# Patient Record
Sex: FEMALE | Race: BLACK OR AFRICAN AMERICAN | NOT HISPANIC OR LATINO | Employment: OTHER | URBAN - METROPOLITAN AREA
[De-identification: names, ages, dates, MRNs, and addresses within clinical notes are randomized per-mention and may not be internally consistent; named-entity substitution may affect disease eponyms.]

---

## 2017-01-23 ENCOUNTER — OFFICE VISIT (OUTPATIENT)
Dept: FAMILY MEDICINE | Facility: CLINIC | Age: 62
End: 2017-01-23
Payer: COMMERCIAL

## 2017-01-23 VITALS
DIASTOLIC BLOOD PRESSURE: 80 MMHG | TEMPERATURE: 98 F | WEIGHT: 208.13 LBS | SYSTOLIC BLOOD PRESSURE: 160 MMHG | HEIGHT: 62 IN | HEART RATE: 82 BPM | BODY MASS INDEX: 38.3 KG/M2

## 2017-01-23 DIAGNOSIS — I10 ESSENTIAL HYPERTENSION: Primary | ICD-10-CM

## 2017-01-23 DIAGNOSIS — Z85.528 PERSONAL HISTORY OF RENAL CANCER: ICD-10-CM

## 2017-01-23 DIAGNOSIS — J30.9 ALLERGIC RHINITIS, UNSPECIFIED ALLERGIC RHINITIS TRIGGER, UNSPECIFIED RHINITIS SEASONALITY: ICD-10-CM

## 2017-01-23 DIAGNOSIS — F33.40 RECURRENT MAJOR DEPRESSIVE DISORDER, IN REMISSION: ICD-10-CM

## 2017-01-23 DIAGNOSIS — M70.71 HIP BURSITIS, RIGHT: ICD-10-CM

## 2017-01-23 DIAGNOSIS — J45.20 ASTHMA, INTERMITTENT, UNCOMPLICATED: ICD-10-CM

## 2017-01-23 DIAGNOSIS — I25.10 CORONARY ARTERY DISEASE INVOLVING NATIVE HEART WITHOUT ANGINA PECTORIS, UNSPECIFIED VESSEL OR LESION TYPE: ICD-10-CM

## 2017-01-23 PROCEDURE — 3077F SYST BP >= 140 MM HG: CPT | Mod: S$GLB,,, | Performed by: FAMILY MEDICINE

## 2017-01-23 PROCEDURE — 90732 PPSV23 VACC 2 YRS+ SUBQ/IM: CPT | Mod: S$GLB,,, | Performed by: FAMILY MEDICINE

## 2017-01-23 PROCEDURE — 90686 IIV4 VACC NO PRSV 0.5 ML IM: CPT | Mod: S$GLB,,, | Performed by: FAMILY MEDICINE

## 2017-01-23 PROCEDURE — 90471 IMMUNIZATION ADMIN: CPT | Mod: S$GLB,,, | Performed by: FAMILY MEDICINE

## 2017-01-23 PROCEDURE — 90472 IMMUNIZATION ADMIN EACH ADD: CPT | Mod: S$GLB,,, | Performed by: FAMILY MEDICINE

## 2017-01-23 PROCEDURE — 99214 OFFICE O/P EST MOD 30 MIN: CPT | Mod: 25,S$GLB,, | Performed by: FAMILY MEDICINE

## 2017-01-23 PROCEDURE — 1159F MED LIST DOCD IN RCRD: CPT | Mod: S$GLB,,, | Performed by: FAMILY MEDICINE

## 2017-01-23 PROCEDURE — 3079F DIAST BP 80-89 MM HG: CPT | Mod: S$GLB,,, | Performed by: FAMILY MEDICINE

## 2017-01-23 PROCEDURE — 99999 PR PBB SHADOW E&M-EST. PATIENT-LVL III: CPT | Mod: PBBFAC,,, | Performed by: FAMILY MEDICINE

## 2017-01-23 RX ORDER — ALBUTEROL SULFATE 90 UG/1
2 AEROSOL, METERED RESPIRATORY (INHALATION) EVERY 6 HOURS PRN
Qty: 18 G | Refills: 5 | Status: SHIPPED | OUTPATIENT
Start: 2017-01-23 | End: 2017-05-26 | Stop reason: SDUPTHER

## 2017-01-23 RX ORDER — VALSARTAN AND HYDROCHLOROTHIAZIDE 320; 25 MG/1; MG/1
1 TABLET, FILM COATED ORAL DAILY
Qty: 90 TABLET | Refills: 3 | Status: SHIPPED | OUTPATIENT
Start: 2017-01-23 | End: 2018-04-02 | Stop reason: SDUPTHER

## 2017-01-23 RX ORDER — CITALOPRAM 20 MG/1
20 TABLET, FILM COATED ORAL DAILY
Qty: 90 TABLET | Refills: 3 | Status: SHIPPED | OUTPATIENT
Start: 2017-01-23 | End: 2018-03-20 | Stop reason: SDUPTHER

## 2017-01-23 RX ORDER — FLUTICASONE PROPIONATE 50 MCG
SPRAY, SUSPENSION (ML) NASAL
Qty: 16 G | Refills: 12 | Status: SHIPPED | OUTPATIENT
Start: 2017-01-23 | End: 2017-10-11

## 2017-01-23 NOTE — MR AVS SNAPSHOT
[facility]  [address] [facility]  [place] [address]  Phone: [phone]  Fax: [phone]                  Erinn Silva   2017 3:40 PM   Office Visit    Description:  Female : 1955   Provider:  Layo Reed MD   Department:  [facility]           Reason for Visit     Medication Refill     Leg Pain           Diagnoses this Visit        Comments    Essential hypertension    -  Primary     Asthma, intermittent, uncomplicated         Coronary artery disease involving native heart without angina pectoris, unspecified vessel or lesion type         Allergic rhinitis, unspecified allergic rhinitis trigger, unspecified rhinitis seasonality         Personal history of renal cancer         Recurrent major depressive disorder, in remission         Hip bursitis, right                To Do List           Future Appointments        Provider Department Dept Phone    2017 11:15 AM LABORATORY, [place] [facility] [phone]    2017 4:00 PM Layo Reed MD [facility] [phone]      Goals (5 Years of Data)     None       These Medications        Disp Refills Start End    valsartan-hydrochlorothiazide (DIOVAN-HCT) 320-25 mg per tablet 90 tablet 3 2017     Take 1 tablet by mouth once daily. - Oral    Pharmacy: [id number] - [place] -  [facility] AT [facility] Ph #: [phone]       citalopram (CELEXA) 20 MG tablet 90 tablet 3 2017     Take 1 tablet (20 mg total) by mouth once daily. - Oral    Pharmacy: [facility] [id number] - [place] -  [facility] AT [facility] Ph #: [phone]       albuterol 90 mcg/actuation inhaler 18 g 5 2017    Inhale 2 puffs into the lungs every 6 (six) hours as needed for Wheezing. - Inhalation    Pharmacy: [facility] [id number] - [place] -  [facility] AT [facility] Ph  #: [phone]       fluticasone (FLONASE) 50 mcg/actuation nasal spray 16 g 12 1/23/2017     Use 2 sprays to each nostril daily    Pharmacy: [facility] [address] [place] [address] [place] [facility] [place] AT [facility] [place]  #: [phone]         [facility] On Call     [facility] On Call Nurse Care Line - 24/7 Assistance  Registered nurses in the [facility] On Call Center provide clinical advisement, health education, appointment booking, and other advisory services.  Call for this free service at [phone].             Medications           Message regarding Medications     Verify the changes and/or additions to your medication regime listed below are the same as discussed with your clinician today.  If any of these changes or additions are incorrect, please notify your healthcare provider.        START taking these NEW medications        Refills    valsartan-hydrochlorothiazide (DIOVAN-HCT) 320-25 mg per tablet 3    Sig: Take 1 tablet by mouth once daily.    Class: Normal    Route: Oral    fluticasone (FLONASE) 50 mcg/actuation nasal spray 12    Sig: Use 2 sprays to each nostril daily    Class: Normal      CHANGE how you are taking these medications     Start Taking Instead of    citalopram (CELEXA) 20 MG tablet citalopram (CELEXA) 20 MG tablet    Dosage:  Take 1 tablet (20 mg total) by mouth once daily. Dosage:  TAKE 1 TABLET BY MOUTH DAILY    Reason for Change:  Reorder       STOP taking these medications     valsartan-hydrochlorothiazide (DIOVAN-HCT) 160-25 mg per tablet TAKE 1 TABLET BY MOUTH ONCE DAILY           Verify that the below list of medications is an accurate representation of the medications you are currently taking.  If none reported, the list may be blank. If incorrect, please contact your healthcare provider. Carry this list with you in case of emergency.           Current Medications     acetaminophen (TYLENOL) 325 MG tablet Take 2 tablets (650 mg total) by mouth every 6 (six) hours as  "needed for Pain.    albuterol 90 mcg/actuation inhaler Inhale 2 puffs into the lungs every 6 (six) hours as needed for Wheezing.    aspirin (ECOTRIN) 81 MG EC tablet Take 81 mg by mouth once daily.     citalopram (CELEXA) 20 MG tablet Take 1 tablet (20 mg total) by mouth once daily.    metoprolol succinate (TOPROL-XL) 25 MG 24 hr tablet Take 1 tablet (25 mg total) by mouth once daily.    multivitamin (ONE DAILY MULTIVITAMIN) per tablet Take 1 tablet by mouth once daily.     cetirizine (ZYRTEC) 10 MG tablet Take 1 tablet (10 mg total) by mouth once daily.    fluticasone (FLONASE) 50 mcg/actuation nasal spray Use 2 sprays to each nostril daily    omeprazole (PRILOSEC OTC) 20 MG tablet Take 20 mg by mouth once daily.    triamcinolone acetonide 0.1% (KENALOG) 0.1 % cream Apply topically 2 (two) times daily.    valsartan-hydrochlorothiazide (DIOVAN-HCT) 320-25 mg per tablet Take 1 tablet by mouth once daily.           Clinical Reference Information           Vital Signs - Last Recorded  Most recent update: 1/23/2017  3:51 PM by Mary Lombardo MA    BP Pulse Temp Ht Wt BMI    (!) 160/80 82 97.8 °F (36.6 °C) 5' 2" (1.575 m) 94.4 kg (208 lb 1.8 oz) 38.06 kg/m2      Blood Pressure          Most Recent Value    BP  (!)  160/80      Allergies as of 1/23/2017     Oxycodone    Azithromycin    Codeine    Corticosteroids (Glucocorticoids)    Doxycycline    Mobic [Meloxicam]    Pcn [Penicillins]    Shellfish Containing Products    Sulfa (Sulfonamide Antibiotics)      Immunizations Administered on Date of Encounter - 1/23/2017     Name Date Dose VIS Date Route    Pneumococcal Polysaccharide - 23 Valent 1/23/2017 0.5 mL 4/24/2015 Intramuscular    influenza - Quadrivalent - PF (ADULT) 1/23/2017 0.5 mL 8/7/2015 Intramuscular      Orders Placed During Today's Visit      Normal Orders This Visit    Influenza - Quadrivalent (3 years & older) (PF)     Pneumococcal Polysaccharide Vaccine (23 Valent) (SQ/IM)     Future Labs/Procedures " Expected by Expires    Comprehensive metabolic panel  1/23/2017 1/23/2018    Lipid panel  1/23/2017 (Approximate) 1/23/2018

## 2017-01-24 NOTE — PROGRESS NOTES
Patient presents follow-up.  Hypertension blood pressure elevated.  States missed medication yesterday, but took it the day before.  Last blood pressure the clinic was elevated.  Mild intermittent asthma needs refill albuterol.  Rarely uses.  Coronary disease without angina.  Followed by cardiology.  Rhinitis with ALLERGIES would like to try something besides oral Claritin.  Depression well controlled with current Celexa.  She does complain of some right lateral hip pain during the past couple of months.  Personal history of treated renal cancer greater than 10 years ago.  Last visit urology recommended no further continued surveillance.    Past Medical History:  Past Medical History   Diagnosis Date    Anxiety     AR (allergic rhinitis)     Asthma, intermittent     Coronary artery disease     Depression     Endometrial polyp     GERD (gastroesophageal reflux disease)     Hiatal hernia     Hyperlipidemia LDL goal <100     Hypertension     Mild mitral regurgitation     Multiple thyroid nodules 3/27/2014    Obesity (BMI 30-39.9) 3/27/2014    Renal cell carcinoma 2002     removed - no recurrence    Sleep apnea     Solitary kidney, acquired      right     Past Surgical History   Procedure Laterality Date    Cholecystectomy      Ovarian cyst removal      Tubal ligation      Rotator cuff repair       L    Colonoscopy  2002     negative    Colonoscopy  3/25/2014          Coronary angioplasty  2008?     balloon angioplasty    Cardiac catheterization  6/27/14     has blockages, but per patient no stents    Nephrectomy Left 2002     [facility] - cancer    Polypectomy       endometrial polyp removal    Appendectomy      Thyroidectomy, partial Left      Social History     Social History    Marital status:      Spouse name: N/A    Number of children: N/A    Years of education: N/A     Occupational History    Not on file.     Social History Main Topics    Smoking  status: Never Smoker    Smokeless tobacco: Never Used    Alcohol use No    Drug use: No    Sexual activity: Not on file     Other Topics Concern    Not on file     Social History Narrative     Family History   Problem Relation Age of Onset    Heart attack Father 67    Diabetes Paternal Aunt     Diabetes Paternal Uncle     Colon cancer Neg Hx     Stomach cancer Neg Hx      Review of patient's allergies indicates:   Allergen Reactions    Oxycodone Shortness Of Breath, Nausea And Vomiting and Other (See Comments)     Anxiety.    Azithromycin Itching    Codeine Palpitations    Corticosteroids (glucocorticoids) Palpitations    Doxycycline Itching and Nausea Only    Mobic [meloxicam] Itching    Pcn [penicillins] Nausea And Vomiting    Shellfish containing products Itching     topical iodine OK    Sulfa (sulfonamide antibiotics) Nausea And Vomiting     Current Outpatient Prescriptions on File Prior to Visit   Medication Sig Dispense Refill    acetaminophen (TYLENOL) 325 MG tablet Take 2 tablets (650 mg total) by mouth every 6 (six) hours as needed for Pain.  0    aspirin (ECOTRIN) 81 MG EC tablet Take 81 mg by mouth once daily.       metoprolol succinate (TOPROL-XL) 25 MG 24 hr tablet Take 1 tablet (25 mg total) by mouth once daily. 30 tablet 11    multivitamin (ONE DAILY MULTIVITAMIN) per tablet Take 1 tablet by mouth once daily.       [DISCONTINUED] albuterol 90 mcg/actuation inhaler Inhale 2 puffs into the lungs every 6 (six) hours as needed for Wheezing. (Patient taking differently: Inhale 2 puffs into the lungs every 6 (six) hours as needed for Wheezing. ) 18 g 5    [DISCONTINUED] citalopram (CELEXA) 20 MG tablet TAKE 1 TABLET BY MOUTH DAILY 30 tablet 0    [DISCONTINUED] valsartan-hydrochlorothiazide (DIOVAN-HCT) 160-25 mg per tablet TAKE 1 TABLET BY MOUTH ONCE DAILY 30 tablet 0    cetirizine (ZYRTEC) 10 MG tablet Take 1 tablet (10 mg total) by mouth once daily. 10 tablet 0    omeprazole  "(PRILOSEC OTC) 20 MG tablet Take 20 mg by mouth once daily.      triamcinolone acetonide 0.1% (KENALOG) 0.1 % cream Apply topically 2 (two) times daily. 45 g 0     No current facility-administered medications on file prior to visit.            ROS:  GENERAL: No fever, chills,  or significant weight changes.   CARDIOVASCULAR: Denies chest pain, PND, orthopnea or reduced exercise tolerance.  ABDOMEN: Appetite fine. Denies diarrhea, abdominal pain, hematemesis or blood in stool.  URINARY: No flank pain, dysuria or hematuria.      OBJECTIVE:     Vitals:    01/23/17 1544   BP: (!) 160/80   Pulse: 82   Temp: 97.8 °F (36.6 °C)   Weight: 94.4 kg (208 lb 1.8 oz)   Height: 5' 2" (1.575 m)     Wt Readings from Last 3 Encounters:   01/23/17 94.4 kg (208 lb 1.8 oz)   12/05/16 96.6 kg (212 lb 15.4 oz)   04/22/16 97.7 kg (215 lb 6.2 oz)     APPEARANCE: Well nourished, well developed, in no acute distress.    HEAD: Normocephalic.  Atraumatic.  No sinus tenderness.  EYES:   Right eye: Pupil reactive.  Conjunctiva clear.    Left eye: Pupil reactive.  Conjunctiva clear.    Both fundi:  Grossly normal to nondilated exam. EOMI.    EARS: TM's intact. Light reflex normal. No retraction or perforation.    NOSE:  clear.  MOUTH & THROAT:  No pharyngeal erythema or exudate. No lesions.  NECK: Supple. No bruits.  No JVD.  No cervical lymphadenopathy.  No thyromegaly.    CHEST: Breath sounds clear bilaterally.  Normal respiratory effort  CARDIOVASCULAR: Normal rate.  Regular rhythm.  No murmurs.  No rub.  No gallops.  ABDOMEN: Bowel sounds normal.  Soft.  No tenderness.  No organomegaly.  PERIPHERAL VASCULAR: No cyanosis.  No clubbing.  No edema.  NEUROLOGIC: No focal findings.  MENTAL STATUS: Alert.  Oriented x 3.  Right hip full range of motion.  Mild tenderness palpation laterally greater trochanter      Erinn was seen today for medication refill and leg pain.    Diagnoses and all orders for this visit:    Essential hypertension  -     " Comprehensive metabolic panel; Future  -     Lipid panel; Future    Asthma, intermittent, uncomplicated  -     albuterol 90 mcg/actuation inhaler; Inhale 2 puffs into the lungs every 6 (six) hours as needed for Wheezing.    Coronary artery disease involving native heart without angina pectoris, unspecified vessel or lesion type    Allergic rhinitis, unspecified allergic rhinitis trigger, unspecified rhinitis seasonality    Personal history of renal cancer    Recurrent major depressive disorder, in remission    Hip bursitis, right    Other orders  -     valsartan-hydrochlorothiazide (DIOVAN-HCT) 320-25 mg per tablet; Take 1 tablet by mouth once daily.  -     citalopram (CELEXA) 20 MG tablet; Take 1 tablet (20 mg total) by mouth once daily.  -     Influenza - Quadrivalent (3 years & older) (PF)  -     Pneumococcal Polysaccharide Vaccine (23 Valent) (SQ/IM)  -     fluticasone (FLONASE) 50 mcg/actuation nasal spray; Use 2 sprays to each nostril daily      Increase Diovan HCT.  Continue other medications.follow-up 1 month

## 2017-01-26 ENCOUNTER — LAB VISIT (OUTPATIENT)
Dept: LAB | Facility: HOSPITAL | Age: 62
End: 2017-01-26
Attending: FAMILY MEDICINE
Payer: COMMERCIAL

## 2017-01-26 DIAGNOSIS — I10 ESSENTIAL HYPERTENSION: ICD-10-CM

## 2017-01-26 LAB
ALBUMIN SERPL BCP-MCNC: 3.8 G/DL
ALP SERPL-CCNC: 76 U/L
ALT SERPL W/O P-5'-P-CCNC: 21 U/L
ANION GAP SERPL CALC-SCNC: 6 MMOL/L
AST SERPL-CCNC: 22 U/L
BILIRUB SERPL-MCNC: 0.7 MG/DL
BUN SERPL-MCNC: 12 MG/DL
CALCIUM SERPL-MCNC: 9.5 MG/DL
CHLORIDE SERPL-SCNC: 101 MMOL/L
CHOLEST/HDLC SERPL: 4.8 {RATIO}
CO2 SERPL-SCNC: 31 MMOL/L
CREAT SERPL-MCNC: 0.8 MG/DL
EST. GFR  (AFRICAN AMERICAN): >60 ML/MIN/1.73 M^2
EST. GFR  (NON AFRICAN AMERICAN): >60 ML/MIN/1.73 M^2
GLUCOSE SERPL-MCNC: 100 MG/DL
HDL/CHOLESTEROL RATIO: 20.7 %
HDLC SERPL-MCNC: 246 MG/DL
HDLC SERPL-MCNC: 51 MG/DL
LDLC SERPL CALC-MCNC: 166.8 MG/DL
NONHDLC SERPL-MCNC: 195 MG/DL
POTASSIUM SERPL-SCNC: 4.4 MMOL/L
PROT SERPL-MCNC: 7.7 G/DL
SODIUM SERPL-SCNC: 138 MMOL/L
TRIGL SERPL-MCNC: 141 MG/DL

## 2017-01-26 PROCEDURE — 80061 LIPID PANEL: CPT

## 2017-01-26 PROCEDURE — 36415 COLL VENOUS BLD VENIPUNCTURE: CPT | Mod: PO

## 2017-01-26 PROCEDURE — 80053 COMPREHEN METABOLIC PANEL: CPT

## 2017-02-13 DIAGNOSIS — E78.5 HYPERLIPIDEMIA, UNSPECIFIED HYPERLIPIDEMIA TYPE: Primary | ICD-10-CM

## 2017-02-13 RX ORDER — PRAVASTATIN SODIUM 20 MG/1
20 TABLET ORAL DAILY
COMMUNITY
End: 2017-02-13 | Stop reason: SDUPTHER

## 2017-02-13 RX ORDER — PRAVASTATIN SODIUM 20 MG/1
20 TABLET ORAL DAILY
Qty: 30 TABLET | Refills: 11 | Status: SHIPPED | OUTPATIENT
Start: 2017-02-13 | End: 2017-10-31 | Stop reason: DRUGHIGH

## 2017-02-13 NOTE — TELEPHONE ENCOUNTER
Cholesterol is elevated.  Recommend start pravastatin 20 mg daily.  Repeat lipid panel, ALT in 3 months. My nurse will contact you to arrange.   Thanks,   Dr. Reed     Results released on [facility]

## 2017-02-14 ENCOUNTER — PATIENT OUTREACH (OUTPATIENT)
Dept: ADMINISTRATIVE | Facility: HOSPITAL | Age: 62
End: 2017-02-14
Payer: COMMERCIAL

## 2017-02-20 ENCOUNTER — TELEPHONE (OUTPATIENT)
Dept: FAMILY MEDICINE | Facility: CLINIC | Age: 62
End: 2017-02-20

## 2017-02-20 NOTE — TELEPHONE ENCOUNTER
----- Message from Julia Scales sent at 2/17/2017  3:20 PM CST -----  Contact: Patient  Patient is returning a call, please call her back at [phone]. Thank you

## 2017-02-22 ENCOUNTER — OFFICE VISIT (OUTPATIENT)
Dept: FAMILY MEDICINE | Facility: CLINIC | Age: 62
End: 2017-02-22
Payer: COMMERCIAL

## 2017-02-22 VITALS
SYSTOLIC BLOOD PRESSURE: 158 MMHG | HEART RATE: 80 BPM | HEIGHT: 62 IN | DIASTOLIC BLOOD PRESSURE: 80 MMHG | WEIGHT: 207.13 LBS | BODY MASS INDEX: 38.12 KG/M2

## 2017-02-22 DIAGNOSIS — E78.5 HYPERLIPIDEMIA LDL GOAL <100: ICD-10-CM

## 2017-02-22 DIAGNOSIS — I10 ESSENTIAL HYPERTENSION: Primary | ICD-10-CM

## 2017-02-22 DIAGNOSIS — I25.10 CORONARY ARTERY DISEASE INVOLVING NATIVE HEART WITHOUT ANGINA PECTORIS, UNSPECIFIED VESSEL OR LESION TYPE: ICD-10-CM

## 2017-02-22 DIAGNOSIS — E66.01 SEVERE OBESITY (BMI 35.0-35.9 WITH COMORBIDITY): ICD-10-CM

## 2017-02-22 PROCEDURE — 99213 OFFICE O/P EST LOW 20 MIN: CPT | Mod: S$GLB,,, | Performed by: FAMILY MEDICINE

## 2017-02-22 PROCEDURE — 3079F DIAST BP 80-89 MM HG: CPT | Mod: S$GLB,,, | Performed by: FAMILY MEDICINE

## 2017-02-22 PROCEDURE — 3077F SYST BP >= 140 MM HG: CPT | Mod: S$GLB,,, | Performed by: FAMILY MEDICINE

## 2017-02-22 PROCEDURE — 1160F RVW MEDS BY RX/DR IN RCRD: CPT | Mod: S$GLB,,, | Performed by: FAMILY MEDICINE

## 2017-02-22 PROCEDURE — 99999 PR PBB SHADOW E&M-EST. PATIENT-LVL III: CPT | Mod: PBBFAC,,, | Performed by: FAMILY MEDICINE

## 2017-02-22 RX ORDER — METOPROLOL SUCCINATE 50 MG/1
50 TABLET, EXTENDED RELEASE ORAL DAILY
Qty: 30 TABLET | Refills: 11 | Status: SHIPPED | OUTPATIENT
Start: 2017-02-22 | End: 2018-04-04 | Stop reason: SDUPTHER

## 2017-02-22 NOTE — MR AVS SNAPSHOT
[facility]  [address] [facility]  [place] [address]  Phone: [phone]  Fax: [phone]                  Erinn Silva   2017 4:00 PM   Office Visit    Description:  Female : 1955   Provider:  Layo Reed MD   Department:  [facility]           Reason for Visit     Follow-up           Diagnoses this Visit        Comments    Essential hypertension    -  Primary     Coronary artery disease involving native heart without angina pectoris, unspecified vessel or lesion type         Hyperlipidemia LDL goal <100         Severe obesity (BMI 35.0-35.9 with comorbidity)                To Do List           Future Appointments        Provider Department Dept Phone    3/23/2017 4:00 PM NURSE, [facility] [phone]    2017 9:25 AM LABORATORY, [place] [facility] [phone]      Goals (5 Years of Data)     None       These Medications        Disp Refills Start End    metoprolol succinate (TOPROL-XL) 50 MG 24 hr tablet 30 tablet 11 2017     Take 1 tablet (50 mg total) by mouth once daily. - Oral    Pharmacy: [facility] [id number] - [address] [place] AT [facility] [place] Ph #: [phone]         [facility]     [facility] Nurse Care Line -  Assistance  Registered nurses in the [facility] Center provide clinical advisement, health education, appointment booking, and other advisory services.  Call for this free service at [phone].             Medications           Message regarding Medications     Verify the changes and/or additions to your medication regime listed below are the same as discussed with your clinician today.  If any of these changes or additions are incorrect, please notify your healthcare provider.        CHANGE how you are taking these medications     Start Taking Instead of    metoprolol succinate (TOPROL-XL) 50 MG 24 hr tablet metoprolol  "succinate (TOPROL-XL) 25 MG 24 hr tablet    Dosage:  Take 1 tablet (50 mg total) by mouth once daily. Dosage:  Take 1 tablet (25 mg total) by mouth once daily.    Reason for Change:  Reorder            Verify that the below list of medications is an accurate representation of the medications you are currently taking.  If none reported, the list may be blank. If incorrect, please contact your healthcare provider. Carry this list with you in case of emergency.           Current Medications     acetaminophen (TYLENOL) 325 MG tablet Take 2 tablets (650 mg total) by mouth every 6 (six) hours as needed for Pain.    albuterol 90 mcg/actuation inhaler Inhale 2 puffs into the lungs every 6 (six) hours as needed for Wheezing.    aspirin (ECOTRIN) 81 MG EC tablet Take 81 mg by mouth once daily.     citalopram (CELEXA) 20 MG tablet Take 1 tablet (20 mg total) by mouth once daily.    fluticasone (FLONASE) 50 mcg/actuation nasal spray Use 2 sprays to each nostril daily    metoprolol succinate (TOPROL-XL) 50 MG 24 hr tablet Take 1 tablet (50 mg total) by mouth once daily.    multivitamin (ONE DAILY MULTIVITAMIN) per tablet Take 1 tablet by mouth once daily.     pravastatin (PRAVACHOL) 20 MG tablet Take 1 tablet (20 mg total) by mouth once daily.    valsartan-hydrochlorothiazide (DIOVAN-HCT) 320-25 mg per tablet Take 1 tablet by mouth once daily.    cetirizine (ZYRTEC) 10 MG tablet Take 1 tablet (10 mg total) by mouth once daily.    omeprazole (PRILOSEC OTC) 20 MG tablet Take 20 mg by mouth once daily.    triamcinolone acetonide 0.1% (KENALOG) 0.1 % cream Apply topically 2 (two) times daily.           Clinical Reference Information           Your Vitals Were     BP Pulse Height Weight BMI    158/80 80 5' 2" (1.575 m) 93.9 kg (207 lb 2 oz) 37.88 kg/m2      Blood Pressure          Most Recent Value    BP  (!)  158/80      Allergies as of 2/22/2017     Oxycodone    Azithromycin    Codeine    Corticosteroids (Glucocorticoids)    " Doxycycline    Mobic [Meloxicam]    Pcn [Penicillins]    Shellfish Containing Products    Sulfa (Sulfonamide Antibiotics)      Immunizations Administered on Date of Encounter - 2/22/2017     None      Language Assistance Services     ATTENTION: Language assistance services are available, free of charge. Please call [phone].      ATENCIÓN: Si habla selwyn, tiene a vasquez disposición servicios gratuitos de asistencia lingüística. Llame al [phone].     CHÚ Ý: N?u b?n nói Ti?ng Vi?t, có các d?ch v? h? tr? ngôn ng? mi?n phí dành cho b?n. G?i s? [phone].         [facility] complies with applicable Federal civil rights laws and does not discriminate on the basis of race, color, national origin, age, disability, or sex.

## 2017-03-15 ENCOUNTER — TELEPHONE (OUTPATIENT)
Dept: FAMILY MEDICINE | Facility: CLINIC | Age: 62
End: 2017-03-15

## 2017-03-15 ENCOUNTER — LAB VISIT (OUTPATIENT)
Dept: LAB | Facility: HOSPITAL | Age: 62
End: 2017-03-15
Attending: NURSE PRACTITIONER
Payer: COMMERCIAL

## 2017-03-15 ENCOUNTER — OFFICE VISIT (OUTPATIENT)
Dept: FAMILY MEDICINE | Facility: CLINIC | Age: 62
End: 2017-03-15
Payer: COMMERCIAL

## 2017-03-15 VITALS
DIASTOLIC BLOOD PRESSURE: 81 MMHG | SYSTOLIC BLOOD PRESSURE: 137 MMHG | WEIGHT: 213.75 LBS | TEMPERATURE: 98 F | HEIGHT: 62 IN | BODY MASS INDEX: 39.34 KG/M2 | HEART RATE: 68 BPM

## 2017-03-15 DIAGNOSIS — R10.11 RUQ DISCOMFORT: Primary | ICD-10-CM

## 2017-03-15 DIAGNOSIS — R11.0 NAUSEA: ICD-10-CM

## 2017-03-15 DIAGNOSIS — R10.11 RUQ DISCOMFORT: ICD-10-CM

## 2017-03-15 DIAGNOSIS — R19.7 DIARRHEA, UNSPECIFIED TYPE: ICD-10-CM

## 2017-03-15 LAB
ALBUMIN SERPL BCP-MCNC: 3.5 G/DL
ALP SERPL-CCNC: 68 U/L
ALT SERPL W/O P-5'-P-CCNC: 20 U/L
AST SERPL-CCNC: 16 U/L
BILIRUB DIRECT SERPL-MCNC: <0.1 MG/DL
BILIRUB SERPL-MCNC: 0.2 MG/DL
PROT SERPL-MCNC: 7.2 G/DL
WBC # BLD AUTO: 6.9 K/UL

## 2017-03-15 PROCEDURE — 99213 OFFICE O/P EST LOW 20 MIN: CPT | Mod: S$GLB,,, | Performed by: NURSE PRACTITIONER

## 2017-03-15 PROCEDURE — 1160F RVW MEDS BY RX/DR IN RCRD: CPT | Mod: S$GLB,,, | Performed by: NURSE PRACTITIONER

## 2017-03-15 PROCEDURE — 99999 PR PBB SHADOW E&M-EST. PATIENT-LVL IV: CPT | Mod: PBBFAC,,, | Performed by: NURSE PRACTITIONER

## 2017-03-15 PROCEDURE — 36415 COLL VENOUS BLD VENIPUNCTURE: CPT | Mod: PO

## 2017-03-15 PROCEDURE — 3079F DIAST BP 80-89 MM HG: CPT | Mod: S$GLB,,, | Performed by: NURSE PRACTITIONER

## 2017-03-15 PROCEDURE — 80076 HEPATIC FUNCTION PANEL: CPT

## 2017-03-15 PROCEDURE — 3075F SYST BP GE 130 - 139MM HG: CPT | Mod: S$GLB,,, | Performed by: NURSE PRACTITIONER

## 2017-03-15 PROCEDURE — 85048 AUTOMATED LEUKOCYTE COUNT: CPT

## 2017-03-15 RX ORDER — DICYCLOMINE HYDROCHLORIDE 20 MG/1
20 TABLET ORAL 2 TIMES DAILY
Qty: 20 TABLET | Refills: 0 | Status: SHIPPED | OUTPATIENT
Start: 2017-03-15 | End: 2017-03-25

## 2017-03-15 RX ORDER — ONDANSETRON HYDROCHLORIDE 8 MG/1
8 TABLET, FILM COATED ORAL EVERY 8 HOURS PRN
Qty: 15 TABLET | Refills: 0 | Status: SHIPPED | OUTPATIENT
Start: 2017-03-15 | End: 2017-03-20

## 2017-03-15 NOTE — MR AVS SNAPSHOT
[facility]  [address] [facility] [address]  Phone: [phone]  Fax: [phone]                  Erinn Silva   3/15/2017 1:40 PM   Office Visit    Description:  Female : 1955   Provider:  Tessa Cross NP   Department:  [facility]           Reason for Visit     Abdominal Pain           Diagnoses this Visit        Comments    RUQ discomfort    -  Primary     Nausea         Diarrhea, unspecified type                To Do List           Future Appointments        Provider Department Dept Phone    3/15/2017 2:35 PM LABORATORY, [place] [facility] [phone]    3/21/2017 8:00 AM HMDC US1 [facility] [phone]    3/23/2017 4:00 PM NURSE, [facility] [phone]    2017 9:25 AM LABORATORY, [place] [facility] [phone]      Goals (5 Years of Data)     None       These Medications        Disp Refills Start End    dicyclomine (BENTYL) 20 mg tablet 20 tablet 0 3/15/2017 3/25/2017    Take 1 tablet (20 mg total) by mouth 2 (two) times daily. - Oral    Pharmacy: [facility] [id number] [place] [address] [address] [facility] [address] [facility] Ph #: [phone]       ondansetron (ZOFRAN) 8 MG tablet 15 tablet 0 3/15/2017 3/20/2017    Take 1 tablet (8 mg total) by mouth every 8 (eight) hours as needed. - Oral    Pharmacy: [facility] [id number] [place] [address] [place] - [address] [facility] [address] [facility] Ph #: [phone]         [facility]     [facility] Care Line -  Assistance  Registered nurses in the [facility] Center provide clinical advisement, health education, appointment booking, and other advisory services.  Call for this free service at [phone].             Medications           Message regarding Medications     Verify the changes and/or additions to your medication regime listed below are the same  as discussed with your clinician today.  If any of these changes or additions are incorrect, please notify your healthcare provider.        START taking these NEW medications        Refills    dicyclomine (BENTYL) 20 mg tablet 0    Sig: Take 1 tablet (20 mg total) by mouth 2 (two) times daily.    Class: Normal    Route: Oral    ondansetron (ZOFRAN) 8 MG tablet 0    Sig: Take 1 tablet (8 mg total) by mouth every 8 (eight) hours as needed.    Class: Normal    Route: Oral           Verify that the below list of medications is an accurate representation of the medications you are currently taking.  If none reported, the list may be blank. If incorrect, please contact your healthcare provider. Carry this list with you in case of emergency.           Current Medications     acetaminophen (TYLENOL) 325 MG tablet Take 2 tablets (650 mg total) by mouth every 6 (six) hours as needed for Pain.    albuterol 90 mcg/actuation inhaler Inhale 2 puffs into the lungs every 6 (six) hours as needed for Wheezing.    aspirin (ECOTRIN) 81 MG EC tablet Take 81 mg by mouth once daily.     citalopram (CELEXA) 20 MG tablet Take 1 tablet (20 mg total) by mouth once daily.    fluticasone (FLONASE) 50 mcg/actuation nasal spray Use 2 sprays to each nostril daily    metoprolol succinate (TOPROL-XL) 50 MG 24 hr tablet Take 1 tablet (50 mg total) by mouth once daily.    multivitamin (ONE DAILY MULTIVITAMIN) per tablet Take 1 tablet by mouth once daily.     pravastatin (PRAVACHOL) 20 MG tablet Take 1 tablet (20 mg total) by mouth once daily.    valsartan-hydrochlorothiazide (DIOVAN-HCT) 320-25 mg per tablet Take 1 tablet by mouth once daily.    cetirizine (ZYRTEC) 10 MG tablet Take 1 tablet (10 mg total) by mouth once daily.    dicyclomine (BENTYL) 20 mg tablet Take 1 tablet (20 mg total) by mouth 2 (two) times daily.    omeprazole (PRILOSEC OTC) 20 MG tablet Take 20 mg by mouth once daily.    ondansetron (ZOFRAN) 8 MG tablet Take 1 tablet (8 mg  "total) by mouth every 8 (eight) hours as needed.    triamcinolone acetonide 0.1% (KENALOG) 0.1 % cream Apply topically 2 (two) times daily.           Clinical Reference Information           Your Vitals Were     BP Pulse Temp Height Weight BMI    137/81 68 98 °F (36.7 °C) 5' 2" (1.575 m) 97 kg (213 lb 11.8 oz) 39.09 kg/m2      Blood Pressure          Most Recent Value    BP  137/81      Allergies as of 3/15/2017     Oxycodone    Azithromycin    Codeine    Corticosteroids (Glucocorticoids)    Doxycycline    Mobic [Meloxicam]    Pcn [Penicillins]    Shellfish Containing Products    Sulfa (Sulfonamide Antibiotics)      Immunizations Administered on Date of Encounter - 3/15/2017     None      Orders Placed During Today's Visit     Future Labs/Procedures Expected by Expires    Giardia / Cryptosporidum, EIA  3/15/2017 5/14/2018    H. pylori antigen, stool  3/15/2017 3/15/2018    Hepatic function panel  3/15/2017 5/14/2018    Occult blood x 1, stool  3/15/2017 3/15/2018    Rotavirus antigen, stool  3/15/2017 3/15/2018    Stool culture  3/15/2017 3/15/2018    Stool Exam-Ova,Cysts,Parasites  3/15/2017 3/15/2018    US Abdomen Limited  3/15/2017 3/15/2018    WBC, Stool  3/15/2017 3/15/2018    WBC  3/15/2017 5/14/2018      Instructions    Hydrate well  Imodium OTC as directed (diarrhea)  Report to ER immediately if symptoms worsen       Language Assistance Services     ATTENTION: Language assistance services are available, free of charge. Please call [phone].      ATENCIÓN: Si habla español, tiene a vasquez disposición servicios gratuitos de asistencia lingüística. Llame al [phone].     CHÚ Ý: N?u b?n nói Ti?ng Vi?t, có các d?ch v? h? tr? ngôn ng? mi?n phí dành cho b?n. G?i s? [phone].         [facility] complies with applicable Federal civil rights laws and does not discriminate on the basis of race, color, national origin, age, disability, or sex.        "

## 2017-03-15 NOTE — PROGRESS NOTES
Subjective:       Patient ID: Erinn Silva is a 61 y.o. female.    Chief Complaint: Abdominal Pain    Abdominal Pain   This is a new problem. The current episode started in the past 7 days (x 4 d). The onset quality is gradual. The problem occurs intermittently. The problem has been gradually improving. The pain is located in the RUQ. The pain is mild. The quality of the pain is sharp and cramping. The abdominal pain does not radiate. Associated symptoms include diarrhea, flatus and nausea. Pertinent negatives include no anorexia, arthralgias, belching, constipation, dysuria, fever, frequency, headaches, hematochezia, hematuria, melena, myalgias, vomiting or weight loss. Nothing aggravates the pain. The pain is relieved by nothing. She has tried proton pump inhibitors for the symptoms. The treatment provided no relief. Her past medical history is significant for GERD and irritable bowel syndrome. There is no history of abdominal surgery, colon cancer, Crohn's disease, gallstones, pancreatitis, PUD or ulcerative colitis.     Past Medical History:   Diagnosis Date    Anxiety     AR (allergic rhinitis)     Asthma, intermittent     Coronary artery disease     Depression     Endometrial polyp     GERD (gastroesophageal reflux disease)     Hiatal hernia     Hyperlipidemia LDL goal <100     Hypertension     Mild mitral regurgitation     Multiple thyroid nodules 3/27/2014    Obesity (BMI 30-39.9) 3/27/2014    Renal cell carcinoma 2002    removed - no recurrence    Sleep apnea     Solitary kidney, acquired     right     Social History     Social History    Marital status:      Spouse name: N/A    Number of children: N/A    Years of education: N/A     Occupational History         Social History Main Topics    Smoking status: Never Smoker    Smokeless tobacco: Never Used    Alcohol use No    Drug use: No    Sexual activity: Not on file     Social History Narrative     Past Surgical History:    Procedure Laterality Date    APPENDECTOMY      CARDIAC CATHETERIZATION  6/27/14    has blockages, but per patient no stents    CHOLECYSTECTOMY      COLONOSCOPY  2002    negative    COLONOSCOPY  3/25/2014         CORONARY ANGIOPLASTY  2008?    balloon angioplasty    NEPHRECTOMY Left 2002    [facility] - [facility]    OVARIAN CYST REMOVAL      POLYPECTOMY      endometrial polyp removal    ROTATOR CUFF REPAIR      L    THYROIDECTOMY, PARTIAL Left     TUBAL LIGATION         Review of Systems   Constitutional: Negative.  Negative for fever and weight loss.   HENT: Negative.    Eyes: Negative.    Respiratory: Negative.    Cardiovascular: Negative.    Gastrointestinal: Positive for abdominal pain, diarrhea, flatus and nausea. Negative for anorexia, constipation, hematochezia, melena and vomiting.   Endocrine: Negative.    Genitourinary: Negative.  Negative for dysuria, frequency and hematuria.   Musculoskeletal: Negative.  Negative for arthralgias and myalgias.   Skin: Negative.    Allergic/Immunologic: Negative.    Neurological: Negative.  Negative for headaches.   Psychiatric/Behavioral: Negative.        Objective:      Physical Exam   Constitutional: She is oriented to person, place, and time. She appears well-developed and well-nourished.   HENT:   Head: Normocephalic.   Right Ear: External ear normal.   Left Ear: External ear normal.   Nose: Nose normal.   Mouth/Throat: Oropharynx is clear and moist.   Eyes: Conjunctivae are normal. Pupils are equal, round, and reactive to light.   Neck: Normal range of motion. Neck supple.   Cardiovascular: Normal rate, regular rhythm and normal heart sounds.    Pulmonary/Chest: Effort normal.   Abdominal: Soft. Bowel sounds are normal. There is tenderness in the right upper quadrant. There is no rigidity, no rebound, no guarding, no CVA tenderness, no tenderness at McBurney's point and negative Perez's sign.   Musculoskeletal: Normal range of motion.    Neurological: She is alert and oriented to person, place, and time.   Skin: Skin is warm and dry.   Psychiatric: She has a normal mood and affect. Her behavior is normal. Judgment and thought content normal.   Nursing note and vitals reviewed.      Assessment:       1. RUQ discomfort    2. Nausea    3. Diarrhea, unspecified type        Plan:           Erinn was seen today for abdominal pain.    Diagnoses and all orders for this visit:    RUQ discomfort  Nausea  Diarrhea, unspecified type  -     US Abdomen Limited; Future  -     Hepatic function panel; Future  -     WBC; Future  -     Stool culture; Future  -     Giardia / Cryptosporidum, EIA; Future  -     H. pylori antigen, stool; Future  -     Occult blood x 1, stool; Future  -     Rotavirus antigen, stool; Future  -     Stool Exam-Ova,Cysts,Parasites; Future  -     WBC, Stool; Future  -     dicyclomine (BENTYL) 20 mg tablet; Take 1 tablet (20 mg total) by mouth 2 (two) times daily.        -     ondansetron (ZOFRAN) 8 MG tablet; Take 1 tablet (8 mg total) by mouth every 8 (eight) hours as needed.  Dispense: 15 tablet; Refill: 0  Hydrate well  Imodium OTC as directed (diarrhea)  Report to ER immediately if symptoms worsen

## 2017-03-15 NOTE — TELEPHONE ENCOUNTER
----- Message from Angie Escoto sent at 3/15/2017  2:51 PM CDT -----  Pt at [phone]//states she saw Ms Gurrola a few minutes ago//she forgot to get a dr vijay//for yesterday and today//3-14/17 and 3/15/17//would like to have faxed if possible//fax is [phone]//please call//thanks//rebeka

## 2017-03-20 ENCOUNTER — TELEPHONE (OUTPATIENT)
Dept: RADIOLOGY | Facility: HOSPITAL | Age: 62
End: 2017-03-20

## 2017-03-21 ENCOUNTER — HOSPITAL ENCOUNTER (OUTPATIENT)
Dept: RADIOLOGY | Facility: HOSPITAL | Age: 62
Discharge: HOME | End: 2017-03-21
Attending: NURSE PRACTITIONER
Payer: COMMERCIAL

## 2017-03-21 ENCOUNTER — TELEPHONE (OUTPATIENT)
Dept: FAMILY MEDICINE | Facility: CLINIC | Age: 62
End: 2017-03-21

## 2017-03-21 DIAGNOSIS — R10.11 RUQ DISCOMFORT: ICD-10-CM

## 2017-03-21 PROCEDURE — 76705 ECHO EXAM OF ABDOMEN: CPT | Mod: TC,PO

## 2017-03-21 PROCEDURE — 76705 ECHO EXAM OF ABDOMEN: CPT | Mod: 26,,, | Performed by: RADIOLOGY

## 2017-03-21 NOTE — TELEPHONE ENCOUNTER
----- Message from Kathy Landers sent at 3/21/2017  9:03 AM CDT -----  Patient was in the office on today for an u/s and lab work. Requesting a doctor's note. Please send to [facility] portal. Please adv/call [phone].//thanks. cw

## 2017-04-19 LAB — HUMAN PAPILLOMAVIRUS (HPV): NORMAL

## 2017-04-27 ENCOUNTER — OFFICE VISIT (OUTPATIENT)
Dept: UROLOGY | Facility: CLINIC | Age: 62
End: 2017-04-27
Payer: COMMERCIAL

## 2017-04-27 VITALS
HEART RATE: 79 BPM | HEIGHT: 62 IN | BODY MASS INDEX: 38.75 KG/M2 | WEIGHT: 210.56 LBS | DIASTOLIC BLOOD PRESSURE: 75 MMHG | SYSTOLIC BLOOD PRESSURE: 145 MMHG

## 2017-04-27 DIAGNOSIS — R31.9 HEMATURIA: Primary | ICD-10-CM

## 2017-04-27 DIAGNOSIS — E66.9 OBESITY (BMI 30-39.9): ICD-10-CM

## 2017-04-27 DIAGNOSIS — N39.0 RECURRENT UTI: ICD-10-CM

## 2017-04-27 LAB
BILIRUB SERPL-MCNC: ABNORMAL MG/DL
BLOOD URINE, POC: ABNORMAL
COLOR, POC UA: YELLOW
GLUCOSE UR QL STRIP: ABNORMAL
KETONES UR QL STRIP: ABNORMAL
LEUKOCYTE ESTERASE URINE, POC: ABNORMAL
NITRITE, POC UA: ABNORMAL
PH, POC UA: 5
PROTEIN, POC: ABNORMAL
SPECIFIC GRAVITY, POC UA: 1.01
UROBILINOGEN, POC UA: 1

## 2017-04-27 PROCEDURE — 1160F RVW MEDS BY RX/DR IN RCRD: CPT | Mod: S$GLB,,, | Performed by: UROLOGY

## 2017-04-27 PROCEDURE — 99999 PR PBB SHADOW E&M-EST. PATIENT-LVL III: CPT | Mod: PBBFAC,,, | Performed by: UROLOGY

## 2017-04-27 PROCEDURE — 99214 OFFICE O/P EST MOD 30 MIN: CPT | Mod: 25,S$GLB,, | Performed by: UROLOGY

## 2017-04-27 PROCEDURE — 3078F DIAST BP <80 MM HG: CPT | Mod: S$GLB,,, | Performed by: UROLOGY

## 2017-04-27 PROCEDURE — 81002 URINALYSIS NONAUTO W/O SCOPE: CPT | Mod: S$GLB,,, | Performed by: UROLOGY

## 2017-04-27 PROCEDURE — 3077F SYST BP >= 140 MM HG: CPT | Mod: S$GLB,,, | Performed by: UROLOGY

## 2017-04-27 RX ORDER — NITROFURANTOIN 25; 75 MG/1; MG/1
CAPSULE ORAL
Refills: 0 | COMMUNITY
Start: 2017-04-18 | End: 2017-08-02

## 2017-04-27 NOTE — PROGRESS NOTES
UROLOGY [place]  4 27 17    Urinalysis: col yellow, sg 15, pH 5, leuco -, nitrites -, prot -, glucose -, bili -, blood -    c-c recurrent uti    Age 61, comes in because she is concerned that she has been having many uti's, approximately 4 in the last year.  The latest one is being treated with nitrofurantoin. Says she had them when she was young, and then for a while she was not having them.  Now she is having them again. The symptoms are dysuria and frequency. No pains at the renal level or any fever.    She is also known to us for hx of renal cancer. In 2014 we did a CT scan with and without contrast that did not show any sign of recurrence in her renal bed of the L side or any similar malignancy or suspicious deformity in her surviving kidney.   At that point it had been 12 from her surgery and I recommended stopping the oncologic followup.    At times pt has a pain that runs down the sacroiliac area to the thigh posteriorly and gets worse with some movements of the lower extremity    PMH    Surgical:  has a past surgical history that includes Cholecystectomy; Ovarian cyst removal; Tubal ligation; Rotator cuff repair; Colonoscopy (2002); Colonoscopy (3/25/2014); Coronary angioplasty (2008?); Cardiac catheterization (6/27/14); Nephrectomy (Left, 2002); Polypectomy; Appendectomy; and Thyroidectomy, partial (Left).    Medical:  has a past medical history of Anxiety; AR (allergic rhinitis); Asthma, intermittent; Coronary artery disease; Depression; Endometrial polyp; GERD (gastroesophageal reflux disease); Hiatal hernia; Hyperlipidemia LDL goal <100; Hypertension; Mild mitral regurgitation; Multiple thyroid nodules; Obesity (BMI 30-39.9); Renal cell carcinoma; Sleep apnea; and Solitary kidney, acquired.    Familial: no fh of renal disease. Father had a heart attack at age 67    Social: , lives in [place], [place]    Current Outpatient Prescriptions on File Prior to Visit   Medication Sig Dispense Refill     acetaminophen (TYLENOL) 325 MG tablet Take 2 tablets (650 mg total) by mouth every 6 (six) hours as needed for Pain.  0    albuterol 90 mcg/actuation inhaler Inhale 2 puffs into the lungs every 6 (six) hours as needed for Wheezing. 18 g 5    aspirin (ECOTRIN) 81 MG EC tablet Take 81 mg by mouth once daily.       citalopram (CELEXA) 20 MG tablet Take 1 tablet (20 mg total) by mouth once daily. 90 tablet 3    fluticasone (FLONASE) 50 mcg/actuation nasal spray Use 2 sprays to each nostril daily 16 g 12    metoprolol succinate (TOPROL-XL) 50 MG 24 hr tablet Take 1 tablet (50 mg total) by mouth once daily. 30 tablet 11    multivitamin (ONE DAILY MULTIVITAMIN) per tablet Take 1 tablet by mouth once daily.       pravastatin (PRAVACHOL) 20 MG tablet Take 1 tablet (20 mg total) by mouth once daily. 30 tablet 11    valsartan-hydrochlorothiazide (DIOVAN-HCT) 320-25 mg per tablet Take 1 tablet by mouth once daily. 90 tablet 3    cetirizine (ZYRTEC) 10 MG tablet Take 1 tablet (10 mg total) by mouth once daily. 10 tablet 0    omeprazole (PRILOSEC OTC) 20 MG tablet Take 20 mg by mouth once daily.      triamcinolone acetonide 0.1% (KENALOG) 0.1 % cream Apply topically 2 (two) times daily. 45 g 0     REVIEW OF SYSTEMS  GENERAL: No complaints of fatigue. No headaches or dizzy spells.   HEENT: vision preserved. Sinuses: No complaints.   CARDIOPULMONARY: No swelling of the legs; no chest pain. No shortness of breath, no wheezing.   GASTROINTESTINAL: has heartburn. Denies diarrhea; denies constipation, no blood or mucus in stools.   As nausea at times.   GENITOURINARY: having frequency uti's; At this time she denies dysuria, bleeding or incontinence.   MUSCULOSKELETAL: having some pain over the posterior L thigh, no other arthritic complaints such as pain or stiffness.   PSYCHIATRIC: No history of depression or anxiety.   ENDOCRINOLOGIC: No reports of sweating, cold or heat intolerance. No polyuria or polydipsia.    NEUROLOGICAL: No headache, dizziness, syncope, paralysis, ataxia, numbness or tingling in the extremities.  LYMPHATICS: No enlarged nodes. No history of splenectomy.    Pt alert, oriented, cooperative, no distress  HEENT: wnl   Neck: supple, no JVD, no lymphadenopathy  Chest: CV NSR, no murmurs  Lungs: normal auscultation  Abdomen prominent, obese, nontender, no organomegaly, no masses.  No hernias  External genitalia nl  Extremities: no edema, peripheral pulses nl  Neuro: preserved    IMP  Frequent uti's  Hx of renal ca, no recurrences  Obesity  Sciatica  I recommend cystoscopy, bladderscan  Pt already has imaging done of her R kidney

## 2017-05-26 ENCOUNTER — OFFICE VISIT (OUTPATIENT)
Dept: FAMILY MEDICINE | Facility: CLINIC | Age: 62
End: 2017-05-26
Payer: COMMERCIAL

## 2017-05-26 VITALS
BODY MASS INDEX: 38.44 KG/M2 | TEMPERATURE: 99 F | WEIGHT: 208.88 LBS | HEART RATE: 72 BPM | DIASTOLIC BLOOD PRESSURE: 78 MMHG | HEIGHT: 62 IN | SYSTOLIC BLOOD PRESSURE: 154 MMHG

## 2017-05-26 DIAGNOSIS — J30.9 ALLERGIC RHINITIS, UNSPECIFIED ALLERGIC RHINITIS TRIGGER, UNSPECIFIED RHINITIS SEASONALITY: Primary | ICD-10-CM

## 2017-05-26 DIAGNOSIS — Z76.0 MEDICATION REFILL: ICD-10-CM

## 2017-05-26 PROCEDURE — 99213 OFFICE O/P EST LOW 20 MIN: CPT | Mod: S$GLB,,, | Performed by: NURSE PRACTITIONER

## 2017-05-26 PROCEDURE — 99999 PR PBB SHADOW E&M-EST. PATIENT-LVL IV: CPT | Mod: PBBFAC,,, | Performed by: NURSE PRACTITIONER

## 2017-05-26 RX ORDER — AZELASTINE 1 MG/ML
1 SPRAY, METERED NASAL 2 TIMES DAILY
Qty: 30 ML | Refills: 0 | Status: SHIPPED | OUTPATIENT
Start: 2017-05-26 | End: 2017-10-11

## 2017-05-26 RX ORDER — BENZONATATE 200 MG/1
200 CAPSULE ORAL 3 TIMES DAILY PRN
Qty: 30 CAPSULE | Refills: 0 | Status: SHIPPED | OUTPATIENT
Start: 2017-05-26 | End: 2017-06-05

## 2017-05-26 RX ORDER — ALBUTEROL SULFATE 90 UG/1
2 AEROSOL, METERED RESPIRATORY (INHALATION) EVERY 6 HOURS PRN
Qty: 18 G | Refills: 5 | Status: SHIPPED | OUTPATIENT
Start: 2017-05-26 | End: 2020-03-24 | Stop reason: SDUPTHER

## 2017-05-26 RX ORDER — PROMETHAZINE HYDROCHLORIDE AND DEXTROMETHORPHAN HYDROBROMIDE 6.25; 15 MG/5ML; MG/5ML
5 SYRUP ORAL 2 TIMES DAILY PRN
Qty: 118 ML | Refills: 0 | Status: SHIPPED | OUTPATIENT
Start: 2017-05-26 | End: 2017-06-05

## 2017-05-26 NOTE — PROGRESS NOTES
Subjective:       Patient ID: Erinn Silva is a 61 y.o. female.    Chief Complaint: Cough    Sinus Problem   This is a new problem. The current episode started in the past 7 days. The problem is unchanged. There has been no fever. She is experiencing no pain. Associated symptoms include congestion, coughing and sinus pressure. Pertinent negatives include no chills, diaphoresis, ear pain, headaches, hoarse voice, neck pain, shortness of breath, sneezing, sore throat or swollen glands. Past treatments include nothing. The treatment provided no relief.     Review of Systems   Constitutional: Negative.  Negative for chills and diaphoresis.   HENT: Positive for congestion, postnasal drip and sinus pressure. Negative for ear pain, hoarse voice, sneezing and sore throat.    Eyes: Negative.    Respiratory: Positive for cough. Negative for shortness of breath.    Cardiovascular: Negative.    Gastrointestinal: Negative.    Endocrine: Negative.    Genitourinary: Negative.    Musculoskeletal: Negative.  Negative for neck pain.   Skin: Negative.    Allergic/Immunologic: Negative.    Neurological: Negative.  Negative for headaches.   Psychiatric/Behavioral: Negative.        Objective:      Physical Exam   Constitutional: She is oriented to person, place, and time. She appears well-developed and well-nourished.   HENT:   Head: Normocephalic.   Right Ear: Hearing, tympanic membrane, external ear and ear canal normal.   Left Ear: Hearing, tympanic membrane, external ear and ear canal normal.   Nose: Rhinorrhea present. Right sinus exhibits no maxillary sinus tenderness and no frontal sinus tenderness. Left sinus exhibits no maxillary sinus tenderness and no frontal sinus tenderness.   Mouth/Throat: Uvula is midline, oropharynx is clear and moist and mucous membranes are normal.   Eyes: Conjunctivae are normal. Pupils are equal, round, and reactive to light.   Neck: Normal range of motion. Neck supple.   Cardiovascular: Normal  rate, regular rhythm and normal heart sounds.    Pulmonary/Chest: Effort normal and breath sounds normal.   Abdominal: Soft. Bowel sounds are normal.   Musculoskeletal: Normal range of motion.   Neurological: She is alert and oriented to person, place, and time.   Skin: Skin is warm and dry. Capillary refill takes 2 to 3 seconds.   Psychiatric: She has a normal mood and affect. Her behavior is normal. Judgment and thought content normal.   Nursing note and vitals reviewed.      Assessment:       1. Allergic rhinitis, unspecified allergic rhinitis trigger, unspecified rhinitis seasonality    2. Medication refill        Plan:           Erinn was seen today for cough.    Diagnoses and all orders for this visit:    Allergic rhinitis, unspecified allergic rhinitis trigger, unspecified rhinitis seasonality  -     azelastine (ASTELIN) 137 mcg (0.1 %) nasal spray; 1 spray (137 mcg total) by Nasal route 2 (two) times daily.  -     benzonatate (TESSALON) 200 MG capsule; Take 1 capsule (200 mg total) by mouth 3 (three) times daily as needed.  -     promethazine-dextromethorphan (PROMETHAZINE-DM) 6.25-15 mg/5 mL Syrp; Take 5 mLs by mouth 2 (two) times daily as needed.      Medication refill  -     albuterol 90 mcg/actuation inhaler; Inhale 2 puffs into the lungs every 6 (six) hours as needed for Wheezing.

## 2017-06-07 ENCOUNTER — TELEPHONE (OUTPATIENT)
Dept: UROLOGY | Facility: CLINIC | Age: 62
End: 2017-06-07

## 2017-06-07 ENCOUNTER — PROCEDURE VISIT (OUTPATIENT)
Dept: UROLOGY | Facility: CLINIC | Age: 62
End: 2017-06-07
Payer: COMMERCIAL

## 2017-06-07 VITALS
BODY MASS INDEX: 38.7 KG/M2 | DIASTOLIC BLOOD PRESSURE: 82 MMHG | WEIGHT: 210.31 LBS | HEIGHT: 62 IN | HEART RATE: 67 BPM | SYSTOLIC BLOOD PRESSURE: 156 MMHG

## 2017-06-07 DIAGNOSIS — R33.9 INCOMPLETE BLADDER EMPTYING: ICD-10-CM

## 2017-06-07 DIAGNOSIS — N39.0 RECURRENT UTI: Primary | ICD-10-CM

## 2017-06-07 DIAGNOSIS — R31.9 HEMATURIA: ICD-10-CM

## 2017-06-07 DIAGNOSIS — N35.12 POSTINFECTIVE URETHRAL STRICTURE IN FEMALE: ICD-10-CM

## 2017-06-07 LAB
BILIRUB SERPL-MCNC: ABNORMAL MG/DL
BLOOD URINE, POC: ABNORMAL
COLOR, POC UA: YELLOW
GLUCOSE UR QL STRIP: ABNORMAL
KETONES UR QL STRIP: ABNORMAL
LEUKOCYTE ESTERASE URINE, POC: ABNORMAL
NITRITE, POC UA: ABNORMAL
PH, POC UA: 5
POC RESIDUAL URINE VOLUME: 63 ML (ref 0–100)
PROTEIN, POC: ABNORMAL
SPECIFIC GRAVITY, POC UA: 1.02
UROBILINOGEN, POC UA: ABNORMAL

## 2017-06-07 PROCEDURE — 52000 CYSTOURETHROSCOPY: CPT | Mod: S$GLB,,, | Performed by: UROLOGY

## 2017-06-07 PROCEDURE — 81002 URINALYSIS NONAUTO W/O SCOPE: CPT | Mod: S$GLB,,, | Performed by: UROLOGY

## 2017-06-07 PROCEDURE — 51798 US URINE CAPACITY MEASURE: CPT | Mod: 59,S$GLB,, | Performed by: UROLOGY

## 2017-06-07 RX ORDER — CIPROFLOXACIN 500 MG/1
500 TABLET ORAL 2 TIMES DAILY
Qty: 30 TABLET | Refills: 1 | Status: SHIPPED | OUTPATIENT
Start: 2017-06-07 | End: 2017-06-22

## 2017-06-07 NOTE — TELEPHONE ENCOUNTER
After Dr Barney further reviewed pt's chart history: he has noticed that pt had a Left Nephrectomy due to renal cell carcinoma quite a number of years ago by Dr Bosch & himself @ [facility] around 2002 & she has had a Cholecystectomy as well; so since she just had an Abdominal U/S in March 2017, so does not need another U/S; please advise pt of this when she returns call.

## 2017-06-07 NOTE — PROGRESS NOTES
UROLOGY [place]  6 7 17      Urinalysis: col yellow, sg 20, pH 5, leuco -, nitrites -, prot -, glucose -, bili -, blood -     c-c recurrent uti     Age 61, comes for urologic w/u for recurrent uti's    Genitalia show involutionary changes, with thinning and pale mucosa.   The urethral meatus is very small.  CYSTOSCOPY with urethral dilatation with sounds:  olympus flexible. Urethra cannot be threaded with the cystoscope, since it is too small. We then proceeded to perform dilatation of the urethra, with sounds. We used even number sounds starting at 14 F and proceeding to 26 F. This caused some bleeding and there was some resistance in the tissue, proving that the stricture was present. We then placed the cystoscope into the urethra. No diverticulum was present. Bladder cavity distends symmetrically and equally when distended with water. Mucosal layer with no lesions. No abnormal trabeculation. Location and shape of the ureteral orifices normal. Pt tolerated the procedure well.       BLADDERSCAN ULTRASOUND 63   ml residual      Pt has hx of L nephrectomy for renal cancer. In 2014 we did a CT scan with and without contrast and it did not show any sign of recurrence in her renal bed of the L side or any similar malignancy or suspicious deformity in her surviving kidney.   At that point it had been 12 yrs from her surgery and I recommended stopping the oncologic followup.        IMP  Frequent uti's  Hx of renal ca, no recurrences  Obesity  Sciatica  Urethral stricture, now s/p dilatation    Pt already has imaging done of her R kidney, and it was normal (L kidney is absent)    We recommend weight loss, kegel exercises  Can use cipro as needed (patient-initiated episodic treatment)

## 2017-06-07 NOTE — TELEPHONE ENCOUNTER
Left a message for the patient to call the office to schedule a renal ultrasound.  Dr Barney said to tell the patient that she had a gallbladder u/s but did not mention both of her kidneys.

## 2017-06-08 NOTE — TELEPHONE ENCOUNTER
S/W pt & advised her per Dr Barney: she does not need another Abd U/S & he cancelled the order b/c he & Dr Bosch performed Left Nephrectomy on pt back in 2002; pt states she agrees & verbalizes understanding.

## 2017-06-22 ENCOUNTER — PATIENT MESSAGE (OUTPATIENT)
Dept: FAMILY MEDICINE | Facility: CLINIC | Age: 62
End: 2017-06-22

## 2017-06-23 ENCOUNTER — TELEPHONE (OUTPATIENT)
Dept: FAMILY MEDICINE | Facility: CLINIC | Age: 62
End: 2017-06-23

## 2017-06-23 DIAGNOSIS — M79.672 PAIN OF LEFT HEEL: Primary | ICD-10-CM

## 2017-06-23 NOTE — TELEPHONE ENCOUNTER
Patient informed xray and OV with ortho on Tuesday scheduled, but if needs to be seen before then, can call Saturday, but no xray available, will keep OV on Tuesday

## 2017-06-23 NOTE — TELEPHONE ENCOUNTER
----- Message from Koki Eagle sent at 6/23/2017  3:08 PM CDT -----  Contact: pt   Pt states that she need to talk top the nurse regarding the appt that was schedule for her today.   ...[phone]

## 2017-06-27 ENCOUNTER — OFFICE VISIT (OUTPATIENT)
Dept: ORTHOPEDICS | Facility: CLINIC | Age: 62
End: 2017-06-27
Payer: COMMERCIAL

## 2017-06-27 ENCOUNTER — HOSPITAL ENCOUNTER (OUTPATIENT)
Dept: RADIOLOGY | Facility: HOSPITAL | Age: 62
Discharge: HOME | End: 2017-06-27
Attending: ORTHOPAEDIC SURGERY
Payer: COMMERCIAL

## 2017-06-27 VITALS
WEIGHT: 210 LBS | BODY MASS INDEX: 38.64 KG/M2 | HEIGHT: 62 IN | SYSTOLIC BLOOD PRESSURE: 165 MMHG | DIASTOLIC BLOOD PRESSURE: 83 MMHG | HEART RATE: 66 BPM

## 2017-06-27 VITALS
SYSTOLIC BLOOD PRESSURE: 165 MMHG | DIASTOLIC BLOOD PRESSURE: 83 MMHG | WEIGHT: 210 LBS | HEIGHT: 62 IN | BODY MASS INDEX: 38.64 KG/M2 | HEART RATE: 66 BPM

## 2017-06-27 DIAGNOSIS — M76.62 TENDONITIS, ACHILLES, LEFT: ICD-10-CM

## 2017-06-27 DIAGNOSIS — Z76.89 REFERRAL OF PATIENT: Primary | ICD-10-CM

## 2017-06-27 DIAGNOSIS — M92.62 HAGLUND'S DEFORMITY OF LEFT HEEL: Primary | ICD-10-CM

## 2017-06-27 DIAGNOSIS — M79.672 PAIN OF LEFT HEEL: ICD-10-CM

## 2017-06-27 DIAGNOSIS — M92.62 HAGLUND'S DEFORMITY OF LEFT HEEL: ICD-10-CM

## 2017-06-27 PROCEDURE — 99499 UNLISTED E&M SERVICE: CPT | Mod: S$GLB,,, | Performed by: ORTHOPAEDIC SURGERY

## 2017-06-27 PROCEDURE — 73650 X-RAY EXAM OF HEEL: CPT | Mod: TC,PO,LT

## 2017-06-27 PROCEDURE — 99999 PR PBB SHADOW E&M-EST. PATIENT-LVL III: CPT | Mod: PBBFAC,,, | Performed by: ORTHOPAEDIC SURGERY

## 2017-06-27 PROCEDURE — 73650 X-RAY EXAM OF HEEL: CPT | Mod: 26,LT,, | Performed by: RADIOLOGY

## 2017-06-27 PROCEDURE — 99244 OFF/OP CNSLTJ NEW/EST MOD 40: CPT | Mod: S$GLB,,, | Performed by: ORTHOPAEDIC SURGERY

## 2017-06-27 NOTE — PROGRESS NOTES
Subjective:          Chief Complaint: Erinn Silva is a 61 y.o. female who had concerns including Pain of the Left Foot.    Mrs. Silva has been having left heel problems for the past two years. She was told she needed surgery for her left      Pain           Past Medical History:   Diagnosis Date    Anxiety     AR (allergic rhinitis)     Asthma, intermittent     Coronary artery disease     Depression     Endometrial polyp     GERD (gastroesophageal reflux disease)     Hiatal hernia     Hyperlipidemia LDL goal <100     Hypertension     Mild mitral regurgitation     Multiple thyroid nodules 3/27/2014    Obesity (BMI 30-39.9) 3/27/2014    Renal cell carcinoma 2002    removed - no recurrence    Sleep apnea     Solitary kidney, acquired     right       Past Surgical History:   Procedure Laterality Date    APPENDECTOMY      CARDIAC CATHETERIZATION  6/27/14    has blockages, but per patient no stents    CHOLECYSTECTOMY      COLONOSCOPY  2002    negative    COLONOSCOPY  3/25/2014         CORONARY ANGIOPLASTY  2008?    balloon angioplasty    NEPHRECTOMY Left 2002    [facility] - cancer    OVARIAN CYST REMOVAL      POLYPECTOMY      endometrial polyp removal    ROTATOR CUFF REPAIR      L    THYROIDECTOMY, PARTIAL Left     TUBAL LIGATION         Family History   Problem Relation Age of Onset    Heart attack Father 67    Diabetes Paternal Aunt     Diabetes Paternal Uncle     Colon cancer Neg Hx     Stomach cancer Neg Hx          Current Outpatient Prescriptions:     acetaminophen (TYLENOL) 325 MG tablet, Take 2 tablets (650 mg total) by mouth every 6 (six) hours as needed for Pain., Disp: , Rfl: 0    albuterol 90 mcg/actuation inhaler, Inhale 2 puffs into the lungs every 6 (six) hours as needed for Wheezing., Disp: 18 g, Rfl: 5    aspirin (ECOTRIN) 81 MG EC tablet, Take 81 mg by mouth once daily. , Disp: , Rfl:     citalopram (CELEXA) 20 MG tablet, Take 1 tablet (20  mg total) by mouth once daily., Disp: 90 tablet, Rfl: 3    fluticasone (FLONASE) 50 mcg/actuation nasal spray, Use 2 sprays to each nostril daily, Disp: 16 g, Rfl: 12    metoprolol succinate (TOPROL-XL) 50 MG 24 hr tablet, Take 1 tablet (50 mg total) by mouth once daily., Disp: 30 tablet, Rfl: 11    multivitamin (ONE DAILY MULTIVITAMIN) per tablet, Take 1 tablet by mouth once daily. , Disp: , Rfl:     nitrofurantoin, macrocrystal-monohydrate, (MACROBID) 100 MG capsule, TK 1 C PO BID, Disp: , Rfl: 0    pravastatin (PRAVACHOL) 20 MG tablet, Take 1 tablet (20 mg total) by mouth once daily., Disp: 30 tablet, Rfl: 11    valsartan-hydrochlorothiazide (DIOVAN-HCT) 320-25 mg per tablet, Take 1 tablet by mouth once daily., Disp: 90 tablet, Rfl: 3    azelastine (ASTELIN) 137 mcg (0.1 %) nasal spray, 1 spray (137 mcg total) by Nasal route 2 (two) times daily., Disp: 30 mL, Rfl: 0    cetirizine (ZYRTEC) 10 MG tablet, Take 1 tablet (10 mg total) by mouth once daily., Disp: 10 tablet, Rfl: 0    omeprazole (PRILOSEC OTC) 20 MG tablet, Take 20 mg by mouth once daily., Disp: , Rfl:     triamcinolone acetonide 0.1% (KENALOG) 0.1 % cream, Apply topically 2 (two) times daily., Disp: 45 g, Rfl: 0    Review of patient's allergies indicates:   Allergen Reactions    Oxycodone Shortness Of Breath, Nausea And Vomiting and Other (See Comments)     Anxiety.    Azithromycin Itching    Codeine Palpitations    Corticosteroids (glucocorticoids) Palpitations    Doxycycline Itching and Nausea Only    Mobic [meloxicam] Itching    Pcn [penicillins] Nausea And Vomiting    Shellfish containing products Itching     topical iodine OK    Sulfa (sulfonamide antibiotics) Nausea And Vomiting       Vitals:    06/27/17 0846   BP: (!) 165/83   Pulse: 66       ROS                Objective:        General: Erinn is well-developed, well-nourished, appears stated age, in no acute distress, alert and oriented to time, place and person.      Ortho/SPM Exam      Current and previous radiographic studies and results were reviewed with the patient:         Assessment:       Encounter Diagnosis   Name Primary?    Pain of left heel Yes          Plan:

## 2017-06-27 NOTE — LETTER
June 29, 2017      Jarret Gong MD  [address] [facility] [address] [place] [address]           [facility]  [address] [facility] [address] [place] [address]  Phone: [phone]          Patient: Erinn Silva   MR Number: [id number]   YOB: 1955   Date of Visit: 6/27/2017       Dear Dr. Jarret Gong:    Thank you for referring Erinn Silva to me for evaluation. Attached you will find relevant portions of my assessment and plan of care.    If you have questions, please do not hesitate to call me. I look forward to following Erinn Silva along with you.    Sincerely,    Josef Jordan MD    Enclosure  CC:  No Recipients    If you would like to receive this communication electronically, please contact [email or web address] or [phone] to request more information on [employer] Link access.    For providers and/or their staff who would like to refer a patient to [facility], please contact us through our one-stop-shop provider referral line, [facility], at [phone].    If you feel you have received this communication in error or would no longer like to receive these types of communications, please e-mail [email or web address]

## 2017-06-29 ENCOUNTER — TELEPHONE (OUTPATIENT)
Dept: ORTHOPEDICS | Facility: CLINIC | Age: 62
End: 2017-06-29

## 2017-06-29 PROBLEM — M76.62 TENDONITIS, ACHILLES, LEFT: Status: ACTIVE | Noted: 2017-06-29

## 2017-06-29 PROBLEM — M92.62 HAGLUND'S DEFORMITY OF LEFT HEEL: Status: ACTIVE | Noted: 2017-06-29

## 2017-06-29 NOTE — PROGRESS NOTES
"HPI: Erinn Silva is a  61 y.o. female who was referred to me by Dr. Jarret Gong and was seen in consultation today for left heel pain.  No history of injury or trauma. The pain has been present and worsening for the past several years. She rates her pain as 8/10 today. The pain is worse with walking and standing. She has already done PT, inserts and worn a boot.     PAST MEDICAL/SURGICAL/FAMILY/SOCIAL/ HISTORY: REVIEWED    ALLERGIES/MEDICATIONS: REVIEWED       Review of Systems:     Constitution: Negative.   HEENT: Negative.   Eyes: Negative.   Cardiovascular: Negative.   Respiratory: Negative.   Endocrine: Negative.   Hematologic/Lymphatic: Negative.   Skin: Negative.   Musculoskeletal: Positive for left heel pain   Gastrointestinal: Negative.   Genitourinary: Negative.   Neurological: Negative.   Psychiatric/Behavioral: Negative.   Allergic/Immunologic: Negative.       PHYSICAL EXAM:  Vitals:    06/27/17 0922   BP: (!) 165/83   Pulse: 66     Ht Readings from Last 1 Encounters:   06/27/17 5' 2" (1.575 m)     Wt Readings from Last 1 Encounters:   06/27/17 95.3 kg (210 lb)       GENERAL: Well developed, well nourished, no acute distress.  SKIN: Skin is intact. No atrophy, abrasions or lesions are noted.   Neurological: Normal mental status. Appropriate and conversant. Alert and oriented x 3.  GAIT: Walks with non-antalgic gait.    Left  lower extremity:  2+ dorsalis pedis pulse.  Capillary refill < 3 seconds.  Normal range of motion tibiotalar and subtalar joints. Normal alignment of the forefoot and the hindfoot.  5/5 strength EHL, FHL, tibialis anterior, gastrocsoleus, tibialis posterior and peroneals. Sensation to light touch intact sural, saphenous, superficial peroneal and deep peroneal nerves. Mild swelling posterior heel. No lymphadenopathy, no masses or tumors palpated.   tenderness to palpation at insertion of achilles with large pump bump.     Right lower extremity:  2+ dorsalis pedis pulse.  " Capillary refill < 3 seconds.  Normal range of motion tibiotalar and subtalar joints. Normal alignment of the forefoot and the hindfoot.  5/5 strength EHL, FHL, tibialis anterior, gastrocsoleus, tibialis posterior and peroneals. Sensation to light touch intact sural, saphenous, superficial peroneal and deep peroneal nerves. No swelling, ecchymosis or deformity. No lymphadenopathy, no masses or tumors palpated.      XRAYS:   3 views of left foot obtained and reviewed today reveal lisandra's deformity with spurring within the achilles insertion.       ASSESSMENT:        Encounter Diagnoses   Name Primary?    Tendonitis, Achilles, left     Lisandra's deformity of left heel         PLAN:  I spent 30  minutes in consulation with the patient today. More than half the time was spent counseling the patient on her condition and the options for operative versus non-operative care.  She has now failed conservative treatment. I have explained the procedure, including indications, risks, and alternatives.  Erinn DICK Silva gave informed consent and is medically ready for surgery. To OR for left lisandra's excision, KALANI, possible FHL transfer and achilles debridement/repair.

## 2017-06-29 NOTE — TELEPHONE ENCOUNTER
----- Message from eCcy Deanaduc sent at 6/29/2017 12:19 PM CDT -----  Contact: Patient  Patient states that she went to see the cardiologist today and the doctor would like to do more testing before the surgery so he can give her clearance to do so.  Can you please call the patient back at [phone].  Thank you

## 2017-06-29 NOTE — TELEPHONE ENCOUNTER
Spoke to patient. Patient states she saw her cardiologist. He would like to do some tests that will not be able to be completed prior to scheduled surgery of 7/10/17. Will call back to reschedule her surgery.

## 2017-07-05 ENCOUNTER — LAB VISIT (OUTPATIENT)
Dept: LAB | Facility: HOSPITAL | Age: 62
End: 2017-07-05
Attending: INTERNAL MEDICINE
Payer: COMMERCIAL

## 2017-07-05 DIAGNOSIS — R00.2 PALPITATIONS: ICD-10-CM

## 2017-07-05 DIAGNOSIS — R73.9 BLOOD GLUCOSE ELEVATED: Primary | ICD-10-CM

## 2017-07-05 DIAGNOSIS — I25.10 CORONARY ATHEROSCLEROSIS OF UNSPECIFIED TYPE OF VESSEL, NATIVE OR GRAFT: ICD-10-CM

## 2017-07-05 LAB
ALBUMIN SERPL BCP-MCNC: 3.5 G/DL
ALP SERPL-CCNC: 68 U/L
ALT SERPL W/O P-5'-P-CCNC: 19 U/L
ANION GAP SERPL CALC-SCNC: 8 MMOL/L
AST SERPL-CCNC: 17 U/L
BASOPHILS # BLD AUTO: 0.02 K/UL
BASOPHILS NFR BLD: 0.3 %
BILIRUB SERPL-MCNC: 0.5 MG/DL
BUN SERPL-MCNC: 22 MG/DL
CALCIUM SERPL-MCNC: 9.4 MG/DL
CHLORIDE SERPL-SCNC: 101 MMOL/L
CHOLEST/HDLC SERPL: 5.4 {RATIO}
CO2 SERPL-SCNC: 29 MMOL/L
CREAT SERPL-MCNC: 0.8 MG/DL
DIFFERENTIAL METHOD: NORMAL
EOSINOPHIL # BLD AUTO: 0.1 K/UL
EOSINOPHIL NFR BLD: 2.3 %
ERYTHROCYTE [DISTWIDTH] IN BLOOD BY AUTOMATED COUNT: 13.9 %
EST. GFR  (AFRICAN AMERICAN): >60 ML/MIN/1.73 M^2
EST. GFR  (NON AFRICAN AMERICAN): >60 ML/MIN/1.73 M^2
ESTIMATED AVG GLUCOSE: 123 MG/DL
GLUCOSE SERPL-MCNC: 92 MG/DL
HBA1C MFR BLD HPLC: 5.9 %
HCT VFR BLD AUTO: 40.2 %
HDL/CHOLESTEROL RATIO: 18.5 %
HDLC SERPL-MCNC: 254 MG/DL
HDLC SERPL-MCNC: 47 MG/DL
HGB BLD-MCNC: 13.1 G/DL
LDLC SERPL CALC-MCNC: 163.6 MG/DL
LYMPHOCYTES # BLD AUTO: 2.4 K/UL
LYMPHOCYTES NFR BLD: 39.4 %
MCH RBC QN AUTO: 28.5 PG
MCHC RBC AUTO-ENTMCNC: 32.6 %
MCV RBC AUTO: 87 FL
MONOCYTES # BLD AUTO: 0.3 K/UL
MONOCYTES NFR BLD: 4.5 %
NEUTROPHILS # BLD AUTO: 3.2 K/UL
NEUTROPHILS NFR BLD: 53.3 %
NONHDLC SERPL-MCNC: 207 MG/DL
PLATELET # BLD AUTO: 295 K/UL
PMV BLD AUTO: 9.4 FL
POTASSIUM SERPL-SCNC: 4.6 MMOL/L
PROT SERPL-MCNC: 7.1 G/DL
RBC # BLD AUTO: 4.6 M/UL
SODIUM SERPL-SCNC: 138 MMOL/L
TRIGL SERPL-MCNC: 217 MG/DL
TSH SERPL DL<=0.005 MIU/L-ACNC: 3.19 UIU/ML
WBC # BLD AUTO: 5.97 K/UL

## 2017-07-05 PROCEDURE — 80053 COMPREHEN METABOLIC PANEL: CPT

## 2017-07-05 PROCEDURE — 36415 COLL VENOUS BLD VENIPUNCTURE: CPT | Mod: PO

## 2017-07-05 PROCEDURE — 80061 LIPID PANEL: CPT

## 2017-07-05 PROCEDURE — 83036 HEMOGLOBIN GLYCOSYLATED A1C: CPT

## 2017-07-05 PROCEDURE — 85025 COMPLETE CBC W/AUTO DIFF WBC: CPT

## 2017-07-05 PROCEDURE — 84443 ASSAY THYROID STIM HORMONE: CPT

## 2017-07-28 ENCOUNTER — NURSE TRIAGE (OUTPATIENT)
Dept: ADMINISTRATIVE | Facility: CLINIC | Age: 62
End: 2017-07-28

## 2017-07-28 NOTE — TELEPHONE ENCOUNTER
Reason for Disposition   [1] Caller requesting NON-URGENT health information AND [2] PCP's office is the best resource    Protocols used: ST INFORMATION ONLY CALL-A-    Pt states she recently had a stent placed and would like to know how long she has to take plavix for. Please call to advise.

## 2017-08-02 ENCOUNTER — LAB VISIT (OUTPATIENT)
Dept: LAB | Facility: HOSPITAL | Age: 62
End: 2017-08-02
Attending: UROLOGY
Payer: COMMERCIAL

## 2017-08-02 ENCOUNTER — OFFICE VISIT (OUTPATIENT)
Dept: UROLOGY | Facility: CLINIC | Age: 62
End: 2017-08-02
Payer: COMMERCIAL

## 2017-08-02 VITALS
HEIGHT: 62 IN | HEART RATE: 65 BPM | WEIGHT: 205 LBS | DIASTOLIC BLOOD PRESSURE: 68 MMHG | BODY MASS INDEX: 37.73 KG/M2 | SYSTOLIC BLOOD PRESSURE: 140 MMHG

## 2017-08-02 DIAGNOSIS — R31.9 HEMATURIA: Primary | ICD-10-CM

## 2017-08-02 DIAGNOSIS — Z90.5 SOLITARY KIDNEY, ACQUIRED: ICD-10-CM

## 2017-08-02 LAB
BILIRUB SERPL-MCNC: ABNORMAL MG/DL
BLOOD URINE, POC: ABNORMAL
BUN SERPL-MCNC: 19 MG/DL
COLOR, POC UA: YELLOW
CREAT SERPL-MCNC: 1 MG/DL
EST. GFR  (AFRICAN AMERICAN): >60 ML/MIN/1.73 M^2
EST. GFR  (NON AFRICAN AMERICAN): >60 ML/MIN/1.73 M^2
GLUCOSE UR QL STRIP: ABNORMAL
KETONES UR QL STRIP: ABNORMAL
LEUKOCYTE ESTERASE URINE, POC: ABNORMAL
NITRITE, POC UA: ABNORMAL
PH, POC UA: 5
PROTEIN, POC: ABNORMAL
SPECIFIC GRAVITY, POC UA: 1.02
UROBILINOGEN, POC UA: ABNORMAL

## 2017-08-02 PROCEDURE — 82565 ASSAY OF CREATININE: CPT

## 2017-08-02 PROCEDURE — 81002 URINALYSIS NONAUTO W/O SCOPE: CPT | Mod: S$GLB,,, | Performed by: UROLOGY

## 2017-08-02 PROCEDURE — 36415 COLL VENOUS BLD VENIPUNCTURE: CPT | Mod: PO

## 2017-08-02 PROCEDURE — 99999 PR PBB SHADOW E&M-EST. PATIENT-LVL III: CPT | Mod: PBBFAC,,, | Performed by: UROLOGY

## 2017-08-02 PROCEDURE — 84520 ASSAY OF UREA NITROGEN: CPT

## 2017-08-02 PROCEDURE — 99213 OFFICE O/P EST LOW 20 MIN: CPT | Mod: 25,S$GLB,, | Performed by: UROLOGY

## 2017-08-02 RX ORDER — SIMVASTATIN 10 MG/1
TABLET, FILM COATED ORAL
Refills: 4 | COMMUNITY
Start: 2017-07-26 | End: 2017-08-02

## 2017-08-02 RX ORDER — DICYCLOMINE HYDROCHLORIDE 20 MG/1
TABLET ORAL
Refills: 0 | COMMUNITY
Start: 2017-07-28 | End: 2017-10-11 | Stop reason: SDUPTHER

## 2017-08-02 RX ORDER — PRASUGREL HYDROCHLORIDE 10 MG/1
TABLET, COATED ORAL
Refills: 5 | COMMUNITY
Start: 2017-08-01 | End: 2018-08-20

## 2017-08-02 NOTE — PROGRESS NOTES
[facility] [place]  8 2 17    c-c renal function    Age 61, seen by us two months ago for frequent uti's and underwent a urologic w/u for that. She was found to have a strictured urethra, and we proceeded to dilate the urethra. The cystoscopy was  Normal. The bladderscan showed 63 ml residual. A renal ultrasound of the R side showed a normal kidney. Her L kidney was absent, secondary to a L nephrectomy in 2002 (carcinoma of the kidney).    More recently she was seen at [facility] by dr emilia salazar for a coronary problem and dr salazar performed coronary angiography and placed a stent. Dr salazar later worried that the contrast he used for his angiography could have been toxic to her kidney. He referred her here.    Pt has a creatinine level from three weeks ago which showed normal level of 0.8, it was the same value six months before, and it was 0.9 two years ago.     On exam, abd soft and nontender.  cva's neg    IMP  Recurrent uti  Hx of urethral stricture  Solitary kidney  Recent use of iv iodinated contrast, cardiologist expressed concern for her renal function    I am ordering creatinine and bun today. If these are abnormal I would order creatinine clearance

## 2017-08-03 ENCOUNTER — OFFICE VISIT (OUTPATIENT)
Dept: FAMILY MEDICINE | Facility: CLINIC | Age: 62
End: 2017-08-03
Payer: COMMERCIAL

## 2017-08-03 VITALS
BODY MASS INDEX: 37.45 KG/M2 | HEART RATE: 55 BPM | HEIGHT: 62 IN | SYSTOLIC BLOOD PRESSURE: 137 MMHG | DIASTOLIC BLOOD PRESSURE: 66 MMHG | WEIGHT: 203.5 LBS

## 2017-08-03 DIAGNOSIS — R09.89 DECREASED DIASTOLIC BLOOD PRESSURE: ICD-10-CM

## 2017-08-03 DIAGNOSIS — R42 LIGHTHEADEDNESS: ICD-10-CM

## 2017-08-03 DIAGNOSIS — Z95.5 S/P CORONARY ARTERY STENT PLACEMENT: Primary | ICD-10-CM

## 2017-08-03 DIAGNOSIS — R53.83 FATIGUE, UNSPECIFIED TYPE: ICD-10-CM

## 2017-08-03 PROCEDURE — 99213 OFFICE O/P EST LOW 20 MIN: CPT | Mod: S$GLB,,, | Performed by: NURSE PRACTITIONER

## 2017-08-03 PROCEDURE — 93010 ELECTROCARDIOGRAM REPORT: CPT | Mod: S$GLB,,, | Performed by: INTERNAL MEDICINE

## 2017-08-03 PROCEDURE — 99999 PR PBB SHADOW E&M-EST. PATIENT-LVL IV: CPT | Mod: PBBFAC,,, | Performed by: NURSE PRACTITIONER

## 2017-08-03 PROCEDURE — 3008F BODY MASS INDEX DOCD: CPT | Mod: S$GLB,,, | Performed by: NURSE PRACTITIONER

## 2017-08-03 PROCEDURE — 93005 ELECTROCARDIOGRAM TRACING: CPT | Mod: S$GLB,,, | Performed by: NURSE PRACTITIONER

## 2017-08-03 NOTE — PROGRESS NOTES
Subjective:       Patient ID: Erinn Silva is a 61 y.o. female.    Chief Complaint: Dizziness    Dizziness:   Chronicity:  New  Onset:  In the past 7 days (worse this AM; began after coronary stent placement last week; also states diastolic 55 on yesterday. BP WNL today)  Progression since onset:  Resolved  Frequency:  Every few days  Severity:  Moderate  Duration:  1 minute  Dizziness characteristics:  Off-balance and lightheaded/impending faint   Associated symptoms: light-headedness.no hearing loss, no ear congestion, no ear pain, no fever, no headaches, no tinnitus, no nausea, no vomiting, no diaphoresis, no aural fullness, no weakness, no visual disturbances, no syncope, no palpitations, no panic, no facial weakness, no slurred speech, no numbness in extremities and no chest pain.   Fatigue, SOB at times  Aggravated by:  Nothing  Treatments tried:  Nothing  Improvements on treatment:  No relief   PMH includes: cardiac surgery.no strokes, no neurologic disease, no head trauma, no balance testing, no ear trauma, no ear surgery, no head trauma, no ear infections, no anxiety, no ear tubes, no environmental allergies, no MRI head and no CT head.    Past Medical History:   Diagnosis Date    Anxiety     AR (allergic rhinitis)     Asthma, intermittent     Coronary artery disease     Depression     Endometrial polyp     GERD (gastroesophageal reflux disease)     Hiatal hernia     Hyperlipidemia LDL goal <100     Hypertension     Mild mitral regurgitation     Multiple thyroid nodules 3/27/2014    Obesity (BMI 30-39.9) 3/27/2014    Renal cell carcinoma 2002    removed - no recurrence    Sleep apnea     Solitary kidney, acquired     right     Social History     Social History    Marital status:      Spouse name: N/A    Number of children: N/A    Years of education: N/A     Occupational History         Social History Main Topics    Smoking status: Never Smoker    Smokeless tobacco: Never Used     Alcohol use No    Drug use: No    Sexual activity: Not on file     Past Surgical History:   Procedure Laterality Date    APPENDECTOMY      CARDIAC CATHETERIZATION  6/27/14    has blockages, but per patient no stents    CHOLECYSTECTOMY      COLONOSCOPY  2002    negative    COLONOSCOPY  3/25/2014         CORONARY ANGIOPLASTY  2008?    balloon angioplasty    CORONARY ANGIOPLASTY WITH STENT PLACEMENT Right 07/26/2017    NEPHRECTOMY Left 2002    [facility] cancer    OVARIAN CYST REMOVAL      POLYPECTOMY      endometrial polyp removal    ROTATOR CUFF REPAIR      L    THYROIDECTOMY, PARTIAL Left     TUBAL LIGATION         Review of Systems   Constitutional: Positive for fatigue. Negative for diaphoresis and fever.   HENT: Negative.  Negative for ear pain, hearing loss and tinnitus.    Eyes: Negative.    Respiratory: Positive for shortness of breath (at times).    Cardiovascular: Negative.  Negative for chest pain, palpitations and syncope.   Gastrointestinal: Negative.  Negative for nausea and vomiting.   Endocrine: Negative.    Genitourinary: Negative.    Musculoskeletal: Negative.    Skin: Negative.    Allergic/Immunologic: Negative.  Negative for environmental allergies.   Neurological: Positive for dizziness and light-headedness. Negative for weakness and headaches.   Psychiatric/Behavioral: Negative.        Objective:      Physical Exam   Constitutional: She is oriented to person, place, and time. She appears well-developed and well-nourished.   HENT:   Head: Normocephalic.   Right Ear: External ear normal.   Left Ear: External ear normal.   Nose: Nose normal.   Mouth/Throat: Oropharynx is clear and moist.   Eyes: Conjunctivae are normal. Pupils are equal, round, and reactive to light.   Neck: Normal range of motion. Neck supple.   Cardiovascular: Regular rhythm.  Bradycardia present.    Murmur heard.  Pulmonary/Chest: Effort normal and breath sounds normal.   Abdominal:  Soft.   Musculoskeletal: Normal range of motion.   Neurological: She is alert and oriented to person, place, and time.   Skin: Skin is warm and dry. Capillary refill takes 2 to 3 seconds.   Psychiatric: She has a normal mood and affect. Her behavior is normal. Judgment and thought content normal.   Nursing note and vitals reviewed.      Assessment:       1. S/P coronary artery stent placement    2. Decreased diastolic blood pressure    3. Lightheadedness    4. Fatigue, unspecified type        Plan:           Erinn was seen today for dizziness.    Diagnoses and all orders for this visit:    S/P coronary artery stent placement  Decreased diastolic blood pressure  Lightheadedness  Fatigue, unspecified type  -     IN OFFICE EKG 12-LEAD (to Muse)  Monitor BP daily   Follow up with cardiology ASAP  Report to ER immediately if symptoms worsen

## 2017-08-03 NOTE — LETTER
August 3, 2017      [facility]  [address] [address]  Phone: [phone]  Fax: [phone]       Patient: Erinn Silva   YOB: 1955  Date of Visit: 08/03/2017    To Whom It May Concern:    Erinn Xie was at [facility] on 08/03/2017. She may return to work/school on 8/4/17 with no restrictions. If you have any questions or concerns, or if I can be of further assistance, please do not hesitate to contact me.    Sincerely,    Mary Lombardo MA

## 2017-08-05 ENCOUNTER — PATIENT MESSAGE (OUTPATIENT)
Dept: UROLOGY | Facility: CLINIC | Age: 62
End: 2017-08-05

## 2017-08-05 ENCOUNTER — NURSE TRIAGE (OUTPATIENT)
Dept: ADMINISTRATIVE | Facility: CLINIC | Age: 62
End: 2017-08-05

## 2017-08-05 NOTE — TELEPHONE ENCOUNTER
Additional Information   Commented on: Answer Assessment - Initial Assessment Questions     Possibly from newly prescribed Effient following stent placement. Patient informed Lexicom medication review does not have that as possible side affect.    Protocols used: ST MEDICATION QUESTION CALL-A-AH

## 2017-08-05 NOTE — TELEPHONE ENCOUNTER
"  Reason for Disposition   Caller has medication question only, adult not sick, and triager answers question     Patient advised to call [facility] again if symptoms persist or worsen or if home measures are ineffective; patient had no further questions at end of call    Answer Assessment - Initial Assessment Questions  1. SYMPTOMS: "Do you have any symptoms?"      Slight hand trembling   2. SEVERITY: If symptoms are present, ask "Are they mild, moderate or severe?"      Mild happen last in June 26,17    Protocols used: ST MEDICATION QUESTION CALL-A-    "

## 2017-08-06 ENCOUNTER — NURSE TRIAGE (OUTPATIENT)
Dept: ADMINISTRATIVE | Facility: CLINIC | Age: 62
End: 2017-08-06

## 2017-08-06 NOTE — TELEPHONE ENCOUNTER
"  Reason for Disposition   Health Information question, no triage required and triager able to answer question    Protocols used: ST INFORMATION ONLY CALL-A-AH  pt was woken by 1 of her children and it startled her/ she had a short episode of palpitations/ states she had a cardiac stent placed last month and questioning of episode of palpitations could "move the stent"    Info advised-  F/u with cardiologist    Jazzy Townsend RN    "

## 2017-08-08 NOTE — TELEPHONE ENCOUNTER
I called pt and discussed the situation with her.  The change has been minimal.  I recommend consultation with nephrology, since she has a solitary kidney, has received intravenous contrast, and is over 60 years of age, which is when nephrons start to get sluggish.    Please get pt an elective consult with nephrology in [place]    Thank you

## 2017-08-08 NOTE — TELEPHONE ENCOUNTER
Spoke to pt and booked her an apt with dr rodriguez in [place] for the 18th. Pt verbalized understanding.

## 2017-08-18 ENCOUNTER — PATIENT MESSAGE (OUTPATIENT)
Dept: NEPHROLOGY | Facility: CLINIC | Age: 62
End: 2017-08-18

## 2017-08-18 ENCOUNTER — INITIAL CONSULT (OUTPATIENT)
Dept: NEPHROLOGY | Facility: CLINIC | Age: 62
End: 2017-08-18
Payer: COMMERCIAL

## 2017-08-18 ENCOUNTER — TELEPHONE (OUTPATIENT)
Dept: NEPHROLOGY | Facility: CLINIC | Age: 62
End: 2017-08-18

## 2017-08-18 VITALS
DIASTOLIC BLOOD PRESSURE: 68 MMHG | OXYGEN SATURATION: 98 % | HEART RATE: 72 BPM | SYSTOLIC BLOOD PRESSURE: 128 MMHG | BODY MASS INDEX: 37.64 KG/M2 | WEIGHT: 204.56 LBS | HEIGHT: 62 IN

## 2017-08-18 DIAGNOSIS — E55.9 VITAMIN D DEFICIENCY: ICD-10-CM

## 2017-08-18 DIAGNOSIS — Z85.528 PERSONAL HISTORY OF RENAL CANCER: ICD-10-CM

## 2017-08-18 PROCEDURE — 99243 OFF/OP CNSLTJ NEW/EST LOW 30: CPT | Mod: S$GLB,,, | Performed by: INTERNAL MEDICINE

## 2017-08-18 PROCEDURE — 99999 PR PBB SHADOW E&M-EST. PATIENT-LVL II: CPT | Mod: PBBFAC,,, | Performed by: INTERNAL MEDICINE

## 2017-08-18 RX ORDER — NITROFURANTOIN 25; 75 MG/1; MG/1
CAPSULE ORAL
Refills: 0 | COMMUNITY
Start: 2017-08-14 | End: 2017-08-20

## 2017-08-18 NOTE — TELEPHONE ENCOUNTER
----- Message from Andres Silva sent at 8/18/2017  2:55 PM CDT -----  Contact: pt  She's calling in regards to a return to work 's note, please fax this to: [phone], please advise, [phone] (cell)

## 2017-08-18 NOTE — LETTER
August 25, 2017      Kg Barney MD  [address] [facility] [address] [place] [address]           [facility]  [address] [address] [address]  Phone: [phone]  Fax: [phone]          Patient: Erinn Silva   MR Number: [id number]   YOB: 1955   Date of Visit: 8/18/2017       Dear Dr. Kg Barney:    Thank you for referring Erinn Silva to me for evaluation. Attached you will find relevant portions of my assessment and plan of care.    If you have questions, please do not hesitate to call me. I look forward to following Erinn Silva along with you.    Sincerely,    Chalino Moise  CC:  No Recipients    If you would like to receive this communication electronically, please contact [email or web address] or [phone] to request more information on [employer] Link access.    For providers and/or their staff who would like to refer a patient to [facility], please contact us through our one-stop-shop provider referral line, [facility], at [phone].    If you feel you have received this communication in error or would no longer like to receive these types of communications, please e-mail [email or web address]

## 2017-08-18 NOTE — LETTER
08/18/2017                 [facility]  [address] [facility] [address]  [place] [address]  Phone: [phone]   08/18/2017    Patient: Erinn Silva   YOB: 1955   Date of Visit: 8/18/2017       To Whom it May Concern:    Erinn Silva was seen in my clinic on 8/18/2017. She may return to work on Monday.    If you have any questions or concerns, please don't hesitate to call.    Sincerely,         Annabelle Moy LPN   for  Lucía Hooks MD

## 2017-08-22 ENCOUNTER — LAB VISIT (OUTPATIENT)
Dept: LAB | Facility: HOSPITAL | Age: 62
End: 2017-08-22
Attending: INTERNAL MEDICINE
Payer: COMMERCIAL

## 2017-08-22 LAB
BACTERIA #/AREA URNS HPF: NORMAL /HPF
BILIRUB UR QL STRIP: NEGATIVE
CLARITY UR: CLEAR
COLOR UR: YELLOW
CREAT UR-MCNC: 20 MG/DL
GLUCOSE UR QL STRIP: NEGATIVE
HGB UR QL STRIP: ABNORMAL
KETONES UR QL STRIP: NEGATIVE
LEUKOCYTE ESTERASE UR QL STRIP: NEGATIVE
MICROSCOPIC COMMENT: NORMAL
NITRITE UR QL STRIP: NEGATIVE
PH UR STRIP: 6 [PH] (ref 5–8)
PROT UR QL STRIP: NEGATIVE
PROT UR-MCNC: <7 MG/DL
PROT/CREAT RATIO, UR: NORMAL
RBC #/AREA URNS HPF: 1 /HPF (ref 0–4)
SP GR UR STRIP: 1 (ref 1–1.03)
SQUAMOUS #/AREA URNS HPF: 6 /HPF
URN SPEC COLLECT METH UR: ABNORMAL
WBC #/AREA URNS HPF: 1 /HPF (ref 0–5)

## 2017-08-22 PROCEDURE — 82570 ASSAY OF URINE CREATININE: CPT

## 2017-08-22 PROCEDURE — 81000 URINALYSIS NONAUTO W/SCOPE: CPT | Mod: PO

## 2017-08-23 ENCOUNTER — PATIENT MESSAGE (OUTPATIENT)
Dept: NEPHROLOGY | Facility: CLINIC | Age: 62
End: 2017-08-23

## 2017-08-24 ENCOUNTER — PATIENT MESSAGE (OUTPATIENT)
Dept: NEPHROLOGY | Facility: CLINIC | Age: 62
End: 2017-08-24

## 2017-08-24 NOTE — TELEPHONE ENCOUNTER
Urine is normal    She had no protein    There was a little contamination from skin cells so not a good clean catch but otherwise normal

## 2017-08-30 NOTE — PROGRESS NOTES
Subjective:       Patient ID: Erinn Silva is a 61 y.o. Black or  female who presents for new evaluation of Consult    HPI  Review of Systems    Objective:      Physical Exam    Assessment:       1. Solitary kidney    2. Vitamin D deficiency    3. Personal history of renal cancer        Plan:             Solitary kidney--check proteinuria--on a RAAS blocker

## 2017-10-11 ENCOUNTER — HOSPITAL ENCOUNTER (OUTPATIENT)
Dept: RADIOLOGY | Facility: HOSPITAL | Age: 62
Discharge: HOME | End: 2017-10-11
Attending: NURSE PRACTITIONER
Payer: COMMERCIAL

## 2017-10-11 ENCOUNTER — OFFICE VISIT (OUTPATIENT)
Dept: FAMILY MEDICINE | Facility: CLINIC | Age: 62
End: 2017-10-11
Payer: COMMERCIAL

## 2017-10-11 VITALS
WEIGHT: 198.63 LBS | HEIGHT: 62 IN | TEMPERATURE: 98 F | SYSTOLIC BLOOD PRESSURE: 124 MMHG | DIASTOLIC BLOOD PRESSURE: 68 MMHG | HEART RATE: 57 BPM | BODY MASS INDEX: 36.55 KG/M2

## 2017-10-11 DIAGNOSIS — R10.9 RIGHT FLANK PAIN: Primary | ICD-10-CM

## 2017-10-11 DIAGNOSIS — Z76.0 MEDICATION REFILL: ICD-10-CM

## 2017-10-11 DIAGNOSIS — R22.9 MASS OF SKIN: ICD-10-CM

## 2017-10-11 DIAGNOSIS — R10.11 RUQ PAIN: ICD-10-CM

## 2017-10-11 DIAGNOSIS — R14.3 FLATUS: ICD-10-CM

## 2017-10-11 DIAGNOSIS — K21.9 GASTROESOPHAGEAL REFLUX DISEASE, ESOPHAGITIS PRESENCE NOT SPECIFIED: ICD-10-CM

## 2017-10-11 LAB
BACTERIA #/AREA URNS HPF: NORMAL /HPF
BILIRUB UR QL STRIP: NEGATIVE
CLARITY UR: CLEAR
COLOR UR: YELLOW
GLUCOSE UR QL STRIP: NEGATIVE
HGB UR QL STRIP: ABNORMAL
KETONES UR QL STRIP: NEGATIVE
LEUKOCYTE ESTERASE UR QL STRIP: NEGATIVE
MICROSCOPIC COMMENT: NORMAL
NITRITE UR QL STRIP: NEGATIVE
PH UR STRIP: 6 [PH] (ref 5–8)
PROT UR QL STRIP: NEGATIVE
RBC #/AREA URNS HPF: 2 /HPF (ref 0–4)
SP GR UR STRIP: 1.02 (ref 1–1.03)
SQUAMOUS #/AREA URNS HPF: 6 /HPF
URN SPEC COLLECT METH UR: ABNORMAL
WBC #/AREA URNS HPF: 1 /HPF (ref 0–5)

## 2017-10-11 PROCEDURE — 99213 OFFICE O/P EST LOW 20 MIN: CPT | Mod: S$GLB,,, | Performed by: NURSE PRACTITIONER

## 2017-10-11 PROCEDURE — 76705 ECHO EXAM OF ABDOMEN: CPT | Mod: 26,,, | Performed by: RADIOLOGY

## 2017-10-11 PROCEDURE — 81000 URINALYSIS NONAUTO W/SCOPE: CPT | Mod: PO

## 2017-10-11 PROCEDURE — 99999 PR PBB SHADOW E&M-EST. PATIENT-LVL IV: CPT | Mod: PBBFAC,,, | Performed by: NURSE PRACTITIONER

## 2017-10-11 PROCEDURE — 76999 ECHO EXAMINATION PROCEDURE: CPT | Mod: TC,PO

## 2017-10-11 RX ORDER — DICYCLOMINE HYDROCHLORIDE 20 MG/1
TABLET ORAL
Qty: 30 TABLET | Refills: 0 | Status: SHIPPED | OUTPATIENT
Start: 2017-10-11 | End: 2018-09-11 | Stop reason: SDUPTHER

## 2017-10-11 NOTE — PROGRESS NOTES
Subjective:       Patient ID: Erinn Silva is a 61 y.o. female.    Chief Complaint: GI Problem and Back Pain    Abdominal Pain   This is a recurrent problem. The current episode started more than 1 month ago (Worse in past week). The onset quality is gradual. The problem occurs daily. The problem has been unchanged. The pain is located in the RUQ and right flank. The pain is mild. The quality of the pain is aching. The abdominal pain does not radiate. Associated symptoms include flatus. Pertinent negatives include no anorexia, arthralgias, belching, constipation, diarrhea, dysuria, fever, frequency, headaches, hematochezia, hematuria, melena, myalgias, nausea, vomiting or weight loss. Nothing aggravates the pain. The pain is relieved by nothing. She has tried nothing for the symptoms. The treatment provided no relief. Her past medical history is significant for GERD. There is no history of abdominal surgery, colon cancer, Crohn's disease, gallstones, irritable bowel syndrome, pancreatitis, PUD or ulcerative colitis. Patient's medical history does not include kidney stones and UTI.   Mass   This is a new (Right lower back) problem. The current episode started in the past 7 days. The problem occurs daily. The problem has been unchanged. Associated symptoms include abdominal pain. Pertinent negatives include no anorexia, arthralgias, change in bowel habit, chest pain, chills, congestion, coughing, diaphoresis, fatigue, fever, headaches, joint swelling, myalgias, nausea, neck pain, numbness, rash, sore throat, swollen glands, urinary symptoms, vertigo, visual change, vomiting or weakness. Exacerbated by: Palpation. She has tried nothing for the symptoms. The treatment provided no relief.   Gastroesophageal Reflux   She complains of abdominal pain and heartburn. She reports no belching, no chest pain, no choking, no coughing, no dysphagia, no early satiety, no globus sensation, no hoarse voice, no nausea, no sore  throat, no stridor, no tooth decay, no water brash or no wheezing. This is a chronic problem. The current episode started more than 1 year ago. The problem occurs frequently. The problem has been unchanged. The heartburn duration is several minutes. The heartburn is located in the substernum. The heartburn is of moderate intensity. The heartburn does not wake her from sleep. The heartburn does not limit her activity. The heartburn changes with position. The symptoms are aggravated by certain foods. Pertinent negatives include no anemia, fatigue, melena, muscle weakness, orthopnea or weight loss. Risk factors include obesity. She has tried a PPI (Worked well but urologist discontinued) for the symptoms. The treatment provided moderate relief. Past procedures do not include an abdominal ultrasound, an EGD, esophageal manometry, esophageal pH monitoring, H. pylori antibody titer or a UGI. Past invasive treatments do not include gastroplasty, gastroplication or reflux surgery.     Past Medical History:   Diagnosis Date    Anxiety     AR (allergic rhinitis)     Asthma, intermittent     Coronary artery disease     Depression     Endometrial polyp     GERD (gastroesophageal reflux disease)     Hiatal hernia     Hyperlipidemia LDL goal <100     Hypertension     Mild mitral regurgitation     Multiple thyroid nodules 3/27/2014    Obesity (BMI 30-39.9) 3/27/2014    Renal cell carcinoma 2002    removed - no recurrence    Sleep apnea     Solitary kidney, acquired     right     Social History     Social History    Marital status:      Spouse name: N/A    Number of children: N/A    Years of education: N/A     Occupational History         Social History Main Topics    Smoking status: Never Smoker    Smokeless tobacco: Never Used    Alcohol use No    Drug use: No    Sexual activity: Not on file     Past Surgical History:   Procedure Laterality Date    APPENDECTOMY      CARDIAC CATHETERIZATION   6/27/14    has blockages, but per patient no stents    CHOLECYSTECTOMY      COLONOSCOPY  2002    negative    COLONOSCOPY  3/25/2014         CORONARY ANGIOPLASTY  2008?    balloon angioplasty    CORONARY ANGIOPLASTY WITH STENT PLACEMENT Right 07/26/2017    NEPHRECTOMY Left 2002    [facility] - cancer    OVARIAN CYST REMOVAL      POLYPECTOMY      endometrial polyp removal    ROTATOR CUFF REPAIR      L    THYROIDECTOMY, PARTIAL Left     TUBAL LIGATION         Review of Systems   Constitutional: Negative.  Negative for chills, diaphoresis, fatigue, fever and weight loss.   HENT: Negative.  Negative for congestion, hoarse voice and sore throat.    Eyes: Negative.    Respiratory: Negative.  Negative for cough, choking and wheezing.    Cardiovascular: Negative.  Negative for chest pain.   Gastrointestinal: Positive for abdominal pain, flatus and heartburn. Negative for anorexia, change in bowel habit, constipation, diarrhea, dysphagia, hematochezia, melena, nausea and vomiting.   Endocrine: Negative.    Genitourinary: Negative.  Negative for dysuria, frequency and hematuria.   Musculoskeletal: Negative.  Negative for arthralgias, joint swelling, myalgias, muscle weakness and neck pain.   Skin: Negative.  Negative for rash.   Allergic/Immunologic: Negative.    Neurological: Negative.  Negative for vertigo, weakness, numbness and headaches.   Psychiatric/Behavioral: Negative.        Objective:      Physical Exam   Constitutional: She is oriented to person, place, and time. She appears well-developed and well-nourished.   HENT:   Head: Normocephalic.   Right Ear: External ear normal.   Left Ear: External ear normal.   Nose: Nose normal.   Mouth/Throat: Oropharynx is clear and moist.   Eyes: Conjunctivae are normal. Pupils are equal, round, and reactive to light.   Neck: Normal range of motion. Neck supple.   Cardiovascular: Normal rate, regular rhythm and normal heart sounds.     Pulmonary/Chest: Effort normal and breath sounds normal.   Abdominal: Soft. Bowel sounds are normal. There is tenderness (mild) in the right upper quadrant. There is no rigidity, no rebound, no guarding, no CVA tenderness, no tenderness at McBurney's point and negative Perez's sign.   Musculoskeletal: Normal range of motion.        Arms:  Neurological: She is alert and oriented to person, place, and time.   Skin: Skin is warm and dry. Capillary refill takes 2 to 3 seconds.   Psychiatric: She has a normal mood and affect. Her behavior is normal. Judgment and thought content normal.   Nursing note and vitals reviewed.      Assessment:       1. Right flank pain    2. Flatus    3. Mass of skin    4. RUQ pain    5. Gastroesophageal reflux disease, esophagitis presence not specified    6. Medication refill        Plan:           Erinn was seen today for gi problem and back pain.    Diagnoses and all orders for this visit:    Right flank pain  RUQ pain  -     CT Abdomen Without Contrast; Future  -     Amylase; Future  -     Lipase; Future  -     Hepatic function panel; Future  -     Urinalysis    Flatus       -     simethicone 80 mg Tab; Take 1 tablet by mouth 3 (three) times daily as needed.    Mass of skin  -     US Soft Tissue Misc; Future      Gastroesophageal reflux disease, esophagitis presence not specified       -     ranitidine (ZANTAC) 150 MG tablet; Take 1 tablet (150 mg total) by mouth 2 (two) times daily.    Medication refill  -     dicyclomine (BENTYL) 20 mg tablet; TK 1 T PO Q 8 H PRN CRAMPING    Report to ER immediately if symptoms worsen

## 2017-10-17 ENCOUNTER — HOSPITAL ENCOUNTER (OUTPATIENT)
Dept: RADIOLOGY | Facility: HOSPITAL | Age: 62
Discharge: HOME | End: 2017-10-17
Attending: NURSE PRACTITIONER
Payer: COMMERCIAL

## 2017-10-17 ENCOUNTER — OFFICE VISIT (OUTPATIENT)
Dept: FAMILY MEDICINE | Facility: CLINIC | Age: 62
End: 2017-10-17
Payer: COMMERCIAL

## 2017-10-17 ENCOUNTER — TELEPHONE (OUTPATIENT)
Dept: FAMILY MEDICINE | Facility: CLINIC | Age: 62
End: 2017-10-17

## 2017-10-17 ENCOUNTER — LAB VISIT (OUTPATIENT)
Dept: LAB | Facility: HOSPITAL | Age: 62
End: 2017-10-17
Attending: FAMILY MEDICINE
Payer: COMMERCIAL

## 2017-10-17 VITALS
SYSTOLIC BLOOD PRESSURE: 136 MMHG | WEIGHT: 202.19 LBS | HEART RATE: 53 BPM | DIASTOLIC BLOOD PRESSURE: 61 MMHG | BODY MASS INDEX: 37.21 KG/M2 | HEIGHT: 62 IN

## 2017-10-17 DIAGNOSIS — G47.30 SLEEP APNEA, UNSPECIFIED TYPE: ICD-10-CM

## 2017-10-17 DIAGNOSIS — E78.5 HYPERLIPIDEMIA, UNSPECIFIED HYPERLIPIDEMIA TYPE: ICD-10-CM

## 2017-10-17 DIAGNOSIS — I25.10 CORONARY ARTERY DISEASE INVOLVING NATIVE HEART WITHOUT ANGINA PECTORIS, UNSPECIFIED VESSEL OR LESION TYPE: ICD-10-CM

## 2017-10-17 DIAGNOSIS — J45.20 MILD INTERMITTENT ASTHMA WITHOUT COMPLICATION: ICD-10-CM

## 2017-10-17 DIAGNOSIS — R10.11 RIGHT UPPER QUADRANT ABDOMINAL PAIN: Primary | ICD-10-CM

## 2017-10-17 DIAGNOSIS — Z00.00 ROUTINE HISTORY AND PHYSICAL EXAMINATION OF ADULT: Primary | ICD-10-CM

## 2017-10-17 DIAGNOSIS — Z01.89 LABORATORY TEST: Primary | ICD-10-CM

## 2017-10-17 DIAGNOSIS — R10.9 RIGHT FLANK PAIN: ICD-10-CM

## 2017-10-17 DIAGNOSIS — Z23 NEED FOR PROPHYLACTIC VACCINATION AND INOCULATION AGAINST INFLUENZA: ICD-10-CM

## 2017-10-17 DIAGNOSIS — F33.40 RECURRENT MAJOR DEPRESSIVE DISORDER, IN REMISSION: ICD-10-CM

## 2017-10-17 DIAGNOSIS — R10.11 RUQ PAIN: ICD-10-CM

## 2017-10-17 DIAGNOSIS — I10 ESSENTIAL HYPERTENSION: ICD-10-CM

## 2017-10-17 LAB
CHOLEST SERPL-MCNC: 205 MG/DL
CHOLEST/HDLC SERPL: 4.3 {RATIO}
HDLC SERPL-MCNC: 48 MG/DL
HDLC SERPL: 23.4 %
LDLC SERPL CALC-MCNC: 122.8 MG/DL
NONHDLC SERPL-MCNC: 157 MG/DL
TRIGL SERPL-MCNC: 171 MG/DL

## 2017-10-17 PROCEDURE — 25500020 PHARM REV CODE 255: Mod: PO | Performed by: NURSE PRACTITIONER

## 2017-10-17 PROCEDURE — 80061 LIPID PANEL: CPT

## 2017-10-17 PROCEDURE — 90686 IIV4 VACC NO PRSV 0.5 ML IM: CPT | Mod: S$GLB,,, | Performed by: FAMILY MEDICINE

## 2017-10-17 PROCEDURE — 36415 COLL VENOUS BLD VENIPUNCTURE: CPT | Mod: PO

## 2017-10-17 PROCEDURE — 90471 IMMUNIZATION ADMIN: CPT | Mod: S$GLB,,, | Performed by: FAMILY MEDICINE

## 2017-10-17 PROCEDURE — 99999 PR PBB SHADOW E&M-EST. PATIENT-LVL III: CPT | Mod: PBBFAC,,, | Performed by: FAMILY MEDICINE

## 2017-10-17 PROCEDURE — 74178 CT ABD&PLV WO CNTR FLWD CNTR: CPT | Mod: TC,PO

## 2017-10-17 PROCEDURE — 99396 PREV VISIT EST AGE 40-64: CPT | Mod: 25,S,, | Performed by: FAMILY MEDICINE

## 2017-10-17 PROCEDURE — 74178 CT ABD&PLV WO CNTR FLWD CNTR: CPT | Mod: 26,,, | Performed by: RADIOLOGY

## 2017-10-17 RX ORDER — TRIAMCINOLONE ACETONIDE 1 MG/G
CREAM TOPICAL 2 TIMES DAILY
Qty: 45 G | Refills: 0 | Status: SHIPPED | OUTPATIENT
Start: 2017-10-17 | End: 2018-02-05

## 2017-10-17 RX ADMIN — IOHEXOL 100 ML: 350 INJECTION, SOLUTION INTRAVENOUS at 02:10

## 2017-10-17 RX ADMIN — IOHEXOL 30 ML: 350 INJECTION, SOLUTION INTRAVENOUS at 02:10

## 2017-10-17 NOTE — PROGRESS NOTES
Patient presents physical exam.  Coronary disease without angina followed by cardiology.  Hypertension controlled.  Mild intermittent asthma not requiring albuterol recently.  Depression without current symptoms doing well.  Like to continue current medication.  Sleep apnea not on CPAP.  Apparently had some problems getting her machine.  We did not have sleep study as this was done by outside provider.  Hyperlipidemia needs lipid panel.  Uses triamcinolone for different rashes and wanted to get refill.  Nothing currently.    Past Medical History:  Past Medical History:   Diagnosis Date    Anxiety     AR (allergic rhinitis)     Asthma, intermittent     Coronary artery disease     Depression     Endometrial polyp     GERD (gastroesophageal reflux disease)     Hiatal hernia     Hyperlipidemia LDL goal <100     Hypertension     Mild mitral regurgitation     Multiple thyroid nodules 3/27/2014    Obesity (BMI 30-39.9) 3/27/2014    Renal cell carcinoma 2002    removed - no recurrence    S/P primary angioplasty with coronary stent 07/26/2017    Sleep apnea     Solitary kidney, acquired     right     Past Surgical History:   Procedure Laterality Date    APPENDECTOMY      CARDIAC CATHETERIZATION  6/27/14    has blockages, but per patient no stents    CHOLECYSTECTOMY      COLONOSCOPY  2002    negative    COLONOSCOPY  3/25/2014         CORONARY ANGIOPLASTY  2008?    balloon angioplasty    CORONARY ANGIOPLASTY WITH STENT PLACEMENT Right 07/26/2017    NEPHRECTOMY Left 2002    [facility] cancer    OVARIAN CYST REMOVAL      POLYPECTOMY      endometrial polyp removal    ROTATOR CUFF REPAIR      L    THYROIDECTOMY, PARTIAL Left     TUBAL LIGATION       Social History     Social History    Marital status:      Spouse name: N/A    Number of children: N/A    Years of education: N/A     Occupational History    Not on file.     Social History Main Topics    Smoking status:  Never Smoker    Smokeless tobacco: Never Used    Alcohol use No    Drug use: No    Sexual activity: Not on file     Other Topics Concern    Not on file     Social History Narrative    No narrative on file     Family History   Problem Relation Age of Onset    Heart attack Father 67    Diabetes Paternal Aunt     Diabetes Paternal Uncle     Colon cancer Neg Hx     Stomach cancer Neg Hx      Review of patient's allergies indicates:   Allergen Reactions    Oxycodone Shortness Of Breath, Nausea And Vomiting and Other (See Comments)     Anxiety.    Plavix [clopidogrel] Diarrhea     Stomach cramps    Azithromycin Itching    Codeine Palpitations    Corticosteroids (glucocorticoids) Palpitations    Doxycycline Itching and Nausea Only    Mobic [meloxicam] Itching    Neuromuscular blockers, steroidal Palpitations    Pcn [penicillins] Nausea And Vomiting    Shellfish containing products Itching     topical iodine OK    Sulfa (sulfonamide antibiotics) Nausea And Vomiting     Current Outpatient Prescriptions on File Prior to Visit   Medication Sig Dispense Refill    acetaminophen (TYLENOL) 325 MG tablet Take 2 tablets (650 mg total) by mouth every 6 (six) hours as needed for Pain.  0    albuterol 90 mcg/actuation inhaler Inhale 2 puffs into the lungs every 6 (six) hours as needed for Wheezing. 18 g 5    aspirin (ECOTRIN) 81 MG EC tablet Take 81 mg by mouth once daily.       citalopram (CELEXA) 20 MG tablet Take 1 tablet (20 mg total) by mouth once daily. 90 tablet 3    dicyclomine (BENTYL) 20 mg tablet TK 1 T PO Q 8 H PRN CRAMPING 30 tablet 0    EFFIENT 10 mg Tab TK 1 T PO D UTD  5    metoprolol succinate (TOPROL-XL) 50 MG 24 hr tablet Take 1 tablet (50 mg total) by mouth once daily. (Patient taking differently: Take 25 mg by mouth once daily. Taking 0.5 tablets to equal 25mg) 30 tablet 11    potassium 99 mg Tab Take 1 tablet by mouth.      pravastatin (PRAVACHOL) 20 MG tablet Take 1 tablet (20 mg  "total) by mouth once daily. 30 tablet 11    ranitidine (ZANTAC) 150 MG tablet Take 1 tablet (150 mg total) by mouth 2 (two) times daily. 60 tablet 0    valsartan-hydrochlorothiazide (DIOVAN-HCT) 320-25 mg per tablet Take 1 tablet by mouth once daily. 90 tablet 3    cetirizine (ZYRTEC) 10 MG tablet Take 1 tablet (10 mg total) by mouth once daily. 10 tablet 0    [] simethicone 80 mg Tab Take 1 tablet by mouth 3 (three) times daily as needed. 15 each 0    [DISCONTINUED] triamcinolone acetonide 0.1% (KENALOG) 0.1 % cream Apply topically 2 (two) times daily. 45 g 0     No current facility-administered medications on file prior to visit.            ROS:  GENERAL: No fever, chills,  or significant weight changes.  HEENT: No headache or hearing complaints.  No dysphagia  Eyes: No vision complaints  CHEST: Denies WILLIAMSON, cyanosis, wheezing, cough and sputum production.  CARDIOVASCULAR: Denies chest pain, PND, orthopnea or reduced exercise tolerance.  ABDOMEN: Appetite fine. Denies diarrhea, abdominal pain, hematemesis or blood in stool.  URINARY: No flank pain, dysuria or hematuria.  MUSCULOSKELETAL: No warmth swelling or tenderness of the joints  NEUROLOGIC: No focal weakness numbness or paresthesia  PSYCHIATRIC: Denies depression.  No SI/HI      OBJECTIVE:     Vitals:    10/17/17 0752   BP: 136/61   Pulse: (!) 53   Weight: 91.7 kg (202 lb 2.6 oz)   Height: 5' 2" (1.575 m)     Wt Readings from Last 3 Encounters:   10/17/17 91.7 kg (202 lb 2.6 oz)   10/11/17 90.1 kg (198 lb 10.2 oz)   17 92.8 kg (204 lb 9.4 oz)     APPEARANCE: Well nourished, well developed, in no acute distress.    HEAD: Normocephalic.  Atraumatic.  No sinus tenderness.  EYES:   Right eye: Pupil reactive.  Conjunctiva clear.    Left eye: Pupil reactive.  Conjunctiva clear.    Both fundi:  Grossly normal to nondilated exam. EOMI.    EARS: TM's intact. Light reflex normal. No retraction or perforation.    NOSE:  clear.  MOUTH & THROAT:  No " pharyngeal erythema or exudate. No lesions.  NECK: Supple. No bruits.  No JVD.  No cervical lymphadenopathy.  No thyromegaly.    CHEST: Breath sounds clear bilaterally.  Normal respiratory effort  CARDIOVASCULAR: Normal rate.  Regular rhythm.  No murmurs.  No rub.  No gallops.  ABDOMEN: Bowel sounds normal.  Soft.  No tenderness.  No organomegaly.  PERIPHERAL VASCULAR: No cyanosis.  No clubbing.  No edema.  NEUROLOGIC: No focal findings.  MENTAL STATUS: Alert.  Oriented x 3.                Erinn was seen today for annual exam.    Diagnoses and all orders for this visit:    Routine history and physical examination of adult    Coronary artery disease involving native heart without angina pectoris, unspecified vessel or lesion type    Essential hypertension    Mild intermittent asthma without complication    Recurrent major depressive disorder, in remission    Sleep apnea, unspecified type    Hyperlipidemia, unspecified hyperlipidemia type  -     Lipid panel; Future    Need for prophylactic vaccination and inoculation against influenza  -     Influenza - Quadrivalent (3 years & older) (PF)    Other orders  -     Cancel: Influenza - Quadrivalent (3 years & older)  -     triamcinolone acetonide 0.1% (KENALOG) 0.1 % cream; Apply topically 2 (two) times daily.      Anticipatory guidance: Don't smoke.  Healthy diet and regular exercise recommended.  Request report from outside sleep study

## 2017-10-17 NOTE — TELEPHONE ENCOUNTER
----- Message from Tessa Cross NP sent at 10/17/2017  4:30 PM CDT -----  CT shows fatty liver, mild hepatomegaly; additional information below. Possible inflammation of intestine noted. Due to symptoms, recommend GI referral for further evaluation.     Nonalcoholic Fatty Liver Disease: What You Should Know  Am Fam Physician. 2006 Jun 1;73(11):1969.  What is nonalcoholic fatty liver disease (NAFLD)?  NAFLD is a buildup of fat in the liver. NAFLD can be harmless, but sometimes it may cause the liver to swell. It is a common condition that has many causes, including some drugs and genetic disorders. The most common causes of NAFLD are obesity, diabetes, and high cholesterol levels. It is not caused by drinking alcohol.  How can my doctor tell if I have NAFLD?  If you have NAFLD, you may feel fullness or pain in the right side of the abdomen. Most people do not have any symptoms. Your doctor may check your blood and order a scan of your liver.  What can I expect?  For most people, NAFLD is harmless and does not cause serious health problems. NAFLD usually does not affect how well the liver works. In a few people, NAFLD may stop the liver from working right. No one can tell who will have problems. It is more likely to happen in people with diabetes or who are very overweight. If your doctor thinks you may have a more severe liver disease, you may need a liver biopsy.  How is NAFLD treated?  People with NAFLD usually do not need treatment. The most important thing is to focus on what has led to NAFLD. Losing weight slowly (1 to 2 pounds per week) may reduce the amount of fat in your liver. Losing weight quickly may make NAFLD worse. If your cholesterol and sugar levels are high, your doctor may give you medicine to lower them.  This handout is provided to you by your family doctor and the American Academy of Family [employer]icians. Other health-related information is available from the AAFP online at  http://familydoctor.org.     This information provides a general overview and may not apply to everyone. Talk to your family doctor to find out if this information applies to you and to get more information on this subject.  Copyright © 2006 by the American Academy of Family Physicians.

## 2017-10-20 ENCOUNTER — TELEPHONE (OUTPATIENT)
Dept: GASTROENTEROLOGY | Facility: CLINIC | Age: 62
End: 2017-10-20

## 2017-10-23 ENCOUNTER — TELEPHONE (OUTPATIENT)
Dept: FAMILY MEDICINE | Facility: CLINIC | Age: 62
End: 2017-10-23

## 2017-10-23 NOTE — TELEPHONE ENCOUNTER
Patient reports GI discomfort has eased up some but is concerned about the CT results (danika ordered).  Next available is mid December for [place], , should she wait that long, as she was trying to avoid going out of town.

## 2017-10-23 NOTE — TELEPHONE ENCOUNTER
----- Message from aHlima Brooks sent at 10/23/2017  4:11 PM CDT -----  Contact: Patient  Patient called for test results; she forgot to ask about them earlier. She can be contacted at [phone].    Thanks,  Halima

## 2017-10-23 NOTE — TELEPHONE ENCOUNTER
----- Message from Delmy Eagle sent at 10/23/2017  9:48 AM CDT -----  Contact: Patient   Patient request a call back at [phone](b/t 1pm and 2pm or anytime after 3pm), Regards to her results and she also needs an Doctor.     Thanks  td

## 2017-10-30 NOTE — TELEPHONE ENCOUNTER
----- Message from Joseph Zazueta sent at 10/30/2017  1:17 PM CDT -----  Contact: self  [phone]  Would like to consult with nurse regarding Ct scan. Please call back between 3:15-4:15 at [phone].   Md Jaz

## 2017-10-30 NOTE — TELEPHONE ENCOUNTER
----- Message from Joseph Zazueta sent at 10/30/2017  4:36 PM CDT -----  Contact: self [phone]  Returning call, please call back at [phone].  Md Jaz

## 2017-10-31 ENCOUNTER — TELEPHONE (OUTPATIENT)
Dept: FAMILY MEDICINE | Facility: CLINIC | Age: 62
End: 2017-10-31

## 2017-10-31 RX ORDER — PRAVASTATIN SODIUM 40 MG/1
40 TABLET ORAL DAILY
Qty: 90 TABLET | Refills: 3 | Status: SHIPPED | OUTPATIENT
Start: 2017-10-31 | End: 2017-12-11 | Stop reason: DRUGHIGH

## 2017-10-31 NOTE — TELEPHONE ENCOUNTER
----- Message from Layo Reed MD sent at 10/23/2017  5:01 PM CDT -----  The cholesterol looks better, still above goal.  Recommend increase pravastatin 40 mg daily if tolerated.  Plan recheck lipid, ALT in 3 months.  My nurse will contact you to arrange.  Thanks,  Dr. Reed    Results released on [facility]

## 2017-10-31 NOTE — TELEPHONE ENCOUNTER
----- Message from Adela Lee sent at 10/31/2017  3:16 PM CDT -----  Contact: PT  Pt has questions about her test results and needs to know if she needs to see a doctor right away.  [phone]

## 2017-10-31 NOTE — TELEPHONE ENCOUNTER
Patient wants to know if it is ok to wait until December, to see GI MD of her choice, for follow up on CT results, or should she be seen sooner.  Reports feeling better, abdominal pain gone after using Prilosec.

## 2017-10-31 NOTE — TELEPHONE ENCOUNTER
----- Message from Raeann Balderas sent at 10/31/2017  4:16 PM CDT -----  Pt miss the call. Please call pt back at [phone].

## 2017-10-31 NOTE — TELEPHONE ENCOUNTER
Spoke with pt regard physician's recommendation. Pt verbalized understanding and will keep current appt for GI.

## 2017-11-13 ENCOUNTER — OFFICE VISIT (OUTPATIENT)
Dept: GASTROENTEROLOGY | Facility: CLINIC | Age: 62
End: 2017-11-13
Payer: COMMERCIAL

## 2017-11-13 ENCOUNTER — PATIENT MESSAGE (OUTPATIENT)
Dept: GASTROENTEROLOGY | Facility: CLINIC | Age: 62
End: 2017-11-13

## 2017-11-13 VITALS
WEIGHT: 199.31 LBS | HEIGHT: 62 IN | BODY MASS INDEX: 36.68 KG/M2 | HEART RATE: 78 BPM | DIASTOLIC BLOOD PRESSURE: 74 MMHG | SYSTOLIC BLOOD PRESSURE: 126 MMHG

## 2017-11-13 DIAGNOSIS — R10.11 RIGHT UPPER QUADRANT ABDOMINAL PAIN: Primary | ICD-10-CM

## 2017-11-13 DIAGNOSIS — K21.9 GASTROESOPHAGEAL REFLUX DISEASE WITHOUT ESOPHAGITIS: ICD-10-CM

## 2017-11-13 DIAGNOSIS — R93.3 ABNORMAL CT SCAN, STOMACH: ICD-10-CM

## 2017-11-13 DIAGNOSIS — K76.0 FATTY LIVER: ICD-10-CM

## 2017-11-13 PROCEDURE — 99999 PR PBB SHADOW E&M-EST. PATIENT-LVL IV: CPT | Mod: PBBFAC,,, | Performed by: INTERNAL MEDICINE

## 2017-11-13 PROCEDURE — 99244 OFF/OP CNSLTJ NEW/EST MOD 40: CPT | Mod: S$GLB,,, | Performed by: INTERNAL MEDICINE

## 2017-11-13 NOTE — PATIENT INSTRUCTIONS
Orders Placed This Encounter   Procedures    Case request GI: ESOPHAGOGASTRODUODENOSCOPY (EGD)     Order Specific Question:   CPT Code:     Answer:   [facility],DIAGNOSIS [92929]     Order Specific Question:   Case Referring Provider     Answer:   JACKIE DIAZ [[id number]]     Order Specific Question:   Positioning:     Answer:   Lateral Left [1000]     Order Specific Question:   Post-Procedure Disposition:     Answer:   Amb Surgery/DOSC [2]     Order Specific Question:   Medical Necessity:     Answer:   Medically Non-Urgent [100]     Nonalcoholic fatty liver, a disease tightly linked to the obesity crisis, is a strong risk factor for heart disease and Type 2 diabetes, and in severe cases it can lead to liver failure. There are no official dietary guidelines to treat it. But whether certain foods might fuel the disease is a subject that is drawing increasing attention from researchers.  Researchers have questioned the involvement of a number of dietary factors -- trans fats, omega-6 oils, fried foods and fructose, to name a few. But one that has attracted perhaps the most attention is sugar, in part because it is metabolized in the liver and it is known to increase blood levels of triglycerides, a type of fat. Studies suggest that sugar consumption contributes to liver fat accumulation. And there is some data indicating that people who carry genetic variants associated with fatty liver are particularly sensitive to increased fat accumulation in response to sugar and refined carbohydrates.  One of the first pieces of dietary advice that we, clinicians who treat fatty liver give to their patients, is to eliminate sugary drinks from their diets. But some studies suggest  that patients with the disease are typically consuming too many calories of all kinds, not just sugar.  We often recommend to avoid eating heavily processed foods, which are easy to consume in large quantities and usually stripped of their  fiber and other naturally occurring nutrients. Preliminary studies have found so far that fatty liver patients respond well to the Mediterranean diet, which includes plenty of fresh produce, nuts, olive oil, poultry and fish.  One small clinical trial published in The Journal of Hepatology last year found that a Mediterranean diet had a more favorable impact on liver fat and insulin resistance than a low fat, high carbohydrate diet. And another study in the journal Clinical Nutrition, which involved 90 overweight patients with fatty liver, found similar success with a Mediterranean approach. Right now, the only proven method of reducing fat in the liver is weight loss. We recommend fatty liver patients to aim for an initial weight loss of at least 10 percent of their body weight, which can be accomplished by limiting junk food and engaging in regular exercise. We advise our patients to exercise at least three times a week for 45 minutes.  Several studies have evaluated pioglitazone, alone, in combination with dietary therapy or with vitamin E in patients with JOSHUA. These have shown biochemical and histologic improvement with pioglitazone but the improvements appear to reverse upon discontinuation.  Pioglitazone was associated with significant declines in serum aminotransferase levels, increased hepatic insulin sensitivity, and improvement in histology. Patients receiving pioglitazone can develope fatigue and mild lower-extremity edema. Also, Pioglitazone was associated with an increase in weight (non visceral), a reduction in glucose, HbA1c levels and liver biochemical tests, and improvement in hepatocellular injury and fibrosis. There is a concern related to a possible increased risk of heart disease and bladder cancer associated with the thiazolidinediones. Careful use in patients suffering with CHF is advised. For patients with fatty liver without inflammation or fibrosis, we check liver biochemical tests, a  complete blood count, platelet count, and prothrombin time annually. For patients found to have fatty liver on an imaging test and who either declined to undergo a liver biopsy or in whom a liver biopsy was not immediately indicated, we check liver biochemical tests, a complete blood count, platelet count, and prothrombin time annually. The dose of Pioglitazone is 15 mg twice a day and for Vitamin E is 800 IU cap a day (natural form of Vitamin E).    Thanks for trusting us with your healthcare needs and using [facility]. If you want to ask us a question, you can do so by replying to this message or by calling [phone].    Sincerely,    Dimas Muller M.D.      To rate your experience with Dr. Muller please click on the link below:    [email or web address]          Abdominal Pain    Abdominal pain is pain in the stomach or belly area. Everyone has this pain from time to time. In many cases it goes away on its own. But abdominal pain can sometimes be due to a serious problem, such as appendicitis. So its important to know when to seek help.  Causes of abdominal pain  There are many possible causes of abdominal pain. Common causes in adults include:  · Constipation, diarrhea, or gas  · Stomach acid flowing back up into the esophagus (acid reflux or heartburn)  · Severe acid reflux, called GERD (gastroesophageal reflux disease)  · A sore in the lining of the stomach or small intestine (peptic ulcer)  · Inflammation of the gallbladder, liver, or pancreas  · Gallstones or kidney stones  · Appendicitis   · Intestinal blockage   · An internal organ pushing through a muscle or other tissue (hernia)  · Urinary tract infections  · In women, menstrual cramps, fibroids, or endometriosis  · Inflammation or infection of the intestines  Diagnosing the cause of abdominal pain  Your healthcare provider will do a physical exam help find the cause of your pain. If needed, tests will be  ordered. Belly pain has many possible causes. So it can be hard to find the reason for your pain. Giving details about your pain can help. Tell your provider where and when you feel the pain, and what makes it better or worse. Also let your provider know if you have other symptoms such as:  · Fever  · Tiredness  · Upset stomach (nausea)  · Vomiting  · Changes in bathroom habits  Treating abdominal pain  Some causes of pain need emergency medical treatment right away. These include appendicitis or a bowel blockage. Other problems can be treated with rest, fluids, or medicines. Your healthcare provider can give you specific instructions for treatment or self-care based on what is causing your pain.  If you have vomiting or diarrhea, sip water or other clear fluids. When you are ready to eat solid foods again, start with small amounts of easy-to-digest, low-fat foods. These include apple sauce, toast, or crackers.   When to seek medical care  Call 911 or go to the hospital right away if you:  · Cant pass stool and are vomiting  · Are vomiting blood or have bloody diarrhea or black, tarry diarrhea  · Have chest, neck, or shoulder pain  · Feel like you might pass out  · Have pain in your shoulder blades with nausea  · Have sudden, severe belly pain  · Have new, severe pain unlike any you have felt before  · Have a belly that is rigid, hard, and tender to touch  Call your healthcare provider if you have:  · Pain for more than 5 days  · Bloating for more than 2 days  · Diarrhea for more than 5 days  · A fever of 100.4°F (38.0°C) or higher, or as directed by your provider  · Pain that gets worse  · Weight loss for no reason  · Continued lack of appetite  · Blood in your stool  How to prevent abdominal pain  Here are some tips to help prevent abdominal pain:  · Eat smaller amounts of food at one time.  · Avoid greasy, fried, or other high-fat foods.  · Avoid foods that give you gas.  · Exercise regularly.  · Drink plenty of  fluids.  To help prevent GERD symptoms:  · Quit smoking.  · Reduce alcohol and certain foods that increase stomach acid.  · Avoid aspirin and over-the-counter pain and fever medicines (NSAIDS or nonsteroidal anti-inflammatory drugs), if possible  · Lose extra weight.  · Finish eating at least 2 hours before you go to bed or lie down.  · Raise the head of your bed.  Date Last Reviewed: 7/1/2016  © 4672-5173 [employer]. [address] [place] [address], [place] [address]. All rights reserved. This information is not intended as a substitute for professional medical care. Always follow your healthcare professional's instructions.        GERD (Adult)    The esophagus is a tube that carries food from the mouth to the stomach. A valve at the lower end of the esophagus prevents stomach acid from flowing upward. When this valve doesn't work properly, stomach contents may repeatedly flow back up (reflux) into the esophagus. This is called gastroesophageal reflux disease (GERD). GERD can irritate the esophagus. It can cause problems with swallowing or breathing. In severe cases, GERD can cause recurrent pneumonia or other serious problems.  Symptoms of reflux include burning, pressure or sharp pain in the upper abdomen or mid to lower chest. The pain can spread to the neck, back, or shoulder. There may be belching, an acid taste in the back of the throat, chronic cough, or sore throat or hoarseness. GERD symptoms often occur during the day after a big meal. They can also occur at night when lying down.   Home care  Lifestyle changes can help reduce symptoms. If needed, medicines may be prescribed. Symptoms often improve with treatment, but if treatment is stopped, the symptoms often return after a few months. So most persons with GERD will need to continue treatment.  Lifestyle changes  · Limit or avoid fatty, fried, and spicy foods, as well as coffee, chocolate, mint, and foods with high acid content such as tomatoes  "and citrus fruit and juices (orange, grapefruit, lemon).  · Dont eat large meals, especially at night. Frequent, smaller meals are best. Do not lie down right after eating. And dont eat anything 3 hours before going to bed.  · Avoid drinking alcohol and smoking. As much as possible, stay away from second hand smoke.  · If you are overweight, losing weight will reduce symptoms.   · Avoid wearing tight clothing around your stomach area.  · If your symptoms occur during sleep, use a foam wedge to elevate your upper body (not just your head.) Or, place 4" blocks under the head of your bed.  Medicines  If needed, medicines can help relieve the symptoms of GERD and prevent damage to the esophagus. Discuss a medicine plan with your healthcare provider. This may include one or more of the following medicines:  · Antacids to help neutralize the normal acids in your stomach.  · Acid blockers (H2 blockers) to decrease acid production.  · Acid inhibitors (PPIs) to decrease acid production in a different way than the blockers. They may work better, but can take a little longer to take effect.  Take an antacid 30-60 minutes after eating and at bedtime, but not at the same time as an acid blocker.  Try not to take medicines such as ibuprofen and aspirin. If you are taking aspirin for your heart or other medical reasons, talk to your healthcare provider about stopping it.  Follow-up care  Follow up with your healthcare provider or as advised by our staff.  When to seek medical advice  Call your healthcare provider if any of the following occur:  · Stomach pain gets worse or moves to the lower right abdomen (appendix area)  · Chest pain appears or gets worse, or spreads to the back, neck, shoulder, or arm  · Frequent vomiting (cant keep down liquids)  · Blood in the stool or vomit (red or black in color)  · Feeling weak or dizzy  · Fever of 100.4ºF (38ºC) or higher, or as directed by your healthcare provider  Date Last Reviewed: " 6/23/2015  © 0201-0838 [employer]. [address] [place] [address], [place] [address]. All rights reserved. This information is not intended as a substitute for professional medical care. Always follow your healthcare professional's instructions.        Nonalcoholic Fatty Liver Disease (NAFLD)  Nonalcoholic fatty liver disease (NAFLD) is a common disease of the liver. It occurs when you have too much fat in the liver. If NAFLD is severe, it can cause liver damage that seems like the damage caused by drinking too much alcohol. But NAFLD is not caused by drinking alcohol. This sheet tells you more about NAFLD and how it can be managed.    How the liver works   The liver is an organ in the upper right side of the belly (abdomen). It has many important jobs. These include:  · Breaking down (metabolizing) proteins, carbohydrates, and fats  · Making a substance called bile that helps break down fats  · Storing and releasing sugar (glucose) into the blood to give the body energy  · Removing toxins from the blood  · Helping with blood clotting  Understanding NAFLD  A healthy liver may contain some fat. But if too much fat builds up in the liver, this causes NAFLD. NAFLD can be mild, causing fatty liver. Or it can be more severe and show inflammation, as well as the fat. This can cause non-alcoholic steatohepatitis (JOSHUA).  · Fatty liver. With fatty liver, the liver simply has more fat than normal. This extra fat usually does not harm the liver.  · JOSHUA. With JOSHUA, the fatty liver becomes inflamed over time. JOSHUA is serious because it can lead to scarring of the liver (fibrosis). Over time, the scarring may lead to cirrhosis of the liver. This can eventually cause liver failure or liver cancer.  Causes and risk factors of NAFLD  Doctors don't know what causes NAFLD. But certain things make the problem more likely to happen. These include:  · Obesity  · Prediabetes or diabetes  · High levels of fat found in the blood  (cholesterol and triglycerides)  · Being exposed to certain medicines   Symptoms of NAFLD  Most people with NAFLD have no symptoms. If symptoms do occur, they can include:  · Tiredness  · Weakness  · Weight loss  · Loss of appetite  · Nausea and vomiting  · Belly pain and cramping  · Yellowing of the skin and eyes (jaundice), as well as dark urine, or light-colored stools  · Swelling in the belly or legs  Diagnosing NAFLD  Your healthcare provider may think you have NAFLD if routine blood tests show high levels of liver enzymes. This may mean you have a liver problem. You may need one or more imaging tests, such as an ultrasound, CT, or MRI. You may need more blood tests to look for other causes of liver disease. You may also need a liver biopsy. During this test, a hollow needle is used to remove a tiny tissue sample from your liver. This tissue is then checked in a lab. This test can find signs of damage to liver tissue. It can also help figure out the cause of the damage and tell the difference between fatty liver and JOSHUA.  Treating NAFLD  Treatment for NAFLD varies for each person. The best early treatment is to treat any underlying conditions causing metabolic syndrome. This is the name for a group of conditions that includes:  · High blood pressure  · High levels of cholesterol and triglycerides  · Being overweight or obese  · Diabetes  Your healthcare provider will monitor your health and treat any symptoms or underlying health problems you have. Your provider will also work with you to control your risk factors. This will make liver damage less likely. In fact, treating those underlying conditions can often improve liver disease. You may need to take certain medicines, but no medicine will cure NAFLD. This is why treating the underlying conditions is most important. Your plan may include:  · Losing extra weight  · Getting regular exercise  · Controlling diabetes and high cholesterol or triglyceride  levels  · Taking medicines and vitamins as prescribed by your provider  · Quitting smoking  · Not drinking alcohol  · Eating a healthy and balanced diet  Living with NAFLD  If NAFLD is caught early, it can be managed with treatment. Your healthcare provider will discuss further treatment choices with you as needed.  Be sure to ask your provider about recommended vaccines. These include vaccines for viruses that can cause liver disease.  Date Last Reviewed: 12/1/2016  © 4836-5966 [employer]. [address] [place] [address], [place] [address]. All rights reserved. This information is not intended as a substitute for professional medical care. Always follow your healthcare professional's instructions.

## 2017-11-13 NOTE — PROGRESS NOTES
[facility]  Gastroenterology Note    Patient referred at the [place]  [address] [facility] [place] [address]    Subjective:       Patient ID: Erinn Silva is a 61 y.o. female.    Chief Complaint: Abdominal Pain    A recent CT scan showed questionable gastric wall thickening and diffuse fatty liver.       Abdominal Pain   This is a recurrent problem. The current episode started more than 1 month ago. The onset quality is gradual. The problem occurs intermittently. The problem has been waxing and waning. The pain is located in the epigastric region and RUQ. The pain is at a severity of 7/10. The pain is moderate. The quality of the pain is dull and aching. The abdominal pain radiates to the back. Associated symptoms include belching and nausea. Pertinent negatives include no anorexia, arthralgias, constipation, diarrhea, dysuria, fever, flatus, frequency, headaches, hematochezia, hematuria, melena, myalgias, vomiting or weight loss. The pain is aggravated by certain positions and eating. The pain is relieved by nothing. Prior diagnostic workup includes CT scan, lower endoscopy and upper endoscopy. Her past medical history is significant for GERD. There is no history of abdominal surgery, colon cancer, Crohn's disease, gallstones, irritable bowel syndrome, pancreatitis, PUD or ulcerative colitis. Patient's medical history does not include kidney stones and UTI.        Past Medical History:   Diagnosis Date    Anxiety     AR (allergic rhinitis)     Asthma, intermittent     Coronary artery disease     Depression     Endometrial polyp     Fatty liver     GERD (gastroesophageal reflux disease)     Hiatal hernia     Hyperlipidemia LDL goal <100     Hypertension     Mild mitral regurgitation     Multiple thyroid nodules 3/27/2014    Obesity (BMI 30-39.9) 3/27/2014    Renal cell carcinoma 2002    removed - no recurrence    S/P primary angioplasty with coronary stent  07/26/2017    Sleep apnea     Solitary kidney, acquired     right     Past Surgical History:   Procedure Laterality Date    APPENDECTOMY      CARDIAC CATHETERIZATION  6/27/14    has blockages, but per patient no stents    CHOLECYSTECTOMY      COLONOSCOPY  2002    negative    COLONOSCOPY  3/25/2014         CORONARY ANGIOPLASTY  2008?    balloon angioplasty    CORONARY ANGIOPLASTY WITH STENT PLACEMENT Right 07/26/2017    [facility]    OVARIAN CYST REMOVAL      POLYPECTOMY      endometrial polyp removal    ROTATOR CUFF REPAIR      L    THYROIDECTOMY, PARTIAL Left     TUBAL LIGATION      UPPER GASTROINTESTINAL ENDOSCOPY  04/2014     Current Outpatient Prescriptions on File Prior to Visit   Medication Sig Dispense Refill    acetaminophen (TYLENOL) 325 MG tablet Take 2 tablets (650 mg total) by mouth every 6 (six) hours as needed for Pain.  0    albuterol 90 mcg/actuation inhaler Inhale 2 puffs into the lungs every 6 (six) hours as needed for Wheezing. 18 g 5    aspirin (ECOTRIN) 81 MG EC tablet Take 81 mg by mouth once daily.       citalopram (CELEXA) 20 MG tablet Take 1 tablet (20 mg total) by mouth once daily. 90 tablet 3    dicyclomine (BENTYL) 20 mg tablet TK 1 T PO Q 8 H PRN CRAMPING 30 tablet 0    EFFIENT 10 mg Tab TK 1 T PO D UTD  5    metoprolol succinate (TOPROL-XL) 50 MG 24 hr tablet Take 1 tablet (50 mg total) by mouth once daily. (Patient taking differently: Take 25 mg by mouth once daily. Taking 0.5 tablets to equal 25mg) 30 tablet 11    potassium 99 mg Tab Take 1 tablet by mouth.      pravastatin (PRAVACHOL) 40 MG tablet Take 1 tablet (40 mg total) by mouth once daily. 90 tablet 3    valsartan-hydrochlorothiazide (DIOVAN-HCT) 320-25 mg per tablet Take 1 tablet by mouth once daily. 90 tablet 3    cetirizine (ZYRTEC) 10 MG tablet Take 1 tablet (10 mg total) by mouth once daily. 10 tablet 0    ranitidine (ZANTAC) 150 MG tablet  TAKE 1 TABLET(150 MG) BY MOUTH TWICE DAILY 60 tablet 0    triamcinolone acetonide 0.1% (KENALOG) 0.1 % cream Apply topically 2 (two) times daily. 45 g 0     No current facility-administered medications on file prior to visit.      Review of patient's allergies indicates:   Allergen Reactions    Oxycodone Shortness Of Breath, Nausea And Vomiting and Other (See Comments)     Anxiety.    Plavix [clopidogrel] Diarrhea     Stomach cramps    Azithromycin Itching    Codeine Palpitations    Corticosteroids (glucocorticoids) Palpitations    Doxycycline Itching and Nausea Only    Mobic [meloxicam] Itching    Neuromuscular blockers, steroidal Palpitations    Pcn [penicillins] Nausea And Vomiting    Shellfish containing products Itching     topical iodine OK    Sulfa (sulfonamide antibiotics) Nausea And Vomiting     Social History     Social History    Marital status:      Spouse name: N/A    Number of children: N/A    Years of education: N/A     Occupational History    Not on file.     Social History Main Topics    Smoking status: Never Smoker    Smokeless tobacco: Never Used    Alcohol use No    Drug use: No    Sexual activity: Not on file     Other Topics Concern    Not on file     Social History Narrative    No narrative on file     Family History   Problem Relation Age of Onset    Heart attack Father 67    Diabetes Paternal Aunt     Diabetes Paternal Uncle     Colon cancer Neg Hx     Stomach cancer Neg Hx        Review of Systems   Constitutional: Negative for activity change, chills, diaphoresis, fatigue, fever and weight loss.   HENT: Negative for ear pain, facial swelling, nosebleeds, rhinorrhea, sneezing, sore throat and trouble swallowing.    Eyes: Negative for photophobia, pain and visual disturbance.   Respiratory: Negative for apnea, cough, chest tightness, shortness of breath and wheezing.    Gastrointestinal: Positive for abdominal pain and nausea. Negative for anorexia, blood  in stool, constipation, diarrhea, flatus, hematochezia, melena, rectal pain and vomiting.   Genitourinary: Negative for decreased urine volume, difficulty urinating, dysuria, flank pain, frequency and hematuria.   Musculoskeletal: Negative for arthralgias, back pain, gait problem, myalgias, neck pain and neck stiffness.   Skin: Negative for pallor and rash.   Neurological: Negative for seizures, syncope, speech difficulty, weakness and headaches.   Hematological: Negative for adenopathy. Does not bruise/bleed easily.   Psychiatric/Behavioral: Negative for behavioral problems, confusion and hallucinations.       Objective:      Physical Exam   Constitutional: She is oriented to person, place, and time. She appears well-developed and well-nourished.   HENT:   Head: Normocephalic and atraumatic.   Eyes: EOM are normal. Pupils are equal, round, and reactive to light.   Neck: Normal range of motion. Neck supple. No thyromegaly present.   Cardiovascular: Normal rate, regular rhythm, normal heart sounds and intact distal pulses.    No murmur heard.  Pulmonary/Chest: Effort normal and breath sounds normal. No respiratory distress. She has no wheezes. She has no rales.   Abdominal: Soft. Bowel sounds are normal. She exhibits no distension and no mass. There is no tenderness.   Musculoskeletal: Normal range of motion. She exhibits no edema or tenderness.   Lymphadenopathy:     She has no cervical adenopathy.   Neurological: She is alert and oriented to person, place, and time. She has normal reflexes. No cranial nerve deficit.   Skin: Skin is warm and dry. No erythema.   Psychiatric: She has a normal mood and affect. Her behavior is normal.       Lab Summary Latest Ref Rng & Units 7/5/2017 8/2/2017 8/22/2017 10/11/2017 10/17/2017   Hemoglobin 12.0 - 16.0 g/dL 13.1 (None) (None) (None) (None)   Hematocrit 37.0 - 48.5 % 40.2 (None) (None) (None) (None)   White count 3.90 - 12.70 K/uL 5.97 (None) (None) (None) (None)   Platelet  count 150 - 350 K/uL 295 (None) (None) (None) (None)   Sodium 136 - 145 mmol/L 138 (None) 138 (None) (None)   Potassium 3.5 - 5.1 mmol/L 4.6 (None) 3.9 (None) (None)   Calcium 8.7 - 10.5 mg/dL 9.4 (None) 9.0 (None) (None)   Phosphorus - (None) (None) (None) (None) (None)   Creatinine 0.5 - 1.4 mg/dL 0.8 1.0 0.9 (None) 0.8   AST 10 - 40 U/L 17 (None) (None) 20 (None)   Alk Phos 55 - 135 U/L 68 (None) (None) 79 (None)   Bilirubin 0.1 - 1.0 mg/dL 0.5 (None) (None) 0.4 (None)   Glucose 70 - 110 mg/dL 92 (None) 78 (None) (None)   Cholesterol 120 - 199 mg/dL 254(H) (None) (None) (None) 205(H)   HDL cholesterol 40 - 75 mg/dL 47 (None) (None) (None) 48   Triglycerides 30 - 150 mg/dL 217(H) (None) (None) (None) 171(H)   LDL calc - (None) (None) (None) (None) (None)   Total protein 6.0 - 8.4 g/dL 7.1 (None) (None) 7.3 (None)   Albumin 3.5 - 5.2 g/dL 3.5 (None) (None) 3.5 (None)   A1C - (None) (None) (None) (None) (None)   PSA Screen - (None) (None) (None) (None) (None)   Some recent data might be hidden     Lab Results   Component Value Date    ALT 21 10/11/2017         Assessment:       1. Right upper quadrant abdominal pain    2. Abnormal CT scan, stomach    3. Gastroesophageal reflux disease without esophagitis    4. Fatty liver        Plan:       Orders Placed This Encounter   Procedures    Case request GI: ESOPHAGOGASTRODUODENOSCOPY (EGD)     Order Specific Question:   CPT Code:     Answer:   MA UPPER GI ENDOSCOPY,DIAGNOSIS [99895]     Order Specific Question:   Case Referring Provider     Answer:   JACKIE DIAZ [[id number]]     Order Specific Question:   Positioning:     Answer:   Lateral Left [1000]     Order Specific Question:   Post-Procedure Disposition:     Answer:   Amb Surgery/DOSC [2]     Order Specific Question:   Medical Necessity:     Answer:   Medically Non-Urgent [100]     Nonalcoholic fatty liver, a disease tightly linked to the obesity crisis, is a strong risk factor for heart disease and Type 2 diabetes,  and in severe cases it can lead to liver failure. There are no official dietary guidelines to treat it. But whether certain foods might fuel the disease is a subject that is drawing increasing attention from researchers.  Researchers have questioned the involvement of a number of dietary factors -- trans fats, omega-6 oils, fried foods and fructose, to name a few. But one that has attracted perhaps the most attention is sugar, in part because it is metabolized in the liver and it is known to increase blood levels of triglycerides, a type of fat. Studies suggest that sugar consumption contributes to liver fat accumulation. And there is some data indicating that people who carry genetic variants associated with fatty liver are particularly sensitive to increased fat accumulation in response to sugar and refined carbohydrates.  One of the first pieces of dietary advice that we, clinicians who treat fatty liver give to their patients, is to eliminate sugary drinks from their diets. But some studies suggest  that patients with the disease are typically consuming too many calories of all kinds, not just sugar.  We often recommend to avoid eating heavily processed foods, which are easy to consume in large quantities and usually stripped of their fiber and other naturally occurring nutrients. Preliminary studies have found so far that fatty liver patients respond well to the Mediterranean diet, which includes plenty of fresh produce, nuts, olive oil, poultry and fish.  One small clinical trial published in The Journal of Hepatology last year found that a Mediterranean diet had a more favorable impact on liver fat and insulin resistance than a low fat, high carbohydrate diet. And another study in the journal Clinical Nutrition, which involved 90 overweight patients with fatty liver, found similar success with a Mediterranean approach. Right now, the only proven method of reducing fat in the liver is weight loss. We recommend  fatty liver patients to aim for an initial weight loss of at least 10 percent of their body weight, which can be accomplished by limiting junk food and engaging in regular exercise. We advise our patients to exercise at least three times a week for 45 minutes.  Several studies have evaluated pioglitazone, alone, in combination with dietary therapy or with vitamin E in patients with JOSHUA. These have shown biochemical and histologic improvement with pioglitazone but the improvements appear to reverse upon discontinuation.  Pioglitazone was associated with significant declines in serum aminotransferase levels, increased hepatic insulin sensitivity, and improvement in histology. Patients receiving pioglitazone can develope fatigue and mild lower-extremity edema. Also, Pioglitazone was associated with an increase in weight (non visceral), a reduction in glucose, HbA1c levels and liver biochemical tests, and improvement in hepatocellular injury and fibrosis. There is a concern related to a possible increased risk of heart disease and bladder cancer associated with the thiazolidinediones. Careful use in patients suffering with CHF is advised. For patients with fatty liver without inflammation or fibrosis, we check liver biochemical tests, a complete blood count, platelet count, and prothrombin time annually. For patients found to have fatty liver on an imaging test and who either declined to undergo a liver biopsy or in whom a liver biopsy was not immediately indicated, we check liver biochemical tests, a complete blood count, platelet count, and prothrombin time annually. The dose of Pioglitazone is 15 mg twice a day and for Vitamin E is 800 IU cap a day (natural form of Vitamin E).    Patients with NAFLD are at increased risk for cardiovascular disease and often have multiple cardiovascular disease risk factors. Management of patients with NAFLD includes optimization of blood glucose control in patients with diabetes and  treatment of hyperlipidemia. Statin therapy has been shown to be safe in patients with NAFLD.  In general, we do not suggest using pharmacologic agents, like Vitamin E or Pioglitazone, solely for the treatment of NAFLD. However, we do suggest vitamin E at a dose of 400 international units/day for the subset of patients with advanced fibrosis on biopsy who do not have diabetes or coronary artery disease.    The management of Fatty Liver consists of:  -- Gradual Weight loss. A 20% weight reduction translates to improved symptoms.  -- Exercise 4 times a week for 30 minutes will mobilize some of the fat away from your liver and make your insulin more effective.  -- A low carb high protein diet, rich in vegetables and increased water consumption, staying away from carbonated, sugary drinks.   -- Avoid processed foods.   These are the only effective measures to manage fatty liver. If after a year of adhering to these measures and effectively reducing your Body Mass Index plus decreasing the insulin resistance, you continue to have symptoms, we may need to start medications to treat Fatty liver. These medications are used to treat diabetes and work well for insulin resistance. Unfortunately, these medications can have side effects.          Risks, benefits and alternative options were discussed with the patient. Risks including but not limited to infection, bleeding, heart or respiratory problems and perforation that may require surgery were all explained to the patient. The patient had a chance for questions if there were doubts or concerns about the test. The referring provider will be notified that our consultation is complete and available through the patient's records.    Thanks for letting us participate in the care of this nice patient,      Dimas Muller

## 2017-11-13 NOTE — LETTER
November 16, 2017      [place]  [address] [facility] [address]           [facility]  [id number] [place] [id number]  Phone: [phone]  Fax: [phone]          Patient: Erinn Silva   MR Number: [id number]   YOB: 1955   Date of Visit: 11/13/2017       Dear Tessa Cross:    Thank you for referring Erinn Silva to me for evaluation. Attached you will find relevant portions of my assessment and plan of care.    If you have questions, please do not hesitate to call me. I look forward to following Erinn Silva along with you.    Sincerely,    Dimas Muller MD    Enclosure  CC:  No Recipients    If you would like to receive this communication electronically, please contact [email or web address] or [phone] to request more information on [employer] Link access.    For providers and/or their staff who would like to refer a patient to [facility], please contact us through our one-stop-shop provider referral line, [facility] , at [phone].    If you feel you have received this communication in error or would no longer like to receive these types of communications, please e-mail [email or web address]

## 2017-11-16 ENCOUNTER — OFFICE VISIT (OUTPATIENT)
Dept: FAMILY MEDICINE | Facility: CLINIC | Age: 62
End: 2017-11-16
Payer: COMMERCIAL

## 2017-11-16 VITALS
SYSTOLIC BLOOD PRESSURE: 127 MMHG | HEIGHT: 62 IN | BODY MASS INDEX: 36.68 KG/M2 | WEIGHT: 199.31 LBS | TEMPERATURE: 98 F | DIASTOLIC BLOOD PRESSURE: 71 MMHG | HEART RATE: 67 BPM

## 2017-11-16 DIAGNOSIS — J02.9 PHARYNGITIS, UNSPECIFIED ETIOLOGY: ICD-10-CM

## 2017-11-16 DIAGNOSIS — J01.00 ACUTE MAXILLARY SINUSITIS, RECURRENCE NOT SPECIFIED: Primary | ICD-10-CM

## 2017-11-16 PROCEDURE — 99213 OFFICE O/P EST LOW 20 MIN: CPT | Mod: S$GLB,,, | Performed by: NURSE PRACTITIONER

## 2017-11-16 PROCEDURE — 99999 PR PBB SHADOW E&M-EST. PATIENT-LVL III: CPT | Mod: PBBFAC,,, | Performed by: NURSE PRACTITIONER

## 2017-11-16 RX ORDER — BENZONATATE 200 MG/1
200 CAPSULE ORAL 3 TIMES DAILY PRN
Qty: 30 CAPSULE | Refills: 0 | Status: SHIPPED | OUTPATIENT
Start: 2017-11-16 | End: 2017-11-26

## 2017-11-16 RX ORDER — AMOXICILLIN 875 MG/1
875 TABLET, FILM COATED ORAL EVERY 12 HOURS
Qty: 20 TABLET | Refills: 0 | Status: SHIPPED | OUTPATIENT
Start: 2017-11-16 | End: 2017-11-26

## 2017-11-16 NOTE — PROGRESS NOTES
Subjective:       Patient ID: Erinn Silva is a 61 y.o. female.    Chief Complaint: Nasal Congestion; Cough; and Sore Throat    Sinus Problem   This is a new problem. The current episode started 1 to 4 weeks ago. The problem is unchanged. There has been no fever. Associated symptoms include congestion, coughing, ear pain, headaches and sinus pressure. Pertinent negatives include no chills, diaphoresis, hoarse voice, neck pain, shortness of breath, sneezing, sore throat or swollen glands. Treatments tried: mucinex. The treatment provided no relief.     Past Medical History:   Diagnosis Date    Anxiety     AR (allergic rhinitis)     Asthma, intermittent     Coronary artery disease     Depression     Endometrial polyp     Fatty liver     GERD (gastroesophageal reflux disease)     Hiatal hernia     Hyperlipidemia LDL goal <100     Hypertension     Mild mitral regurgitation     Multiple thyroid nodules 3/27/2014    Obesity (BMI 30-39.9) 3/27/2014    Renal cell carcinoma 2002    removed - no recurrence    S/P primary angioplasty with coronary stent 07/26/2017    Sleep apnea     Solitary kidney, acquired     right     Social History     Social History    Marital status:      Spouse name: N/A    Number of children: N/A    Years of education: N/A     Occupational History         Social History Main Topics    Smoking status: Never Smoker    Smokeless tobacco: Never Used    Alcohol use No    Drug use: No    Sexual activity: Not on file     Past Surgical History:   Procedure Laterality Date    APPENDECTOMY      CARDIAC CATHETERIZATION  6/27/14    has blockages, but per patient no stents    CHOLECYSTECTOMY      COLONOSCOPY  2002    negative    COLONOSCOPY  3/25/2014         CORONARY ANGIOPLASTY  2008?    balloon angioplasty    CORONARY ANGIOPLASTY WITH STENT PLACEMENT Right 07/26/2017    NEPHRECTOMY Left 2002    [facility]    OVARIAN CYST REMOVAL       POLYPECTOMY      endometrial polyp removal    ROTATOR CUFF REPAIR      L    THYROIDECTOMY, PARTIAL Left     TUBAL LIGATION      UPPER GASTROINTESTINAL ENDOSCOPY  04/2014       Review of Systems   Constitutional: Negative.  Negative for chills and diaphoresis.   HENT: Positive for congestion, ear pain, rhinorrhea, sinus pain and sinus pressure. Negative for hoarse voice, sneezing and sore throat.    Eyes: Negative.    Respiratory: Positive for cough. Negative for shortness of breath.    Cardiovascular: Negative.    Gastrointestinal: Negative.    Endocrine: Negative.    Genitourinary: Negative.    Musculoskeletal: Negative.  Negative for neck pain.   Skin: Negative.    Allergic/Immunologic: Negative.    Neurological: Positive for headaches.   Psychiatric/Behavioral: Negative.        Objective:      Physical Exam   Constitutional: She is oriented to person, place, and time. She appears well-developed and well-nourished.   HENT:   Head: Normocephalic.   Right Ear: External ear normal.   Left Ear: External ear normal.   Mouth/Throat: Oropharynx is clear and moist.   Eyes: Conjunctivae are normal. Pupils are equal, round, and reactive to light.   Neck: Normal range of motion. Neck supple.   Cardiovascular: Normal rate, regular rhythm and normal heart sounds.    Pulmonary/Chest: Effort normal and breath sounds normal.   Abdominal: Soft. Bowel sounds are normal.   Musculoskeletal: Normal range of motion.   Neurological: She is alert and oriented to person, place, and time.   Skin: Skin is warm and dry. Capillary refill takes 2 to 3 seconds.   Psychiatric: She has a normal mood and affect. Her behavior is normal. Judgment and thought content normal.   Nursing note and vitals reviewed.      Assessment:       1. Acute maxillary sinusitis, recurrence not specified    2. Pharyngitis, unspecified etiology        Plan:           Erinn was seen today for nasal congestion, cough and sore throat.    Diagnoses and all orders for  this visit:    Acute maxillary sinusitis, recurrence not specified  Pharyngitis, unspecified etiology  -     amoxicillin (AMOXIL) 875 MG tablet; Take 1 tablet (875 mg total) by mouth every 12 (twelve) hours.  -     benzonatate (TESSALON) 200 MG capsule; Take 1 capsule (200 mg total) by mouth 3 (three) times daily as needed.

## 2017-11-17 ENCOUNTER — TELEPHONE (OUTPATIENT)
Dept: FAMILY MEDICINE | Facility: CLINIC | Age: 62
End: 2017-11-17

## 2017-11-17 NOTE — TELEPHONE ENCOUNTER
Please call pt and inform her the MD was consulted and the the pravachol 20 mg BID is acceptable.

## 2017-11-17 NOTE — TELEPHONE ENCOUNTER
Dr. Reed,  This pt has been taking pravachol 20 mg BID, instead of the 40 mg daily. She states that taking the 40 mg at once causes her muscle pain. I hadn't ever seen it prescribed that way. Is this Ok?

## 2017-11-20 ENCOUNTER — TELEPHONE (OUTPATIENT)
Dept: FAMILY MEDICINE | Facility: CLINIC | Age: 62
End: 2017-11-20

## 2017-11-20 NOTE — TELEPHONE ENCOUNTER
----- Message from Ramon Bethea sent at 11/20/2017 12:17 PM CST -----  Contact: Pt   Pt called to cancel procedure scheduled on 11- requested a  Callback to discuss other dates and times...[phone]

## 2017-12-11 ENCOUNTER — SURGERY (OUTPATIENT)
Age: 62
End: 2017-12-11

## 2017-12-11 ENCOUNTER — ANESTHESIA (OUTPATIENT)
Dept: ENDOSCOPY | Facility: HOSPITAL | Age: 62
End: 2017-12-11
Payer: COMMERCIAL

## 2017-12-11 ENCOUNTER — PATIENT MESSAGE (OUTPATIENT)
Dept: GASTROENTEROLOGY | Facility: HOSPITAL | Age: 62
End: 2017-12-11

## 2017-12-11 ENCOUNTER — ANESTHESIA EVENT (OUTPATIENT)
Dept: ENDOSCOPY | Facility: HOSPITAL | Age: 62
End: 2017-12-11
Payer: COMMERCIAL

## 2017-12-11 ENCOUNTER — HOSPITAL ENCOUNTER (OUTPATIENT)
Facility: HOSPITAL | Age: 62
Discharge: HOME | End: 2017-12-11
Attending: INTERNAL MEDICINE | Admitting: INTERNAL MEDICINE
Payer: COMMERCIAL

## 2017-12-11 VITALS
BODY MASS INDEX: 36.62 KG/M2 | WEIGHT: 199 LBS | OXYGEN SATURATION: 99 % | DIASTOLIC BLOOD PRESSURE: 79 MMHG | TEMPERATURE: 98 F | HEART RATE: 56 BPM | RESPIRATION RATE: 16 BRPM | HEIGHT: 62 IN | SYSTOLIC BLOOD PRESSURE: 129 MMHG

## 2017-12-11 DIAGNOSIS — R93.3 ABNORMAL CT SCAN, STOMACH: ICD-10-CM

## 2017-12-11 DIAGNOSIS — R10.13 EPIGASTRIC PAIN: ICD-10-CM

## 2017-12-11 DIAGNOSIS — K21.9 GASTROESOPHAGEAL REFLUX DISEASE WITHOUT ESOPHAGITIS: Primary | ICD-10-CM

## 2017-12-11 PROCEDURE — 25000003 PHARM REV CODE 250: Performed by: NURSE ANESTHETIST, CERTIFIED REGISTERED

## 2017-12-11 PROCEDURE — 37000008 HC ANESTHESIA 1ST 15 MINUTES: Performed by: INTERNAL MEDICINE

## 2017-12-11 PROCEDURE — 63600175 PHARM REV CODE 636 W HCPCS: Performed by: NURSE ANESTHETIST, CERTIFIED REGISTERED

## 2017-12-11 PROCEDURE — 43235 EGD DIAGNOSTIC BRUSH WASH: CPT | Mod: ,,, | Performed by: INTERNAL MEDICINE

## 2017-12-11 PROCEDURE — 25000003 PHARM REV CODE 250: Performed by: INTERNAL MEDICINE

## 2017-12-11 PROCEDURE — 43235 EGD DIAGNOSTIC BRUSH WASH: CPT | Performed by: INTERNAL MEDICINE

## 2017-12-11 RX ORDER — SODIUM CHLORIDE, SODIUM LACTATE, POTASSIUM CHLORIDE, CALCIUM CHLORIDE 600; 310; 30; 20 MG/100ML; MG/100ML; MG/100ML; MG/100ML
INJECTION, SOLUTION INTRAVENOUS CONTINUOUS
Status: DISCONTINUED | OUTPATIENT
Start: 2017-12-11 | End: 2017-12-11 | Stop reason: HOSPADM

## 2017-12-11 RX ORDER — PRAVASTATIN SODIUM 20 MG/1
20 TABLET ORAL 2 TIMES DAILY
Qty: 60 TABLET | Refills: 5 | Status: SHIPPED | OUTPATIENT
Start: 2017-12-11 | End: 2018-01-17 | Stop reason: DRUGHIGH

## 2017-12-11 RX ORDER — PROPOFOL 10 MG/ML
VIAL (ML) INTRAVENOUS
Status: DISCONTINUED | OUTPATIENT
Start: 2017-12-11 | End: 2017-12-11

## 2017-12-11 RX ORDER — LIDOCAINE HYDROCHLORIDE 10 MG/ML
INJECTION INFILTRATION; PERINEURAL
Status: DISCONTINUED | OUTPATIENT
Start: 2017-12-11 | End: 2017-12-11

## 2017-12-11 RX ADMIN — LIDOCAINE HYDROCHLORIDE 50 MG: 10 INJECTION, SOLUTION INFILTRATION; PERINEURAL at 10:12

## 2017-12-11 RX ADMIN — PROPOFOL 50 MG: 10 INJECTION, EMULSION INTRAVENOUS at 10:12

## 2017-12-11 RX ADMIN — SODIUM CHLORIDE, SODIUM LACTATE, POTASSIUM CHLORIDE, AND CALCIUM CHLORIDE: 600; 310; 30; 20 INJECTION, SOLUTION INTRAVENOUS at 10:12

## 2017-12-11 NOTE — ANESTHESIA PREPROCEDURE EVALUATION
12/11/2017  Erinn Silva is a 62 y.o., female.    Anesthesia Evaluation    I have reviewed the Patient Summary Reports.    I have reviewed the Nursing Notes.   I have reviewed the Medications.     Review of Systems  Anesthesia Hx:  No problems with previous Anesthesia  History of prior surgery of interest to airway management or planning: Denies Family Hx of Anesthesia complications.   Denies Personal Hx of Anesthesia complications.   Cardiovascular:   Hypertension CAD  CABG/stent  hyperlipidemia ECG has been reviewed.    Pulmonary:   Asthma Sleep Apnea    Renal/:   Chronic Renal Disease Hx Renal Cancer   Hepatic/GI:   Hiatal Hernia, GERD Liver Disease,    Neurological:   Neuromuscular Disease,    Psych:   Psychiatric History depression          Physical Exam  General:  Obesity    Airway/Jaw/Neck:  Airway Findings: Mouth Opening: Normal Tongue: Normal  General Airway Assessment: Adult  Mallampati: II  TM Distance: Normal, at least 6 cm          Chest/Lungs:  Chest/Lungs Findings: Normal Respiratory Rate     Heart/Vascular:  Heart Findings: Rate: Normal             Anesthesia Plan  Type of Anesthesia, risks & benefits discussed:  Anesthesia Type:  MAC  Patient's Preference:   Intra-op Monitoring Plan: standard ASA monitors  Intra-op Monitoring Plan Comments:   Post Op Pain Control Plan:   Post Op Pain Control Plan Comments:   Induction:   IV  Beta Blocker:  Patient is on a Beta-Blocker and has received one dose within the past 24 hours (No further documentation required).       Informed Consent: Patient understands risks and agrees with Anesthesia plan.  Questions answered. Anesthesia consent signed with patient.  ASA Score: 3     Day of Surgery Review of History & Physical:    H&P update referred to the surgeon.         Ready For Surgery From Anesthesia Perspective.

## 2017-12-11 NOTE — INTERVAL H&P NOTE
The patient has been examined and the H&P has been reviewed:    I concur with the findings and no changes have occurred since H&P was written.    Anesthesia/Surgery risks, benefits and alternative options discussed and understood by patient/family.        Active Hospital Problems    Diagnosis  POA    *Epigastric pain [R10.13]  Yes    Abnormal CT scan, stomach [R93.3]  Yes    GERD (gastroesophageal reflux disease) [K21.9]  Yes      Resolved Hospital Problems    Diagnosis Date Resolved POA   No resolved problems to display.

## 2017-12-11 NOTE — TELEPHONE ENCOUNTER
----- Message from Yvonne Shaw sent at 12/11/2017  1:09 PM CST -----  Pt needs to speak to the nurse regarding a change in one of her meds/please call [phone]/ma

## 2017-12-11 NOTE — TRANSFER OF CARE
"Anesthesia Transfer of Care Note    Patient: Erinn Silva    Procedure(s) Performed: Procedure(s) (LRB):  ESOPHAGOGASTRODUODENOSCOPY (EGD) (Left)    Patient location: PACU    Anesthesia Type: MAC    Transport from OR: Transported from OR on room air with adequate spontaneous ventilation    Post pain: adequate analgesia    Post assessment: no apparent anesthetic complications    Post vital signs: stable    Level of consciousness: awake    Nausea/Vomiting: no nausea/vomiting    Complications: none    Transfer of care protocol was followed      Last vitals:   Visit Vitals  BP (!) 144/59 (BP Location: Right arm, Patient Position: Lying)   Pulse (!) 53   Temp 36.9 °C (98.4 °F) (Oral)   Resp 20   Ht 5' 2" (1.575 m)   Wt 90.3 kg (199 lb)   SpO2 97%   Breastfeeding? No   BMI 36.40 kg/m²     "

## 2017-12-11 NOTE — ANESTHESIA RELEASE NOTE
"Anesthesia Release from PACU Note    Patient: Erinn Silva    Procedure(s) Performed: Procedure(s) (LRB):  ESOPHAGOGASTRODUODENOSCOPY (EGD) (Left)    Anesthesia type: MAC    Post pain: Adequate analgesia    Post assessment: no apparent anesthetic complications    Last Vitals:   Visit Vitals  BP (!) 144/59 (BP Location: Right arm, Patient Position: Lying)   Pulse (!) 53   Temp 36.9 °C (98.4 °F) (Oral)   Resp 20   Ht 5' 2" (1.575 m)   Wt 90.3 kg (199 lb)   SpO2 97%   Breastfeeding? No   BMI 36.40 kg/m²       Post vital signs: stable    Level of consciousness: awake    Nausea/Vomiting: no nausea/no vomiting    Complications: none    Airway Patency: patent    Respiratory: unassisted    Cardiovascular: stable and blood pressure at baseline    Hydration: euvolemic  "

## 2017-12-11 NOTE — PLAN OF CARE
Renzo SINGLETARY at bedside. Discussed procedure results with patient and family. Vital signs stable.

## 2017-12-11 NOTE — DISCHARGE SUMMARY
Endoscopy Discharge Summary      Admit Date: 12/11/2017    Discharge Date and Time:  12/11/2017 11:02 AM    Attending Physician: Binh aCnnon MD     Discharge Physician: Binh Cannon MD     Principal Admitting Diagnoses: Epigastric pain         Discharge Diagnosis: The primary encounter diagnosis was Gastroesophageal reflux disease without esophagitis. Diagnoses of Abnormal CT scan, stomach and Epigastric pain were also pertinent to this visit.     Discharged Condition: Good    Indication for Admission: Epigastric pain     Hospital Course: Patient was admitted for an inpatient procedure and tolerated the procedure well with no complications.    Significant Diagnostic Studies: EGD    Pathology (if any):  Specimen (12h ago through future)    None          Disposition: Home.    Bleeding: None    No Implants         Follow Up/Patient Instructions:   Current Discharge Medication List      CONTINUE these medications which have NOT CHANGED    Details   albuterol 90 mcg/actuation inhaler Inhale 2 puffs into the lungs every 6 (six) hours as needed for Wheezing.  Qty: 18 g, Refills: 5    Associated Diagnoses: Medication refill      aspirin (ECOTRIN) 81 MG EC tablet Take 81 mg by mouth once daily.       citalopram (CELEXA) 20 MG tablet Take 1 tablet (20 mg total) by mouth once daily.  Qty: 90 tablet, Refills: 3      EFFIENT 10 mg Tab TK 1 T PO D UTD  Refills: 5      metoprolol succinate (TOPROL-XL) 50 MG 24 hr tablet Take 1 tablet (50 mg total) by mouth once daily.  Qty: 30 tablet, Refills: 11      potassium 99 mg Tab Take 1 tablet by mouth.      pravastatin (PRAVACHOL) 40 MG tablet Take 1 tablet (40 mg total) by mouth once daily.  Qty: 90 tablet, Refills: 3      ranitidine (ZANTAC) 150 MG tablet TAKE 1 TABLET(150 MG) BY MOUTH TWICE DAILY  Qty: 60 tablet, Refills: 0      valsartan-hydrochlorothiazide (DIOVAN-HCT) 320-25 mg per tablet Take 1 tablet by mouth once daily.  Qty: 90 tablet, Refills: 3       acetaminophen (TYLENOL) 325 MG tablet Take 2 tablets (650 mg total) by mouth every 6 (six) hours as needed for Pain.  Refills: 0      cetirizine (ZYRTEC) 10 MG tablet Take 1 tablet (10 mg total) by mouth once daily.  Qty: 10 tablet, Refills: 0      dicyclomine (BENTYL) 20 mg tablet TK 1 T PO Q 8 H PRN CRAMPING  Qty: 30 tablet, Refills: 0      triamcinolone acetonide 0.1% (KENALOG) 0.1 % cream Apply topically 2 (two) times daily.  Qty: 45 g, Refills: 0               Discharge Procedure Orders  Diet general     Activity as tolerated     Call MD for:  temperature >100.4     Call MD for:  persistent nausea and vomiting     Call MD for:  severe uncontrolled pain     Call MD for:  difficulty breathing, headache or visual disturbances     Call MD for:  redness, tenderness, or signs of infection (pain, swelling, redness, odor or green/yellow discharge around incision site)     Call MD for:  hives     Call MD for:  persistent dizziness or light-headedness     No dressing needed         Follow-up Information     Layo Reed MD.    Specialty:  Family Medicine  Why:  As needed  Contact information:  [address] [facility] [address] [phone]

## 2017-12-11 NOTE — DISCHARGE INSTRUCTIONS
If you develop fevers, severe abdominal pain, significant bleeding, nausea or vomiting, please contact us or come to our Emergency Department at [facility].  Our  contact number is [phone] available for emergencies or any question that you may have.      You may return to normal activities tomorrow.  Written discharge instructions were provided to you today and have them handy since you may not remember our conversation today.       I also recommend that you follow a high fiber diet and take calcium supplements daily as well as to continue your present medications.      If you take Aspirin, please resume taking it at your prior dose today. If we obtained biopsies or removed polyps during your procedure, we will contact you within 5 to 6 days with the results.      Thanks for trusting us with your healthcare needs.      Sincerely,     Dimas Muller M.D.        To rate your experience with Dr. Muller, please click on the link below     [email or web address]

## 2017-12-11 NOTE — ANESTHESIA POSTPROCEDURE EVALUATION
"Anesthesia Post Evaluation    Patient: Erinn Silva    Procedure(s) Performed: Procedure(s) (LRB):  ESOPHAGOGASTRODUODENOSCOPY (EGD) (Left)    Final Anesthesia Type: MAC  Patient location during evaluation: PACU  Patient participation: Yes- Able to Participate  Level of consciousness: awake and alert  Post-procedure vital signs: reviewed and stable  Pain management: adequate  Airway patency: patent  PONV status at discharge: No PONV  Anesthetic complications: no      Cardiovascular status: blood pressure returned to baseline  Respiratory status: unassisted  Hydration status: euvolemic  Follow-up not needed.        Visit Vitals  BP (!) 144/59 (BP Location: Right arm, Patient Position: Lying)   Pulse (!) 53   Temp 36.9 °C (98.4 °F) (Oral)   Resp 20   Ht 5' 2" (1.575 m)   Wt 90.3 kg (199 lb)   SpO2 97%   Breastfeeding? No   BMI 36.40 kg/m²       Pain/S[place] Score: Pain Assessment Performed: Yes (12/11/2017 10:36 AM)  Presence of Pain: denies (12/11/2017 10:36 AM)      "

## 2017-12-11 NOTE — H&P (VIEW-ONLY)
[facility]  Gastroenterology Note    Patient referred at the request [place]  [id number] [place] [address]    Subjective:       Patient ID: Erinn Silva is a 61 y.o. female.    Chief Complaint: Abdominal Pain    A recent CT scan showed questionable gastric wall thickening and diffuse fatty liver.       Abdominal Pain   This is a recurrent problem. The current episode started more than 1 month ago. The onset quality is gradual. The problem occurs intermittently. The problem has been waxing and waning. The pain is located in the epigastric region and RUQ. The pain is at a severity of 7/10. The pain is moderate. The quality of the pain is dull and aching. The abdominal pain radiates to the back. Associated symptoms include belching and nausea. Pertinent negatives include no anorexia, arthralgias, constipation, diarrhea, dysuria, fever, flatus, frequency, headaches, hematochezia, hematuria, melena, myalgias, vomiting or weight loss. The pain is aggravated by certain positions and eating. The pain is relieved by nothing. Prior diagnostic workup includes CT scan, lower endoscopy and upper endoscopy. Her past medical history is significant for GERD. There is no history of abdominal surgery, colon cancer, Crohn's disease, gallstones, irritable bowel syndrome, pancreatitis, PUD or ulcerative colitis. Patient's medical history does not include kidney stones and UTI.        Past Medical History:   Diagnosis Date    Anxiety     AR (allergic rhinitis)     Asthma, intermittent     Coronary artery disease     Depression     Endometrial polyp     Fatty liver     GERD (gastroesophageal reflux disease)     Hiatal hernia     Hyperlipidemia LDL goal <100     Hypertension     Mild mitral regurgitation     Multiple thyroid nodules 3/27/2014    Obesity (BMI 30-39.9) 3/27/2014    Renal cell carcinoma 2002    removed - no recurrence    S/P primary angioplasty with coronary stent  07/26/2017    Sleep apnea     Solitary kidney, acquired     right     Past Surgical History:   Procedure Laterality Date    APPENDECTOMY      CARDIAC CATHETERIZATION  6/27/14    has blockages, but per patient no stents    CHOLECYSTECTOMY      COLONOSCOPY  2002    negative    COLONOSCOPY  3/25/2014         CORONARY ANGIOPLASTY  2008?    balloon angioplasty    CORONARY ANGIOPLASTY WITH STENT PLACEMENT Right 07/26/2017    [facility] Left [facility]    OVARIAN CYST REMOVAL      POLYPECTOMY      endometrial polyp removal    ROTATOR CUFF REPAIR      L    THYROIDECTOMY, PARTIAL Left     TUBAL LIGATION      UPPER GASTROINTESTINAL ENDOSCOPY  04/2014     Current Outpatient Prescriptions on File Prior to Visit   Medication Sig Dispense Refill    acetaminophen (TYLENOL) 325 MG tablet Take 2 tablets (650 mg total) by mouth every 6 (six) hours as needed for Pain.  0    albuterol 90 mcg/actuation inhaler Inhale 2 puffs into the lungs every 6 (six) hours as needed for Wheezing. 18 g 5    aspirin (ECOTRIN) 81 MG EC tablet Take 81 mg by mouth once daily.       citalopram (CELEXA) 20 MG tablet Take 1 tablet (20 mg total) by mouth once daily. 90 tablet 3    dicyclomine (BENTYL) 20 mg tablet TK 1 T PO Q 8 H PRN CRAMPING 30 tablet 0    EFFIENT 10 mg Tab TK 1 T PO D UTD  5    metoprolol succinate (TOPROL-XL) 50 MG 24 hr tablet Take 1 tablet (50 mg total) by mouth once daily. (Patient taking differently: Take 25 mg by mouth once daily. Taking 0.5 tablets to equal 25mg) 30 tablet 11    potassium 99 mg Tab Take 1 tablet by mouth.      pravastatin (PRAVACHOL) 40 MG tablet Take 1 tablet (40 mg total) by mouth once daily. 90 tablet 3    valsartan-hydrochlorothiazide (DIOVAN-HCT) 320-25 mg per tablet Take 1 tablet by mouth once daily. 90 tablet 3    cetirizine (ZYRTEC) 10 MG tablet Take 1 tablet (10 mg total) by mouth once daily. 10 tablet 0    ranitidine (ZANTAC) 150 MG tablet  TAKE 1 TABLET(150 MG) BY MOUTH TWICE DAILY 60 tablet 0    triamcinolone acetonide 0.1% (KENALOG) 0.1 % cream Apply topically 2 (two) times daily. 45 g 0     No current facility-administered medications on file prior to visit.      Review of patient's allergies indicates:   Allergen Reactions    Oxycodone Shortness Of Breath, Nausea And Vomiting and Other (See Comments)     Anxiety.    Plavix [clopidogrel] Diarrhea     Stomach cramps    Azithromycin Itching    Codeine Palpitations    Corticosteroids (glucocorticoids) Palpitations    Doxycycline Itching and Nausea Only    Mobic [meloxicam] Itching    Neuromuscular blockers, steroidal Palpitations    Pcn [penicillins] Nausea And Vomiting    Shellfish containing products Itching     topical iodine OK    Sulfa (sulfonamide antibiotics) Nausea And Vomiting     Social History     Social History    Marital status:      Spouse name: N/A    Number of children: N/A    Years of education: N/A     Occupational History    Not on file.     Social History Main Topics    Smoking status: Never Smoker    Smokeless tobacco: Never Used    Alcohol use No    Drug use: No    Sexual activity: Not on file     Other Topics Concern    Not on file     Social History Narrative    No narrative on file     Family History   Problem Relation Age of Onset    Heart attack Father 67    Diabetes Paternal Aunt     Diabetes Paternal Uncle     Colon cancer Neg Hx     Stomach cancer Neg Hx        Review of Systems   Constitutional: Negative for activity change, chills, diaphoresis, fatigue, fever and weight loss.   HENT: Negative for ear pain, facial swelling, nosebleeds, rhinorrhea, sneezing, sore throat and trouble swallowing.    Eyes: Negative for photophobia, pain and visual disturbance.   Respiratory: Negative for apnea, cough, chest tightness, shortness of breath and wheezing.    Gastrointestinal: Positive for abdominal pain and nausea. Negative for anorexia, blood  in stool, constipation, diarrhea, flatus, hematochezia, melena, rectal pain and vomiting.   Genitourinary: Negative for decreased urine volume, difficulty urinating, dysuria, flank pain, frequency and hematuria.   Musculoskeletal: Negative for arthralgias, back pain, gait problem, myalgias, neck pain and neck stiffness.   Skin: Negative for pallor and rash.   Neurological: Negative for seizures, syncope, speech difficulty, weakness and headaches.   Hematological: Negative for adenopathy. Does not bruise/bleed easily.   Psychiatric/Behavioral: Negative for behavioral problems, confusion and hallucinations.       Objective:      Physical Exam   Constitutional: She is oriented to person, place, and time. She appears well-developed and well-nourished.   HENT:   Head: Normocephalic and atraumatic.   Eyes: EOM are normal. Pupils are equal, round, and reactive to light.   Neck: Normal range of motion. Neck supple. No thyromegaly present.   Cardiovascular: Normal rate, regular rhythm, normal heart sounds and intact distal pulses.    No murmur heard.  Pulmonary/Chest: Effort normal and breath sounds normal. No respiratory distress. She has no wheezes. She has no rales.   Abdominal: Soft. Bowel sounds are normal. She exhibits no distension and no mass. There is no tenderness.   Musculoskeletal: Normal range of motion. She exhibits no edema or tenderness.   Lymphadenopathy:     She has no cervical adenopathy.   Neurological: She is alert and oriented to person, place, and time. She has normal reflexes. No cranial nerve deficit.   Skin: Skin is warm and dry. No erythema.   Psychiatric: She has a normal mood and affect. Her behavior is normal.       Lab Summary Latest Ref Rng & Units 7/5/2017 8/2/2017 8/22/2017 10/11/2017 10/17/2017   Hemoglobin 12.0 - 16.0 g/dL 13.1 (None) (None) (None) (None)   Hematocrit 37.0 - 48.5 % 40.2 (None) (None) (None) (None)   White count 3.90 - 12.70 K/uL 5.97 (None) (None) (None) (None)   Platelet  count 150 - 350 K/uL 295 (None) (None) (None) (None)   Sodium 136 - 145 mmol/L 138 (None) 138 (None) (None)   Potassium 3.5 - 5.1 mmol/L 4.6 (None) 3.9 (None) (None)   Calcium 8.7 - 10.5 mg/dL 9.4 (None) 9.0 (None) (None)   Phosphorus - (None) (None) (None) (None) (None)   Creatinine 0.5 - 1.4 mg/dL 0.8 1.0 0.9 (None) 0.8   AST 10 - 40 U/L 17 (None) (None) 20 (None)   Alk Phos 55 - 135 U/L 68 (None) (None) 79 (None)   Bilirubin 0.1 - 1.0 mg/dL 0.5 (None) (None) 0.4 (None)   Glucose 70 - 110 mg/dL 92 (None) 78 (None) (None)   Cholesterol 120 - 199 mg/dL 254(H) (None) (None) (None) 205(H)   HDL cholesterol 40 - 75 mg/dL 47 (None) (None) (None) 48   Triglycerides 30 - 150 mg/dL 217(H) (None) (None) (None) 171(H)   LDL calc - (None) (None) (None) (None) (None)   Total protein 6.0 - 8.4 g/dL 7.1 (None) (None) 7.3 (None)   Albumin 3.5 - 5.2 g/dL 3.5 (None) (None) 3.5 (None)   A1C - (None) (None) (None) (None) (None)   PSA Screen - (None) (None) (None) (None) (None)   Some recent data might be hidden     Lab Results   Component Value Date    ALT 21 10/11/2017         Assessment:       1. Right upper quadrant abdominal pain    2. Abnormal CT scan, stomach    3. Gastroesophageal reflux disease without esophagitis    4. Fatty liver        Plan:       Orders Placed This Encounter   Procedures    Case request GI: ESOPHAGOGASTRODUODENOSCOPY (EGD)     Order Specific Question:   CPT Code:     Answer:   ME UPPER GI ENDOSCOPY,DIAGNOSIS [96066]     Order Specific Question:   Case Referring Provider     Answer:   JACKIE DIAZ [[id number]]     Order Specific Question:   Positioning:     Answer:   Lateral Left [1000]     Order Specific Question:   Post-Procedure Disposition:     Answer:   Amb Surgery/DOSC [2]     Order Specific Question:   Medical Necessity:     Answer:   Medically Non-Urgent [100]     Nonalcoholic fatty liver, a disease tightly linked to the obesity crisis, is a strong risk factor for heart disease and Type 2 diabetes,  and in severe cases it can lead to liver failure. There are no official dietary guidelines to treat it. But whether certain foods might fuel the disease is a subject that is drawing increasing attention from researchers.  Researchers have questioned the involvement of a number of dietary factors -- trans fats, omega-6 oils, fried foods and fructose, to name a few. But one that has attracted perhaps the most attention is sugar, in part because it is metabolized in the liver and it is known to increase blood levels of triglycerides, a type of fat. Studies suggest that sugar consumption contributes to liver fat accumulation. And there is some data indicating that people who carry genetic variants associated with fatty liver are particularly sensitive to increased fat accumulation in response to sugar and refined carbohydrates.  One of the first pieces of dietary advice that we, clinicians who treat fatty liver give to their patients, is to eliminate sugary drinks from their diets. But some studies suggest  that patients with the disease are typically consuming too many calories of all kinds, not just sugar.  We often recommend to avoid eating heavily processed foods, which are easy to consume in large quantities and usually stripped of their fiber and other naturally occurring nutrients. Preliminary studies have found so far that fatty liver patients respond well to the Mediterranean diet, which includes plenty of fresh produce, nuts, olive oil, poultry and fish.  One small clinical trial published in The Journal of Hepatology last year found that a Mediterranean diet had a more favorable impact on liver fat and insulin resistance than a low fat, high carbohydrate diet. And another study in the journal Clinical Nutrition, which involved 90 overweight patients with fatty liver, found similar success with a Mediterranean approach. Right now, the only proven method of reducing fat in the liver is weight loss. We recommend  fatty liver patients to aim for an initial weight loss of at least 10 percent of their body weight, which can be accomplished by limiting junk food and engaging in regular exercise. We advise our patients to exercise at least three times a week for 45 minutes.  Several studies have evaluated pioglitazone, alone, in combination with dietary therapy or with vitamin E in patients with JOSHUA. These have shown biochemical and histologic improvement with pioglitazone but the improvements appear to reverse upon discontinuation.  Pioglitazone was associated with significant declines in serum aminotransferase levels, increased hepatic insulin sensitivity, and improvement in histology. Patients receiving pioglitazone can develope fatigue and mild lower-extremity edema. Also, Pioglitazone was associated with an increase in weight (non visceral), a reduction in glucose, HbA1c levels and liver biochemical tests, and improvement in hepatocellular injury and fibrosis. There is a concern related to a possible increased risk of heart disease and bladder cancer associated with the thiazolidinediones. Careful use in patients suffering with CHF is advised. For patients with fatty liver without inflammation or fibrosis, we check liver biochemical tests, a complete blood count, platelet count, and prothrombin time annually. For patients found to have fatty liver on an imaging test and who either declined to undergo a liver biopsy or in whom a liver biopsy was not immediately indicated, we check liver biochemical tests, a complete blood count, platelet count, and prothrombin time annually. The dose of Pioglitazone is 15 mg twice a day and for Vitamin E is 800 IU cap a day (natural form of Vitamin E).    Patients with NAFLD are at increased risk for cardiovascular disease and often have multiple cardiovascular disease risk factors. Management of patients with NAFLD includes optimization of blood glucose control in patients with diabetes and  treatment of hyperlipidemia. Statin therapy has been shown to be safe in patients with NAFLD.  In general, we do not suggest using pharmacologic agents, like Vitamin E or Pioglitazone, solely for the treatment of NAFLD. However, we do suggest vitamin E at a dose of 400 international units/day for the subset of patients with advanced fibrosis on biopsy who do not have diabetes or coronary artery disease.    The management of Fatty Liver consists of:  -- Gradual Weight loss. A 20% weight reduction translates to improved symptoms.  -- Exercise 4 times a week for 30 minutes will mobilize some of the fat away from your liver and make your insulin more effective.  -- A low carb high protein diet, rich in vegetables and increased water consumption, staying away from carbonated, sugary drinks.   -- Avoid processed foods.   These are the only effective measures to manage fatty liver. If after a year of adhering to these measures and effectively reducing your Body Mass Index plus decreasing the insulin resistance, you continue to have symptoms, we may need to start medications to treat Fatty liver. These medications are used to treat diabetes and work well for insulin resistance. Unfortunately, these medications can have side effects.          Risks, benefits and alternative options were discussed with the patient. Risks including but not limited to infection, bleeding, heart or respiratory problems and perforation that may require surgery were all explained to the patient. The patient had a chance for questions if there were doubts or concerns about the test. The referring provider will be notified that our consultation is complete and available through the patient's records.    Thanks for letting us participate in the care of this nice patient,      Dimas Muller

## 2017-12-13 NOTE — TELEPHONE ENCOUNTER
----- Message from Adithya Gong sent at 12/13/2017  4:34 PM CST -----  Contact: [phone]  Pt would like to consult with nurse about medication dosage changes. Has questions; need a script for 20mg twice a day 30day supply please call bk at [phone] asked [facility] to call bk. alysha quintanilla        .  [facility] [employer] [address] [place] [address] FRANCISCO DANIEL [facility] Center[facility]ar GARVIN [facility] Rainer Balderas  [address] [address]  Phone: [phone] Fax: [phone]

## 2017-12-15 ENCOUNTER — TELEPHONE (OUTPATIENT)
Dept: FAMILY MEDICINE | Facility: CLINIC | Age: 62
End: 2017-12-15

## 2017-12-15 NOTE — TELEPHONE ENCOUNTER
----- Message from Leigh Ann Darden sent at 12/14/2017  4:26 PM CST -----  Contact: Pt  Pt called and stated she needed to speak to the nurse. She stated that she needs to discuss and get clarity on the milligrams of her prescription that was sent to her pharmacy. She can be reached at [phone].    Thanks,  TF

## 2017-12-28 ENCOUNTER — TELEPHONE (OUTPATIENT)
Dept: FAMILY MEDICINE | Facility: CLINIC | Age: 62
End: 2017-12-28

## 2017-12-28 NOTE — TELEPHONE ENCOUNTER
----- Message from Lisseth Baltazar sent at 12/28/2017  2:48 PM CST -----  Contact: patient  Calling concerning  a prior authorization for  medication Pravastatin 20 mg 2 x daily approval. Please call patient @ [phone] [facility] [id number]  [address]  [address] [facility] [address]  [place] [address]  Phone: [phone] Fax: [phone]

## 2017-12-29 NOTE — TELEPHONE ENCOUNTER
----- Message from Halima Brooks sent at 12/29/2017 10:08 AM CST -----  Contact: Patient  Patient returned call. She can be contacted at [phone].    Thanks,  Halima

## 2018-01-04 ENCOUNTER — OFFICE VISIT (OUTPATIENT)
Dept: FAMILY MEDICINE | Facility: CLINIC | Age: 63
End: 2018-01-04
Payer: COMMERCIAL

## 2018-01-04 VITALS
HEART RATE: 57 BPM | WEIGHT: 201.75 LBS | BODY MASS INDEX: 37.13 KG/M2 | DIASTOLIC BLOOD PRESSURE: 69 MMHG | SYSTOLIC BLOOD PRESSURE: 130 MMHG | HEIGHT: 62 IN

## 2018-01-04 DIAGNOSIS — E78.5 HYPERLIPIDEMIA LDL GOAL <100: Primary | ICD-10-CM

## 2018-01-04 DIAGNOSIS — I10 ESSENTIAL HYPERTENSION: ICD-10-CM

## 2018-01-04 DIAGNOSIS — G47.30 SLEEP APNEA, UNSPECIFIED TYPE: ICD-10-CM

## 2018-01-04 DIAGNOSIS — E66.01 SEVERE OBESITY (BMI 35.0-35.9 WITH COMORBIDITY): ICD-10-CM

## 2018-01-04 DIAGNOSIS — I25.10 CORONARY ARTERY DISEASE INVOLVING NATIVE HEART WITHOUT ANGINA PECTORIS, UNSPECIFIED VESSEL OR LESION TYPE: ICD-10-CM

## 2018-01-04 PROCEDURE — 99214 OFFICE O/P EST MOD 30 MIN: CPT | Mod: S$GLB,,, | Performed by: FAMILY MEDICINE

## 2018-01-04 PROCEDURE — 99999 PR PBB SHADOW E&M-EST. PATIENT-LVL III: CPT | Mod: PBBFAC,,, | Performed by: FAMILY MEDICINE

## 2018-01-04 NOTE — PROGRESS NOTES
Hyperlipidemia reviewed current medication.  She thought pravastatin might make her tired.  She has sleep apnea, but had problems with compliance with CPAP when she had respiratory illness and she states that she had to bring her machine back.  Coronary disease with previous stenting followed by cardiology.  No chest pain.  Hypertension controlled.  She is also on beta blocker.    Past Medical History:  Past Medical History:   Diagnosis Date    Anticoagulant long-term use     Anxiety     AR (allergic rhinitis)     Arthritis     Asthma, intermittent     Coronary artery disease     Depression     Endometrial polyp     Fatty liver     GERD (gastroesophageal reflux disease)     Hiatal hernia     Hyperlipidemia LDL goal <100     Hypertension     Mild mitral regurgitation     Multiple thyroid nodules 3/27/2014    Obesity (BMI 30-39.9) 3/27/2014    Renal cell carcinoma 2002    removed - no recurrence    S/P primary angioplasty with coronary stent 07/26/2017    Sleep apnea     Solitary kidney, acquired     right     Past Surgical History:   Procedure Laterality Date    APPENDECTOMY      CARDIAC CATHETERIZATION  6/27/14    has blockages, but per patient no stents    CHOLECYSTECTOMY      COLONOSCOPY  2002    negative    COLONOSCOPY  3/25/2014         CORONARY ANGIOPLASTY  2008?    balloon angioplasty    CORONARY ANGIOPLASTY WITH STENT PLACEMENT Right 07/26/2017    NEPHRECTOMY Left 2002    [facility] - cancer    OVARIAN CYST REMOVAL      POLYPECTOMY      endometrial polyp removal    ROTATOR CUFF REPAIR      L    THYROIDECTOMY, PARTIAL Left     TUBAL LIGATION      UPPER GASTROINTESTINAL ENDOSCOPY  04/2014     Social History     Social History    Marital status:      Spouse name: N/A    Number of children: N/A    Years of education: N/A     Occupational History    Not on file.     Social History Main Topics    Smoking status: Never Smoker    Smokeless tobacco:  Never Used    Alcohol use No    Drug use: No    Sexual activity: Not on file     Other Topics Concern    Not on file     Social History Narrative    No narrative on file     Family History   Problem Relation Age of Onset    Heart attack Father 67    Diabetes Paternal Aunt     Diabetes Paternal Uncle     Colon cancer Neg Hx     Stomach cancer Neg Hx      Review of patient's allergies indicates:   Allergen Reactions    Oxycodone Shortness Of Breath, Nausea And Vomiting and Other (See Comments)     Anxiety.    Plavix [clopidogrel] Diarrhea     Stomach cramps    Azithromycin Itching    Codeine Palpitations    Corticosteroids (glucocorticoids) Palpitations    Doxycycline Itching and Nausea Only    Mobic [meloxicam] Itching    Neuromuscular blockers, steroidal Palpitations    Nickel sutures [surgical stainless steel] Rash     Jewelery    Pcn [penicillins] Nausea And Vomiting    Shellfish containing products Itching     topical iodine OK    Sulfa (sulfonamide antibiotics) Nausea And Vomiting     Current Outpatient Prescriptions on File Prior to Visit   Medication Sig Dispense Refill    acetaminophen (TYLENOL) 325 MG tablet Take 2 tablets (650 mg total) by mouth every 6 (six) hours as needed for Pain.  0    albuterol 90 mcg/actuation inhaler Inhale 2 puffs into the lungs every 6 (six) hours as needed for Wheezing. 18 g 5    aspirin (ECOTRIN) 81 MG EC tablet Take 81 mg by mouth once daily.       citalopram (CELEXA) 20 MG tablet Take 1 tablet (20 mg total) by mouth once daily. 90 tablet 3    dicyclomine (BENTYL) 20 mg tablet TK 1 T PO Q 8 H PRN CRAMPING 30 tablet 0    EFFIENT 10 mg Tab TK 1 T PO D UTD  5    metoprolol succinate (TOPROL-XL) 50 MG 24 hr tablet Take 1 tablet (50 mg total) by mouth once daily. (Patient taking differently: Take 25 mg by mouth once daily. Taking 0.5 tablets to equal 25mg) 30 tablet 11    potassium 99 mg Tab Take 1 tablet by mouth.      pravastatin (PRAVACHOL) 20 MG  "tablet Take 1 tablet (20 mg total) by mouth 2 (two) times daily. 60 tablet 5    ranitidine (ZANTAC) 150 MG tablet TAKE 1 TABLET(150 MG) BY MOUTH TWICE DAILY 60 tablet 0    valsartan-hydrochlorothiazide (DIOVAN-HCT) 320-25 mg per tablet Take 1 tablet by mouth once daily. 90 tablet 3    cetirizine (ZYRTEC) 10 MG tablet Take 1 tablet (10 mg total) by mouth once daily. 10 tablet 0    triamcinolone acetonide 0.1% (KENALOG) 0.1 % cream Apply topically 2 (two) times daily. 45 g 0     No current facility-administered medications on file prior to visit.            ROS:  GENERAL: No fever, chills,  or significant weight changes.   CARDIOVASCULAR: Denies chest pain, PND, orthopnea or reduced exercise tolerance.  ABDOMEN: Appetite fine. Denies diarrhea, abdominal pain, hematemesis or blood in stool.  URINARY: No flank pain, dysuria or hematuria.      OBJECTIVE:     Vitals:    01/04/18 0926   BP: 130/69   Pulse: (!) 57   Weight: 91.5 kg (201 lb 11.5 oz)   Height: 5' 2" (1.575 m)     Wt Readings from Last 3 Encounters:   01/04/18 91.5 kg (201 lb 11.5 oz)   12/11/17 90.3 kg (199 lb)   11/16/17 90.4 kg (199 lb 4.7 oz)     APPEARANCE: Well nourished, well developed, in no acute distress.    HEAD: Normocephalic.  Atraumatic.  No sinus tenderness.  EYES:   Right eye: Pupil reactive.  Conjunctiva clear.    Left eye: Pupil reactive.  Conjunctiva clear.    Both fundi:  Grossly normal to nondilated exam. EOMI.    EARS: TM's intact. Light reflex normal. No retraction or perforation.    NOSE:  clear.  MOUTH & THROAT:  No pharyngeal erythema or exudate. No lesions.  NECK: Supple. No bruits.  No JVD.  No cervical lymphadenopathy.  No thyromegaly.    CHEST: Breath sounds clear bilaterally.  Normal respiratory effort  CARDIOVASCULAR: Normal rate.  Regular rhythm.  No murmurs.  No rub.  No gallops.  ABDOMEN: Bowel sounds normal.  Soft.  No tenderness.  No organomegaly.  PERIPHERAL VASCULAR: No cyanosis.  No clubbing.  No edema.  NEUROLOGIC: " No focal findings.  MENTAL STATUS: Alert.  Oriented x 3.          Erinn was seen today for fatigue.    Diagnoses and all orders for this visit:    Hyperlipidemia LDL goal <100    Coronary artery disease involving native heart without angina pectoris, unspecified vessel or lesion type    Essential hypertension    Sleep apnea, unspecified type  -     Ambulatory consult to Sleep Disorders    Severe obesity (BMI 35.0-35.9 with comorbidity)      Negative pravastatin causing her fatigue.  Need to get sleep evaluation reviewed.  Encourage weight loss.  Continue current medication.  Keep follow-up cardiology.  May consider different beta blocker-she'll discuss with her cardiologist.  Reviewed previous laboratory

## 2018-01-12 ENCOUNTER — TELEPHONE (OUTPATIENT)
Dept: FAMILY MEDICINE | Facility: CLINIC | Age: 63
End: 2018-01-12

## 2018-01-12 NOTE — TELEPHONE ENCOUNTER
Left message on voice mail that PA faxed on 12/29/17, no response received.  She can contact pharmacy and [employer].

## 2018-01-12 NOTE — TELEPHONE ENCOUNTER
----- Message from Lisseth Baltazar sent at 1/12/2018 12:24 PM CST -----  Contact: patient  Calling concerning the status of a prior authorization to be sent to her insurance provider for medication needed.  Please call patient @ [phone]. Thanks, brice

## 2018-01-17 RX ORDER — PRAVASTATIN SODIUM 20 MG/1
20 TABLET ORAL DAILY
Qty: 30 TABLET | Refills: 11 | Status: SHIPPED | OUTPATIENT
Start: 2018-01-17 | End: 2018-03-02

## 2018-01-17 NOTE — TELEPHONE ENCOUNTER
Left message on voice mail that RX changed to pravachol 20mg once daily, Should she still recheck her lab on 2/1/18, please advise.

## 2018-02-05 ENCOUNTER — OFFICE VISIT (OUTPATIENT)
Dept: FAMILY MEDICINE | Facility: CLINIC | Age: 63
End: 2018-02-05
Payer: COMMERCIAL

## 2018-02-05 VITALS
HEART RATE: 56 BPM | SYSTOLIC BLOOD PRESSURE: 135 MMHG | BODY MASS INDEX: 36.79 KG/M2 | WEIGHT: 199.94 LBS | TEMPERATURE: 98 F | HEIGHT: 62 IN | DIASTOLIC BLOOD PRESSURE: 74 MMHG

## 2018-02-05 DIAGNOSIS — J30.9 ALLERGIC RHINITIS, UNSPECIFIED CHRONICITY, UNSPECIFIED SEASONALITY, UNSPECIFIED TRIGGER: Primary | ICD-10-CM

## 2018-02-05 PROCEDURE — 3008F BODY MASS INDEX DOCD: CPT | Mod: S$GLB,,, | Performed by: NURSE PRACTITIONER

## 2018-02-05 PROCEDURE — 99999 PR PBB SHADOW E&M-EST. PATIENT-LVL IV: CPT | Mod: PBBFAC,,, | Performed by: NURSE PRACTITIONER

## 2018-02-05 PROCEDURE — 99213 OFFICE O/P EST LOW 20 MIN: CPT | Mod: S$GLB,,, | Performed by: NURSE PRACTITIONER

## 2018-02-05 RX ORDER — AZELASTINE 1 MG/ML
1 SPRAY, METERED NASAL 2 TIMES DAILY
Qty: 30 ML | Refills: 0 | Status: SHIPPED | OUTPATIENT
Start: 2018-02-05 | End: 2018-11-19

## 2018-02-05 RX ORDER — LEVOCETIRIZINE DIHYDROCHLORIDE 5 MG/1
5 TABLET, FILM COATED ORAL NIGHTLY
Qty: 10 TABLET | Refills: 0 | Status: SHIPPED | OUTPATIENT
Start: 2018-02-05 | End: 2018-03-02

## 2018-02-05 NOTE — PROGRESS NOTES
Subjective:       Patient ID: Erinn Silva is a 62 y.o. female.    Chief Complaint: Cough; Sinusitis; and Sore Throat    Sinus Problem   This is a new problem. The current episode started in the past 7 days. The problem is unchanged. There has been no fever. She is experiencing no pain. Associated symptoms include congestion, ear pain (right; ear fullness) and sinus pressure. Pertinent negatives include no chills, coughing, diaphoresis, headaches, hoarse voice, neck pain, shortness of breath, sneezing, sore throat or swollen glands. Past treatments include nothing. The treatment provided no relief.     Past Medical History:   Diagnosis Date    Anticoagulant long-term use     Anxiety     AR (allergic rhinitis)     Arthritis     Asthma, intermittent     Coronary artery disease     Depression     Endometrial polyp     Fatty liver     GERD (gastroesophageal reflux disease)     Hiatal hernia     Hyperlipidemia LDL goal <100     Hypertension     Mild mitral regurgitation     Multiple thyroid nodules 3/27/2014    Obesity (BMI 30-39.9) 3/27/2014    Renal cell carcinoma 2002    removed - no recurrence    S/P primary angioplasty with coronary stent 07/26/2017    Sleep apnea     Solitary kidney, acquired     right     Social History     Social History    Marital status:      Spouse name: N/A    Number of children: N/A    Years of education: N/A     Occupational History    Not on file.     Social History Main Topics    Smoking status: Never Smoker    Smokeless tobacco: Never Used    Alcohol use No    Drug use: No    Sexual activity: Not on file     Social History Narrative    No narrative on file     Past Surgical History:   Procedure Laterality Date    APPENDECTOMY      CARDIAC CATHETERIZATION  6/27/14    has blockages, but per patient no stents    CHOLECYSTECTOMY      COLONOSCOPY  2002    negative    COLONOSCOPY  3/25/2014         CORONARY ANGIOPLASTY  2008?    balloon angioplasty     CORONARY ANGIOPLASTY WITH STENT PLACEMENT Right 07/26/2017    NEPHRECTOMY Left 2002    [facility] - cancer    OVARIAN CYST REMOVAL      POLYPECTOMY      endometrial polyp removal    ROTATOR CUFF REPAIR      L    THYROIDECTOMY, PARTIAL Left     TUBAL LIGATION      UPPER GASTROINTESTINAL ENDOSCOPY  04/2014       Review of Systems   Constitutional: Negative.  Negative for chills and diaphoresis.   HENT: Positive for congestion, ear pain (right; ear fullness), postnasal drip and sinus pressure. Negative for hoarse voice, sneezing and sore throat.    Eyes: Negative.    Respiratory: Negative.  Negative for cough and shortness of breath.    Cardiovascular: Negative.    Gastrointestinal: Negative.    Endocrine: Negative.    Genitourinary: Negative.    Musculoskeletal: Negative.  Negative for neck pain.   Skin: Negative.    Allergic/Immunologic: Negative.    Neurological: Negative.  Negative for headaches.   Psychiatric/Behavioral: Negative.        Objective:      Physical Exam   Constitutional: She is oriented to person, place, and time. She appears well-developed and well-nourished.   HENT:   Head: Normocephalic.   Right Ear: Hearing, external ear and ear canal normal. A middle ear effusion is present.   Left Ear: Hearing, tympanic membrane, external ear and ear canal normal.   Nose: Rhinorrhea present. Right sinus exhibits no maxillary sinus tenderness and no frontal sinus tenderness. Left sinus exhibits no maxillary sinus tenderness and no frontal sinus tenderness.   Mouth/Throat: Uvula is midline, oropharynx is clear and moist and mucous membranes are normal.   Eyes: Conjunctivae are normal. Pupils are equal, round, and reactive to light.   Neck: Normal range of motion. Neck supple.   Cardiovascular: Normal rate, regular rhythm and normal heart sounds.    Pulmonary/Chest: Effort normal and breath sounds normal.   Abdominal: Soft. Bowel sounds are normal.   Musculoskeletal: Normal range  of motion.   Neurological: She is alert and oriented to person, place, and time.   Skin: Skin is warm and dry. Capillary refill takes 2 to 3 seconds.   Psychiatric: She has a normal mood and affect. Her behavior is normal. Judgment and thought content normal.   Nursing note and vitals reviewed.      Assessment:       1. Allergic rhinitis, unspecified chronicity, unspecified seasonality, unspecified trigger        Plan:           Erinn was seen today for cough, sinusitis and sore throat.    Diagnoses and all orders for this visit:    Allergic rhinitis, unspecified chronicity, unspecified seasonality, unspecified trigger  -     azelastine (ASTELIN) 137 mcg (0.1 %) nasal spray; 1 spray (137 mcg total) by Nasal route 2 (two) times daily.  -     levocetirizine (XYZAL) 5 MG tablet; Take 1 tablet (5 mg total) by mouth every evening.  Bollinger mist OTC as directed  RTC as needed

## 2018-02-05 NOTE — LETTER
February 5, 2018      [facility]  [address] [address] [place] [address]  Phone: [phone]  Fax: [phone]       Patient: Erinn Silva   YOB: 1955  Date of Visit: 02/05/2018    To Whom It May Concern:    Toan Silva  was at [facility] on 02/05/2018. She may return to work/school on 02/06/2018 with no restrictions. If you have any questions or concerns, or if I can be of further assistance, please do not hesitate to contact me.    Sincerely,    Claudia Portillo LPN

## 2018-03-02 ENCOUNTER — OFFICE VISIT (OUTPATIENT)
Dept: FAMILY MEDICINE | Facility: CLINIC | Age: 63
End: 2018-03-02
Payer: COMMERCIAL

## 2018-03-02 VITALS
HEART RATE: 62 BPM | SYSTOLIC BLOOD PRESSURE: 142 MMHG | TEMPERATURE: 98 F | DIASTOLIC BLOOD PRESSURE: 70 MMHG | BODY MASS INDEX: 36.99 KG/M2 | WEIGHT: 201 LBS | HEIGHT: 62 IN

## 2018-03-02 DIAGNOSIS — T78.40XD ALLERGIC REACTION, SUBSEQUENT ENCOUNTER: ICD-10-CM

## 2018-03-02 DIAGNOSIS — R21 RASH: Primary | ICD-10-CM

## 2018-03-02 DIAGNOSIS — Z88.9 MULTIPLE ALLERGIES: ICD-10-CM

## 2018-03-02 PROCEDURE — 99213 OFFICE O/P EST LOW 20 MIN: CPT | Mod: S$GLB,,, | Performed by: NURSE PRACTITIONER

## 2018-03-02 PROCEDURE — 99999 PR PBB SHADOW E&M-EST. PATIENT-LVL V: CPT | Mod: PBBFAC,,, | Performed by: NURSE PRACTITIONER

## 2018-03-02 PROCEDURE — 3077F SYST BP >= 140 MM HG: CPT | Mod: S$GLB,,, | Performed by: NURSE PRACTITIONER

## 2018-03-02 PROCEDURE — 3078F DIAST BP <80 MM HG: CPT | Mod: S$GLB,,, | Performed by: NURSE PRACTITIONER

## 2018-03-02 RX ORDER — PRAVASTATIN SODIUM 40 MG/1
40 TABLET ORAL NIGHTLY
Refills: 3 | COMMUNITY
Start: 2018-02-01 | End: 2018-12-27 | Stop reason: SDUPTHER

## 2018-03-02 RX ORDER — TRIAMCINOLONE ACETONIDE 5 MG/G
CREAM TOPICAL 2 TIMES DAILY
Qty: 30 G | Refills: 0 | Status: SHIPPED | OUTPATIENT
Start: 2018-03-02 | End: 2021-10-27

## 2018-03-02 NOTE — PROGRESS NOTES
Subjective:       Patient ID: Erinn Silva is a 62 y.o. female.    Chief Complaint: Rash (on the neck)    Rash   This is a new problem. The current episode started in the past 7 days. The problem is unchanged. The affected locations include the neck. The rash is characterized by itchiness and redness. It is unknown (States hugged lady with necklace of perfume that she is allergic to) if there was an exposure to a precipitant. Pertinent negatives include no anorexia, congestion, cough, diarrhea, eye pain, facial edema, fatigue, fever, joint pain, nail changes, rhinorrhea, shortness of breath, sore throat or vomiting. Past treatments include antihistamine (Benadryl). The treatment provided mild (States benadryl causes drowsiness; would like med that can take during day) relief. Her past medical history is significant for allergies (has multiple allergies; would like to see allergist). There is no history of asthma, eczema or varicella.     Past Medical History:   Diagnosis Date    Anticoagulant long-term use     Anxiety     AR (allergic rhinitis)     Arthritis     Asthma, intermittent     Coronary artery disease     Depression     Endometrial polyp     Fatty liver     GERD (gastroesophageal reflux disease)     Hiatal hernia     Hyperlipidemia LDL goal <100     Hypertension     Mild mitral regurgitation     Multiple thyroid nodules 3/27/2014    Obesity (BMI 30-39.9) 3/27/2014    Renal cell carcinoma 2002    removed - no recurrence    S/P primary angioplasty with coronary stent 07/26/2017    Sleep apnea     Solitary kidney, acquired     right     Social History     Social History    Marital status:      Spouse name: N/A    Number of children: N/A    Years of education: N/A     Occupational History    Not on file.     Social History Main Topics    Smoking status: Never Smoker    Smokeless tobacco: Never Used    Alcohol use No    Drug use: No    Sexual activity: Not on file      Social History Narrative    No narrative on file     Past Surgical History:   Procedure Laterality Date    APPENDECTOMY      CARDIAC CATHETERIZATION  6/27/14    has blockages, but per patient no stents    CHOLECYSTECTOMY      COLONOSCOPY  2002    negative    COLONOSCOPY  3/25/2014         CORONARY ANGIOPLASTY  2008?    balloon angioplasty    CORONARY ANGIOPLASTY WITH STENT PLACEMENT Right 07/26/2017    NEPHRECTOMY Left 2002    [facility] - cancer    OVARIAN CYST REMOVAL      POLYPECTOMY      endometrial polyp removal    ROTATOR CUFF REPAIR      L    THYROIDECTOMY, PARTIAL Left     TUBAL LIGATION      UPPER GASTROINTESTINAL ENDOSCOPY  04/2014       Review of Systems   Constitutional: Negative.  Negative for fatigue and fever.   HENT: Negative.  Negative for congestion, rhinorrhea and sore throat.    Eyes: Negative.  Negative for pain.   Respiratory: Negative.  Negative for cough and shortness of breath.    Cardiovascular: Negative.    Gastrointestinal: Negative.  Negative for anorexia, diarrhea and vomiting.   Endocrine: Negative.    Genitourinary: Negative.    Musculoskeletal: Negative.  Negative for joint pain.   Skin: Positive for rash. Negative for nail changes.   Allergic/Immunologic: Negative.    Neurological: Negative.    Psychiatric/Behavioral: Negative.        Objective:      Physical Exam   Constitutional: She is oriented to person, place, and time. She appears well-developed and well-nourished.   HENT:   Head: Normocephalic.   Right Ear: External ear normal.   Left Ear: External ear normal.   Nose: Nose normal.   Mouth/Throat: Oropharynx is clear and moist.   Eyes: Conjunctivae are normal. Pupils are equal, round, and reactive to light.   Neck: Normal range of motion. Neck supple.   Cardiovascular: Normal rate, regular rhythm and normal heart sounds.    Pulmonary/Chest: Effort normal and breath sounds normal.   Abdominal: Soft. Bowel sounds are normal.    Musculoskeletal: Normal range of motion.   Neurological: She is alert and oriented to person, place, and time.   Skin: Skin is warm and dry. Capillary refill takes 2 to 3 seconds. Rash noted. Rash is urticarial (neck).   Psychiatric: She has a normal mood and affect. Her behavior is normal. Judgment and thought content normal.   Nursing note and vitals reviewed.      Assessment:       1. Rash    2. Allergic reaction, subsequent encounter    3. Multiple allergies        Plan:           Erinn was seen today for rash.    Diagnoses and all orders for this visit:    Rash  Allergic reaction, subsequent encounter  -     triamcinolone acetonide 0.5% (KENALOG) 0.5 % Crea; Apply topically 2 (two) times daily.       Zyrtec OTC as directed    Multiple allergies  -     Ambulatory referral to Allergy

## 2018-03-06 ENCOUNTER — TELEPHONE (OUTPATIENT)
Dept: FAMILY MEDICINE | Facility: CLINIC | Age: 63
End: 2018-03-06

## 2018-03-06 ENCOUNTER — HOSPITAL ENCOUNTER (EMERGENCY)
Facility: HOSPITAL | Age: 63
Discharge: HOME | End: 2018-03-06
Payer: COMMERCIAL

## 2018-03-06 VITALS
BODY MASS INDEX: 36.99 KG/M2 | DIASTOLIC BLOOD PRESSURE: 72 MMHG | SYSTOLIC BLOOD PRESSURE: 170 MMHG | TEMPERATURE: 99 F | OXYGEN SATURATION: 98 % | WEIGHT: 201 LBS | HEART RATE: 59 BPM | HEIGHT: 62 IN | RESPIRATION RATE: 20 BRPM

## 2018-03-06 DIAGNOSIS — Z78.9 DIPHTHERIA-PERTUSSIS-TETANUS (DPT) VACCINATION ADMINISTERED AT CURRENT VISIT: ICD-10-CM

## 2018-03-06 DIAGNOSIS — S09.90XA INJURY OF HEAD, INITIAL ENCOUNTER: Primary | ICD-10-CM

## 2018-03-06 DIAGNOSIS — S00.01XA ABRASION OF SCALP, INITIAL ENCOUNTER: ICD-10-CM

## 2018-03-06 PROCEDURE — 90715 TDAP VACCINE 7 YRS/> IM: CPT | Performed by: PHYSICIAN ASSISTANT

## 2018-03-06 PROCEDURE — 90471 IMMUNIZATION ADMIN: CPT | Performed by: PHYSICIAN ASSISTANT

## 2018-03-06 PROCEDURE — 63600175 PHARM REV CODE 636 W HCPCS: Performed by: PHYSICIAN ASSISTANT

## 2018-03-06 PROCEDURE — 99284 EMERGENCY DEPT VISIT MOD MDM: CPT | Mod: 25

## 2018-03-06 RX ADMIN — CLOSTRIDIUM TETANI TOXOID ANTIGEN (FORMALDEHYDE INACTIVATED), CORYNEBACTERIUM DIPHTHERIAE TOXOID ANTIGEN (FORMALDEHYDE INACTIVATED), BORDETELLA PERTUSSIS TOXOID ANTIGEN (GLUTARALDEHYDE INACTIVATED), BORDETELLA PERTUSSIS FILAMENTOUS HEMAGGLUTININ ANTIGEN (FORMALDEHYDE INACTIVATED), BORDETELLA PERTUSSIS PERTACTIN ANTIGEN, AND BORDETELLA PERTUSSIS FIMBRIAE 2/3 ANTIGEN 0.5 ML: 5; 2; 2.5; 5; 3; 5 INJECTION, SUSPENSION INTRAMUSCULAR at 03:03

## 2018-03-06 NOTE — TELEPHONE ENCOUNTER
----- Message from Delmy Lagos sent at 3/6/2018  1:39 PM CST -----  Contact: Pt  Pt calling in regards to she fell and bumped her head and was bleeding a lot due to on blood thinners and would like to be seen to day.    Pt stated that the bleeding has stopped but she just wanted to be checked out.    Pt can be reached at [phone]

## 2018-03-06 NOTE — ED PROVIDER NOTES
"   History      Chief Complaint   Patient presents with    Head Laceration     Pt states, "I raised the trunk lid of my car up and when I raised up I hit my head on the latch and it started bleeding."       Review of patient's allergies indicates:   Allergen Reactions    Oxycodone Shortness Of Breath, Nausea And Vomiting and Other (See Comments)     Anxiety.    Plavix [clopidogrel] Diarrhea     Stomach cramps    Azithromycin Itching    Codeine Palpitations    Corticosteroids (glucocorticoids) Palpitations    Doxycycline Itching and Nausea Only    Mobic [meloxicam] Itching    Neuromuscular blockers, steroidal Palpitations    Nickel sutures [surgical stainless steel] Rash     Jewelery    Pcn [penicillins] Nausea And Vomiting    Shellfish containing products Itching     topical iodine OK    Sulfa (sulfonamide antibiotics) Nausea And Vomiting        HPI   HPI    3/6/2018, 3:03 PM   History obtained from the patient      History of Present Illness: Erinn Silva is a 62 y.o. female patient who presents to the Emergency Department for check up since head injury pta.  She struck her head on truck door of her car.  Her scalp was bleeding but that resolved.  She takes Effient and aspirin daily.  She denies LOC, focal weakness, n/v.  She does admit to headache.  Symptoms are moderate in severity.     No further complaints or concerns at this time.           PCP: Layo Reed MD       Past Medical History:  Past Medical History:   Diagnosis Date    Anticoagulant long-term use     Anxiety     AR (allergic rhinitis)     Arthritis     Asthma, intermittent     Coronary artery disease     Depression     Endometrial polyp     Fatty liver     GERD (gastroesophageal reflux disease)     Hiatal hernia     Hyperlipidemia LDL goal <100     Hypertension     Mild mitral regurgitation     Multiple thyroid nodules 3/27/2014    Obesity (BMI 30-39.9) 3/27/2014    Renal cell carcinoma 2002    removed - no " recurrence    S/P primary angioplasty with coronary stent 07/26/2017    Sleep apnea     Solitary kidney, acquired     right         Past Surgical History:  Past Surgical History:   Procedure Laterality Date    APPENDECTOMY      CARDIAC CATHETERIZATION  6/27/14    has blockages, but per patient no stents    CHOLECYSTECTOMY      COLONOSCOPY  2002    negative    COLONOSCOPY  3/25/2014         CORONARY ANGIOPLASTY  2008?    balloon angioplasty    CORONARY ANGIOPLASTY WITH STENT PLACEMENT Right 07/26/2017    NEPHRECTOMY Left 2002    [facility]    OVARIAN CYST REMOVAL      POLYPECTOMY      endometrial polyp removal    ROTATOR CUFF REPAIR      L    THYROIDECTOMY, PARTIAL Left     TUBAL LIGATION      UPPER GASTROINTESTINAL ENDOSCOPY  04/2014           Family History:  Family History   Problem Relation Age of Onset    Heart attack Father 67    Diabetes Paternal Aunt     Diabetes Paternal Uncle     Colon cancer Neg Hx     Stomach cancer Neg Hx            Social History:  Social History     Social History Main Topics    Smoking status: Never Smoker    Smokeless tobacco: Never Used    Alcohol use No    Drug use: No    Sexual activity: Not on file       ROS   Review of Systems   Constitutional: Negative for activity change, chills and fever.   HENT: Negative for rhinorrhea and trouble swallowing.    Eyes: Negative for pain and visual disturbance.   Respiratory: Negative for cough and shortness of breath.    Cardiovascular: Negative for chest pain.   Gastrointestinal: Negative for nausea and vomiting.   Genitourinary: Negative for dysuria.   Musculoskeletal: Negative for gait problem and neck stiffness.   Skin: Negative for pallor and rash.   Neurological: Negative for facial asymmetry, speech difficulty and weakness.   Hematological: Does not bruise/bleed easily.   All other systems reviewed and are negative.    Review of Systems    Physical Exam      Initial Vitals  "[03/06/18 1453]   BP Pulse Resp Temp SpO2   (!) 170/72 (!) 59 20 98.5 °F (36.9 °C) 98 %      MAP       104.67         Physical Exam  Vital signs and nursing notes reviewed.  Constitutional: Patient is in NAD. Awake and alert. Well-developed and well-nourished.  Head:  Normocephalic.  No agosto sign  Eyes: PERRL. EOM intact. Conjunctivae nl. No scleral icterus.  No hyphema  ENT: Mucous membranes are moist. Oropharynx is clear.  No hematotympanum  Neck: Supple. No JVD. No lymphadenopathy.  No meningismus.  No midline ttp, +FROM  Cardiovascular: Regular rate and rhythm. No murmurs, rubs, or gallops. Distal pulses are 2+ and symmetric.  Pulmonary/Chest: No respiratory distress. Clear to auscultation bilaterally. No wheezing, rales, or rhonchi.  Abdominal: Soft. Non-distended. No TTP. No rebound, guarding, or rigidity. Good bowel sounds.  Genitourinary: No CVA tenderness  Musculoskeletal: Moves all extremities. No edema.   Skin: Warm and dry.  Small abrasion or very superficial laceration to right parietal scalp, no repair indicated.   Neurological: Awake and alert. GCS 15.  No acute focal neurological deficits are appreciated. No facial droop.  Tongue is midline.  No pronator drift.  Finger to nose normal.  Hand  equal and strong, 5/5 motor strength x 4.   equal and strong bilaterally  Psychiatric: Normal affect. Good eye contact. Appropriate in content.      ED Course          Procedures  ED Vital Signs:  Vitals:    03/06/18 1453   BP: (!) 170/72   Pulse: (!) 59   Resp: 20   Temp: 98.5 °F (36.9 °C)   TempSrc: Oral   SpO2: 98%   Weight: 91.2 kg (201 lb)   Height: 5' 2" (1.575 m)                 Imaging Results:  Imaging Results          CT Head Without Contrast (Final result)  Result time 03/06/18 17:02:03    Final result by Ziggy Hugo MD (03/06/18 17:02:03)                 Impression:         Negative noncontrast CT scan of the brain.  No acute intracranial hemorrhage.        All CT scans at this " facility use automated dose modulation, iterative reconstruction, and/or weight based dosing techniques when appropriate to reduce radiation dose to as low as reasonably achievable.       Electronically signed by: YOSEF WOLF MD  Date:     03/06/18  Time:    17:02              Narrative:    CT HEAD WITHOUT CONTRAST    Date: 03/06/18 16:43:00    Clinical indication: Head trauma with scalp laceration and bleeding.     Technique:  Standard noncontrast CT scan of the brain.     Comparison: No previous for comparison.     Findings:  The ventricles are nondilated. Gray and white matter structures reveal normal attenuation. No hemorrhage, mass effect or edema is identified.     The skull and skull base are unremarkable.                                 The Emergency Provider reviewed the vital signs and test results, which are outlined above.    ED Discussion             Medication(s) given in the ER:  Medications   Tdap vaccine injection 0.5 mL (0.5 mLs Intramuscular Given 3/6/18 1534)           Follow-up Information     Layo Reed MD In 2 days.    Specialty:  Family Medicine  Contact information:  [address]  [place] [id number] [phone]                       Discharge Medication List as of 3/6/2018  5:05 PM             Medical Decision Making        All findings were reviewed with the patient/family in detail.  Findings seem to be most consistent with a diagnosis of head injury. Closed Head Injury precautions were discussed with patient and/or family/caretaker  Second impact syndrome was also discussed.    All remaining questions and concerns were addressed at that time.  Patient/family has been counseled regarding the need for follow-up as well as the indication to return to the emergency room should new or worrisome developments occur.        MDM               Clinical Impression:        ICD-10-CM ICD-9-CM   1. Injury of head, initial encounter S09.90XA 959.01   2. Abrasion of scalp, initial  encounter S00.01XA 910.0   3. Diphtheria-pertussis-tetanus (DPT) vaccination administered at current visit Z78.9 V49.89             Jerrica Woods PA-C  03/07/18 1832

## 2018-03-20 RX ORDER — CITALOPRAM 20 MG/1
TABLET, FILM COATED ORAL
Qty: 90 TABLET | Refills: 3 | Status: SHIPPED | OUTPATIENT
Start: 2018-03-20 | End: 2019-05-31

## 2018-03-31 ENCOUNTER — NURSE TRIAGE (OUTPATIENT)
Dept: ADMINISTRATIVE | Facility: CLINIC | Age: 63
End: 2018-03-31

## 2018-03-31 RX ORDER — VALSARTAN AND HYDROCHLOROTHIAZIDE 320; 25 MG/1; MG/1
TABLET, FILM COATED ORAL
Qty: 90 TABLET | Refills: 0 | Status: CANCELLED | OUTPATIENT
Start: 2018-03-31

## 2018-03-31 NOTE — TELEPHONE ENCOUNTER
Pt called re rx on valsartan. rec pt ask for 3 day emergency supply at pharmacy. Office message re refill to PCP. kasie back with questions     Reason for Disposition   Caller has medication question about med not prescribed by PCP and triager unable to answer question (e.g., compatibility with other med, storage)    Protocols used: ST MEDICATION QUESTION CALL-A-AH

## 2018-04-02 RX ORDER — VALSARTAN AND HYDROCHLOROTHIAZIDE 320; 25 MG/1; MG/1
1 TABLET, FILM COATED ORAL DAILY
Qty: 90 TABLET | Refills: 0 | Status: SHIPPED | OUTPATIENT
Start: 2018-04-02 | End: 2018-06-19 | Stop reason: SDUPTHER

## 2018-04-02 NOTE — TELEPHONE ENCOUNTER
----- Message from Jolly Palenciaedwinmarilia sent at 4/2/2018  7:37 AM CDT -----  Contact: self [phone]  States that she is calling to check status on refill request on refill request for valsartan/hctz 320/25mg. Pt uses     [facility] [id number]  [address] [facility] [place] [address]  Phone: [phone] Fax: [phone]    Please call back at [phone]//thank you acc

## 2018-04-03 ENCOUNTER — LAB VISIT (OUTPATIENT)
Dept: LAB | Facility: HOSPITAL | Age: 63
End: 2018-04-03
Attending: FAMILY MEDICINE
Payer: COMMERCIAL

## 2018-04-03 ENCOUNTER — PATIENT OUTREACH (OUTPATIENT)
Dept: ADMINISTRATIVE | Facility: HOSPITAL | Age: 63
End: 2018-04-03

## 2018-04-03 DIAGNOSIS — E78.5 HYPERLIPIDEMIA, UNSPECIFIED HYPERLIPIDEMIA TYPE: ICD-10-CM

## 2018-04-03 LAB
ALT SERPL W/O P-5'-P-CCNC: 23 U/L
CHOLEST SERPL-MCNC: 230 MG/DL
CHOLEST/HDLC SERPL: 5.2 {RATIO}
HDLC SERPL-MCNC: 44 MG/DL
HDLC SERPL: 19.1 %
LDLC SERPL CALC-MCNC: 116.6 MG/DL
NONHDLC SERPL-MCNC: 186 MG/DL
TRIGL SERPL-MCNC: 347 MG/DL

## 2018-04-03 PROCEDURE — 80061 LIPID PANEL: CPT

## 2018-04-03 PROCEDURE — 84460 ALANINE AMINO (ALT) (SGPT): CPT

## 2018-04-03 PROCEDURE — 36415 COLL VENOUS BLD VENIPUNCTURE: CPT | Mod: PO

## 2018-04-04 ENCOUNTER — PATIENT MESSAGE (OUTPATIENT)
Dept: FAMILY MEDICINE | Facility: CLINIC | Age: 63
End: 2018-04-04

## 2018-04-04 DIAGNOSIS — E78.5 HYPERLIPIDEMIA, UNSPECIFIED HYPERLIPIDEMIA TYPE: Primary | ICD-10-CM

## 2018-04-04 RX ORDER — METOPROLOL SUCCINATE 50 MG/1
50 TABLET, EXTENDED RELEASE ORAL DAILY
Qty: 30 TABLET | Refills: 11 | Status: SHIPPED | OUTPATIENT
Start: 2018-04-04 | End: 2019-11-13

## 2018-06-19 ENCOUNTER — OFFICE VISIT (OUTPATIENT)
Dept: FAMILY MEDICINE | Facility: CLINIC | Age: 63
End: 2018-06-19
Payer: COMMERCIAL

## 2018-06-19 VITALS
HEART RATE: 60 BPM | SYSTOLIC BLOOD PRESSURE: 170 MMHG | WEIGHT: 200 LBS | HEIGHT: 62 IN | BODY MASS INDEX: 36.8 KG/M2 | DIASTOLIC BLOOD PRESSURE: 80 MMHG | TEMPERATURE: 98 F

## 2018-06-19 DIAGNOSIS — G47.30 SLEEP APNEA, UNSPECIFIED TYPE: ICD-10-CM

## 2018-06-19 DIAGNOSIS — E66.01 SEVERE OBESITY (BMI 35.0-35.9 WITH COMORBIDITY): ICD-10-CM

## 2018-06-19 DIAGNOSIS — I10 ESSENTIAL HYPERTENSION: ICD-10-CM

## 2018-06-19 DIAGNOSIS — K21.9 GASTROESOPHAGEAL REFLUX DISEASE WITHOUT ESOPHAGITIS: Primary | ICD-10-CM

## 2018-06-19 DIAGNOSIS — I25.10 CORONARY ARTERY DISEASE INVOLVING NATIVE HEART WITHOUT ANGINA PECTORIS, UNSPECIFIED VESSEL OR LESION TYPE: ICD-10-CM

## 2018-06-19 DIAGNOSIS — K58.9 IRRITABLE BOWEL SYNDROME, UNSPECIFIED TYPE: ICD-10-CM

## 2018-06-19 PROBLEM — R93.3 ABNORMAL CT SCAN, STOMACH: Status: RESOLVED | Noted: 2017-12-11 | Resolved: 2018-06-19

## 2018-06-19 PROBLEM — R10.13 EPIGASTRIC PAIN: Status: RESOLVED | Noted: 2017-12-11 | Resolved: 2018-06-19

## 2018-06-19 PROCEDURE — 3008F BODY MASS INDEX DOCD: CPT | Mod: CPTII,S$GLB,, | Performed by: FAMILY MEDICINE

## 2018-06-19 PROCEDURE — 3079F DIAST BP 80-89 MM HG: CPT | Mod: CPTII,S$GLB,, | Performed by: FAMILY MEDICINE

## 2018-06-19 PROCEDURE — 3077F SYST BP >= 140 MM HG: CPT | Mod: CPTII,S$GLB,, | Performed by: FAMILY MEDICINE

## 2018-06-19 PROCEDURE — 99999 PR PBB SHADOW E&M-EST. PATIENT-LVL III: CPT | Mod: PBBFAC,,, | Performed by: FAMILY MEDICINE

## 2018-06-19 PROCEDURE — 99214 OFFICE O/P EST MOD 30 MIN: CPT | Mod: S$GLB,,, | Performed by: FAMILY MEDICINE

## 2018-06-19 RX ORDER — AMLODIPINE BESYLATE 5 MG/1
5 TABLET ORAL DAILY
Qty: 30 TABLET | Refills: 1 | Status: SHIPPED | OUTPATIENT
Start: 2018-06-19 | End: 2018-10-16

## 2018-06-19 RX ORDER — VALSARTAN AND HYDROCHLOROTHIAZIDE 320; 25 MG/1; MG/1
1 TABLET, FILM COATED ORAL DAILY
Qty: 90 TABLET | Refills: 3 | Status: SHIPPED | OUTPATIENT
Start: 2018-06-19 | End: 2019-05-05 | Stop reason: SDUPTHER

## 2018-06-19 NOTE — PROGRESS NOTES
Complains of some fatigue.  She gets some gas and intermittent looser stool.  She gets belching and occasional fecal urgency.  Previously diagnosed irritable bowel syndrome.  She has had previous imaging.  She is on ranitidine, but only takes it occasionally.  She does get some reflux at least 3 times a week.  Symptoms do not change with eating.  Sleep apnea not currently treated pending follow-up with Dr. Eagle.  Severe obesity reviewed.  Coronary disease followed by Cardiology.  Hyperlipidemia on medication.  She was concerned that the pravastatin was causing her symptoms.  Hypertension uncontrolled    Past Medical History:  Past Medical History:   Diagnosis Date    Anticoagulant long-term use     Anxiety     AR (allergic rhinitis)     Arthritis     Asthma, intermittent     Coronary artery disease     Depression     Endometrial polyp     Fatty liver     GERD (gastroesophageal reflux disease)     Hiatal hernia     Hyperlipidemia LDL goal <100     Hypertension     Mild mitral regurgitation     Multiple thyroid nodules 3/27/2014    Obesity (BMI 30-39.9) 3/27/2014    Renal cell carcinoma 2002    removed - no recurrence    S/P primary angioplasty with coronary stent 07/26/2017    Sleep apnea     Solitary kidney, acquired     right     Past Surgical History:   Procedure Laterality Date    APPENDECTOMY      CARDIAC CATHETERIZATION  6/27/14    has blockages, but per patient no stents    CHOLECYSTECTOMY      COLONOSCOPY  2002    negative    COLONOSCOPY  3/25/2014         CORONARY ANGIOPLASTY  2008?    balloon angioplasty    CORONARY ANGIOPLASTY WITH STENT PLACEMENT Right 07/26/2017    NEPHRECTOMY Left 2002    [facility] - cancer    OVARIAN CYST REMOVAL      POLYPECTOMY      endometrial polyp removal    ROTATOR CUFF REPAIR      L    THYROIDECTOMY, PARTIAL Left     TUBAL LIGATION      UPPER GASTROINTESTINAL ENDOSCOPY  04/2014     Social History     Social History     Marital status:      Spouse name: N/A    Number of children: N/A    Years of education: N/A     Occupational History    Not on file.     Social History Main Topics    Smoking status: Never Smoker    Smokeless tobacco: Never Used    Alcohol use No    Drug use: No    Sexual activity: Not on file     Other Topics Concern    Not on file     Social History Narrative    No narrative on file     Family History   Problem Relation Age of Onset    Heart attack Father 67    Diabetes Paternal Aunt     Diabetes Paternal Uncle     Colon cancer Neg Hx     Stomach cancer Neg Hx      Review of patient's allergies indicates:   Allergen Reactions    Oxycodone Shortness Of Breath, Nausea And Vomiting and Other (See Comments)     Anxiety.    Plavix [clopidogrel] Diarrhea     Stomach cramps    Azithromycin Itching    Codeine Palpitations    Corticosteroids (glucocorticoids) Palpitations    Doxycycline Itching and Nausea Only    Mobic [meloxicam] Itching    Neuromuscular blockers, steroidal Palpitations    Nickel sutures [surgical stainless steel] Rash     Jewelery    Pcn [penicillins] Nausea And Vomiting    Shellfish containing products Itching     topical iodine OK    Sulfa (sulfonamide antibiotics) Nausea And Vomiting     Current Outpatient Prescriptions on File Prior to Visit   Medication Sig Dispense Refill    acetaminophen (TYLENOL) 325 MG tablet Take 2 tablets (650 mg total) by mouth every 6 (six) hours as needed for Pain.  0    albuterol 90 mcg/actuation inhaler Inhale 2 puffs into the lungs every 6 (six) hours as needed for Wheezing. 18 g 5    aspirin (ECOTRIN) 81 MG EC tablet Take 81 mg by mouth once daily.       citalopram (CELEXA) 20 MG tablet TAKE 1 TABLET BY MOUTH DAILY 90 tablet 3    EFFIENT 10 mg Tab TK 1 T PO D UTD  5    metoprolol succinate (TOPROL-XL) 50 MG 24 hr tablet Take 1 tablet (50 mg total) by mouth once daily. 30 tablet 11    potassium 99 mg Tab Take 1 tablet by mouth.    "   pravastatin (PRAVACHOL) 40 MG tablet Take 40 mg by mouth every evening.  3    ranitidine (ZANTAC) 150 MG tablet TAKE 1 TABLET(150 MG) BY MOUTH TWICE DAILY 60 tablet 3    [DISCONTINUED] valsartan-hydrochlorothiazide (DIOVAN-HCT) 320-25 mg per tablet Take 1 tablet by mouth once daily. 90 tablet 0    azelastine (ASTELIN) 137 mcg (0.1 %) nasal spray 1 spray (137 mcg total) by Nasal route 2 (two) times daily. 30 mL 0    dicyclomine (BENTYL) 20 mg tablet TK 1 T PO Q 8 H PRN CRAMPING 30 tablet 0    triamcinolone acetonide 0.5% (KENALOG) 0.5 % Crea Apply topically 2 (two) times daily. 30 g 0     No current facility-administered medications on file prior to visit.            ROS:  GENERAL: No fever, chills,  or significant weight changes.   CARDIOVASCULAR: Denies chest pain, PND, orthopnea or reduced exercise tolerance.  ABDOMEN: Appetite fine. Denies hematemesis or blood in stool.  URINARY: No flank pain, dysuria or hematuria.      OBJECTIVE:     Vitals:    06/19/18 1134   BP: (!) 170/80   Pulse: 60   Temp: 98.4 °F (36.9 °C)   Weight: 90.7 kg (200 lb)   Height: 5' 2" (1.575 m)     Wt Readings from Last 3 Encounters:   06/19/18 90.7 kg (200 lb)   03/06/18 91.2 kg (201 lb)   03/02/18 91.2 kg (201 lb)     APPEARANCE: Well nourished, well developed, in no acute distress.    HEAD: Normocephalic.  Atraumatic.  No sinus tenderness.  EYES:   Right eye: Pupil reactive.  Conjunctiva clear.    Left eye: Pupil reactive.  Conjunctiva clear.    Both fundi:  Grossly normal to nondilated exam. EOMI.    EARS: TM's intact. Light reflex normal. No retraction or perforation.    NOSE:  clear.  MOUTH & THROAT:  No pharyngeal erythema or exudate. No lesions.  NECK: Supple. No bruits.  No JVD.  No cervical lymphadenopathy.  No thyromegaly.    CHEST: Breath sounds clear bilaterally.  Normal respiratory effort  CARDIOVASCULAR: Normal rate.  Regular rhythm.  No murmurs.  No rub.  No gallops.  ABDOMEN: Bowel sounds normal.  Soft.  No " tenderness.  No organomegaly.  PERIPHERAL VASCULAR: No cyanosis.  No clubbing.  No edema.  NEUROLOGIC: No focal findings.  MENTAL STATUS: Alert.  Oriented x 3.    Reviewed previous EGD December 2017, previous CT abdomen pelvis October 2017    Erinn was seen today for fatigue and abdominal pain.    Diagnoses and all orders for this visit:    Gastroesophageal reflux disease without esophagitis    Irritable bowel syndrome, unspecified type    Sleep apnea, unspecified type    Severe obesity (BMI 35.0-35.9 with comorbidity)    Coronary artery disease involving native heart without angina pectoris, unspecified vessel or lesion type    Essential hypertension    Other orders  -     amLODIPine (NORVASC) 5 MG tablet; Take 1 tablet (5 mg total) by mouth once daily.  -     valsartan-hydrochlorothiazide (DIOVAN-HCT) 320-25 mg per tablet; Take 1 tablet by mouth once daily.      Reviewed previous imaging.  Doubt symptoms related to her pravastatin.  Would like her to continue if possible due to coronary disease.  She will discuss this with her cardiologist when she sees him next.  At amlodipine as above.  Recheck blood pressure with nurse in 1 month.  Make sure that she takes her Zantac twice a day every day.  Keep follow-up scheduled regarding sleep apnea.  Follow-up symptoms not improved.

## 2018-06-28 ENCOUNTER — OFFICE VISIT (OUTPATIENT)
Dept: FAMILY MEDICINE | Facility: CLINIC | Age: 63
End: 2018-06-28
Payer: COMMERCIAL

## 2018-06-28 VITALS
BODY MASS INDEX: 36.8 KG/M2 | HEIGHT: 62 IN | SYSTOLIC BLOOD PRESSURE: 139 MMHG | HEART RATE: 61 BPM | DIASTOLIC BLOOD PRESSURE: 67 MMHG | WEIGHT: 200 LBS

## 2018-06-28 DIAGNOSIS — Z88.9 MULTIPLE ALLERGIES: ICD-10-CM

## 2018-06-28 DIAGNOSIS — J32.9 SINUSITIS, UNSPECIFIED CHRONICITY, UNSPECIFIED LOCATION: Primary | ICD-10-CM

## 2018-06-28 PROCEDURE — 3075F SYST BP GE 130 - 139MM HG: CPT | Mod: CPTII,S$GLB,, | Performed by: NURSE PRACTITIONER

## 2018-06-28 PROCEDURE — 99999 PR PBB SHADOW E&M-EST. PATIENT-LVL IV: CPT | Mod: PBBFAC,,, | Performed by: NURSE PRACTITIONER

## 2018-06-28 PROCEDURE — 99213 OFFICE O/P EST LOW 20 MIN: CPT | Mod: S$GLB,,, | Performed by: NURSE PRACTITIONER

## 2018-06-28 PROCEDURE — 3078F DIAST BP <80 MM HG: CPT | Mod: CPTII,S$GLB,, | Performed by: NURSE PRACTITIONER

## 2018-06-28 PROCEDURE — 3008F BODY MASS INDEX DOCD: CPT | Mod: CPTII,S$GLB,, | Performed by: NURSE PRACTITIONER

## 2018-06-28 RX ORDER — AMOXICILLIN 875 MG/1
875 TABLET, FILM COATED ORAL EVERY 12 HOURS
Qty: 20 TABLET | Refills: 0 | Status: SHIPPED | OUTPATIENT
Start: 2018-06-28 | End: 2018-07-08

## 2018-06-28 NOTE — PROGRESS NOTES
Subjective:       Patient ID: Erinn Silva is a 62 y.o. female.    Chief Complaint: Sinus Problem    Sinus Problem   This is a new problem. The current episode started 1 to 4 weeks ago. The problem has been waxing and waning since onset. There has been no fever. The pain is moderate. Associated symptoms include congestion, coughing, headaches and sinus pressure. Pertinent negatives include no chills, diaphoresis, ear pain, hoarse voice, neck pain, shortness of breath, sneezing, sore throat or swollen glands. Treatments tried: States started amoxil she had left over; symptoms improving. Also using nasal spray. The treatment provided mild relief.     Past Medical History:   Diagnosis Date    Anticoagulant long-term use     Anxiety     AR (allergic rhinitis)     Arthritis     Asthma, intermittent     Coronary artery disease     Depression     Endometrial polyp     Fatty liver     GERD (gastroesophageal reflux disease)     Hiatal hernia     Hyperlipidemia LDL goal <100     Hypertension     Mild mitral regurgitation     Multiple thyroid nodules 3/27/2014    Obesity (BMI 30-39.9) 3/27/2014    Renal cell carcinoma 2002    removed - no recurrence    S/P primary angioplasty with coronary stent 07/26/2017    Sleep apnea     Solitary kidney, acquired     right     Social History     Social History    Marital status:      Spouse name: N/A    Number of children: N/A    Years of education: N/A     Occupational History    Not on file.     Social History Main Topics    Smoking status: Never Smoker    Smokeless tobacco: Never Used    Alcohol use No    Drug use: No    Sexual activity: Not on file     Social History Narrative    No narrative on file     Past Surgical History:   Procedure Laterality Date    APPENDECTOMY      CARDIAC CATHETERIZATION  6/27/14    has blockages, but per patient no stents    CHOLECYSTECTOMY      COLONOSCOPY  2002    negative    COLONOSCOPY  3/25/2014          CORONARY ANGIOPLASTY  2008?    balloon angioplasty    CORONARY ANGIOPLASTY WITH STENT PLACEMENT Right 07/26/2017    NEPHRECTOMY Left 2002    [facility]    OVARIAN CYST REMOVAL      POLYPECTOMY      endometrial polyp removal    ROTATOR CUFF REPAIR      L    THYROIDECTOMY, PARTIAL Left     TUBAL LIGATION      UPPER GASTROINTESTINAL ENDOSCOPY  04/2014       Review of Systems   Constitutional: Negative.  Negative for chills and diaphoresis.   HENT: Positive for congestion, postnasal drip, rhinorrhea, sinus pain and sinus pressure. Negative for ear pain, hoarse voice, sneezing and sore throat.    Eyes: Negative.    Respiratory: Positive for cough. Negative for shortness of breath.    Cardiovascular: Negative.    Gastrointestinal: Negative.    Endocrine: Negative.    Genitourinary: Negative.    Musculoskeletal: Negative.  Negative for neck pain.   Skin: Negative.    Allergic/Immunologic: Negative.    Neurological: Positive for headaches.   Psychiatric/Behavioral: Negative.        Objective:      Physical Exam   Constitutional: She is oriented to person, place, and time. She appears well-developed and well-nourished.   HENT:   Head: Normocephalic.   Right Ear: Hearing, tympanic membrane, external ear and ear canal normal.   Left Ear: Hearing, tympanic membrane, external ear and ear canal normal.   Nose: Mucosal edema and rhinorrhea present. Right sinus exhibits maxillary sinus tenderness and frontal sinus tenderness. Left sinus exhibits maxillary sinus tenderness and frontal sinus tenderness.   Mouth/Throat: Uvula is midline, oropharynx is clear and moist and mucous membranes are normal.   Eyes: Conjunctivae are normal. Pupils are equal, round, and reactive to light.   Neck: Normal range of motion. Neck supple.   Cardiovascular: Normal rate, regular rhythm and normal heart sounds.    Pulmonary/Chest: Effort normal and breath sounds normal.   Abdominal: Soft. Bowel sounds are normal.    Musculoskeletal: Normal range of motion.   Neurological: She is alert and oriented to person, place, and time.   Skin: Skin is warm and dry. Capillary refill takes 2 to 3 seconds.   Psychiatric: She has a normal mood and affect. Her behavior is normal. Judgment and thought content normal.   Nursing note and vitals reviewed.      Assessment:       1. Sinusitis, unspecified chronicity, unspecified location    2. Multiple allergies        Plan:           Erinn was seen today for sinus problem.    Diagnoses and all orders for this visit:    Sinusitis, unspecified chronicity, unspecified location  -     amoxicillin (AMOXIL) 875 MG tablet; Take 1 tablet (875 mg total) by mouth every 12 (twelve) hours. for 10 days    Multiple allergies  -     Ambulatory referral to Allergy

## 2018-07-18 ENCOUNTER — LAB VISIT (OUTPATIENT)
Dept: LAB | Facility: HOSPITAL | Age: 63
End: 2018-07-18
Attending: ALLERGY & IMMUNOLOGY
Payer: COMMERCIAL

## 2018-07-18 ENCOUNTER — OFFICE VISIT (OUTPATIENT)
Dept: ALLERGY | Facility: CLINIC | Age: 63
End: 2018-07-18
Payer: COMMERCIAL

## 2018-07-18 VITALS — OXYGEN SATURATION: 97 % | WEIGHT: 198.88 LBS | BODY MASS INDEX: 36.6 KG/M2 | HEIGHT: 62 IN | HEART RATE: 78 BPM

## 2018-07-18 DIAGNOSIS — J31.0 CHRONIC RHINITIS: Primary | ICD-10-CM

## 2018-07-18 DIAGNOSIS — L50.8 ACUTE URTICARIA: ICD-10-CM

## 2018-07-18 DIAGNOSIS — L29.9 PRURITUS: ICD-10-CM

## 2018-07-18 DIAGNOSIS — J31.0 CHRONIC RHINITIS: ICD-10-CM

## 2018-07-18 DIAGNOSIS — Z91.018 FOOD ALLERGY: ICD-10-CM

## 2018-07-18 PROCEDURE — 99999 PR PBB SHADOW E&M-EST. PATIENT-LVL III: CPT | Mod: PBBFAC,,, | Performed by: ALLERGY & IMMUNOLOGY

## 2018-07-18 PROCEDURE — 86003 ALLG SPEC IGE CRUDE XTRC EA: CPT

## 2018-07-18 PROCEDURE — 36415 COLL VENOUS BLD VENIPUNCTURE: CPT | Mod: PO

## 2018-07-18 PROCEDURE — 99244 OFF/OP CNSLTJ NEW/EST MOD 40: CPT | Mod: S$GLB,,, | Performed by: ALLERGY & IMMUNOLOGY

## 2018-07-18 PROCEDURE — 86003 ALLG SPEC IGE CRUDE XTRC EA: CPT | Mod: 59

## 2018-07-18 RX ORDER — FLUTICASONE PROPIONATE 50 MCG
2 SPRAY, SUSPENSION (ML) NASAL DAILY
Qty: 16 G | Refills: 12 | Status: SHIPPED | OUTPATIENT
Start: 2018-07-18 | End: 2018-10-19

## 2018-07-18 NOTE — LETTER
July 18, 2018      [place]  [address] [facility] [facility] [facility] [address]           [facility]  [address] [facility] [address] [place] [address]  Phone: [phone]          Patient: Erinn Silva   MR Number: [id number]   YOB: 1955   Date of Visit: 7/18/2018       Dear Tessa Cross:    Thank you for referring Erinn Silva to me for evaluation. Attached you will find relevant portions of my assessment and plan of care.    If you have questions, please do not hesitate to call me. I look forward to following Erinn Silva along with you.    Sincerely,    Vandana Ch MD    Enclosure  CC:  No Recipients    If you would like to receive this communication electronically, please contact [email or web address] or [phone] to request more information on [employer] Link access.    For providers and/or their staff who would like to refer a patient to [facility], please contact us through our one-stop-shop provider referral line, Lencho Rick, at [phone].    If you feel you have received this communication in error or would no longer like to receive these types of communications, please e-mail [email or web address]

## 2018-07-18 NOTE — PROGRESS NOTES
Subjective:       Patient ID: Erinn Silva is a 62 y.o. female.    Chief Complaint:  Allergies (food allergies, evaluation and discuss what is appropriate treatments)      63 yo woman presents for consult from [facility] for allergies. She states she ha had allergy issues lifelong. She gets rash from most jewelry,. wears hypoallergenic and is fine. She has very dry skin an gets worse in winter and summer but not really eczema patches. She also gets itch and rash with citrus foods like oranges, grapefruit, lemon and tomatoes but nothing worse. With seafood she gets itch and hives as well as wheeze and SOB so avoids. She is fine with fish. No other food triggers. Does not have EpiPen. She finds perfumes and chemical cause itch as well.  She does have rhinitis, always stuffy nose, occ sneeze, lots of PND and occ runny nose, ha itchy yes and nose, eyes kvng red and water at times. She is worse with pollen and dyson so worse in spring and fall. Not on any allergy medications. Take benadryl prn. She has asthma but very milk, rarely ever uses albuterol maybe once er year. Never had an allergy test. No H/o sinus surgery or T&A. No insect or latex allergy.        Environmental History: see history section for home environment  Review of Systems   Constitutional: Negative for appetite change, chills, fatigue and fever.   HENT: Positive for congestion, postnasal drip, rhinorrhea and sneezing. Negative for ear discharge, ear pain, facial swelling, nosebleeds, sinus pressure, sore throat, trouble swallowing and voice change.    Eyes: Positive for discharge, redness and itching. Negative for visual disturbance.   Respiratory: Positive for cough, shortness of breath and wheezing. Negative for choking and chest tightness.    Cardiovascular: Negative for chest pain, palpitations and leg swelling.   Gastrointestinal: Negative for abdominal distention, abdominal pain, constipation, diarrhea, nausea and vomiting.    Genitourinary: Negative for difficulty urinating.   Musculoskeletal: Negative for arthralgias, gait problem, joint swelling and myalgias.   Skin: Positive for rash. Negative for color change.   Neurological: Negative for dizziness, syncope, weakness, light-headedness and headaches.   Hematological: Negative for adenopathy. Does not bruise/bleed easily.   Psychiatric/Behavioral: Negative for agitation, behavioral problems, confusion and sleep disturbance. The patient is not nervous/anxious.         Objective:    Physical Exam   Constitutional: She is oriented to person, place, and time. She appears well-developed and well-nourished. No distress.   HENT:   Head: Normocephalic and atraumatic.   Right Ear: Hearing, tympanic membrane, external ear and ear canal normal.   Left Ear: Hearing, tympanic membrane, external ear and ear canal normal.   Nose: No mucosal edema, rhinorrhea, sinus tenderness or septal deviation. No epistaxis. Right sinus exhibits no maxillary sinus tenderness and no frontal sinus tenderness. Left sinus exhibits no maxillary sinus tenderness and no frontal sinus tenderness.   Mouth/Throat: Uvula is midline, oropharynx is clear and moist and mucous membranes are normal. No uvula swelling.   Eyes: Conjunctivae are normal. Right eye exhibits no discharge. Left eye exhibits no discharge.   Neck: Normal range of motion. No thyromegaly present.   Cardiovascular: Normal rate, regular rhythm and normal heart sounds.    No murmur heard.  Pulmonary/Chest: Effort normal and breath sounds normal. No respiratory distress. She has no wheezes.   Abdominal: Soft. She exhibits no distension. There is no tenderness.   Musculoskeletal: Normal range of motion. She exhibits no edema or tenderness.   Lymphadenopathy:     She has no cervical adenopathy.   Neurological: She is alert and oriented to person, place, and time.   Skin: Skin is warm and dry. No rash noted. No erythema.   Psychiatric: She has a normal mood and  affect. Her behavior is normal. Judgment and thought content normal.   Nursing note and vitals reviewed.      Laboratory:   none performed   Assessment:       1. Chronic rhinitis    2. Pruritus    3. Acute urticaria    4. Food allergy         Plan:       1. Advised for foods can be IgE mediated allergy vs intolerance, will send immunocaps to further evaluate  2. Immunocaps for inhalants to determine if any allergic triggers and start fluticasone 2 SEN daily, prescription sent in  3. Good skin care for eczema - bathe daily in lukewarm water, let soak, pat dry and moisturize after bath as well as second time per day.  Wash with mild soap like Aveeno or Dove.  Moisturize with Lubriderm, Eucerin, CeraVe or Aquaphor.  4. Phone review  5. [facility] notified of completed consult via [employer]

## 2018-07-19 ENCOUNTER — TELEPHONE (OUTPATIENT)
Dept: FAMILY MEDICINE | Facility: CLINIC | Age: 63
End: 2018-07-19

## 2018-07-19 NOTE — TELEPHONE ENCOUNTER
----- Message from Halima Brooks sent at 7/19/2018  6:42 AM CDT -----  Contact: Patient  Patient called to reschedule her Nurse Visit. She can be contacted at [phone].    Thanks,  Halima

## 2018-07-20 LAB
A ALTERNATA IGE QN: <0.35 KU/L
A FUMIGATUS IGE QN: <0.35 KU/L
ALLERGEN MAPLE/SYCAMORE IGE: <0.35 KU/L
ALLERGEN OREGANO, CLASS: NORMAL
ALLERGEN PENICILLIUM IGE: <0.35 KU/L
ALLERGEN WALNUT TREE IGE: <0.35 KU/L
ALLERGEN WHITE PINE TREE IGE: <0.35 KU/L
ALLERGEN WILLOW IGE: <0.35 KU/L
BAHIA GRASS IGE QN: <0.35 KU/L
BALD CYPRESS IGE QN: <0.35 KU/L
BERMUDA GRASS IGE QN: <0.35 KU/L
C GLOBOSUM IGE QN: <0.35 KU/L
C HERBARUM IGE QN: <0.35 KU/L
C LUNATA IGE QN: <0.35 KU/L
CAT DANDER IGE QN: <0.35 KU/L
CATFISH IGE QN: <0.35 KU/L
CLAM IGE QN: <0.35 KU/L
CODFISH IGE QN: <0.35 KU/L
COMMON RAGWEED IGE QN: <0.35 KU/L
COTTONWOOD IGE QN: <0.35 KU/L
CRAB IGE QN: <0.35 KU/L
CRAWFISH IGE QN: 0.69 KU/L
D FARINAE IGE QN: <0.35 KU/L
D PTERONYSS IGE QN: 0.52 KU/L
DEPRECATED A ALTERNATA IGE RAST QL: NORMAL
DEPRECATED A FUMIGATUS IGE RAST QL: NORMAL
DEPRECATED BAHIA GRASS IGE RAST QL: NORMAL
DEPRECATED BALD CYPRESS IGE RAST QL: NORMAL
DEPRECATED BERMUDA GRASS IGE RAST QL: NORMAL
DEPRECATED C GLOBOSUM IGE RAST QL: NORMAL
DEPRECATED C HERBARUM IGE RAST QL: NORMAL
DEPRECATED C LUNATA IGE RAST QL: NORMAL
DEPRECATED CAT DANDER IGE RAST QL: NORMAL
DEPRECATED CATFISH IGE RAST QL: NORMAL
DEPRECATED CLAM IGE RAST QL: NORMAL
DEPRECATED CODFISH IGE RAST QL: NORMAL
DEPRECATED COMMON RAGWEED IGE RAST QL: NORMAL
DEPRECATED COTTONWOOD IGE RAST QL: NORMAL
DEPRECATED CRAB IGE RAST QL: NORMAL
DEPRECATED CRAWFISH IGE RAST QL: ABNORMAL
DEPRECATED D FARINAE IGE RAST QL: NORMAL
DEPRECATED D PTERONYSS IGE RAST QL: ABNORMAL
DEPRECATED DOG DANDER IGE RAST QL: ABNORMAL
DEPRECATED ENGL PLANTAIN IGE RAST QL: NORMAL
DEPRECATED FLOUNDER IGE RAST QL: NORMAL
DEPRECATED GARLIC IGE RAST QL: NORMAL
DEPRECATED GRAPEFRUIT IGE RAST QL: NORMAL
DEPRECATED HORSE DANDER IGE RAST QL: NORMAL
DEPRECATED JOHNSON GRASS IGE RAST QL: NORMAL
DEPRECATED LEMON IGE RAST QL: NORMAL
DEPRECATED LIME IGE RAST QL: NORMAL
DEPRECATED LOBSTER IGE RAST QL: NORMAL
DEPRECATED MARSH ELDER IGE RAST QL: NORMAL
DEPRECATED MUGWORT IGE RAST QL: NORMAL
DEPRECATED ONION IGE RAST QL: NORMAL
DEPRECATED ORANGE IGE RAST QL: NORMAL
DEPRECATED OYSTER IGE RAST QL: NORMAL
DEPRECATED PEACH IGE RAST QL: NORMAL
DEPRECATED PECAN/HICK TREE IGE RAST QL: NORMAL
DEPRECATED PINEAPPLE IGE RAST QL: NORMAL
DEPRECATED ROACH IGE RAST QL: NORMAL
DEPRECATED S ROSTRATA IGE RAST QL: NORMAL
DEPRECATED SALMON IGE RAST QL: NORMAL
DEPRECATED SALTWORT IGE RAST QL: NORMAL
DEPRECATED SCALLOP IGE RAST QL: NORMAL
DEPRECATED SHRIMP IGE RAST QL: ABNORMAL
DEPRECATED SILVER BIRCH IGE RAST QL: NORMAL
DEPRECATED TIMOTHY IGE RAST QL: NORMAL
DEPRECATED TOMATO IGE RAST QL: NORMAL
DEPRECATED TROUT IGE RAST QL: NORMAL
DEPRECATED TUNA IGE RAST QL: NORMAL
DEPRECATED WHITE OAK IGE RAST QL: NORMAL
DOG DANDER IGE QN: 0.9 KU/L
ENGL PLANTAIN IGE QN: <0.35 KU/L
FLOUNDER IGE QN: <0.35 KU/L
GARLIC IGE QN: <0.35 KU/L
GRAPEFRUIT IGE QN: <0.35 KU/L
HORSE DANDER IGE QN: <0.35 KU/L
JOHNSON GRASS IGE QN: <0.35 KU/L
LEMON IGE QN: <0.35 KU/L
LIME IGE QN: <0.35 KU/L
LOBSTER IGE QN: <0.35 KU/L
MAPLE/SYCAMORE CLASS: NORMAL
MARSH ELDER IGE QN: <0.35 KU/L
MUGWORT IGE QN: <0.35 KU/L
ONION IGE QN: <0.35 KU/L
ORANGE IGE QN: <0.35 KU/L
OREGANO IGE QN: <0.35 KU/L
OYSTER IGE QN: <0.35 KU/L
PEACH IGE QN: <0.35 KU/L
PECAN/HICK TREE IGE QN: <0.35 KU/L
PENICILLIUM CLASS: NORMAL
PINEAPPLE IGE QN: <0.35 KU/L
RAGWEED, WESTERN IGE: <0.35 KU/L
RAGWEED, WESTERN, CLASS: NORMAL
ROACH IGE QN: <0.35 KU/L
S ROSTRATA IGE QN: <0.35 KU/L
SALMON IGE QN: <0.35 KU/L
SALTWORT IGE QN: <0.35 KU/L
SCALLOP IGE QN: <0.35 KU/L
SHRIMP IGE QN: 0.72 KU/L
SILVER BIRCH IGE QN: <0.35 KU/L
TIMOTHY IGE QN: <0.35 KU/L
TOMATO IGE QN: <0.35 KU/L
TROUT IGE QN: <0.35 KU/L
TUNA IGE QN: <0.35 KU/L
WALNUT TREE CLASS: NORMAL
WHITE OAK IGE QN: <0.35 KU/L
WHITE PINE CLASS: NORMAL
WILLOW CLASS: NORMAL

## 2018-07-24 ENCOUNTER — PATIENT MESSAGE (OUTPATIENT)
Dept: ENDOCRINOLOGY | Facility: CLINIC | Age: 63
End: 2018-07-24

## 2018-08-01 ENCOUNTER — PATIENT MESSAGE (OUTPATIENT)
Dept: ALLERGY | Facility: CLINIC | Age: 63
End: 2018-08-01

## 2018-08-20 ENCOUNTER — OFFICE VISIT (OUTPATIENT)
Dept: FAMILY MEDICINE | Facility: CLINIC | Age: 63
End: 2018-08-20
Payer: COMMERCIAL

## 2018-08-20 VITALS
WEIGHT: 199 LBS | SYSTOLIC BLOOD PRESSURE: 119 MMHG | TEMPERATURE: 99 F | DIASTOLIC BLOOD PRESSURE: 65 MMHG | BODY MASS INDEX: 36.62 KG/M2 | HEIGHT: 62 IN | HEART RATE: 71 BPM

## 2018-08-20 DIAGNOSIS — J32.9 SINUSITIS, UNSPECIFIED CHRONICITY, UNSPECIFIED LOCATION: Primary | ICD-10-CM

## 2018-08-20 PROCEDURE — 99999 PR PBB SHADOW E&M-EST. PATIENT-LVL IV: CPT | Mod: PBBFAC,,, | Performed by: NURSE PRACTITIONER

## 2018-08-20 PROCEDURE — 3074F SYST BP LT 130 MM HG: CPT | Mod: CPTII,S$GLB,, | Performed by: NURSE PRACTITIONER

## 2018-08-20 PROCEDURE — 3078F DIAST BP <80 MM HG: CPT | Mod: CPTII,S$GLB,, | Performed by: NURSE PRACTITIONER

## 2018-08-20 PROCEDURE — 3008F BODY MASS INDEX DOCD: CPT | Mod: CPTII,S$GLB,, | Performed by: NURSE PRACTITIONER

## 2018-08-20 PROCEDURE — 99213 OFFICE O/P EST LOW 20 MIN: CPT | Mod: S$GLB,,, | Performed by: NURSE PRACTITIONER

## 2018-08-20 RX ORDER — MONTELUKAST SODIUM 10 MG/1
10 TABLET ORAL NIGHTLY
Qty: 30 TABLET | Refills: 0 | Status: SHIPPED | OUTPATIENT
Start: 2018-08-20 | End: 2018-09-16 | Stop reason: SDUPTHER

## 2018-08-20 RX ORDER — AMOXICILLIN 875 MG/1
875 TABLET, FILM COATED ORAL EVERY 12 HOURS
Qty: 20 TABLET | Refills: 0 | Status: SHIPPED | OUTPATIENT
Start: 2018-08-20 | End: 2018-08-30

## 2018-08-20 RX ORDER — PROMETHAZINE HYDROCHLORIDE AND DEXTROMETHORPHAN HYDROBROMIDE 6.25; 15 MG/5ML; MG/5ML
5 SYRUP ORAL 2 TIMES DAILY PRN
Qty: 118 ML | Refills: 0 | Status: SHIPPED | OUTPATIENT
Start: 2018-08-20 | End: 2018-08-30

## 2018-08-20 NOTE — PROGRESS NOTES
Subjective:       Patient ID: Erinn Silva is a 62 y.o. female.    Chief Complaint: Sore Throat; Cough; Fever; and Nasal Congestion    Sinus Problem   This is a recurrent problem. The current episode started more than 1 month ago (Current episode > 7 d). The problem is unchanged. There has been no fever. The pain is moderate. Associated symptoms include congestion, coughing, headaches, sinus pressure and a sore throat. Pertinent negatives include no chills, diaphoresis, ear pain, hoarse voice, neck pain, shortness of breath, sneezing or swollen glands. Past treatments include nothing. The treatment provided no relief.     Past Medical History:   Diagnosis Date    Anticoagulant long-term use     Anxiety     AR (allergic rhinitis)     Arthritis     Asthma, intermittent     Coronary artery disease     Depression     Endometrial polyp     Fatty liver     GERD (gastroesophageal reflux disease)     Hiatal hernia     Hyperlipidemia LDL goal <100     Hypertension     Mild mitral regurgitation     Multiple thyroid nodules 3/27/2014    Obesity (BMI 30-39.9) 3/27/2014    Renal cell carcinoma 2002    removed - no recurrence    S/P primary angioplasty with coronary stent 07/26/2017    Sleep apnea     Solitary kidney, acquired     right     Social History     Socioeconomic History    Marital status:      Spouse name: Not on file    Number of children: Not on file    Years of education: Not on file    Highest education level: Not on file   Social Needs    Financial resource strain: Not on file    Food insecurity - worry: Not on file    Food insecurity - inability: Not on file    Transportation needs - medical: Not on file    Transportation needs - non-medical: Not on file   Occupational History    Not on file   Tobacco Use    Smoking status: Never Smoker    Smokeless tobacco: Never Used   Substance and Sexual Activity    Alcohol use: No    Drug use: No    Sexual activity: Not on file    Other Topics Concern    Not on file   Social History Narrative    Not on file     Past Surgical History:   Procedure Laterality Date    APPENDECTOMY      CARDIAC CATHETERIZATION  6/27/14    has blockages, but per patient no stents    CHOLECYSTECTOMY      COLONOSCOPY  2002    negative    COLONOSCOPY  3/25/2014         CORONARY ANGIOPLASTY  2008?    balloon angioplasty    CORONARY ANGIOPLASTY WITH STENT PLACEMENT Right 07/26/2017    NEPHRECTOMY Left 2002    [facility]    OVARIAN CYST REMOVAL      POLYPECTOMY      endometrial polyp removal    ROTATOR CUFF REPAIR      L    THYROIDECTOMY, PARTIAL Left     TUBAL LIGATION      UPPER GASTROINTESTINAL ENDOSCOPY  04/2014       Review of Systems   Constitutional: Negative.  Negative for chills and diaphoresis.   HENT: Positive for congestion, postnasal drip, sinus pressure, sinus pain and sore throat. Negative for ear pain, hoarse voice and sneezing.    Eyes: Negative.    Respiratory: Positive for cough. Negative for shortness of breath.    Cardiovascular: Negative.    Gastrointestinal: Negative.    Endocrine: Negative.    Genitourinary: Negative.    Musculoskeletal: Negative.  Negative for neck pain.   Skin: Negative.    Allergic/Immunologic: Negative.    Neurological: Positive for headaches.   Psychiatric/Behavioral: Negative.        Objective:      Physical Exam   Constitutional: She is oriented to person, place, and time. She appears well-developed and well-nourished.   HENT:   Head: Normocephalic.   Right Ear: Hearing, tympanic membrane, external ear and ear canal normal.   Left Ear: Hearing, tympanic membrane, external ear and ear canal normal.   Nose: Mucosal edema and rhinorrhea present. Right sinus exhibits maxillary sinus tenderness. Right sinus exhibits no frontal sinus tenderness. Left sinus exhibits maxillary sinus tenderness. Left sinus exhibits no frontal sinus tenderness.   Mouth/Throat: Uvula is midline,  oropharynx is clear and moist and mucous membranes are normal.   Eyes: Conjunctivae are normal. Pupils are equal, round, and reactive to light.   Neck: Normal range of motion. Neck supple.   Cardiovascular: Normal rate, regular rhythm and normal heart sounds.   Pulmonary/Chest: Effort normal and breath sounds normal.   Abdominal: Soft. Bowel sounds are normal.   Musculoskeletal: Normal range of motion.   Neurological: She is alert and oriented to person, place, and time.   Skin: Skin is warm and dry. Capillary refill takes 2 to 3 seconds.   Psychiatric: She has a normal mood and affect. Her behavior is normal. Judgment and thought content normal.   Nursing note and vitals reviewed.      Assessment:       1. Sinusitis, unspecified chronicity, unspecified location        Plan:           Erinn was seen today for sore throat, cough, fever and nasal congestion.    Diagnoses and all orders for this visit:    Sinusitis, unspecified chronicity, unspecified location  -     montelukast (SINGULAIR) 10 mg tablet; Take 1 tablet (10 mg total) by mouth every evening.  -     amoxicillin (AMOXIL) 875 MG tablet; Take 1 tablet (875 mg total) by mouth every 12 (twelve) hours. for 10 days  -     promethazine-dextromethorphan (PROMETHAZINE-DM) 6.25-15 mg/5 mL Syrp; Take 5 mLs by mouth 2 (two) times daily as needed.  Zyrtec OTC as directed

## 2018-08-22 ENCOUNTER — TELEPHONE (OUTPATIENT)
Dept: FAMILY MEDICINE | Facility: CLINIC | Age: 63
End: 2018-08-22

## 2018-08-22 RX ORDER — ONDANSETRON HYDROCHLORIDE 8 MG/1
8 TABLET, FILM COATED ORAL EVERY 8 HOURS PRN
Qty: 15 TABLET | Refills: 0 | Status: SHIPPED | OUTPATIENT
Start: 2018-08-22 | End: 2018-08-27

## 2018-08-22 NOTE — TELEPHONE ENCOUNTER
----- Message from Annabelle Herrera sent at 8/22/2018  9:55 AM CDT -----  Contact: pt  Pt stated she's calling about medication prescription she didn't get at the time of visit, she can be reached at [phone]

## 2018-08-22 NOTE — TELEPHONE ENCOUNTER
----- Message from Angie Escoto sent at 8/22/2018 12:25 PM CDT -----  Pt at [phone]//states she is returning your call//please call again//valarie/lh

## 2018-08-22 NOTE — TELEPHONE ENCOUNTER
Pt is requesting something for nausea. 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 Pt want to return to work on tomorrow.

## 2018-09-11 ENCOUNTER — OFFICE VISIT (OUTPATIENT)
Dept: FAMILY MEDICINE | Facility: CLINIC | Age: 63
End: 2018-09-11
Payer: COMMERCIAL

## 2018-09-11 VITALS
WEIGHT: 199 LBS | SYSTOLIC BLOOD PRESSURE: 130 MMHG | BODY MASS INDEX: 36.62 KG/M2 | HEART RATE: 49 BPM | HEIGHT: 62 IN | DIASTOLIC BLOOD PRESSURE: 60 MMHG | TEMPERATURE: 98 F

## 2018-09-11 DIAGNOSIS — K52.9 GASTROENTERITIS: Primary | ICD-10-CM

## 2018-09-11 DIAGNOSIS — R11.0 NAUSEA: ICD-10-CM

## 2018-09-11 PROCEDURE — 3008F BODY MASS INDEX DOCD: CPT | Mod: CPTII,S$GLB,, | Performed by: NURSE PRACTITIONER

## 2018-09-11 PROCEDURE — 99213 OFFICE O/P EST LOW 20 MIN: CPT | Mod: S$GLB,,, | Performed by: NURSE PRACTITIONER

## 2018-09-11 PROCEDURE — 3075F SYST BP GE 130 - 139MM HG: CPT | Mod: CPTII,S$GLB,, | Performed by: NURSE PRACTITIONER

## 2018-09-11 PROCEDURE — 99999 PR PBB SHADOW E&M-EST. PATIENT-LVL IV: CPT | Mod: PBBFAC,,, | Performed by: NURSE PRACTITIONER

## 2018-09-11 PROCEDURE — 3078F DIAST BP <80 MM HG: CPT | Mod: CPTII,S$GLB,, | Performed by: NURSE PRACTITIONER

## 2018-09-11 RX ORDER — ONDANSETRON HYDROCHLORIDE 8 MG/1
8 TABLET, FILM COATED ORAL EVERY 8 HOURS PRN
Qty: 15 TABLET | Refills: 0 | Status: SHIPPED | OUTPATIENT
Start: 2018-09-11 | End: 2018-11-19

## 2018-09-11 RX ORDER — DICYCLOMINE HYDROCHLORIDE 20 MG/1
TABLET ORAL
Qty: 30 TABLET | Refills: 0 | Status: SHIPPED | OUTPATIENT
Start: 2018-09-11 | End: 2019-03-26

## 2018-09-11 NOTE — PROGRESS NOTES
Subjective:       Patient ID: Erinn Silva is a 62 y.o. female.    Chief Complaint: Abdominal Cramping; Nausea; and Constipation    Abdominal Cramping   This is a new problem. The current episode started in the past 7 days. The onset quality is gradual. The problem occurs daily. The problem has been rapidly improving. The pain is located in the generalized abdominal region. The pain is mild. The quality of the pain is cramping. The abdominal pain does not radiate. Associated symptoms include constipation (Resolved; states stools soft over past 2 d), flatus and nausea. Pertinent negatives include no anorexia, arthralgias, belching, diarrhea, dysuria, fever, frequency, headaches, hematochezia, hematuria, melena, myalgias, vomiting or weight loss. Nothing aggravates the pain. The pain is relieved by nothing. The treatment provided moderate relief. Her past medical history is significant for GERD and irritable bowel syndrome. There is no history of abdominal surgery, colon cancer, Crohn's disease, gallstones, pancreatitis, PUD or ulcerative colitis. Patient's medical history does not include kidney stones and UTI.     Past Medical History:   Diagnosis Date    Anticoagulant long-term use     Anxiety     AR (allergic rhinitis)     Arthritis     Asthma, intermittent     Coronary artery disease     Depression     Endometrial polyp     Fatty liver     GERD (gastroesophageal reflux disease)     Hiatal hernia     Hyperlipidemia LDL goal <100     Hypertension     Mild mitral regurgitation     Multiple thyroid nodules 3/27/2014    Obesity (BMI 30-39.9) 3/27/2014    Renal cell carcinoma 2002    removed - no recurrence    S/P primary angioplasty with coronary stent 07/26/2017    Sleep apnea     Solitary kidney, acquired     right     Social History     Socioeconomic History    Marital status:      Spouse name: Not on file    Number of children: Not on file    Years of education: Not on file     Highest education level: Not on file   Social Needs    Financial resource strain: Not on file    Food insecurity - worry: Not on file    Food insecurity - inability: Not on file    Transportation needs - medical: Not on file    Transportation needs - non-medical: Not on file   Occupational History    Not on file   Tobacco Use    Smoking status: Never Smoker    Smokeless tobacco: Never Used   Substance and Sexual Activity    Alcohol use: No    Drug use: No    Sexual activity: Not on file   Other Topics Concern    Not on file   Social History Narrative    Not on file     Past Surgical History:   Procedure Laterality Date    APPENDECTOMY      CARDIAC CATHETERIZATION  6/27/14    has blockages, but per patient no stents    CHOLECYSTECTOMY      COLONOSCOPY  2002    negative    COLONOSCOPY  3/25/2014         CORONARY ANGIOPLASTY  2008?    balloon angioplasty    CORONARY ANGIOPLASTY WITH STENT PLACEMENT Right 07/26/2017    EGD (ESOPHAGOGASTRODUODENOSCOPY) N/A 4/17/2014    [facility]    ESOPHAGOGASTRODUODENOSCOPY (EGD) Left 12/11/2017    [facility]    MOTILITY STUDY, ESOPHAGEAL, USING HIGH RESOLUTION MANOMETRY N/A 4/17/2014    [facility]    [facility] Left 2002    [facility]    OVARIAN CYST REMOVAL      POLYPECTOMY      endometrial polyp removal    ROTATOR CUFF REPAIR      L    THYROIDECTOMY Left 7/8/2014    [facility]    THYROIDECTOMY, PARTIAL Left     TUBAL LIGATION      UPPER GASTROINTESTINAL ENDOSCOPY  04/2014       Review of Systems   Constitutional: Negative.  Negative for fever and weight loss.   HENT: Negative.    Eyes: Negative.    Respiratory: Negative.    Cardiovascular: Negative.    Gastrointestinal: Positive for constipation (Resolved; states stools soft over past 2 d), flatus and nausea. Negative for anorexia, diarrhea,  hematochezia, melena and vomiting.   Endocrine: Negative.    Genitourinary: Negative.  Negative for dysuria, frequency and hematuria.   Musculoskeletal: Negative.  Negative for arthralgias and myalgias.   Skin: Negative.    Allergic/Immunologic: Negative.    Neurological: Negative.  Negative for headaches.   Psychiatric/Behavioral: Negative.        Objective:      Physical Exam   Constitutional: She is oriented to person, place, and time. She appears well-developed and well-nourished.   HENT:   Head: Normocephalic.   Right Ear: External ear normal.   Left Ear: External ear normal.   Nose: Nose normal.   Mouth/Throat: Oropharynx is clear and moist.   Eyes: Conjunctivae are normal. Pupils are equal, round, and reactive to light.   Neck: Normal range of motion. Neck supple.   Cardiovascular: Normal rate, regular rhythm and normal heart sounds.   Pulmonary/Chest: Effort normal and breath sounds normal.   Abdominal: Soft. Bowel sounds are normal. There is no tenderness.   Musculoskeletal: Normal range of motion.   Neurological: She is alert and oriented to person, place, and time.   Skin: Skin is warm and dry. Capillary refill takes 2 to 3 seconds.   Psychiatric: She has a normal mood and affect. Her behavior is normal. Judgment and thought content normal.   Nursing note and vitals reviewed.      Assessment:       1. Gastroenteritis    2. Nausea        Plan:           Erinn was seen today for abdominal cramping, nausea and constipation.    Diagnoses and all orders for this visit:    Gastroenteritis  Nausea  -     dicyclomine (BENTYL) 20 mg tablet; TK 1 T PO Q 8 H PRN CRAMPING  -     ondansetron (ZOFRAN) 8 MG tablet; Take 1 tablet (8 mg total) by mouth every 8 (eight) hours as needed for Nausea.  Gas X OTC        Hydrate well        Report to ER immediately if symptoms worsen

## 2018-09-11 NOTE — LETTER
September 11, 2018      [facility]  [address] [address]  Phone: [phone]  Fax: [phone]       Patient: Erinn Silva   YOB: 1955  Date of Visit: 09/11/2018    To Whom It May Concern:    Toan Silva  was at [facility] on 09/11/2018. She may return to work/school on 09/12/2018 with no restrictions. If you have any questions or concerns, or if I can be of further assistance, please do not hesitate to contact me.    Sincerely,    Claudia Portillo LPN

## 2018-09-17 RX ORDER — MONTELUKAST SODIUM 10 MG/1
TABLET ORAL
Qty: 30 TABLET | Refills: 0 | Status: SHIPPED | OUTPATIENT
Start: 2018-09-17 | End: 2018-10-20 | Stop reason: SDUPTHER

## 2018-10-09 ENCOUNTER — OFFICE VISIT (OUTPATIENT)
Dept: FAMILY MEDICINE | Facility: CLINIC | Age: 63
End: 2018-10-09
Payer: COMMERCIAL

## 2018-10-09 VITALS
SYSTOLIC BLOOD PRESSURE: 125 MMHG | HEART RATE: 59 BPM | TEMPERATURE: 99 F | HEIGHT: 62 IN | WEIGHT: 196 LBS | BODY MASS INDEX: 36.07 KG/M2 | DIASTOLIC BLOOD PRESSURE: 68 MMHG

## 2018-10-09 DIAGNOSIS — R35.0 URINE FREQUENCY: Primary | ICD-10-CM

## 2018-10-09 DIAGNOSIS — R53.83 FATIGUE, UNSPECIFIED TYPE: ICD-10-CM

## 2018-10-09 DIAGNOSIS — R42 DIZZINESS: ICD-10-CM

## 2018-10-09 LAB
BILIRUB UR QL STRIP: NEGATIVE
CLARITY UR: CLEAR
COLOR UR: YELLOW
GLUCOSE UR QL STRIP: NEGATIVE
HGB UR QL STRIP: ABNORMAL
KETONES UR QL STRIP: NEGATIVE
LEUKOCYTE ESTERASE UR QL STRIP: NEGATIVE
NITRITE UR QL STRIP: NEGATIVE
PH UR STRIP: 6.5 [PH] (ref 5–8)
PROT UR QL STRIP: NEGATIVE
SP GR UR STRIP: 1 (ref 1–1.03)
URN SPEC COLLECT METH UR: ABNORMAL

## 2018-10-09 PROCEDURE — 93010 ELECTROCARDIOGRAM REPORT: CPT | Mod: S$GLB,,, | Performed by: INTERNAL MEDICINE

## 2018-10-09 PROCEDURE — 93005 ELECTROCARDIOGRAM TRACING: CPT | Mod: S$GLB,,, | Performed by: NURSE PRACTITIONER

## 2018-10-09 PROCEDURE — 87086 URINE CULTURE/COLONY COUNT: CPT

## 2018-10-09 PROCEDURE — 3008F BODY MASS INDEX DOCD: CPT | Mod: CPTII,S$GLB,, | Performed by: NURSE PRACTITIONER

## 2018-10-09 PROCEDURE — 99213 OFFICE O/P EST LOW 20 MIN: CPT | Mod: S$GLB,,, | Performed by: NURSE PRACTITIONER

## 2018-10-09 PROCEDURE — 99999 PR PBB SHADOW E&M-EST. PATIENT-LVL IV: CPT | Mod: PBBFAC,,, | Performed by: NURSE PRACTITIONER

## 2018-10-09 PROCEDURE — 3074F SYST BP LT 130 MM HG: CPT | Mod: CPTII,S$GLB,, | Performed by: NURSE PRACTITIONER

## 2018-10-09 PROCEDURE — 81002 URINALYSIS NONAUTO W/O SCOPE: CPT | Mod: PO

## 2018-10-09 PROCEDURE — 3078F DIAST BP <80 MM HG: CPT | Mod: CPTII,S$GLB,, | Performed by: NURSE PRACTITIONER

## 2018-10-09 RX ORDER — MECLIZINE HYDROCHLORIDE 25 MG/1
25 TABLET ORAL 2 TIMES DAILY PRN
Qty: 10 TABLET | Refills: 0 | Status: SHIPPED | OUTPATIENT
Start: 2018-10-09 | End: 2018-11-19

## 2018-10-11 ENCOUNTER — LAB VISIT (OUTPATIENT)
Dept: LAB | Facility: HOSPITAL | Age: 63
End: 2018-10-11
Attending: NURSE PRACTITIONER
Payer: COMMERCIAL

## 2018-10-11 DIAGNOSIS — R53.83 FATIGUE, UNSPECIFIED TYPE: ICD-10-CM

## 2018-10-11 LAB
ANION GAP SERPL CALC-SCNC: 10 MMOL/L
BACTERIA UR CULT: NORMAL
BASOPHILS # BLD AUTO: 0.05 K/UL
BASOPHILS NFR BLD: 0.8 %
BUN SERPL-MCNC: 13 MG/DL
CALCIUM SERPL-MCNC: 9.2 MG/DL
CHLORIDE SERPL-SCNC: 104 MMOL/L
CO2 SERPL-SCNC: 26 MMOL/L
CREAT SERPL-MCNC: 0.8 MG/DL
DIFFERENTIAL METHOD: ABNORMAL
EOSINOPHIL # BLD AUTO: 0.3 K/UL
EOSINOPHIL NFR BLD: 4.6 %
ERYTHROCYTE [DISTWIDTH] IN BLOOD BY AUTOMATED COUNT: 13.9 %
EST. GFR  (AFRICAN AMERICAN): >60 ML/MIN/1.73 M^2
EST. GFR  (NON AFRICAN AMERICAN): >60 ML/MIN/1.73 M^2
GLUCOSE SERPL-MCNC: 96 MG/DL
HCT VFR BLD AUTO: 40.8 %
HGB BLD-MCNC: 12.6 G/DL
IMM GRANULOCYTES # BLD AUTO: 0.01 K/UL
IMM GRANULOCYTES NFR BLD AUTO: 0.2 %
LYMPHOCYTES # BLD AUTO: 2.4 K/UL
LYMPHOCYTES NFR BLD: 40 %
MCH RBC QN AUTO: 28.6 PG
MCHC RBC AUTO-ENTMCNC: 30.9 G/DL
MCV RBC AUTO: 93 FL
MONOCYTES # BLD AUTO: 0.4 K/UL
MONOCYTES NFR BLD: 7.1 %
NEUTROPHILS # BLD AUTO: 2.9 K/UL
NEUTROPHILS NFR BLD: 47.3 %
NRBC BLD-RTO: 0 /100 WBC
PLATELET # BLD AUTO: 285 K/UL
PMV BLD AUTO: 10 FL
POTASSIUM SERPL-SCNC: 4.5 MMOL/L
RBC # BLD AUTO: 4.4 M/UL
SODIUM SERPL-SCNC: 140 MMOL/L
TSH SERPL DL<=0.005 MIU/L-ACNC: 1.78 UIU/ML
WBC # BLD AUTO: 6.05 K/UL

## 2018-10-11 PROCEDURE — 85025 COMPLETE CBC W/AUTO DIFF WBC: CPT

## 2018-10-11 PROCEDURE — 36415 COLL VENOUS BLD VENIPUNCTURE: CPT | Mod: PO

## 2018-10-11 PROCEDURE — 80048 BASIC METABOLIC PNL TOTAL CA: CPT

## 2018-10-11 PROCEDURE — 84443 ASSAY THYROID STIM HORMONE: CPT

## 2018-10-11 NOTE — PROGRESS NOTES
Subjective:       Patient ID: Erinn Silva is a 62 y.o. female.    Chief Complaint: Urinary Frequency and Back Pain    Urinary Frequency    This is a new problem. The current episode started in the past 7 days. The problem occurs intermittently. The problem has been unchanged. The patient is experiencing no pain. There has been no fever. There is no history of pyelonephritis. Associated symptoms include frequency. Pertinent negatives include no behavior changes, chills, discharge, flank pain, hematuria, hesitancy, nausea, possible pregnancy, sweats, urgency, vomiting, weight loss, bubble bath use, constipation, rash or withholding. She has tried nothing for the symptoms. The treatment provided no relief. Her past medical history is significant for hypertension and a single kidney. There is no history of catheterization, diabetes insipidus, diabetes mellitus, genitourinary reflux, kidney stones, recurrent UTIs, STD, urinary stasis or a urological procedure.   Fatigue   This is a new problem. The current episode started in the past 7 days. The problem occurs intermittently. The problem has been unchanged. Associated symptoms include fatigue and vertigo. Pertinent negatives include no abdominal pain, anorexia, arthralgias, change in bowel habit, chest pain, chills, congestion, coughing, diaphoresis, fever, headaches, joint swelling, myalgias, nausea, neck pain, numbness, rash, sore throat, swollen glands, urinary symptoms, visual change, vomiting or weakness. Nothing aggravates the symptoms. She has tried nothing for the symptoms. The treatment provided no relief.   Dizziness:   Chronicity:  New  Onset:  In the past 7 days  Progression since onset:  Resolved  Frequency:  Every few days  Severity:  Mild  Duration:  Very brief  Dizziness characteristics:  Height vertigo   Associated symptoms: light-headedness.no hearing loss, no ear congestion, no ear pain, no fever, no headaches, no tinnitus, no nausea, no vomiting,  no diaphoresis, no aural fullness, no weakness, no visual disturbances, no syncope, no palpitations, no panic, no facial weakness, no slurred speech, no numbness in extremities and no chest pain.  Aggravated by:  Nothing  Treatments tried:  Nothing  Improvements on treatment:  Significant   PMH includes: cardiac surgery and anxiety.no strokes, no neurologic disease, no head trauma, no balance testing, no ear trauma, no ear surgery, no head trauma, no ear infections, no ear tubes, no environmental allergies, no MRI head and no CT head.    Past Medical History:   Diagnosis Date    Anticoagulant long-term use     Anxiety     AR (allergic rhinitis)     Arthritis     Asthma, intermittent     Coronary artery disease     Depression     Endometrial polyp     Fatty liver     GERD (gastroesophageal reflux disease)     Hiatal hernia     Hyperlipidemia LDL goal <100     Hypertension     Mild mitral regurgitation     Multiple thyroid nodules 3/27/2014    Obesity (BMI 30-39.9) 3/27/2014    Renal cell carcinoma 2002    removed - no recurrence    S/P primary angioplasty with coronary stent 07/26/2017    Sleep apnea     Using CPAP    Solitary kidney, acquired     right     Social History     Socioeconomic History    Marital status:      Spouse name: Not on file    Number of children: Not on file    Years of education: Not on file    Highest education level: Not on file   Social Needs    Financial resource strain: Not on file    Food insecurity - worry: Not on file    Food insecurity - inability: Not on file    Transportation needs - medical: Not on file    Transportation needs - non-medical: Not on file   Occupational History    Not on file   Tobacco Use    Smoking status: Never Smoker    Smokeless tobacco: Never Used   Substance and Sexual Activity    Alcohol use: No    Drug use: No    Sexual activity: Not on file   Other Topics Concern    Not on file   Social History Narrative    Not on  file     Past Surgical History:   Procedure Laterality Date    APPENDECTOMY      CARDIAC CATHETERIZATION  6/27/14    has blockages, but per patient no stents    CHOLECYSTECTOMY      COLONOSCOPY  2002    negative    COLONOSCOPY  3/25/2014         CORONARY ANGIOPLASTY  2008?    balloon angioplasty    CORONARY ANGIOPLASTY WITH STENT PLACEMENT Right 07/26/2017    EGD (ESOPHAGOGASTRODUODENOSCOPY) N/A 4/17/2014    [facility]    ESOPHAGOGASTRODUODENOSCOPY (EGD) Left 12/11/2017    [facility]    MOTILITY STUDY, ESOPHAGUS, USING HIGH RESOLUTION MANOMETRY N/A 4/17/2014    [facility]    NEPHRECTOMY Left 2002    [facility] - [facility] - cancer    OVARIAN CYST REMOVAL      POLYPECTOMY      endometrial polyp removal    ROTATOR CUFF REPAIR      L    THYROIDECTOMY Left 7/8/2014    [facility]    THYROIDECTOMY, PARTIAL Left     TUBAL LIGATION      UPPER GASTROINTESTINAL ENDOSCOPY  04/2014       Review of Systems   Constitutional: Positive for fatigue. Negative for chills, diaphoresis, fever and weight loss.   HENT: Negative.  Negative for congestion, ear pain, hearing loss, sore throat and tinnitus.    Eyes: Negative.    Respiratory: Negative.  Negative for cough.    Cardiovascular: Negative.  Negative for chest pain, palpitations and syncope.   Gastrointestinal: Negative.  Negative for abdominal pain, anorexia, change in bowel habit, constipation, nausea and vomiting.   Endocrine: Negative.    Genitourinary: Positive for frequency. Negative for flank pain, hematuria, hesitancy and urgency.   Musculoskeletal: Negative.  Negative for arthralgias, joint swelling, myalgias and neck pain.   Skin: Negative.  Negative for rash.   Allergic/Immunologic: Negative.  Negative for environmental allergies.   Neurological: Positive for dizziness, vertigo and light-headedness. Negative for  weakness, numbness and headaches.   Psychiatric/Behavioral: Negative.        Objective:      Physical Exam   Constitutional: She is oriented to person, place, and time. She appears well-developed and well-nourished.   HENT:   Head: Normocephalic.   Right Ear: External ear normal.   Left Ear: External ear normal.   Nose: Nose normal.   Mouth/Throat: Oropharynx is clear and moist.   Eyes: Conjunctivae are normal. Pupils are equal, round, and reactive to light.   Neck: Normal range of motion. Neck supple.   Cardiovascular: Normal rate, regular rhythm and normal heart sounds.   Pulmonary/Chest: Effort normal and breath sounds normal.   Abdominal: Soft. Bowel sounds are normal.   Musculoskeletal: Normal range of motion.   Neurological: She is alert and oriented to person, place, and time.   Skin: Skin is warm and dry. Capillary refill takes 2 to 3 seconds.   Psychiatric: She has a normal mood and affect. Her behavior is normal. Judgment and thought content normal.   Nursing note and vitals reviewed.      Assessment:       1. Urine frequency    2. Fatigue, unspecified type    3. Dizziness        Plan:           Erinn was seen today for urinary frequency and back pain.    Diagnoses and all orders for this visit:    Urine frequency  -     Urinalysis  -     Urine culture    Fatigue, unspecified type  -     CBC auto differential; Future  -     Basic metabolic panel; Future  -     TSH; Future    Dizziness  -     IN OFFICE EKG 12-LEAD (to Muse)  -     meclizine (ANTIVERT) 25 mg tablet; Take 1 tablet (25 mg total) by mouth 2 (two) times daily as needed for Dizziness.    Report to ER immediately if symptoms worsen

## 2018-10-16 ENCOUNTER — OFFICE VISIT (OUTPATIENT)
Dept: FAMILY MEDICINE | Facility: CLINIC | Age: 63
End: 2018-10-16
Payer: COMMERCIAL

## 2018-10-16 ENCOUNTER — HOSPITAL ENCOUNTER (OUTPATIENT)
Dept: RADIOLOGY | Facility: HOSPITAL | Age: 63
Discharge: HOME | End: 2018-10-16
Attending: NURSE PRACTITIONER
Payer: COMMERCIAL

## 2018-10-16 VITALS
SYSTOLIC BLOOD PRESSURE: 130 MMHG | HEART RATE: 77 BPM | OXYGEN SATURATION: 99 % | HEIGHT: 62 IN | WEIGHT: 194 LBS | BODY MASS INDEX: 35.7 KG/M2 | DIASTOLIC BLOOD PRESSURE: 70 MMHG

## 2018-10-16 DIAGNOSIS — R05.9 COUGH: Primary | ICD-10-CM

## 2018-10-16 DIAGNOSIS — R10.9 FLANK PAIN: ICD-10-CM

## 2018-10-16 DIAGNOSIS — Z85.528 PERSONAL HISTORY OF RENAL CANCER: ICD-10-CM

## 2018-10-16 DIAGNOSIS — R05.9 COUGH: ICD-10-CM

## 2018-10-16 DIAGNOSIS — J30.9 ALLERGIC RHINITIS, UNSPECIFIED SEASONALITY, UNSPECIFIED TRIGGER: ICD-10-CM

## 2018-10-16 PROCEDURE — 71046 X-RAY EXAM CHEST 2 VIEWS: CPT | Mod: 26,,, | Performed by: RADIOLOGY

## 2018-10-16 PROCEDURE — 3008F BODY MASS INDEX DOCD: CPT | Mod: CPTII,S$GLB,, | Performed by: NURSE PRACTITIONER

## 2018-10-16 PROCEDURE — 71046 X-RAY EXAM CHEST 2 VIEWS: CPT | Mod: TC,PO

## 2018-10-16 PROCEDURE — 99213 OFFICE O/P EST LOW 20 MIN: CPT | Mod: S$GLB,,, | Performed by: NURSE PRACTITIONER

## 2018-10-16 PROCEDURE — 3075F SYST BP GE 130 - 139MM HG: CPT | Mod: CPTII,S$GLB,, | Performed by: NURSE PRACTITIONER

## 2018-10-16 PROCEDURE — 3078F DIAST BP <80 MM HG: CPT | Mod: CPTII,S$GLB,, | Performed by: NURSE PRACTITIONER

## 2018-10-16 PROCEDURE — 99999 PR PBB SHADOW E&M-EST. PATIENT-LVL V: CPT | Mod: PBBFAC,,, | Performed by: NURSE PRACTITIONER

## 2018-10-16 RX ORDER — METOLAZONE 5 MG/1
TABLET ORAL
Refills: 1 | COMMUNITY
Start: 2018-10-15 | End: 2018-12-07

## 2018-10-16 NOTE — PROGRESS NOTES
"Subjective:       Patient ID: Erinn Silva is a 62 y.o. female.    Chief Complaint: Cough  Pt in today for cough. She states that she saw her cardiologist on yesterday and states he informed her that she had "fluid on her lungs." Pt states that she did not have a CXR done and no cough on yesterday. Denies CP, SOB, HA, dizziness, wheezing. Pt states prescribed zaroxolyn by cardiologist on yesterday. Records requested from cardiology. Pt states cough mild, intermittent and non-productive.   Flank Pain   This is a new problem. The current episode started yesterday. The problem occurs intermittently. The problem is unchanged. Pain location: right flank. The quality of the pain is described as aching. The pain is mild. Pertinent negatives include no abdominal pain, bladder incontinence, bowel incontinence, chest pain, dysuria, fever, headaches, leg pain, numbness, paresis, paresthesias, pelvic pain, perianal numbness, tingling, weakness or weight loss. (Coughing) Risk factors include history of cancer. She has tried nothing for the symptoms. The treatment provided no relief.     Past Medical History:   Diagnosis Date    Anticoagulant long-term use     Anxiety     AR (allergic rhinitis)     Arthritis     Asthma, intermittent     Coronary artery disease     Depression     Endometrial polyp     Fatty liver     GERD (gastroesophageal reflux disease)     Hiatal hernia     Hyperlipidemia LDL goal <100     Hypertension     Mild mitral regurgitation     Multiple thyroid nodules 3/27/2014    Obesity (BMI 30-39.9) 3/27/2014    Renal cell carcinoma 2002    removed - no recurrence    S/P primary angioplasty with coronary stent 07/26/2017    Sleep apnea     Using CPAP    Solitary kidney, acquired     right     Social History     Socioeconomic History    Marital status:      Spouse name: Not on file    Number of children: Not on file    Years of education: Not on file    Highest education level: Not " on file   Social Needs    Financial resource strain: Not on file    Food insecurity - worry: Not on file    Food insecurity - inability: Not on file    Transportation needs - medical: Not on file    Transportation needs - non-medical: Not on file   Occupational History    Not on file   Tobacco Use    Smoking status: Never Smoker    Smokeless tobacco: Never Used   Substance and Sexual Activity    Alcohol use: No    Drug use: No    Sexual activity: Not on file   Other Topics Concern    Not on file   Social History Narrative    Not on file     Past Surgical History:   Procedure Laterality Date    APPENDECTOMY      CARDIAC CATHETERIZATION  6/27/14    has blockages, but per patient no stents    CHOLECYSTECTOMY      COLONOSCOPY  2002    negative    COLONOSCOPY  3/25/2014         CORONARY ANGIOPLASTY  2008?    balloon angioplasty    CORONARY ANGIOPLASTY WITH STENT PLACEMENT Right 07/26/2017    EGD (ESOPHAGOGASTRODUODENOSCOPY) N/A 4/17/2014    [facility]    ESOPHAGOGASTRODUODENOSCOPY (EGD) Left 12/11/2017    [facility]    MOTILITY STUDY, ESOPHAGUS, USING HIGH RESOLUTION MANOMETRY N/A 4/17/2014    [facility]    NE[facility]Y Left 2002    [place] [facility]    OVARIAN CYST REMOVAL      POLYPECTOMY      endometrial polyp removal    ROTATOR CUFF REPAIR      L    THYROIDECTOMY Left 7/8/2014    [facility]    THYROIDECTOMY, PARTIAL Left     TUBAL LIGATION      UPPER GASTROINTESTINAL ENDOSCOPY  04/2014       Review of Systems   Constitutional: Negative.  Negative for fever and weight loss.   HENT: Negative.    Eyes: Negative.    Respiratory: Positive for cough.    Cardiovascular: Negative.  Negative for chest pain.   Gastrointestinal: Negative.  Negative for abdominal pain and bowel incontinence.   Endocrine: Negative.    Genitourinary: Positive for  flank pain. Negative for bladder incontinence, dysuria and pelvic pain.   Skin: Negative.    Allergic/Immunologic: Negative.    Neurological: Negative.  Negative for tingling, weakness, numbness, headaches and paresthesias.   Psychiatric/Behavioral: Negative.        Objective:      Physical Exam   Constitutional: She is oriented to person, place, and time. She appears well-developed and well-nourished.   HENT:   Head: Normocephalic.   Right Ear: External ear normal.   Left Ear: External ear normal.   Nose: Nose normal.   Mouth/Throat: Oropharynx is clear and moist.   Eyes: Conjunctivae are normal. Pupils are equal, round, and reactive to light.   Neck: Normal range of motion. Neck supple.   Cardiovascular: Normal rate, regular rhythm and normal heart sounds.   Pulmonary/Chest: Effort normal and breath sounds normal.   Abdominal: Soft. Bowel sounds are normal.   Musculoskeletal: Normal range of motion.   Neurological: She is alert and oriented to person, place, and time.   Skin: Skin is warm and dry. Capillary refill takes 2 to 3 seconds.   Psychiatric: She has a normal mood and affect. Her behavior is normal. Judgment and thought content normal.   Nursing note and vitals reviewed.      Assessment:       1. Cough    2. Allergic rhinitis, unspecified seasonality, unspecified trigger    3. Flank pain    4. Personal history of renal cancer        Plan:           Erinn was seen today for cough.    Diagnoses and all orders for this visit:    Cough  -     X-Ray Chest PA And Lateral; Future  -     D dimer, quantitative; Future    Allergic rhinitis, unspecified seasonality, unspecified trigger  Continue meclizine, singulair, flonase as prescribed    Flank pain  Personal history of renal cancer  -     US Retroperitoneal Complete; Future    Report to ER immediately if symptoms worsen

## 2018-10-17 ENCOUNTER — TELEPHONE (OUTPATIENT)
Dept: RADIOLOGY | Facility: HOSPITAL | Age: 63
End: 2018-10-17

## 2018-10-18 ENCOUNTER — HOSPITAL ENCOUNTER (OUTPATIENT)
Dept: RADIOLOGY | Facility: HOSPITAL | Age: 63
Discharge: HOME | End: 2018-10-18
Attending: NURSE PRACTITIONER
Payer: COMMERCIAL

## 2018-10-18 ENCOUNTER — TELEPHONE (OUTPATIENT)
Dept: FAMILY MEDICINE | Facility: CLINIC | Age: 63
End: 2018-10-18

## 2018-10-18 DIAGNOSIS — R10.9 FLANK PAIN: ICD-10-CM

## 2018-10-18 DIAGNOSIS — R89.9 ABNORMAL LABORATORY TEST: Primary | ICD-10-CM

## 2018-10-18 PROCEDURE — 76770 US EXAM ABDO BACK WALL COMP: CPT | Mod: TC,PO

## 2018-10-18 PROCEDURE — 76770 US EXAM ABDO BACK WALL COMP: CPT | Mod: 26,,, | Performed by: RADIOLOGY

## 2018-10-18 NOTE — TELEPHONE ENCOUNTER
MD TUCKER Juarez. Staff             D-dimer mildly elevated.  Please get CT PE protocol, venous ultrasound both legs ASAP.  My nurse will contact you to arrange.   Thanks,   Dr. Reed

## 2018-10-18 NOTE — TELEPHONE ENCOUNTER
----- Message from Era Wong sent at 10/18/2018  3:03 PM CDT -----  Contact: pt  She is calling in regards to her test results and received a letter stating that she needed to speak with the Dr, please advise [phone]

## 2018-10-19 ENCOUNTER — HOSPITAL ENCOUNTER (OUTPATIENT)
Dept: RADIOLOGY | Facility: HOSPITAL | Age: 63
Discharge: HOME | End: 2018-10-19
Attending: FAMILY MEDICINE
Payer: COMMERCIAL

## 2018-10-19 ENCOUNTER — OFFICE VISIT (OUTPATIENT)
Dept: FAMILY MEDICINE | Facility: CLINIC | Age: 63
End: 2018-10-19
Payer: COMMERCIAL

## 2018-10-19 VITALS
BODY MASS INDEX: 33.86 KG/M2 | DIASTOLIC BLOOD PRESSURE: 56 MMHG | HEART RATE: 81 BPM | HEIGHT: 62 IN | WEIGHT: 184 LBS | TEMPERATURE: 98 F | SYSTOLIC BLOOD PRESSURE: 112 MMHG

## 2018-10-19 DIAGNOSIS — R89.9 ABNORMAL LABORATORY TEST: ICD-10-CM

## 2018-10-19 DIAGNOSIS — Z12.31 ENCOUNTER FOR SCREENING MAMMOGRAM FOR MALIGNANT NEOPLASM OF BREAST: ICD-10-CM

## 2018-10-19 DIAGNOSIS — R91.8 PULMONARY NODULES: Primary | ICD-10-CM

## 2018-10-19 DIAGNOSIS — Z85.528 PERSONAL HISTORY OF RENAL CANCER: ICD-10-CM

## 2018-10-19 PROCEDURE — 90686 IIV4 VACC NO PRSV 0.5 ML IM: CPT | Mod: S$GLB,,, | Performed by: FAMILY MEDICINE

## 2018-10-19 PROCEDURE — 3078F DIAST BP <80 MM HG: CPT | Mod: CPTII,S$GLB,, | Performed by: FAMILY MEDICINE

## 2018-10-19 PROCEDURE — 93970 EXTREMITY STUDY: CPT | Mod: 26,,, | Performed by: RADIOLOGY

## 2018-10-19 PROCEDURE — 99214 OFFICE O/P EST MOD 30 MIN: CPT | Mod: 25,S$GLB,, | Performed by: FAMILY MEDICINE

## 2018-10-19 PROCEDURE — 93970 EXTREMITY STUDY: CPT | Mod: TC,PO

## 2018-10-19 PROCEDURE — 71275 CT ANGIOGRAPHY CHEST: CPT | Mod: 26,,, | Performed by: RADIOLOGY

## 2018-10-19 PROCEDURE — 3008F BODY MASS INDEX DOCD: CPT | Mod: CPTII,S$GLB,, | Performed by: FAMILY MEDICINE

## 2018-10-19 PROCEDURE — 99999 PR PBB SHADOW E&M-EST. PATIENT-LVL IV: CPT | Mod: PBBFAC,,, | Performed by: FAMILY MEDICINE

## 2018-10-19 PROCEDURE — 3074F SYST BP LT 130 MM HG: CPT | Mod: CPTII,S$GLB,, | Performed by: FAMILY MEDICINE

## 2018-10-19 PROCEDURE — 25500020 PHARM REV CODE 255: Mod: PO | Performed by: FAMILY MEDICINE

## 2018-10-19 PROCEDURE — 71275 CT ANGIOGRAPHY CHEST: CPT | Mod: TC,PO

## 2018-10-19 PROCEDURE — 90471 IMMUNIZATION ADMIN: CPT | Mod: S$GLB,,, | Performed by: FAMILY MEDICINE

## 2018-10-19 RX ADMIN — IOHEXOL 100 ML: 350 INJECTION, SOLUTION INTRAVENOUS at 10:10

## 2018-10-19 NOTE — TELEPHONE ENCOUNTER
----- Message from Rosalinda España sent at 10/19/2018  8:42 AM CDT -----  Contact: pt  Please call pt @ [phone], pt have some questions.

## 2018-10-19 NOTE — PROGRESS NOTES
Patient states that she saw her cardiologist recently and they told her she might have some fluid in her chest on auscultation.  She saw the nurse practitioner after this and had a chest x-ray performed as well as some laboratory with a positive D-dimer.  Subsequently performed bilateral venous ultrasound lower extremities which is negative.  CT scan negative for pulmonary embolism though she does have a couple of small nodules for which follow-up is recommended in 6 months.  She is asymptomatic with regards to all of this.  Remote history of kidney cancer.  She did have recent retroperitoneal ultrasound which was unremarkable.    Past Medical History:  Past Medical History:   Diagnosis Date    Anticoagulant long-term use     Anxiety     AR (allergic rhinitis)     Arthritis     Asthma, intermittent     Coronary artery disease     Depression     Endometrial polyp     Fatty liver     GERD (gastroesophageal reflux disease)     Hiatal hernia     Hyperlipidemia LDL goal <100     Hypertension     Mild mitral regurgitation     Multiple thyroid nodules 3/27/2014    Obesity (BMI 30-39.9) 3/27/2014    Renal cell carcinoma 2002    removed - no recurrence    S/P primary angioplasty with coronary stent 07/26/2017    Sleep apnea     Using CPAP    Solitary kidney, acquired     right     Past Surgical History:   Procedure Laterality Date    APPENDECTOMY      CARDIAC CATHETERIZATION  6/27/14    has blockages, but per patient no stents    CHOLECYSTECTOMY      COLONOSCOPY  2002    negative    COLONOSCOPY  3/25/2014         CORONARY ANGIOPLASTY  2008?    balloon angioplasty    CORONARY ANGIOPLASTY WITH STENT PLACEMENT Right 07/26/2017    EGD (ESOPHAGOGASTRODUODENOSCOPY) N/A 4/17/2014    [facility]    ESOPHAGOGASTRODUODENOSCOPY (EGD) Left 12/11/2017    [facility]    MOTILITY STUDY, ESOPHAGUS, USING HIGH RESOLUTION MANOMETRY N/A 4/17/2014     [facility]    NEPHRECTOMY Left 2002    [place] [facility] - [facility] - cancer    OVARIAN CYST REMOVAL      POLYPECTOMY      endometrial polyp removal    ROTATOR CUFF REPAIR      L    THYROIDECTOMY Left 7/8/2014    [facility]    THYROIDECTOMY, PARTIAL Left     TUBAL LIGATION      UPPER GASTROINTESTINAL ENDOSCOPY  04/2014     Social History     Socioeconomic History    Marital status:      Spouse name: Not on file    Number of children: Not on file    Years of education: Not on file    Highest education level: Not on file   Social Needs    Financial resource strain: Not on file    Food insecurity - worry: Not on file    Food insecurity - inability: Not on file    Transportation needs - medical: Not on file    Transportation needs - non-medical: Not on file   Occupational History    Not on file   Tobacco Use    Smoking status: Never Smoker    Smokeless tobacco: Never Used   Substance and Sexual Activity    Alcohol use: No    Drug use: No    Sexual activity: Not on file   Other Topics Concern    Not on file   Social History Narrative    Not on file     Family History   Problem Relation Age of Onset    Heart attack Father 67    Diabetes Paternal Aunt     Allergies Paternal Aunt     Diabetes Paternal Uncle     Allergies Maternal Grandmother     Colon cancer Neg Hx     Stomach cancer Neg Hx     Angioedema Neg Hx     Atopy Neg Hx     Immunodeficiency Neg Hx     Urticaria Neg Hx     Rhinitis Neg Hx     Eczema Neg Hx     Asthma Neg Hx      Review of patient's allergies indicates:   Allergen Reactions    Oxycodone Shortness Of Breath, Nausea And Vomiting and Other (See Comments)     Anxiety.    Plavix [clopidogrel] Diarrhea     Stomach cramps    Azithromycin Itching    Codeine Palpitations    Corticosteroids (glucocorticoids) Palpitations    Doxycycline Itching and Nausea Only    Mobic [meloxicam] Itching     Neuromuscular blockers, steroidal Palpitations    Nickel sutures [surgical stainless steel] Rash     Jewelery    Pcn [penicillins] Nausea And Vomiting    Shellfish containing products Itching     topical iodine OK    Sulfa (sulfonamide antibiotics) Nausea And Vomiting     Current Outpatient Medications on File Prior to Visit   Medication Sig Dispense Refill    acetaminophen (TYLENOL) 325 MG tablet Take 2 tablets (650 mg total) by mouth every 6 (six) hours as needed for Pain.  0    albuterol 90 mcg/actuation inhaler Inhale 2 puffs into the lungs every 6 (six) hours as needed for Wheezing. 18 g 5    aspirin (ECOTRIN) 81 MG EC tablet Take 325 mg by mouth once daily.       citalopram (CELEXA) 20 MG tablet TAKE 1 TABLET BY MOUTH DAILY 90 tablet 3    dicyclomine (BENTYL) 20 mg tablet TK 1 T PO Q 8 H PRN CRAMPING 30 tablet 0    metOLazone (ZAROXOLYN) 5 MG tablet TK 1 T PO 3 TIMES A WEEK  1    metoprolol succinate (TOPROL-XL) 50 MG 24 hr tablet Take 1 tablet (50 mg total) by mouth once daily. (Patient taking differently: Take 25 mg by mouth once daily. ) 30 tablet 11    montelukast (SINGULAIR) 10 mg tablet TAKE 1 TABLET(10 MG) BY MOUTH EVERY EVENING 30 tablet 0    ondansetron (ZOFRAN) 8 MG tablet Take 1 tablet (8 mg total) by mouth every 8 (eight) hours as needed for Nausea. 15 tablet 0    potassium 99 mg Tab Take 1 tablet by mouth.      pravastatin (PRAVACHOL) 40 MG tablet Take 40 mg by mouth every evening.  3    ranitidine (ZANTAC) 150 MG tablet TAKE 1 TABLET(150 MG) BY MOUTH TWICE DAILY 60 tablet 3    valsartan-hydrochlorothiazide (DIOVAN-HCT) 320-25 mg per tablet Take 1 tablet by mouth once daily. 90 tablet 3    azelastine (ASTELIN) 137 mcg (0.1 %) nasal spray 1 spray (137 mcg total) by Nasal route 2 (two) times daily. 30 mL 0    meclizine (ANTIVERT) 25 mg tablet Take 1 tablet (25 mg total) by mouth 2 (two) times daily as needed for Dizziness. 10 tablet 0    triamcinolone acetonide 0.5% (KENALOG)  "0.5 % Crea Apply topically 2 (two) times daily. 30 g 0    [DISCONTINUED] fluticasone (FLONASE) 50 mcg/actuation nasal spray 2 sprays (100 mcg total) by Each Nare route once daily. 16 g 12     Current Facility-Administered Medications on File Prior to Visit   Medication Dose Route Frequency Provider Last Rate Last Dose    [COMPLETED] omnipaque 350 iohexol 100 mL  100 mL Intravenous ONCE PRN Layo Reed MD   100 mL at 10/19/18 1039           ROS:  GENERAL: No fever, chills,  or significant weight changes.   CARDIOVASCULAR: Denies chest pain, PND, orthopnea or reduced exercise tolerance.  ABDOMEN: Appetite fine. Denies diarrhea, abdominal pain, hematemesis or blood in stool.  URINARY: No flank pain, dysuria or hematuria.      OBJECTIVE:     Vitals:    10/19/18 1525   BP: (!) 112/56   Pulse: 81   Temp: 98.4 °F (36.9 °C)   Weight: 83.5 kg (184 lb)   Height: 5' 2" (1.575 m)     Wt Readings from Last 3 Encounters:   10/19/18 83.5 kg (184 lb)   10/16/18 88 kg (194 lb)   10/09/18 88.9 kg (196 lb)     APPEARANCE: Well nourished, well developed, in no acute distress.    HEAD: Normocephalic.  Atraumatic.  No sinus tenderness.  EYES:   Right eye: Pupil reactive.  Conjunctiva clear.    Left eye: Pupil reactive.  Conjunctiva clear.    Both fundi:  Grossly normal to nondilated exam. EOMI.    EARS: TM's intact. Light reflex normal. No retraction or perforation.    NOSE:  clear.  MOUTH & THROAT:  No pharyngeal erythema or exudate. No lesions.  NECK: Supple. No bruits.  No JVD.  No cervical lymphadenopathy.  No thyromegaly.    CHEST: Breath sounds clear bilaterally.  Normal respiratory effort  CARDIOVASCULAR: Normal rate.  Regular rhythm.  No murmurs.  No rub.  No gallops.  ABDOMEN: Bowel sounds normal.  Soft.  No tenderness.  No organomegaly.  PERIPHERAL VASCULAR: No cyanosis.  No clubbing.  No edema.  NEUROLOGIC: No focal findings.  MENTAL STATUS: Alert.  Oriented x 3.        Diagnoses and all orders for this " visit:    Pulmonary nodules    Encounter for screening mammogram for malignant neoplasm of breast  -     Mammo Digital Screening Bilat; Future    Personal history of renal cancer    Solitary kidney    Other orders  -     Influenza - Quadrivalent (3 years & older) (PF)      Plan repeat CT chest noncontrast 6 months.  Reassured no worrisome findings at present.  Follow-up as needed if symptoms

## 2018-10-23 RX ORDER — MONTELUKAST SODIUM 10 MG/1
TABLET ORAL
Qty: 30 TABLET | Refills: 11 | Status: SHIPPED | OUTPATIENT
Start: 2018-10-23 | End: 2018-11-19

## 2018-11-20 ENCOUNTER — HOSPITAL ENCOUNTER (OUTPATIENT)
Dept: RADIOLOGY | Facility: HOSPITAL | Age: 63
Discharge: HOME | End: 2018-11-20
Attending: FAMILY MEDICINE
Payer: COMMERCIAL

## 2018-11-20 VITALS — WEIGHT: 184.06 LBS | BODY MASS INDEX: 33.87 KG/M2 | HEIGHT: 62 IN

## 2018-11-20 DIAGNOSIS — Z12.31 ENCOUNTER FOR SCREENING MAMMOGRAM FOR MALIGNANT NEOPLASM OF BREAST: ICD-10-CM

## 2018-11-20 PROCEDURE — 77067 SCR MAMMO BI INCL CAD: CPT | Mod: TC,PO

## 2018-11-20 PROCEDURE — 77067 SCR MAMMO BI INCL CAD: CPT | Mod: 26,,, | Performed by: RADIOLOGY

## 2018-11-20 PROCEDURE — 77063 BREAST TOMOSYNTHESIS BI: CPT | Mod: TC,PO

## 2018-11-20 PROCEDURE — 77063 BREAST TOMOSYNTHESIS BI: CPT | Mod: 26,,, | Performed by: RADIOLOGY

## 2018-12-07 ENCOUNTER — OFFICE VISIT (OUTPATIENT)
Dept: FAMILY MEDICINE | Facility: CLINIC | Age: 63
End: 2018-12-07
Payer: COMMERCIAL

## 2018-12-07 VITALS
TEMPERATURE: 99 F | DIASTOLIC BLOOD PRESSURE: 57 MMHG | WEIGHT: 195 LBS | HEART RATE: 56 BPM | BODY MASS INDEX: 35.88 KG/M2 | SYSTOLIC BLOOD PRESSURE: 138 MMHG | HEIGHT: 62 IN

## 2018-12-07 DIAGNOSIS — J32.9 SINUSITIS, UNSPECIFIED CHRONICITY, UNSPECIFIED LOCATION: Primary | ICD-10-CM

## 2018-12-07 PROCEDURE — 99213 OFFICE O/P EST LOW 20 MIN: CPT | Mod: S$GLB,,, | Performed by: NURSE PRACTITIONER

## 2018-12-07 PROCEDURE — 3075F SYST BP GE 130 - 139MM HG: CPT | Mod: CPTII,S$GLB,, | Performed by: NURSE PRACTITIONER

## 2018-12-07 PROCEDURE — 3008F BODY MASS INDEX DOCD: CPT | Mod: CPTII,S$GLB,, | Performed by: NURSE PRACTITIONER

## 2018-12-07 PROCEDURE — 3078F DIAST BP <80 MM HG: CPT | Mod: CPTII,S$GLB,, | Performed by: NURSE PRACTITIONER

## 2018-12-07 PROCEDURE — 99999 PR PBB SHADOW E&M-EST. PATIENT-LVL IV: CPT | Mod: PBBFAC,,, | Performed by: NURSE PRACTITIONER

## 2018-12-07 RX ORDER — AMOXICILLIN 875 MG/1
875 TABLET, FILM COATED ORAL EVERY 12 HOURS
Qty: 20 TABLET | Refills: 0 | Status: SHIPPED | OUTPATIENT
Start: 2018-12-07 | End: 2018-12-17

## 2018-12-07 NOTE — PROGRESS NOTES
Subjective:       Patient ID: Erinn Silva is a 62 y.o. female.    Chief Complaint: Sinus Problem; Nasal Congestion; Cough; Ear Fullness; and Sore Throat    Sinus Problem   This is a new problem. The current episode started 1 to 4 weeks ago. The problem has been waxing and waning since onset. There has been no fever. The pain is moderate. Associated symptoms include congestion, headaches, a hoarse voice, sinus pressure and a sore throat. Pertinent negatives include no chills, coughing, diaphoresis, ear pain, neck pain, shortness of breath, sneezing or swollen glands. Past treatments include nothing. The treatment provided no relief.     Past Medical History:   Diagnosis Date    Anticoagulant long-term use     Anxiety     AR (allergic rhinitis)     Arthritis     Asthma, intermittent     Coronary artery disease     Depression     Endometrial polyp     Fatty liver     GERD (gastroesophageal reflux disease)     Hiatal hernia     Hyperlipidemia LDL goal <100     Hypertension     Mild mitral regurgitation     Multiple thyroid nodules 3/27/2014    Obesity (BMI 30-39.9) 3/27/2014    Renal cell carcinoma 2002    removed - no recurrence    S/P primary angioplasty with coronary stent 07/26/2017    Sleep apnea     Using CPAP    Solitary kidney, acquired     right     Social History     Socioeconomic History    Marital status:      Spouse name: Not on file    Number of children: Not on file    Years of education: Not on file    Highest education level: Not on file   Social Needs    Financial resource strain: Not on file    Food insecurity - worry: Not on file    Food insecurity - inability: Not on file    Transportation needs - medical: Not on file    Transportation needs - non-medical: Not on file   Occupational History    Not on file   Tobacco Use    Smoking status: Never Smoker    Smokeless tobacco: Never Used   Substance and Sexual Activity    Alcohol use: No    Drug use: No     Sexual activity: Not on file   Other Topics Concern    Not on file   Social History Narrative    Not on file     Past Surgical History:   Procedure Laterality Date    APPENDECTOMY      CARDIAC CATHETERIZATION  6/27/14    has blockages, but per patient no stents    CHOLECYSTECTOMY      COLONOSCOPY  2002    negative    COLONOSCOPY  3/25/2014         CORONARY ANGIOPLASTY  2008?    balloon angioplasty    CORONARY ANGIOPLASTY WITH STENT PLACEMENT Right 07/26/2017    EGD (ESOPHAGOGASTRODUODENOSCOPY) N/A 4/17/2014    [facility]    ESOPHAGOGASTRODUODENOSCOPY (EGD) Left 12/11/2017    [facility]    MOTILITY STUDY, ESOPHAGUS, USING HIGH RESOLUTION MANOMETRY N/A 4/17/2014    [facility]    NEPHRECTOMY Left 2002    [facility]aela/randrup - cancer    OVARIAN CYST REMOVAL      POLYPECTOMY      endometrial polyp removal    ROTATOR CUFF REPAIR      L    THYROIDECTOMY Left 7/8/2014    [facility]    THYROIDECTOMY, PARTIAL Left     TUBAL LIGATION      UPPER GASTROINTESTINAL ENDOSCOPY  04/2014       Review of Systems   Constitutional: Negative.  Negative for chills and diaphoresis.   HENT: Positive for congestion, hoarse voice, postnasal drip, rhinorrhea, sinus pressure, sinus pain and sore throat. Negative for ear pain and sneezing.    Eyes: Negative.    Respiratory: Negative.  Negative for cough and shortness of breath.    Cardiovascular: Negative.    Gastrointestinal: Negative.    Endocrine: Negative.    Genitourinary: Negative.    Musculoskeletal: Negative.  Negative for neck pain.   Skin: Negative.    Allergic/Immunologic: Negative.    Neurological: Positive for headaches.   Psychiatric/Behavioral: Negative.        Objective:      Physical Exam   Constitutional: She is oriented to person, place, and time. She appears well-developed and well-nourished.   HENT:   Head:  Normocephalic.   Right Ear: Hearing, tympanic membrane, external ear and ear canal normal.   Left Ear: Hearing, tympanic membrane, external ear and ear canal normal.   Nose: Mucosal edema and rhinorrhea present. Right sinus exhibits maxillary sinus tenderness. Right sinus exhibits no frontal sinus tenderness. Left sinus exhibits maxillary sinus tenderness. Left sinus exhibits no frontal sinus tenderness.   Mouth/Throat: Uvula is midline, oropharynx is clear and moist and mucous membranes are normal.   Eyes: Conjunctivae are normal. Pupils are equal, round, and reactive to light.   Neck: Normal range of motion. Neck supple.   Cardiovascular: Normal rate, regular rhythm and normal heart sounds.   Pulmonary/Chest: Effort normal and breath sounds normal.   Abdominal: Soft. Bowel sounds are normal.   Musculoskeletal: Normal range of motion.   Neurological: She is alert and oriented to person, place, and time.   Skin: Skin is warm and dry. Capillary refill takes 2 to 3 seconds.   Psychiatric: She has a normal mood and affect. Her behavior is normal. Judgment and thought content normal.   Nursing note and vitals reviewed.      Assessment:       1. Sinusitis, unspecified chronicity, unspecified location        Plan:           Erinn was seen today for sinus problem, nasal congestion, cough, ear fullness and sore throat.    Diagnoses and all orders for this visit:    Sinusitis, unspecified chronicity, unspecified location  -     amoxicillin (AMOXIL) 875 MG tablet; Take 1 tablet (875 mg total) by mouth every 12 (twelve) hours. for 10 days  Zyrtec, flonase as directed  Report to ER immediately if symptoms worsen

## 2018-12-27 ENCOUNTER — PATIENT MESSAGE (OUTPATIENT)
Dept: FAMILY MEDICINE | Facility: CLINIC | Age: 63
End: 2018-12-27

## 2018-12-27 DIAGNOSIS — E78.5 HYPERLIPIDEMIA LDL GOAL <100: Primary | ICD-10-CM

## 2018-12-27 RX ORDER — PRAVASTATIN SODIUM 40 MG/1
40 TABLET ORAL NIGHTLY
Qty: 90 TABLET | Refills: 1 | Status: SHIPPED | OUTPATIENT
Start: 2018-12-27 | End: 2019-03-26

## 2018-12-27 RX ORDER — PRAVASTATIN SODIUM 40 MG/1
TABLET ORAL
Qty: 90 TABLET | Refills: 1 | Status: SHIPPED | OUTPATIENT
Start: 2018-12-27 | End: 2019-07-10 | Stop reason: SDUPTHER

## 2019-01-11 ENCOUNTER — OFFICE VISIT (OUTPATIENT)
Dept: FAMILY MEDICINE | Facility: CLINIC | Age: 64
End: 2019-01-11
Payer: COMMERCIAL

## 2019-01-11 VITALS
TEMPERATURE: 99 F | HEART RATE: 66 BPM | SYSTOLIC BLOOD PRESSURE: 125 MMHG | WEIGHT: 196 LBS | HEIGHT: 62 IN | DIASTOLIC BLOOD PRESSURE: 59 MMHG | BODY MASS INDEX: 36.07 KG/M2

## 2019-01-11 DIAGNOSIS — H60.502 ACUTE OTITIS EXTERNA OF LEFT EAR, UNSPECIFIED TYPE: Primary | ICD-10-CM

## 2019-01-11 PROCEDURE — 99999 PR PBB SHADOW E&M-EST. PATIENT-LVL IV: CPT | Mod: PBBFAC,,, | Performed by: NURSE PRACTITIONER

## 2019-01-11 PROCEDURE — 3008F PR BODY MASS INDEX (BMI) DOCUMENTED: ICD-10-PCS | Mod: CPTII,S$GLB,, | Performed by: NURSE PRACTITIONER

## 2019-01-11 PROCEDURE — 3078F PR MOST RECENT DIASTOLIC BLOOD PRESSURE < 80 MM HG: ICD-10-PCS | Mod: CPTII,S$GLB,, | Performed by: NURSE PRACTITIONER

## 2019-01-11 PROCEDURE — 3008F BODY MASS INDEX DOCD: CPT | Mod: CPTII,S$GLB,, | Performed by: NURSE PRACTITIONER

## 2019-01-11 PROCEDURE — 99213 OFFICE O/P EST LOW 20 MIN: CPT | Mod: S$GLB,,, | Performed by: NURSE PRACTITIONER

## 2019-01-11 PROCEDURE — 99213 PR OFFICE/OUTPT VISIT, EST, LEVL III, 20-29 MIN: ICD-10-PCS | Mod: S$GLB,,, | Performed by: NURSE PRACTITIONER

## 2019-01-11 PROCEDURE — 3078F DIAST BP <80 MM HG: CPT | Mod: CPTII,S$GLB,, | Performed by: NURSE PRACTITIONER

## 2019-01-11 PROCEDURE — 99999 PR PBB SHADOW E&M-EST. PATIENT-LVL IV: ICD-10-PCS | Mod: PBBFAC,,, | Performed by: NURSE PRACTITIONER

## 2019-01-11 PROCEDURE — 3074F PR MOST RECENT SYSTOLIC BLOOD PRESSURE < 130 MM HG: ICD-10-PCS | Mod: CPTII,S$GLB,, | Performed by: NURSE PRACTITIONER

## 2019-01-11 PROCEDURE — 3074F SYST BP LT 130 MM HG: CPT | Mod: CPTII,S$GLB,, | Performed by: NURSE PRACTITIONER

## 2019-01-11 RX ORDER — OFLOXACIN 3 MG/ML
10 SOLUTION AURICULAR (OTIC) DAILY
Qty: 5 ML | Refills: 0 | Status: SHIPPED | OUTPATIENT
Start: 2019-01-11 | End: 2019-01-18

## 2019-01-11 NOTE — PROGRESS NOTES
Subjective:       Patient ID: Erinn Silva is a 63 y.o. female.    Chief Complaint: Otalgia (left ear )    Otalgia    There is pain in the left ear. This is a new problem. The current episode started yesterday. The problem occurs constantly. The problem has been unchanged. There has been no fever. The pain is moderate. Pertinent negatives include no abdominal pain, coughing, diarrhea, ear discharge, headaches, hearing loss, neck pain, rash, rhinorrhea, sore throat or vomiting. She has tried nothing for the symptoms. The treatment provided no relief. There is no history of a chronic ear infection, hearing loss or a tympanostomy tube.     Past Medical History:   Diagnosis Date    Anticoagulant long-term use     Anxiety     AR (allergic rhinitis)     Arthritis     Asthma, intermittent     Coronary artery disease     Depression     Endometrial polyp     Fatty liver     GERD (gastroesophageal reflux disease)     Hiatal hernia     Hyperlipidemia LDL goal <100     Hypertension     Mild mitral regurgitation     Multiple thyroid nodules 3/27/2014    Obesity (BMI 30-39.9) 3/27/2014    Renal cell carcinoma 2002    removed - no recurrence    S/P primary angioplasty with coronary stent 07/26/2017    Sleep apnea     Using CPAP    Solitary kidney, acquired     right     Social History     Socioeconomic History    Marital status:      Spouse name: Not on file    Number of children: Not on file    Years of education: Not on file    Highest education level: Not on file   Social Needs    Financial resource strain: Not on file    Food insecurity - worry: Not on file    Food insecurity - inability: Not on file    Transportation needs - medical: Not on file    Transportation needs - non-medical: Not on file   Occupational History    Not on file   Tobacco Use    Smoking status: Never Smoker    Smokeless tobacco: Never Used   Substance and Sexual Activity    Alcohol use: No    Drug use: No     Sexual activity: Not on file   Other Topics Concern    Not on file   Social History Narrative    Not on file     Past Surgical History:   Procedure Laterality Date    APPENDECTOMY      CARDIAC CATHETERIZATION  6/27/14    has blockages, but per patient no stents    CHOLECYSTECTOMY      COLONOSCOPY  2002    negative    COLONOSCOPY  3/25/2014         CORONARY ANGIOPLASTY  2008?    balloon angioplasty    CORONARY ANGIOPLASTY WITH STENT PLACEMENT Right 07/26/2017    EGD (ESOPHAGOGASTRODUODENOSCOPY) N/A 4/17/2014    [facility]    ESOPHAGOGASTRODUODENOSCOPY (EGD) Left 12/11/2017    [facility]    MOTILITY STUDY, ESOPHAGUS, USING HIGH RESOLUTION MANOMETRY N/A 4/17/2014    [facility]    NEPHRECTOMY Left 2002    [facility] mikaela/randrup - cancer    OVARIAN CYST REMOVAL      POLYPECTOMY      endometrial polyp removal    ROTATOR CUFF REPAIR      L    THYROIDECTOMY Left 7/8/2014    [facility]    THYROIDECTOMY, PARTIAL Left     TUBAL LIGATION      UPPER GASTROINTESTINAL ENDOSCOPY  04/2014       Review of Systems   Constitutional: Negative.    HENT: Positive for ear pain. Negative for ear discharge, hearing loss, rhinorrhea and sore throat.    Eyes: Negative.    Respiratory: Negative.  Negative for cough.    Cardiovascular: Negative.    Gastrointestinal: Negative.  Negative for abdominal pain, diarrhea and vomiting.   Endocrine: Negative.    Genitourinary: Negative.    Musculoskeletal: Negative.  Negative for neck pain.   Skin: Negative.  Negative for rash.   Allergic/Immunologic: Negative.    Neurological: Negative.  Negative for headaches.   Psychiatric/Behavioral: Negative.        Objective:      Physical Exam   Constitutional: She is oriented to person, place, and time. She appears well-developed and well-nourished.   HENT:   Head: Normocephalic.   Right Ear: Hearing,  tympanic membrane, external ear and ear canal normal.   Left Ear: Hearing, tympanic membrane and external ear normal.   Nose: Nose normal.   Mouth/Throat: Uvula is midline, oropharynx is clear and moist and mucous membranes are normal.   Moderate swelling to left ear canal   Eyes: Conjunctivae are normal. Pupils are equal, round, and reactive to light.   Neck: Normal range of motion. Neck supple.   Cardiovascular: Normal rate, regular rhythm and normal heart sounds.   Pulmonary/Chest: Effort normal and breath sounds normal.   Abdominal: Soft. Bowel sounds are normal.   Musculoskeletal: Normal range of motion.   Neurological: She is alert and oriented to person, place, and time.   Skin: Skin is warm and dry. Capillary refill takes 2 to 3 seconds.   Psychiatric: She has a normal mood and affect. Her behavior is normal. Judgment and thought content normal.   Nursing note and vitals reviewed.      Assessment:       1. Acute otitis externa of left ear, unspecified type        Plan:           Erinn was seen today for otalgia.    Diagnoses and all orders for this visit:    Acute otitis externa of left ear, unspecified type  -     ofloxacin (FLOXIN) 0.3 % otic solution; Place 10 drops into the left ear once daily. for 7 days  Report to ER immediately if symptoms worsen

## 2019-03-26 ENCOUNTER — OFFICE VISIT (OUTPATIENT)
Dept: FAMILY MEDICINE | Facility: CLINIC | Age: 64
End: 2019-03-26
Payer: COMMERCIAL

## 2019-03-26 VITALS
SYSTOLIC BLOOD PRESSURE: 127 MMHG | BODY MASS INDEX: 36.15 KG/M2 | WEIGHT: 196.44 LBS | TEMPERATURE: 98 F | HEIGHT: 62 IN | HEART RATE: 60 BPM | DIASTOLIC BLOOD PRESSURE: 65 MMHG

## 2019-03-26 DIAGNOSIS — J32.9 SINUSITIS, UNSPECIFIED CHRONICITY, UNSPECIFIED LOCATION: Primary | ICD-10-CM

## 2019-03-26 PROCEDURE — 3074F PR MOST RECENT SYSTOLIC BLOOD PRESSURE < 130 MM HG: ICD-10-PCS | Mod: CPTII,S$GLB,, | Performed by: NURSE PRACTITIONER

## 2019-03-26 PROCEDURE — 99999 PR PBB SHADOW E&M-EST. PATIENT-LVL III: ICD-10-PCS | Mod: PBBFAC,,, | Performed by: NURSE PRACTITIONER

## 2019-03-26 PROCEDURE — 99213 PR OFFICE/OUTPT VISIT, EST, LEVL III, 20-29 MIN: ICD-10-PCS | Mod: S$GLB,,, | Performed by: NURSE PRACTITIONER

## 2019-03-26 PROCEDURE — 3074F SYST BP LT 130 MM HG: CPT | Mod: CPTII,S$GLB,, | Performed by: NURSE PRACTITIONER

## 2019-03-26 PROCEDURE — 3078F DIAST BP <80 MM HG: CPT | Mod: CPTII,S$GLB,, | Performed by: NURSE PRACTITIONER

## 2019-03-26 PROCEDURE — 99999 PR PBB SHADOW E&M-EST. PATIENT-LVL III: CPT | Mod: PBBFAC,,, | Performed by: NURSE PRACTITIONER

## 2019-03-26 PROCEDURE — 3078F PR MOST RECENT DIASTOLIC BLOOD PRESSURE < 80 MM HG: ICD-10-PCS | Mod: CPTII,S$GLB,, | Performed by: NURSE PRACTITIONER

## 2019-03-26 PROCEDURE — 99213 OFFICE O/P EST LOW 20 MIN: CPT | Mod: S$GLB,,, | Performed by: NURSE PRACTITIONER

## 2019-03-26 PROCEDURE — 3008F BODY MASS INDEX DOCD: CPT | Mod: CPTII,S$GLB,, | Performed by: NURSE PRACTITIONER

## 2019-03-26 PROCEDURE — 3008F PR BODY MASS INDEX (BMI) DOCUMENTED: ICD-10-PCS | Mod: CPTII,S$GLB,, | Performed by: NURSE PRACTITIONER

## 2019-03-26 RX ORDER — ONDANSETRON 4 MG/1
4 TABLET, ORALLY DISINTEGRATING ORAL EVERY 6 HOURS PRN
Qty: 20 TABLET | Refills: 0 | Status: SHIPPED | OUTPATIENT
Start: 2019-03-26 | End: 2020-03-03

## 2019-03-26 RX ORDER — FLUTICASONE PROPIONATE 50 MCG
2 SPRAY, SUSPENSION (ML) NASAL DAILY
Qty: 16 G | Refills: 0 | Status: SHIPPED | OUTPATIENT
Start: 2019-03-26 | End: 2019-04-23 | Stop reason: SDUPTHER

## 2019-03-26 RX ORDER — BENZONATATE 200 MG/1
200 CAPSULE ORAL 3 TIMES DAILY PRN
Qty: 30 CAPSULE | Refills: 0 | Status: SHIPPED | OUTPATIENT
Start: 2019-03-26 | End: 2019-04-05

## 2019-03-26 NOTE — PROGRESS NOTES
Subjective:       Patient ID: Erinn Silva is a 63 y.o. female.    Chief Complaint: Sinus Problem    Sinus Problem   This is a new problem. The current episode started in the past 7 days. The problem has been gradually worsening since onset. There has been no fever. The pain is mild. Associated symptoms include congestion, coughing and sinus pressure. Pertinent negatives include no ear pain, headaches, shortness of breath or sore throat. (Nausea) Past treatments include nothing.       Review of Systems   Constitutional: Negative for fatigue, fever and unexpected weight change.   HENT: Positive for congestion and sinus pressure. Negative for ear pain and sore throat.    Eyes: Negative for pain and visual disturbance.   Respiratory: Positive for cough. Negative for shortness of breath.    Cardiovascular: Negative for chest pain and palpitations.   Gastrointestinal: Positive for nausea. Negative for abdominal pain, diarrhea and vomiting.   Musculoskeletal: Negative for arthralgias and myalgias.   Skin: Negative for color change and rash.   Neurological: Negative for dizziness and headaches.   Psychiatric/Behavioral: Negative for dysphoric mood and sleep disturbance. The patient is not nervous/anxious.        Vitals:    03/26/19 1350   BP: 127/65   Pulse: 60   Temp: 98.1 °F (36.7 °C)       Objective:     Current Outpatient Medications   Medication Sig Dispense Refill    acetaminophen (TYLENOL) 325 MG tablet Take 2 tablets (650 mg total) by mouth every 6 (six) hours as needed for Pain.  0    albuterol 90 mcg/actuation inhaler Inhale 2 puffs into the lungs every 6 (six) hours as needed for Wheezing. 18 g 5    aspirin (ECOTRIN) 81 MG EC tablet Take 325 mg by mouth once daily.       citalopram (CELEXA) 20 MG tablet TAKE 1 TABLET BY MOUTH DAILY 90 tablet 3    metoprolol succinate (TOPROL-XL) 50 MG 24 hr tablet Take 1 tablet (50 mg total) by mouth once daily. (Patient taking differently: Take 25 mg by mouth once  daily. ) 30 tablet 11    pravastatin (PRAVACHOL) 40 MG tablet TAKE 1 TABLET(40 MG) BY MOUTH EVERY DAY 90 tablet 1    ranitidine (ZANTAC) 150 MG tablet TAKE 1 TABLET(150 MG) BY MOUTH TWICE DAILY 60 tablet 3    triamcinolone acetonide 0.5% (KENALOG) 0.5 % Crea Apply topically 2 (two) times daily. 30 g 0    valsartan-hydrochlorothiazide (DIOVAN-HCT) 320-25 mg per tablet Take 1 tablet by mouth once daily. 90 tablet 3    benzonatate (TESSALON) 200 MG capsule Take 1 capsule (200 mg total) by mouth 3 (three) times daily as needed for Cough. 30 capsule 0    fluticasone (FLONASE) 50 mcg/actuation nasal spray 2 sprays (100 mcg total) by Each Nare route once daily. 16 g 0    ondansetron (ZOFRAN-ODT) 4 MG TbDL Take 1 tablet (4 mg total) by mouth every 6 (six) hours as needed. 20 tablet 0     No current facility-administered medications for this visit.        Physical Exam   Constitutional: She is oriented to person, place, and time. She appears well-developed and well-nourished. No distress.   HENT:   Head: Normocephalic and atraumatic.   Right Ear: Tympanic membrane normal.   Left Ear: Tympanic membrane normal.   Nose: Mucosal edema and rhinorrhea present.   Mouth/Throat: Posterior oropharyngeal edema present.   Eyes: Pupils are equal, round, and reactive to light. EOM are normal.   Neck: Normal range of motion. Neck supple.   Cardiovascular: Normal rate and regular rhythm.   Pulmonary/Chest: Effort normal and breath sounds normal.   Dry cough   Musculoskeletal: Normal range of motion.   Neurological: She is alert and oriented to person, place, and time.   Skin: Skin is warm and dry. No rash noted.   Psychiatric: She has a normal mood and affect. Judgment normal.   Nursing note and vitals reviewed.      Assessment:       1. Sinusitis, unspecified chronicity, unspecified location        Plan:   Sinusitis, unspecified chronicity, unspecified location    Other orders  -     fluticasone (FLONASE) 50 mcg/actuation nasal  spray; 2 sprays (100 mcg total) by Each Nare route once daily.  Dispense: 16 g; Refill: 0  -     benzonatate (TESSALON) 200 MG capsule; Take 1 capsule (200 mg total) by mouth 3 (three) times daily as needed for Cough.  Dispense: 30 capsule; Refill: 0  -     ondansetron (ZOFRAN-ODT) 4 MG TbDL; Take 1 tablet (4 mg total) by mouth every 6 (six) hours as needed.  Dispense: 20 tablet; Refill: 0        No follow-ups on file.    There are no Patient Instructions on file for this visit.

## 2019-04-23 RX ORDER — FLUTICASONE PROPIONATE 50 MCG
SPRAY, SUSPENSION (ML) NASAL
Qty: 16 ML | Refills: 2 | Status: SHIPPED | OUTPATIENT
Start: 2019-04-23 | End: 2019-11-13

## 2019-05-05 RX ORDER — VALSARTAN AND HYDROCHLOROTHIAZIDE 320; 25 MG/1; MG/1
TABLET, FILM COATED ORAL
Qty: 90 TABLET | Refills: 0 | Status: SHIPPED | OUTPATIENT
Start: 2019-05-05 | End: 2019-05-06 | Stop reason: SDUPTHER

## 2019-05-06 RX ORDER — VALSARTAN AND HYDROCHLOROTHIAZIDE 320; 25 MG/1; MG/1
1 TABLET, FILM COATED ORAL DAILY
Qty: 90 TABLET | Refills: 1 | Status: SHIPPED | OUTPATIENT
Start: 2019-05-06 | End: 2019-11-13 | Stop reason: SDUPTHER

## 2019-05-06 NOTE — TELEPHONE ENCOUNTER
----- Message from Toñito Maya sent at 5/6/2019 12:55 PM CDT -----  Contact: [phone]  ..Type:  RX Refill Request    Who Called: Erinn  Refill or New Rx:refill  RX Name and Strength: Valsartan 320 mg   How is the patient currently taking it? (ex. 1XDay) daily  Is this a 30 day or 90 day RX:90 day   Preferred Pharmacy with phone number:..  [facility] [address] [place] [address] [facility]  [address] [facility] MERARY [address]LAHERTY  [facility]  [place] [address]  Phone: [phone] Fax: [phone]      Local or Mail Order:local  Ordering Provider:Dr Reed   Would the patient rather a call back or a response via [facility]? Call back  Best Call Back Number:[phone]  Additional Information:

## 2019-05-27 ENCOUNTER — LAB VISIT (OUTPATIENT)
Dept: LAB | Facility: HOSPITAL | Age: 64
End: 2019-05-27
Attending: FAMILY MEDICINE
Payer: COMMERCIAL

## 2019-05-27 ENCOUNTER — OFFICE VISIT (OUTPATIENT)
Dept: FAMILY MEDICINE | Facility: CLINIC | Age: 64
End: 2019-05-27
Payer: COMMERCIAL

## 2019-05-27 VITALS
WEIGHT: 194.19 LBS | DIASTOLIC BLOOD PRESSURE: 70 MMHG | BODY MASS INDEX: 35.73 KG/M2 | TEMPERATURE: 98 F | HEIGHT: 62 IN | HEART RATE: 54 BPM | SYSTOLIC BLOOD PRESSURE: 154 MMHG

## 2019-05-27 DIAGNOSIS — J32.9 SINUSITIS, UNSPECIFIED CHRONICITY, UNSPECIFIED LOCATION: Primary | ICD-10-CM

## 2019-05-27 DIAGNOSIS — H92.01 OTALGIA, RIGHT: ICD-10-CM

## 2019-05-27 DIAGNOSIS — E78.5 HYPERLIPIDEMIA, UNSPECIFIED HYPERLIPIDEMIA TYPE: ICD-10-CM

## 2019-05-27 DIAGNOSIS — Z76.0 MEDICATION REFILL: ICD-10-CM

## 2019-05-27 LAB
ALT SERPL W/O P-5'-P-CCNC: 22 U/L (ref 10–44)
CHOLEST SERPL-MCNC: 192 MG/DL (ref 120–199)
CHOLEST/HDLC SERPL: 3.9 {RATIO} (ref 2–5)
HDLC SERPL-MCNC: 49 MG/DL (ref 40–75)
HDLC SERPL: 25.5 % (ref 20–50)
LDLC SERPL CALC-MCNC: 111.8 MG/DL (ref 63–159)
NONHDLC SERPL-MCNC: 143 MG/DL
TRIGL SERPL-MCNC: 156 MG/DL (ref 30–150)

## 2019-05-27 PROCEDURE — 3077F PR MOST RECENT SYSTOLIC BLOOD PRESSURE >= 140 MM HG: ICD-10-PCS | Mod: CPTII,S$GLB,, | Performed by: NURSE PRACTITIONER

## 2019-05-27 PROCEDURE — 99213 PR OFFICE/OUTPT VISIT, EST, LEVL III, 20-29 MIN: ICD-10-PCS | Mod: S$GLB,,, | Performed by: NURSE PRACTITIONER

## 2019-05-27 PROCEDURE — 99213 OFFICE O/P EST LOW 20 MIN: CPT | Mod: S$GLB,,, | Performed by: NURSE PRACTITIONER

## 2019-05-27 PROCEDURE — 99999 PR PBB SHADOW E&M-EST. PATIENT-LVL IV: CPT | Mod: PBBFAC,,, | Performed by: NURSE PRACTITIONER

## 2019-05-27 PROCEDURE — 84460 ALANINE AMINO (ALT) (SGPT): CPT

## 2019-05-27 PROCEDURE — 3078F PR MOST RECENT DIASTOLIC BLOOD PRESSURE < 80 MM HG: ICD-10-PCS | Mod: CPTII,S$GLB,, | Performed by: NURSE PRACTITIONER

## 2019-05-27 PROCEDURE — 80061 LIPID PANEL: CPT

## 2019-05-27 PROCEDURE — 3008F PR BODY MASS INDEX (BMI) DOCUMENTED: ICD-10-PCS | Mod: CPTII,S$GLB,, | Performed by: NURSE PRACTITIONER

## 2019-05-27 PROCEDURE — 3077F SYST BP >= 140 MM HG: CPT | Mod: CPTII,S$GLB,, | Performed by: NURSE PRACTITIONER

## 2019-05-27 PROCEDURE — 3078F DIAST BP <80 MM HG: CPT | Mod: CPTII,S$GLB,, | Performed by: NURSE PRACTITIONER

## 2019-05-27 PROCEDURE — 99999 PR PBB SHADOW E&M-EST. PATIENT-LVL IV: ICD-10-PCS | Mod: PBBFAC,,, | Performed by: NURSE PRACTITIONER

## 2019-05-27 PROCEDURE — 3008F BODY MASS INDEX DOCD: CPT | Mod: CPTII,S$GLB,, | Performed by: NURSE PRACTITIONER

## 2019-05-27 PROCEDURE — 36415 COLL VENOUS BLD VENIPUNCTURE: CPT | Mod: PO

## 2019-05-27 RX ORDER — AMOXICILLIN 875 MG/1
875 TABLET, FILM COATED ORAL EVERY 12 HOURS
Qty: 20 TABLET | Refills: 0 | Status: SHIPPED | OUTPATIENT
Start: 2019-05-27 | End: 2019-06-06

## 2019-05-27 NOTE — PROGRESS NOTES
Subjective:       Patient ID: Erinn Silva is a 63 y.o. female.    Chief Complaint: Sinus Problem (off and on for about 1 month); Sinusitis; Sore Throat; and Otalgia (right ear)    Sinus Problem   This is a new problem. The current episode started 1 to 4 weeks ago. The problem is unchanged. There has been no fever. The pain is moderate. Associated symptoms include congestion, ear pain, headaches, sinus pressure and a sore throat. Pertinent negatives include no chills, coughing, diaphoresis, hoarse voice, neck pain, shortness of breath, sneezing or swollen glands. Past treatments include nothing. The treatment provided no relief.     Past Medical History:   Diagnosis Date    Anticoagulant long-term use     Anxiety     AR (allergic rhinitis)     Arthritis     Asthma, intermittent     Coronary artery disease     Depression     Endometrial polyp     Fatty liver     GERD (gastroesophageal reflux disease)     Hiatal hernia     Hyperlipidemia LDL goal <100     Hypertension     Mild mitral regurgitation     Multiple thyroid nodules 3/27/2014    Obesity (BMI 30-39.9) 3/27/2014    Renal cell carcinoma 2002    removed - no recurrence    S/P primary angioplasty with coronary stent 07/26/2017    Sleep apnea     Using CPAP    Solitary kidney, acquired     right     Social History     Socioeconomic History    Marital status:      Spouse name: Not on file    Number of children: Not on file    Years of education: Not on file    Highest education level: Not on file   Occupational History    Not on file   Social Needs    Financial resource strain: Not on file    Food insecurity:     Worry: Not on file     Inability: Not on file    Transportation needs:     Medical: Not on file     Non-medical: Not on file   Tobacco Use    Smoking status: Never Smoker    Smokeless tobacco: Never Used   Substance and Sexual Activity    Alcohol use: No    Drug use: No    Sexual activity: Not on file   Lifestyle     Physical activity:     Days per week: Not on file     Minutes per session: Not on file    Stress: Not on file   Relationships    Social connections:     Talks on phone: Not on file     Gets together: Not on file     Attends [employer] service: Not on file     Active member of club or organization: Not on file     Attends meetings of clubs or organizations: Not on file     Relationship status: Not on file   Other Topics Concern    Not on file   Social History Narrative    Not on file     Past Surgical History:   Procedure Laterality Date    APPENDECTOMY      CARDIAC CATHETERIZATION  6/27/14    has blockages, but per patient no stents    CHOLECYSTECTOMY      COLONOSCOPY  2002    negative    COLONOSCOPY  3/25/2014         CORONARY ANGIOPLASTY  2008?    balloon angioplasty    CORONARY ANGIOPLASTY WITH STENT PLACEMENT Right 07/26/2017    EGD (ESOPHAGOGASTRODUODENOSCOPY) N/A 4/17/2014    [facility]    ESOPHAGOGASTRODUODENOSCOPY (EGD) Left 12/11/2017    [facility] END[facility]    MOTILITY STUDY, ESOPHAGUS, USING HIGH RESOLUTION MANOMETRY N/A 4/17/2014    [facility] END[facility]    NEPHRECTOMY Left 2002    [facility] - mikaela/randrup - cancer    OVARIAN CYST REMOVAL      POLYPECTOMY      endometrial polyp removal    ROTATOR CUFF REPAIR      L    THYROIDECTOMY Left 7/8/2014    [facility]    THYROIDECTOMY, PARTIAL Left     TUBAL LIGATION      UPPER GASTROINTESTINAL ENDOSCOPY  04/2014       Review of Systems   Constitutional: Negative.  Negative for chills and diaphoresis.   HENT: Positive for congestion, ear pain, postnasal drip, rhinorrhea, sinus pressure, sinus pain and sore throat. Negative for hoarse voice and sneezing.    Eyes: Negative.    Respiratory: Negative.  Negative for cough and shortness of breath.    Cardiovascular: Negative.    Gastrointestinal: Negative.    Endocrine:  Negative.    Genitourinary: Negative.    Musculoskeletal: Negative.  Negative for neck pain.   Skin: Negative.    Allergic/Immunologic: Negative.    Neurological: Positive for headaches.   Psychiatric/Behavioral: Negative.        Objective:      Physical Exam   Constitutional: She is oriented to person, place, and time. She appears well-developed and well-nourished.   HENT:   Head: Normocephalic.   Right Ear: Hearing, tympanic membrane, external ear and ear canal normal.   Left Ear: Hearing, tympanic membrane, external ear and ear canal normal.   Nose: Mucosal edema and rhinorrhea present. Right sinus exhibits maxillary sinus tenderness. Right sinus exhibits no frontal sinus tenderness. Left sinus exhibits maxillary sinus tenderness. Left sinus exhibits no frontal sinus tenderness.   Mouth/Throat: Uvula is midline, oropharynx is clear and moist and mucous membranes are normal.   Eyes: Pupils are equal, round, and reactive to light. Conjunctivae are normal.   Neck: Normal range of motion. Neck supple.   Cardiovascular: Normal rate, regular rhythm and normal heart sounds.   Pulmonary/Chest: Effort normal and breath sounds normal.   Abdominal: Soft. Bowel sounds are normal.   Musculoskeletal: Normal range of motion.   Neurological: She is alert and oriented to person, place, and time.   Skin: Skin is warm and dry. Capillary refill takes 2 to 3 seconds.   Psychiatric: She has a normal mood and affect. Her behavior is normal. Judgment and thought content normal.   Nursing note and vitals reviewed.      Assessment:       1. Sinusitis, unspecified chronicity, unspecified location    2. Otalgia, right    3. Medication refill        Plan:         Erinn was seen today for sinus problem, sinusitis, sore throat and otalgia.    Diagnoses and all orders for this visit:    Sinusitis, unspecified chronicity, unspecified location  Otalgia, right        -     amoxicillin (AMOXIL) 875 MG tablet; Take 1 tablet (875 mg total) by mouth  every 12 (twelve) hours. for 10 days  Saline nasal spray OTC as directed  Report to ER immediately if symptoms worsen    Medication refill  -     ranitidine (ZANTAC) 150 MG tablet; TAKE 1 TABLET(150 MG) BY MOUTH TWICE DAILY

## 2019-05-31 ENCOUNTER — PATIENT OUTREACH (OUTPATIENT)
Dept: ADMINISTRATIVE | Facility: HOSPITAL | Age: 64
End: 2019-05-31

## 2019-05-31 ENCOUNTER — OFFICE VISIT (OUTPATIENT)
Dept: FAMILY MEDICINE | Facility: CLINIC | Age: 64
End: 2019-05-31
Payer: COMMERCIAL

## 2019-05-31 VITALS
SYSTOLIC BLOOD PRESSURE: 152 MMHG | DIASTOLIC BLOOD PRESSURE: 73 MMHG | BODY MASS INDEX: 35.7 KG/M2 | HEIGHT: 62 IN | HEART RATE: 69 BPM | TEMPERATURE: 98 F | WEIGHT: 194 LBS

## 2019-05-31 DIAGNOSIS — F33.41 RECURRENT MAJOR DEPRESSIVE DISORDER, IN PARTIAL REMISSION: ICD-10-CM

## 2019-05-31 DIAGNOSIS — R00.2 PALPITATIONS: ICD-10-CM

## 2019-05-31 DIAGNOSIS — I25.10 CORONARY ARTERY DISEASE INVOLVING NATIVE HEART WITHOUT ANGINA PECTORIS, UNSPECIFIED VESSEL OR LESION TYPE: ICD-10-CM

## 2019-05-31 DIAGNOSIS — R91.8 PULMONARY NODULES: ICD-10-CM

## 2019-05-31 DIAGNOSIS — I10 ESSENTIAL HYPERTENSION: Primary | ICD-10-CM

## 2019-05-31 DIAGNOSIS — E66.01 SEVERE OBESITY (BMI 35.0-35.9 WITH COMORBIDITY): ICD-10-CM

## 2019-05-31 PROCEDURE — 99214 OFFICE O/P EST MOD 30 MIN: CPT | Mod: S$GLB,,, | Performed by: FAMILY MEDICINE

## 2019-05-31 PROCEDURE — 99999 PR PBB SHADOW E&M-EST. PATIENT-LVL III: CPT | Mod: PBBFAC,,, | Performed by: FAMILY MEDICINE

## 2019-05-31 PROCEDURE — 3008F PR BODY MASS INDEX (BMI) DOCUMENTED: ICD-10-PCS | Mod: CPTII,S$GLB,, | Performed by: FAMILY MEDICINE

## 2019-05-31 PROCEDURE — 3078F PR MOST RECENT DIASTOLIC BLOOD PRESSURE < 80 MM HG: ICD-10-PCS | Mod: CPTII,S$GLB,, | Performed by: FAMILY MEDICINE

## 2019-05-31 PROCEDURE — 3078F DIAST BP <80 MM HG: CPT | Mod: CPTII,S$GLB,, | Performed by: FAMILY MEDICINE

## 2019-05-31 PROCEDURE — 3008F BODY MASS INDEX DOCD: CPT | Mod: CPTII,S$GLB,, | Performed by: FAMILY MEDICINE

## 2019-05-31 PROCEDURE — 3077F SYST BP >= 140 MM HG: CPT | Mod: CPTII,S$GLB,, | Performed by: FAMILY MEDICINE

## 2019-05-31 PROCEDURE — 99999 PR PBB SHADOW E&M-EST. PATIENT-LVL III: ICD-10-PCS | Mod: PBBFAC,,, | Performed by: FAMILY MEDICINE

## 2019-05-31 PROCEDURE — 99214 PR OFFICE/OUTPT VISIT, EST, LEVL IV, 30-39 MIN: ICD-10-PCS | Mod: S$GLB,,, | Performed by: FAMILY MEDICINE

## 2019-05-31 PROCEDURE — 3077F PR MOST RECENT SYSTOLIC BLOOD PRESSURE >= 140 MM HG: ICD-10-PCS | Mod: CPTII,S$GLB,, | Performed by: FAMILY MEDICINE

## 2019-05-31 RX ORDER — CITALOPRAM 20 MG/1
20 TABLET, FILM COATED ORAL DAILY
Refills: 3 | COMMUNITY
Start: 2019-05-31 | End: 2019-11-13 | Stop reason: SDUPTHER

## 2019-06-01 NOTE — PROGRESS NOTES
Hypertension blood pressure elevated.  Coronary disease no chest pain or shortness of breath.  Obesity reviewed.  Due for follow-up CT regarding pulmonary nodules.  She is seeing Cardiology regarding previous ER visit with tachycardia.  States having some pauses on monitoring.  They are considering pacemaker.  No syncope.  Depression with some recurrent symptoms.  No SI/HI.    Diagnoses and all orders for this visit:    Essential hypertension    Coronary artery disease involving native heart without angina pectoris, unspecified vessel or lesion type    Severe obesity (BMI 35.0-35.9 with comorbidity)    Pulmonary nodules  -     CT Chest Without Contrast; Future    Recurrent major depressive disorder, in partial remission    Palpitations      Continue current medications.  Request last note from her cardiologist.  Increased Celexa 20 mg daily.  Follow-up 1 month.  Recheck blood pressure with nurse in 1 week.            Past Medical History:  Past Medical History:   Diagnosis Date    Anticoagulant long-term use     Anxiety     AR (allergic rhinitis)     Arthritis     Asthma, intermittent     Coronary artery disease     Depression     Endometrial polyp     Fatty liver     GERD (gastroesophageal reflux disease)     Hiatal hernia     Hyperlipidemia LDL goal <100     Hypertension     Mild mitral regurgitation     Multiple thyroid nodules 3/27/2014    Obesity (BMI 30-39.9) 3/27/2014    Renal cell carcinoma 2002    removed - no recurrence    S/P primary angioplasty with coronary stent 07/26/2017    Sleep apnea     Using CPAP    Solitary kidney, acquired     right     Past Surgical History:   Procedure Laterality Date    APPENDECTOMY      CARDIAC CATHETERIZATION  6/27/14    has blockages, but per patient no stents    CHOLECYSTECTOMY      COLONOSCOPY  2002    negative    COLONOSCOPY  3/25/2014         CORONARY ANGIOPLASTY  2008?    balloon angioplasty    CORONARY ANGIOPLASTY WITH STENT PLACEMENT  Right 07/26/2017    EGD (ESOPHAGOGASTRODUODENOSCOPY) N/A 4/17/2014    [facility]    ESOPHAGOGASTRODUODENOSCOPY (EGD) Left 12/11/2017    [facility]    MOTILITY STUDY, ESOPHAGUS, USING HIGH RESOLUTION MANOMETRY N/A 4/17/2014    [facility]    NEPHRECTOMY Left 2002    [facility] - mikaela/[facility] - cancer    OVARIAN CYST REMOVAL      POLYPECTOMY      endometrial polyp removal    ROTATOR CUFF REPAIR      L    THYROIDECTOMY Left 7/8/2014    [facility]    THYROIDECTOMY, PARTIAL Left     TUBAL LIGATION      UPPER GASTROINTESTINAL ENDOSCOPY  04/2014     Review of patient's allergies indicates:   Allergen Reactions    Oxycodone Shortness Of Breath, Nausea And Vomiting and Other (See Comments)     Anxiety.    Plavix [clopidogrel] Diarrhea     Stomach cramps    Azithromycin Itching    Codeine Palpitations    Corticosteroids (glucocorticoids) Palpitations    Doxycycline Itching and Nausea Only    Mobic [meloxicam] Itching    Neuromuscular blockers, steroidal Palpitations    Nickel sutures [surgical stainless steel] Rash     Jewelery    Pcn [penicillins] Nausea And Vomiting    Shellfish containing products Itching     topical iodine OK    Sulfa (sulfonamide antibiotics) Nausea And Vomiting     Current Outpatient Medications on File Prior to Visit   Medication Sig Dispense Refill    acetaminophen (TYLENOL) 325 MG tablet Take 2 tablets (650 mg total) by mouth every 6 (six) hours as needed for Pain.  0    albuterol 90 mcg/actuation inhaler Inhale 2 puffs into the lungs every 6 (six) hours as needed for Wheezing. 18 g 5    amoxicillin (AMOXIL) 875 MG tablet Take 1 tablet (875 mg total) by mouth every 12 (twelve) hours. for 10 days 20 tablet 0    aspirin (ECOTRIN) 81 MG EC tablet Take 325 mg by mouth once daily.       citalopram (CELEXA) 20 MG tablet Take 1 tablet (20 mg  total) by mouth once daily.  3    fluticasone (FLONASE) 50 mcg/actuation nasal spray USE 2 SPRAYS IN EACH NOSTRIL ONCE DAILY 16 mL 2    metoprolol succinate (TOPROL-XL) 50 MG 24 hr tablet Take 1 tablet (50 mg total) by mouth once daily. (Patient taking differently: Take 25 mg by mouth once daily. ) 30 tablet 11    ondansetron (ZOFRAN-ODT) 4 MG TbDL Take 1 tablet (4 mg total) by mouth every 6 (six) hours as needed. 20 tablet 0    pravastatin (PRAVACHOL) 40 MG tablet TAKE 1 TABLET(40 MG) BY MOUTH EVERY DAY 90 tablet 1    ranitidine (ZANTAC) 150 MG tablet TAKE 1 TABLET(150 MG) BY MOUTH TWICE DAILY 60 tablet 3    valsartan-hydrochlorothiazide (DIOVAN-HCT) 320-25 mg per tablet Take 1 tablet by mouth once daily. 90 tablet 1    [DISCONTINUED] citalopram (CELEXA) 20 MG tablet TAKE 1 TABLET BY MOUTH DAILY 90 tablet 3    triamcinolone acetonide 0.5% (KENALOG) 0.5 % Crea Apply topically 2 (two) times daily. 30 g 0     No current facility-administered medications on file prior to visit.      Social History     Socioeconomic History    Marital status:      Spouse name: Not on file    Number of children: Not on file    Years of education: Not on file    Highest education level: Not on file   Occupational History    Not on file   Social Needs    Financial resource strain: Not on file    Food insecurity:     Worry: Not on file     Inability: Not on file    Transportation needs:     Medical: Not on file     Non-medical: Not on file   Tobacco Use    Smoking status: Never Smoker    Smokeless tobacco: Never Used   Substance and Sexual Activity    Alcohol use: No    Drug use: No    Sexual activity: Not on file   Lifestyle    Physical activity:     Days per week: Not on file     Minutes per session: Not on file    Stress: Not on file   Relationships    Social connections:     Talks on phone: Not on file     Gets together: Not on file     Attends Bahai service: Not on file     Active member of club or  "organization: Not on file     Attends meetings of clubs or organizations: Not on file     Relationship status: Not on file   Other Topics Concern    Not on file   Social History Narrative    Not on file     Family History   Problem Relation Age of Onset    Heart attack Father 67    Diabetes Paternal Aunt     Allergies Paternal Aunt     Breast cancer Paternal Aunt     Diabetes Paternal Uncle     Allergies Maternal Grandmother     Colon cancer Neg Hx     Stomach cancer Neg Hx     Angioedema Neg Hx     Atopy Neg Hx     Immunodeficiency Neg Hx     Urticaria Neg Hx     Rhinitis Neg Hx     Eczema Neg Hx     Asthma Neg Hx            ROS:  GENERAL: No fever, chills,  or significant weight changes.   CARDIOVASCULAR: Denies chest pain, PND, orthopnea or reduced exercise tolerance.  ABDOMEN: Appetite fine. Denies diarrhea, abdominal pain, hematemesis or blood in stool.  URINARY: No flank pain, dysuria or hematuria.    Vitals:    05/31/19 0956   BP: (!) 152/73   Pulse: 69   Temp: 98.2 °F (36.8 °C)   Weight: 88 kg (194 lb)   Height: 5' 2" (1.575 m)     Wt Readings from Last 3 Encounters:   05/31/19 88 kg (194 lb)   05/27/19 88.1 kg (194 lb 3.2 oz)   03/26/19 89.1 kg (196 lb 6.9 oz)       OBJECTIVE:   APPEARANCE: Well nourished, well developed, in no acute distress.    HEAD: Normocephalic.  Atraumatic.  No sinus tenderness.  EYES:   Right eye: Pupil reactive.  Conjunctiva clear.    Left eye: Pupil reactive.  Conjunctiva clear.  EOMI.    EARS: TM's intact. Light reflex normal. No retraction or perforation.    NOSE:  clear.  MOUTH & THROAT:  No pharyngeal erythema or exudate. No lesions.  NECK: Supple. No bruits.  No JVD.  No cervical lymphadenopathy.  No thyromegaly.    CHEST: Breath sounds clear bilaterally.  Normal respiratory effort  CARDIOVASCULAR: Normal rate.  Regular rhythm.  No murmurs.  No rub.  No gallops.  ABDOMEN: Bowel sounds normal.  Soft.  No tenderness.  No organomegaly.  PERIPHERAL VASCULAR: No " cyanosis.  No clubbing.  No edema.  NEUROLOGIC: No focal findings.  MENTAL STATUS: Alert.  Oriented x 3.

## 2019-06-04 ENCOUNTER — HOSPITAL ENCOUNTER (OUTPATIENT)
Dept: RADIOLOGY | Facility: HOSPITAL | Age: 64
Discharge: HOME | End: 2019-06-04
Attending: FAMILY MEDICINE
Payer: COMMERCIAL

## 2019-06-04 DIAGNOSIS — R91.8 PULMONARY NODULES: ICD-10-CM

## 2019-06-04 PROCEDURE — 71250 CT CHEST WITHOUT CONTRAST: ICD-10-PCS | Mod: 26,,, | Performed by: RADIOLOGY

## 2019-06-04 PROCEDURE — 71250 CT THORAX DX C-: CPT | Mod: 26,,, | Performed by: RADIOLOGY

## 2019-06-04 PROCEDURE — 71250 CT THORAX DX C-: CPT | Mod: TC,PO

## 2019-06-05 ENCOUNTER — PATIENT OUTREACH (OUTPATIENT)
Dept: ADMINISTRATIVE | Facility: HOSPITAL | Age: 64
End: 2019-06-05

## 2019-06-05 ENCOUNTER — OFFICE VISIT (OUTPATIENT)
Dept: FAMILY MEDICINE | Facility: CLINIC | Age: 64
End: 2019-06-05
Payer: COMMERCIAL

## 2019-06-05 VITALS
HEART RATE: 64 BPM | DIASTOLIC BLOOD PRESSURE: 68 MMHG | HEIGHT: 62 IN | BODY MASS INDEX: 35.7 KG/M2 | WEIGHT: 194 LBS | SYSTOLIC BLOOD PRESSURE: 118 MMHG

## 2019-06-05 DIAGNOSIS — R91.8 PULMONARY NODULES: Primary | ICD-10-CM

## 2019-06-05 DIAGNOSIS — I10 ESSENTIAL HYPERTENSION: ICD-10-CM

## 2019-06-05 PROCEDURE — 99213 OFFICE O/P EST LOW 20 MIN: CPT | Mod: S$GLB,,, | Performed by: FAMILY MEDICINE

## 2019-06-05 PROCEDURE — 3074F PR MOST RECENT SYSTOLIC BLOOD PRESSURE < 130 MM HG: ICD-10-PCS | Mod: CPTII,S$GLB,, | Performed by: FAMILY MEDICINE

## 2019-06-05 PROCEDURE — 99213 PR OFFICE/OUTPT VISIT, EST, LEVL III, 20-29 MIN: ICD-10-PCS | Mod: S$GLB,,, | Performed by: FAMILY MEDICINE

## 2019-06-05 PROCEDURE — 3074F SYST BP LT 130 MM HG: CPT | Mod: CPTII,S$GLB,, | Performed by: FAMILY MEDICINE

## 2019-06-05 PROCEDURE — 3078F DIAST BP <80 MM HG: CPT | Mod: CPTII,S$GLB,, | Performed by: FAMILY MEDICINE

## 2019-06-05 PROCEDURE — 3008F BODY MASS INDEX DOCD: CPT | Mod: CPTII,S$GLB,, | Performed by: FAMILY MEDICINE

## 2019-06-05 PROCEDURE — 3078F PR MOST RECENT DIASTOLIC BLOOD PRESSURE < 80 MM HG: ICD-10-PCS | Mod: CPTII,S$GLB,, | Performed by: FAMILY MEDICINE

## 2019-06-05 PROCEDURE — 99999 PR PBB SHADOW E&M-EST. PATIENT-LVL III: ICD-10-PCS | Mod: PBBFAC,,, | Performed by: FAMILY MEDICINE

## 2019-06-05 PROCEDURE — 3008F PR BODY MASS INDEX (BMI) DOCUMENTED: ICD-10-PCS | Mod: CPTII,S$GLB,, | Performed by: FAMILY MEDICINE

## 2019-06-05 PROCEDURE — 99999 PR PBB SHADOW E&M-EST. PATIENT-LVL III: CPT | Mod: PBBFAC,,, | Performed by: FAMILY MEDICINE

## 2019-06-05 NOTE — PROGRESS NOTES
Received call from Patient rep and stated pt wanted to speak with Dr. Reed nurse.  Spoke with pt and stated she had some concerns about her labs. Pt also wanted to know if she needed to scheduled an appt.  Pt would like Dr. Reed nurse staff to give her a call. Message sent to Dr. Reed staff.

## 2019-06-05 NOTE — PROGRESS NOTES
Called and spoke with patient, patient wanted to schedule an appointment with Dr. Reed, appointment scheduled for patient per patient request.

## 2019-06-05 NOTE — Clinical Note
Received call from Patient rep and stated pt wanted to speak with Dr. Reed nurse.  Spoke with pt and stated she had some concerns about her labs. Pt also wanted to know if she needed to scheduled an appt.  Pt would like Dr. Reed nurse staff to give her a call.

## 2019-06-06 NOTE — PROGRESS NOTES
Patient presents follow-up CT as below.  Blood pressure is elevated in the clinic today.  States 118/68 at home this morning.  She is seeing Cardiology.    Alex Carlos MD 6/4/2019 Routine      Narrative     EXAMINATION:  CT CHEST WITHOUT CONTRAST    CLINICAL HISTORY:  pulmonary nodules; Other nonspecific abnormal finding of lung field    TECHNIQUE:  Low dose axial images, sagittal and coronal reformations were obtained from the thoracic inlet to the lung bases. Contrast was not administered.    COMPARISON:  10/19/2018    FINDINGS:  There is a subpleural nodule in the left lung apex 6 mm series 4, image 88.  There is a 3 mm pulmonary nodule superior segment left lower lobe series 4, image 190.  Previously discussed target right pulmonary nodule in the middle lobe is not seen currently.  No filling defects within the central airways.  No pleural effusion or pneumothorax.  CABG.  Normal size heart.  Surgical change from partial thyroidectomy on the left.  No axillary or mediastinal adenopathy.  Left nephrectomy and adrenalectomy.  Mild multilevel degenerative changes in the spine.  At least mild degenerative change of both hip joints.      Impression       Resolved right lung pulmonary nodule and unchanged left apical pulmonary nodule.  Small new pulmonary nodule in the left lower lobe.  In a high-risk patient, 12 month follow-up CT could be considered.      Electronically signed by: Alex Carlos  Date: 06/04/2019         Erinn was seen today for follow-up.    Diagnoses and all orders for this visit:    Pulmonary nodules  -     CT Chest Without Contrast; Future    Essential hypertension      Recommend continue current treatment.  No further evaluation recommended at this time regarding small pulmonary nodules, appears benign.  Recommend repeat CT chest noncontrast in 1 year.            Past Medical History:  Past Medical History:   Diagnosis Date    Anticoagulant long-term use     Anxiety     AR  (allergic rhinitis)     Arthritis     Asthma, intermittent     Coronary artery disease     Depression     Endometrial polyp     Fatty liver     GERD (gastroesophageal reflux disease)     Hiatal hernia     Hyperlipidemia LDL goal <100     Hypertension     Mild mitral regurgitation     Multiple thyroid nodules 3/27/2014    Obesity (BMI 30-39.9) 3/27/2014    Renal cell carcinoma 2002    removed - no recurrence    S/P primary angioplasty with coronary stent 07/26/2017    Sleep apnea     Using CPAP    Solitary kidney, acquired     right     Past Surgical History:   Procedure Laterality Date    APPENDECTOMY      CARDIAC CATHETERIZATION  6/27/14    has blockages, but per patient no stents    CHOLECYSTECTOMY      COLONOSCOPY  2002    negative    COLONOSCOPY  3/25/2014         CORONARY ANGIOPLASTY  2008?    balloon angioplasty    CORONARY ANGIOPLASTY WITH STENT PLACEMENT Right 07/26/2017    EGD (ESOPHAGOGASTRODUODENOSCOPY) N/A 4/17/2014    [facility] ENDO    ESOPHAGOGASTRODUODENOSCOPY (EGD) Left 12/11/2017    [facility]    MOTILITY STUDY, ESOPHAGUS, USING HIGH RESOLUTION MANOMETRY N/A 4/17/2014    [facility] at [facility]    [facility] Left 2002    [facility]    OVARIAN CYST REMOVAL      PERCUTANEOUS ENDOVENOUS LASER ABLATION OF PERIPHERAL VEIN  06/05/2019    POLYPECTOMY      endometrial polyp removal    ROTATOR CUFF REPAIR      L    THYROIDECTOMY Left 7/8/2014    [facility]    THYROIDECTOMY, PARTIAL Left     TUBAL LIGATION      UPPER GASTROINTESTINAL ENDOSCOPY  04/2014     Review of patient's allergies indicates:   Allergen Reactions    Oxycodone Shortness Of Breath, Nausea And Vomiting and Other (See Comments)     Anxiety.    Plavix [clopidogrel] Diarrhea     Stomach cramps    Azithromycin Itching    Codeine Palpitations    Corticosteroids  (glucocorticoids) Palpitations    Doxycycline Itching and Nausea Only    Mobic [meloxicam] Itching    Neuromuscular blockers, steroidal Palpitations    Nickel sutures [surgical stainless steel] Rash     Jewelery    Pcn [penicillins] Nausea And Vomiting    Shellfish containing products Itching     topical iodine OK    Sulfa (sulfonamide antibiotics) Nausea And Vomiting     Current Outpatient Medications on File Prior to Visit   Medication Sig Dispense Refill    acetaminophen (TYLENOL) 325 MG tablet Take 2 tablets (650 mg total) by mouth every 6 (six) hours as needed for Pain.  0    albuterol 90 mcg/actuation inhaler Inhale 2 puffs into the lungs every 6 (six) hours as needed for Wheezing. 18 g 5    amoxicillin (AMOXIL) 875 MG tablet Take 1 tablet (875 mg total) by mouth every 12 (twelve) hours. for 10 days 20 tablet 0    aspirin (ECOTRIN) 81 MG EC tablet Take 325 mg by mouth once daily.       citalopram (CELEXA) 20 MG tablet Take 1 tablet (20 mg total) by mouth once daily.  3    fluticasone (FLONASE) 50 mcg/actuation nasal spray USE 2 SPRAYS IN EACH NOSTRIL ONCE DAILY 16 mL 2    metoprolol succinate (TOPROL-XL) 50 MG 24 hr tablet Take 1 tablet (50 mg total) by mouth once daily. (Patient taking differently: Take 25 mg by mouth once daily. ) 30 tablet 11    ondansetron (ZOFRAN-ODT) 4 MG TbDL Take 1 tablet (4 mg total) by mouth every 6 (six) hours as needed. 20 tablet 0    pravastatin (PRAVACHOL) 40 MG tablet TAKE 1 TABLET(40 MG) BY MOUTH EVERY DAY 90 tablet 1    ranitidine (ZANTAC) 150 MG tablet TAKE 1 TABLET(150 MG) BY MOUTH TWICE DAILY 60 tablet 3    valsartan-hydrochlorothiazide (DIOVAN-HCT) 320-25 mg per tablet Take 1 tablet by mouth once daily. 90 tablet 1    triamcinolone acetonide 0.5% (KENALOG) 0.5 % Crea Apply topically 2 (two) times daily. 30 g 0     No current facility-administered medications on file prior to visit.      Social History     Socioeconomic History    Marital status:   "    Spouse name: Not on file    Number of children: Not on file    Years of education: Not on file    Highest education level: Not on file   Occupational History    Not on file   Social Needs    Financial resource strain: Not on file    Food insecurity:     Worry: Not on file     Inability: Not on file    Transportation needs:     Medical: Not on file     Non-medical: Not on file   Tobacco Use    Smoking status: Never Smoker    Smokeless tobacco: Never Used   Substance and Sexual Activity    Alcohol use: No    Drug use: No    Sexual activity: Not on file   Lifestyle    Physical activity:     Days per week: Not on file     Minutes per session: Not on file    Stress: Not on file   Relationships    Social connections:     Talks on phone: Not on file     Gets together: Not on file     Attends Oriental orthodox service: Not on file     Active member of club or organization: Not on file     Attends meetings of clubs or organizations: Not on file     Relationship status: Not on file   Other Topics Concern    Not on file   Social History Narrative    Not on file     Family History   Problem Relation Age of Onset    Heart attack Father 67    Diabetes Paternal Aunt     Allergies Paternal Aunt     Breast cancer Paternal Aunt     Diabetes Paternal Uncle     Allergies Maternal Grandmother     Colon cancer Neg Hx     Stomach cancer Neg Hx     Angioedema Neg Hx     Atopy Neg Hx     Immunodeficiency Neg Hx     Urticaria Neg Hx     Rhinitis Neg Hx     Eczema Neg Hx     Asthma Neg Hx            ROS:  GENERAL: No fever, chills,  or significant weight changes.   CARDIOVASCULAR: Denies chest pain, PND, orthopnea or reduced exercise tolerance.  ABDOMEN: Appetite fine. Denies diarrhea, abdominal pain, hematemesis or blood in stool.  URINARY: No flank pain, dysuria or hematuria.    Vitals:    06/05/19 1404 06/05/19 1437   BP: (!) 153/72 118/68   Pulse: 64    Weight: 88 kg (194 lb)    Height: 5' 2" (1.575 m)  "       Wt Readings from Last 3 Encounters:   06/05/19 88 kg (194 lb)   05/31/19 88 kg (194 lb)   05/27/19 88.1 kg (194 lb 3.2 oz)       APPEARANCE: Well nourished, well developed, in no acute distress.    HEAD: Normocephalic.  Atraumatic.  EYES:   Right eye: Pupil reactive.  Conjunctiva clear.    Left eye: Pupil reactive.  Conjunctiva clear.    NECK: Supple. No bruits.  No JVD.  No cervical lymphadenopathy.  No thyromegaly.    CHEST: Breath sounds clear bilaterally.  Normal respiratory effort  CARDIOVASCULAR: Normal rate.  Regular rhythm.  No murmurs.  No rub.  No gallops.   No edema.  MENTAL STATUS: Alert.  Oriented x 3.

## 2019-07-01 RX ORDER — CITALOPRAM 20 MG/1
TABLET, FILM COATED ORAL
Qty: 90 TABLET | Refills: 3 | Status: SHIPPED | OUTPATIENT
Start: 2019-07-01 | End: 2020-10-15 | Stop reason: ALTCHOICE

## 2019-07-10 RX ORDER — PRAVASTATIN SODIUM 40 MG/1
TABLET ORAL
Qty: 90 TABLET | Refills: 3 | Status: SHIPPED | OUTPATIENT
Start: 2019-07-10 | End: 2019-12-26

## 2019-07-12 ENCOUNTER — TELEPHONE (OUTPATIENT)
Dept: FAMILY MEDICINE | Facility: CLINIC | Age: 64
End: 2019-07-12

## 2019-07-12 NOTE — TELEPHONE ENCOUNTER
----- Message from Toñito Maya sent at 7/12/2019  9:58 AM CDT -----  Contact: [phone]  Would like a nurse to contact her regarding her medications, please contact her @ [phone]. Thanks

## 2019-07-12 NOTE — TELEPHONE ENCOUNTER
Per patient, her cardiologist put her back on amlodipine.  She is still taking OTC potassium 99mg and is wondering should she continue to take this, will wait for Dr Reed response on Monday.

## 2019-07-30 RX ORDER — FLUTICASONE PROPIONATE 50 MCG
SPRAY, SUSPENSION (ML) NASAL
Qty: 16 ML | Refills: 0 | Status: SHIPPED | OUTPATIENT
Start: 2019-07-30 | End: 2019-11-13

## 2019-08-27 RX ORDER — FLUTICASONE PROPIONATE 50 MCG
SPRAY, SUSPENSION (ML) NASAL
Qty: 16 ML | Refills: 0 | OUTPATIENT
Start: 2019-08-27

## 2019-11-04 RX ORDER — FAMOTIDINE 20 MG/1
20 TABLET, FILM COATED ORAL 2 TIMES DAILY
Qty: 60 TABLET | Refills: 11 | Status: SHIPPED | OUTPATIENT
Start: 2019-11-04 | End: 2021-06-10

## 2019-11-04 NOTE — TELEPHONE ENCOUNTER
Changing zantac to another medication in the same class.The FDA is not calling for individuals to stop taking ranitidine, however change due to recent FDA zantac concerns.

## 2019-11-13 ENCOUNTER — OFFICE VISIT (OUTPATIENT)
Dept: FAMILY MEDICINE | Facility: CLINIC | Age: 64
End: 2019-11-13
Payer: COMMERCIAL

## 2019-11-13 VITALS
DIASTOLIC BLOOD PRESSURE: 74 MMHG | HEART RATE: 82 BPM | WEIGHT: 192.19 LBS | TEMPERATURE: 98 F | HEIGHT: 62 IN | BODY MASS INDEX: 35.37 KG/M2 | SYSTOLIC BLOOD PRESSURE: 142 MMHG

## 2019-11-13 DIAGNOSIS — I10 ESSENTIAL HYPERTENSION: ICD-10-CM

## 2019-11-13 DIAGNOSIS — J06.9 UPPER RESPIRATORY TRACT INFECTION, UNSPECIFIED TYPE: Primary | ICD-10-CM

## 2019-11-13 PROCEDURE — 99999 PR PBB SHADOW E&M-EST. PATIENT-LVL III: CPT | Mod: PBBFAC,,, | Performed by: FAMILY MEDICINE

## 2019-11-13 PROCEDURE — 99213 PR OFFICE/OUTPT VISIT, EST, LEVL III, 20-29 MIN: ICD-10-PCS | Mod: S$GLB,,, | Performed by: FAMILY MEDICINE

## 2019-11-13 PROCEDURE — 3077F SYST BP >= 140 MM HG: CPT | Mod: CPTII,S$GLB,, | Performed by: FAMILY MEDICINE

## 2019-11-13 PROCEDURE — 99213 OFFICE O/P EST LOW 20 MIN: CPT | Mod: S$GLB,,, | Performed by: FAMILY MEDICINE

## 2019-11-13 PROCEDURE — 3008F BODY MASS INDEX DOCD: CPT | Mod: CPTII,S$GLB,, | Performed by: FAMILY MEDICINE

## 2019-11-13 PROCEDURE — 99999 PR PBB SHADOW E&M-EST. PATIENT-LVL III: ICD-10-PCS | Mod: PBBFAC,,, | Performed by: FAMILY MEDICINE

## 2019-11-13 PROCEDURE — 3008F PR BODY MASS INDEX (BMI) DOCUMENTED: ICD-10-PCS | Mod: CPTII,S$GLB,, | Performed by: FAMILY MEDICINE

## 2019-11-13 PROCEDURE — 3078F DIAST BP <80 MM HG: CPT | Mod: CPTII,S$GLB,, | Performed by: FAMILY MEDICINE

## 2019-11-13 PROCEDURE — 3078F PR MOST RECENT DIASTOLIC BLOOD PRESSURE < 80 MM HG: ICD-10-PCS | Mod: CPTII,S$GLB,, | Performed by: FAMILY MEDICINE

## 2019-11-13 PROCEDURE — 3077F PR MOST RECENT SYSTOLIC BLOOD PRESSURE >= 140 MM HG: ICD-10-PCS | Mod: CPTII,S$GLB,, | Performed by: FAMILY MEDICINE

## 2019-11-13 RX ORDER — BENZONATATE 200 MG/1
200 CAPSULE ORAL 3 TIMES DAILY PRN
Qty: 30 CAPSULE | Refills: 0 | Status: SHIPPED | OUTPATIENT
Start: 2019-11-13 | End: 2019-11-23

## 2019-11-13 RX ORDER — FLUTICASONE PROPIONATE 50 MCG
SPRAY, SUSPENSION (ML) NASAL
Qty: 16 G | Refills: 12 | Status: SHIPPED | OUTPATIENT
Start: 2019-11-13 | End: 2021-01-20

## 2019-11-13 RX ORDER — VALSARTAN AND HYDROCHLOROTHIAZIDE 160; 12.5 MG/1; MG/1
1 TABLET, FILM COATED ORAL
COMMUNITY
Start: 2019-10-28 | End: 2019-12-26 | Stop reason: ALTCHOICE

## 2019-11-13 RX ORDER — AMLODIPINE BESYLATE 2.5 MG/1
2.5 TABLET ORAL DAILY
COMMUNITY
End: 2021-06-10 | Stop reason: ALTCHOICE

## 2019-11-13 RX ORDER — ACETAMINOPHEN 325 MG/1
650 TABLET ORAL EVERY 6 HOURS PRN
Refills: 0 | COMMUNITY
Start: 2019-11-13 | End: 2020-08-11

## 2019-11-13 NOTE — PATIENT INSTRUCTIONS
"Please monitor blood pressure and pulse once or twice a day and write down readings. Bring numbers in when you come in for blood pressure check with the nurse.  What is the DASH diet?  DASH stands for Dietary Approaches to Stop Hypertension. It is a balanced eating plan that your family doctor might recommend to help you lower your blood pressure. The DASH diet:  Is low in salt, saturated fat, cholesterol and total fat.   Emphasizes fruits, vegetables, and fat-free or low-fat milk and milk products.   Includes whole grains, fish, poultry and nuts.   Limits the amount of red meat, sweets, added sugars and sugar-containing beverages in your diet.   Is rich in potassium, magnesium and calcium, as well as protein and fiber.  How can the DASH diet help me stay healthy?  Getting too much sodium (salt) in your diet can contribute to high blood pressure (also called hypertension). Some salt is in foods naturally, and some salt is added to food when it is processed or prepared. Following the DASH diet can help you lower your blood pressure, or prevent high blood pressure, by reducing the amount of sodium in your diet to less than 2,300 mg per day.  The fruits, vegetables and whole grains recommended in the DASH diet provide many other elements of a healthy diet, such as lycopene, beta-carotene and isoflavones. These can help protect your body against common health conditions, such as cancer, osteoporosis, stroke and diabetes. Following the DASH diet can also help reduce your risk of heart disease by lowering your low-density lipoprotein (LDL, or "bad") cholesterol level.  Following the DASH diet may drop your blood pressure by a few points in as little as 2 weeks. However, you should not stop taking your blood pressure medicine, or any other prescribed medicine, without talking to your doctor first.  What kinds of foods are included in the DASH diet?  The DASH diet is nutritionally balanced for good health. You dont need to " buy any special foods or pills, or cook with any special recipes, to follow the DASH diet. If you follow the DASH diet, you will eat about 2,000 calories each day. These calories will come from a variety of foods.  Where is sodium in my diet?  Food Serving Sodium Content   ¼ teaspoon table salt 575 mg   ½ teaspoon table salt 1,150 mg   1 teaspoon table salt 2,300 mg   1 hot dog 460 mg   1 regular fast-food hamburger 600 mg   2 ounces processed cheese 600 mg   1 tablespoon soy sauce 900 mg   1 serving frozen pizza with meat and vegetables 982 mg   8 ounces regular potato chips 1,192 mg   The DASH diet  Whole grains (6 to 8 servings a day)   Vegetables (4 to 5 servings a day)   Fruits (4 to 5 servings a day)   Low-fat or fat-free milk and milk products (2 to 3 servings a day)   Lean meats, poultry and fish (6 or fewer servings a day)   Nuts, seeds and beans (4 to 5 servings a week)   Fats and oils (2 to 3 servings a day)   Sweets, preferably low-fat or fat-free (5 or fewer a week)   Sodium (no more than 2,300 mg a day)  You can adapt the DASH diet to meet your needs. For example:  If you drink alcohol, limit yourself to 2 drinks or less per day for men and 1 drink or less per day for women.   To reduce your blood pressure even more, replace some of the carbohydrates in the DASH diet with low-fat protein and unsaturated fats.   If you need to lose weight, reduce the number of calories you eat to about 1,600 per day.   Follow a lower-sodium version (no more than 1,500 mg daily) if you are 40 years of age or older, you are  or you already have high blood pressure.  How can I change my eating habits?  Dont be discouraged if following the DASH diet is difficult at first. Start with small, achievable goals. The following ideas can help you make healthy changes:  Pay attention to your current eating habits. Make a list of everything you eat for 2 or 3 days. Compare this list to the DASH diet recommendations  "above and see what changes you need to make in your diet.   Make one change at a time. For example, start by choosing lower-fat versions of your favorite foods or adding more whole grains to your meals.   Learn what makes a serving for each type of food. For example, 1 serving equals 1 slice of bread, 8 ounces of milk, 1 cup of raw vegetables or 1/2 cup of cooked vegetables. For more serving sizes, go to the [facility] site. Dont have a measuring cup? One serving (3 ounces) of meat or poultry is about the size of a deck of cards. One serving (1/2 cup) of rice or potato looks like half a baseball, and a serving of cheese is about the size of 4 stacked dice.   If eating more fruits and vegetables gives you gas, bloating or diarrhea, increase these foods slowly. You can also talk to your family doctor about taking over-the-counter medicines to reduce these symptoms until your body adjusts.   Get more exercise. Physical activity helps lower your blood pressure and can help you lose more weight.   Use salt-free seasonings, such as spices and herbs, to add flavor to your recipes and reduce or eliminate salt.   Include as many fresh and unprocessed foods as possible. Cut back on frozen dinners, packaged mixes, canned soups and bottled salad dressings, which are often high in sodium.   When buying canned, frozen or processed foods, check nutrition labels for the amount of sodium, sugar and saturated fat. Look for the phrases "no salt added," "sodium-free," "low sodium" or "very low sodium" on food packages. Choose foods with monounsaturated and polyunsaturated fats.   Steam, grill, poach, roast or stir-chavez foods. Use low-sodium broth or water instead of butter or oil for sautéing.   When you eat at a restaurant, ask how foods are prepared. Ask if your order can be made without added salt. Dont add salty condiments, such as ketchup, mustard, pickles or sauces, to your food.  Other Organizations  Centers for Disease Control and " Prevention   National Heart, Lung, and Blood [place]   Written by [facility] editorial staff  Reviewed/Updated: 02/11  Created: 08/09

## 2019-11-14 NOTE — PROGRESS NOTES
Patient complains of sore throat, intermittent otalgia congestion, postnasal drainage, slight cough, no fever.  Symptoms started over the past few days.  Current treatment over-the-counter medication.    Past Medical History:  Past Medical History:   Diagnosis Date    Anticoagulant long-term use     Anxiety     AR (allergic rhinitis)     Arthritis     Asthma, intermittent     Coronary artery disease     Depression     Endometrial polyp     Fatty liver     GERD (gastroesophageal reflux disease)     Hiatal hernia     Hyperlipidemia LDL goal <100     Hypertension     Mild mitral regurgitation     Multiple thyroid nodules 3/27/2014    Obesity (BMI 30-39.9) 3/27/2014    Renal cell carcinoma 2002    removed - no recurrence    S/P primary angioplasty with coronary stent 07/26/2017    Sleep apnea     Using CPAP    Solitary kidney, acquired     right     Past Surgical History:   Procedure Laterality Date    APPENDECTOMY      CARDIAC CATHETERIZATION  6/27/14    has blockages, but per patient no stents    CHOLECYSTECTOMY      COLONOSCOPY  2002    negative    COLONOSCOPY  3/25/2014         CORONARY ANGIOPLASTY  2008?    balloon angioplasty    CORONARY ANGIOPLASTY WITH STENT PLACEMENT Right 07/26/2017    NEPHRECTOMY Left 2002    [facility] - cancer    OVARIAN CYST REMOVAL      PERCUTANEOUS ENDOVENOUS LASER ABLATION OF PERIPHERAL VEIN  06/05/2019    POLYPECTOMY      endometrial polyp removal    ROTATOR CUFF REPAIR      L    THYROIDECTOMY, PARTIAL Left     TUBAL LIGATION      UPPER GASTROINTESTINAL ENDOSCOPY  04/2014     Review of patient's allergies indicates:   Allergen Reactions    Oxycodone Shortness Of Breath, Nausea And Vomiting and Other (See Comments)     Anxiety.    Plavix [clopidogrel] Diarrhea     Stomach cramps    Azithromycin Itching    Codeine Palpitations    Corticosteroids (glucocorticoids) Palpitations    Doxycycline Itching and Nausea Only    Mobic  [meloxicam] Itching    Neuromuscular blockers, steroidal Palpitations    Nickel sutures [surgical stainless steel] Rash     Jewelery    Pcn [penicillins] Nausea And Vomiting    Shellfish containing products Itching     topical iodine OK    Sulfa (sulfonamide antibiotics) Nausea And Vomiting     Current Outpatient Medications on File Prior to Visit   Medication Sig Dispense Refill    albuterol 90 mcg/actuation inhaler Inhale 2 puffs into the lungs every 6 (six) hours as needed for Wheezing. 18 g 5    amLODIPine (NORVASC) 2.5 MG tablet Take 2.5 mg by mouth once daily.      aspirin (ECOTRIN) 81 MG EC tablet Take 325 mg by mouth once daily.       citalopram (CELEXA) 20 MG tablet TAKE 1 TABLET BY MOUTH DAILY 90 tablet 3    famotidine (PEPCID) 20 MG tablet Take 1 tablet (20 mg total) by mouth 2 (two) times daily. 60 tablet 11    ondansetron (ZOFRAN-ODT) 4 MG TbDL Take 1 tablet (4 mg total) by mouth every 6 (six) hours as needed. 20 tablet 0    pravastatin (PRAVACHOL) 40 MG tablet TAKE 1 TABLET(40 MG) BY MOUTH EVERY DAY 90 tablet 3    valsartan-hydrochlorothiazide (DIOVAN-HCT) 160-12.5 mg per tablet Take 1 tablet by mouth.      [DISCONTINUED] acetaminophen (TYLENOL) 325 MG tablet Take 2 tablets (650 mg total) by mouth every 6 (six) hours as needed for Pain.  0    [DISCONTINUED] fluticasone propionate (FLONASE) 50 mcg/actuation nasal spray SHAKE LIQUID AND USE 2 SPRAYS(100 MCG) IN EACH NOSTRIL EVERY DAY 16 mL 0    triamcinolone acetonide 0.5% (KENALOG) 0.5 % Crea Apply topically 2 (two) times daily. 30 g 0    [DISCONTINUED] citalopram (CELEXA) 20 MG tablet Take 1 tablet (20 mg total) by mouth once daily.  3    [DISCONTINUED] fluticasone (FLONASE) 50 mcg/actuation nasal spray USE 2 SPRAYS IN EACH NOSTRIL ONCE DAILY (Patient not taking: Reported on 11/13/2019) 16 mL 2    [DISCONTINUED] metoprolol succinate (TOPROL-XL) 50 MG 24 hr tablet Take 1 tablet (50 mg total) by mouth once daily. (Patient not taking:  Reported on 11/13/2019) 30 tablet 11    [DISCONTINUED] valsartan-hydrochlorothiazide (DIOVAN-HCT) 320-25 mg per tablet Take 1 tablet by mouth once daily. (Patient not taking: Reported on 11/13/2019) 90 tablet 1     No current facility-administered medications on file prior to visit.      Social History     Socioeconomic History    Marital status:      Spouse name: Not on file    Number of children: Not on file    Years of education: Not on file    Highest education level: Not on file   Occupational History    Not on file   Social Needs    Financial resource strain: Not on file    Food insecurity:     Worry: Not on file     Inability: Not on file    Transportation needs:     Medical: Not on file     Non-medical: Not on file   Tobacco Use    Smoking status: Never Smoker    Smokeless tobacco: Never Used   Substance and Sexual Activity    Alcohol use: No    Drug use: No    Sexual activity: Not on file   Lifestyle    Physical activity:     Days per week: Not on file     Minutes per session: Not on file    Stress: Not on file   Relationships    Social connections:     Talks on phone: Not on file     Gets together: Not on file     Attends [employer] service: Not on file     Active member of club or organization: Not on file     Attends meetings of clubs or organizations: Not on file     Relationship status: Not on file   Other Topics Concern    Not on file   Social History Narrative    Not on file     Family History   Problem Relation Age of Onset    Heart attack Father 67    Diabetes Paternal Aunt     Allergies Paternal Aunt     Breast cancer Paternal Aunt     Diabetes Paternal Uncle     Allergies Maternal Grandmother     Colon cancer Neg Hx     Stomach cancer Neg Hx     Angioedema Neg Hx     Atopy Neg Hx     Immunodeficiency Neg Hx     Urticaria Neg Hx     Rhinitis Neg Hx     Eczema Neg Hx     Asthma Neg Hx              Physical Exam:  Vitals:    11/13/19 1558   BP: (!) 142/74  "  Pulse: 82   Temp: 98 °F (36.7 °C)   Weight: 87.2 kg (192 lb 3.2 oz)   Height: 5' 2" (1.575 m)       Gen.: No acute distress  HEENT: sinuses nontender. Ears: Tympanic membranes intact.  No erythema or effusion.  Nose mild congestion.  Throat mild erythema no exudate.  Mild postnasal drainage.  Neck supple no adenopathy  Chest: Clear to auscultation.  Normal respiratory effort.    Erinn was seen today for otalgia, sore throat and headache.    Diagnoses and all orders for this visit:    Upper respiratory tract infection, unspecified type    Essential hypertension    Other orders  -     benzonatate (TESSALON) 200 MG capsule; Take 1 capsule (200 mg total) by mouth 3 (three) times daily as needed for Cough.  -     fluticasone propionate (FLONASE) 50 mcg/actuation nasal spray; Use 2 sprays to each nostril daily  -     acetaminophen (TYLENOL) 325 MG tablet; Take 2 tablets (650 mg total) by mouth every 6 (six) hours as needed for Pain.      Recheck blood pressure with nurse in a few weeks.    Follow-up as needed if symptoms not improving with Recommended treatment next few days  "

## 2019-11-27 ENCOUNTER — TELEPHONE (OUTPATIENT)
Dept: FAMILY MEDICINE | Facility: CLINIC | Age: 64
End: 2019-11-27

## 2019-11-27 NOTE — TELEPHONE ENCOUNTER
----- Message from Rosas Perdomo sent at 11/27/2019  9:33 AM CST -----  Contact: pt   Type:  Same Day Appointment Request    Caller is requesting a same day appointment.  Caller declined first available appointment listed below.    Name of Caller:YANIRA ORTIZ   When is the first available appointment 12/03/2019  Symptoms: congestion sore throat and sinus  Best Call Back Number: [phone]   Additional Information:

## 2019-12-24 ENCOUNTER — TELEPHONE (OUTPATIENT)
Dept: FAMILY MEDICINE | Facility: CLINIC | Age: 64
End: 2019-12-24

## 2019-12-24 NOTE — TELEPHONE ENCOUNTER
----- Message from Azar Crespo sent at 12/24/2019  9:37 AM CST -----  Contact: Pt   Pt is calling to come in for BP/ phys but is experiencing symptoms of elevated BP / lightheadedness , nagging headache and the bp has been running from 140/82 to the 170's and can be reached at [phone]//thanks/dbw

## 2019-12-24 NOTE — TELEPHONE ENCOUNTER
Patient reports she has been running around and this may be cause of dizziness, ok at present, just wanted to schedule an appt after Everly.  Appointment scheduled for 12/26 at 920AM, patient aware, advised ER for distress or walk in/ after hours clinic for worsening of symptoms before then.

## 2019-12-26 ENCOUNTER — OFFICE VISIT (OUTPATIENT)
Dept: FAMILY MEDICINE | Facility: CLINIC | Age: 64
End: 2019-12-26
Payer: COMMERCIAL

## 2019-12-26 ENCOUNTER — HOSPITAL ENCOUNTER (OUTPATIENT)
Dept: RADIOLOGY | Facility: HOSPITAL | Age: 64
Discharge: HOME | End: 2019-12-26
Attending: FAMILY MEDICINE
Payer: COMMERCIAL

## 2019-12-26 VITALS
TEMPERATURE: 99 F | OXYGEN SATURATION: 97 % | DIASTOLIC BLOOD PRESSURE: 82 MMHG | HEIGHT: 62 IN | SYSTOLIC BLOOD PRESSURE: 124 MMHG | BODY MASS INDEX: 35.48 KG/M2 | WEIGHT: 192.81 LBS | HEART RATE: 64 BPM

## 2019-12-26 VITALS — HEIGHT: 62 IN | WEIGHT: 192.88 LBS | BODY MASS INDEX: 35.49 KG/M2

## 2019-12-26 DIAGNOSIS — E66.01 SEVERE OBESITY (BMI 35.0-35.9 WITH COMORBIDITY): ICD-10-CM

## 2019-12-26 DIAGNOSIS — I25.10 CORONARY ARTERY DISEASE INVOLVING NATIVE CORONARY ARTERY OF NATIVE HEART WITHOUT ANGINA PECTORIS: ICD-10-CM

## 2019-12-26 DIAGNOSIS — Z12.31 ENCOUNTER FOR SCREENING MAMMOGRAM FOR MALIGNANT NEOPLASM OF BREAST: ICD-10-CM

## 2019-12-26 DIAGNOSIS — Z00.00 ROUTINE HISTORY AND PHYSICAL EXAMINATION OF ADULT: Primary | ICD-10-CM

## 2019-12-26 DIAGNOSIS — G47.30 SLEEP APNEA, UNSPECIFIED TYPE: ICD-10-CM

## 2019-12-26 DIAGNOSIS — Z85.528 PERSONAL HISTORY OF RENAL CANCER: ICD-10-CM

## 2019-12-26 DIAGNOSIS — I95.1 ORTHOSTATIC HYPOTENSION: ICD-10-CM

## 2019-12-26 DIAGNOSIS — I10 ESSENTIAL HYPERTENSION: ICD-10-CM

## 2019-12-26 DIAGNOSIS — F33.40 RECURRENT MAJOR DEPRESSIVE DISORDER, IN REMISSION: ICD-10-CM

## 2019-12-26 DIAGNOSIS — Z00.00 ROUTINE HISTORY AND PHYSICAL EXAMINATION OF ADULT: ICD-10-CM

## 2019-12-26 PROCEDURE — 99999 PR PBB SHADOW E&M-EST. PATIENT-LVL V: ICD-10-PCS | Mod: PBBFAC,,, | Performed by: FAMILY MEDICINE

## 2019-12-26 PROCEDURE — 3074F PR MOST RECENT SYSTOLIC BLOOD PRESSURE < 130 MM HG: ICD-10-PCS | Mod: CPTII,S$GLB,, | Performed by: FAMILY MEDICINE

## 2019-12-26 PROCEDURE — 3079F DIAST BP 80-89 MM HG: CPT | Mod: CPTII,S$GLB,, | Performed by: FAMILY MEDICINE

## 2019-12-26 PROCEDURE — 99999 PR PBB SHADOW E&M-EST. PATIENT-LVL V: CPT | Mod: PBBFAC,,, | Performed by: FAMILY MEDICINE

## 2019-12-26 PROCEDURE — 77063 MAMMO DIGITAL SCREENING BILAT WITH TOMOSYNTHESIS_CAD: ICD-10-PCS | Mod: 26,,, | Performed by: RADIOLOGY

## 2019-12-26 PROCEDURE — 90686 IIV4 VACC NO PRSV 0.5 ML IM: CPT | Mod: S$GLB,,, | Performed by: FAMILY MEDICINE

## 2019-12-26 PROCEDURE — 99396 PREV VISIT EST AGE 40-64: CPT | Mod: 25,S$GLB,, | Performed by: FAMILY MEDICINE

## 2019-12-26 PROCEDURE — 3079F PR MOST RECENT DIASTOLIC BLOOD PRESSURE 80-89 MM HG: ICD-10-PCS | Mod: CPTII,S$GLB,, | Performed by: FAMILY MEDICINE

## 2019-12-26 PROCEDURE — 77063 BREAST TOMOSYNTHESIS BI: CPT | Mod: 26,,, | Performed by: RADIOLOGY

## 2019-12-26 PROCEDURE — 99396 PR PREVENTIVE VISIT,EST,40-64: ICD-10-PCS | Mod: 25,S$GLB,, | Performed by: FAMILY MEDICINE

## 2019-12-26 PROCEDURE — 90686 FLU VACCINE (QUAD) GREATER THAN OR EQUAL TO 3YO PRESERVATIVE FREE IM: ICD-10-PCS | Mod: S$GLB,,, | Performed by: FAMILY MEDICINE

## 2019-12-26 PROCEDURE — 77067 SCR MAMMO BI INCL CAD: CPT | Mod: 26,,, | Performed by: RADIOLOGY

## 2019-12-26 PROCEDURE — 3074F SYST BP LT 130 MM HG: CPT | Mod: CPTII,S$GLB,, | Performed by: FAMILY MEDICINE

## 2019-12-26 PROCEDURE — 90471 IMMUNIZATION ADMIN: CPT | Mod: S$GLB,,, | Performed by: FAMILY MEDICINE

## 2019-12-26 PROCEDURE — 90471 FLU VACCINE (QUAD) GREATER THAN OR EQUAL TO 3YO PRESERVATIVE FREE IM: ICD-10-PCS | Mod: S$GLB,,, | Performed by: FAMILY MEDICINE

## 2019-12-26 PROCEDURE — 77067 MAMMO DIGITAL SCREENING BILAT WITH TOMOSYNTHESIS_CAD: ICD-10-PCS | Mod: 26,,, | Performed by: RADIOLOGY

## 2019-12-26 PROCEDURE — 77067 SCR MAMMO BI INCL CAD: CPT | Mod: TC,PO

## 2019-12-26 RX ORDER — ATORVASTATIN CALCIUM 20 MG/1
10 TABLET, FILM COATED ORAL DAILY
COMMUNITY
Start: 2019-11-27 | End: 2021-06-10 | Stop reason: SINTOL

## 2019-12-26 RX ORDER — VALSARTAN 160 MG/1
160 TABLET ORAL DAILY
Qty: 90 TABLET | Refills: 3 | Status: SHIPPED | OUTPATIENT
Start: 2019-12-26 | End: 2020-12-30

## 2019-12-26 NOTE — PROGRESS NOTES
Patient physical exam.  Hypertension controlled.  She does note some intermittent lightheadedness with standing.  This last for few seconds.  No chest pain shortness of breath or palpitations.  She did have a couple of episodes of diarrhea recently, but states she has had occasional lightheadedness prior to this.  Sleep apnea did not tolerate CPAP.  Coronary disease followed by Cardiology.  Denies angina.  Depression controlled with current medication.  No SI/HI.  Remote renal cancer without evidence recurrence.    Erinn was seen today for blood pressure check and dizziness.    Diagnoses and all orders for this visit:    Routine history and physical examination of adult  -     CBC auto differential; Future  -     Comprehensive metabolic panel; Future  -     Mammo Digital Screening Bilat; Future    Essential hypertension  -     CBC auto differential; Future  -     Comprehensive metabolic panel; Future    Sleep apnea, unspecified type    Recurrent major depressive disorder, in remission    Coronary artery disease involving native coronary artery of native heart without angina pectoris    Personal history of renal cancer    Severe obesity (BMI 35.0-35.9 with comorbidity)    Orthostatic hypotension  -     CBC auto differential; Future    Encounter for screening mammogram for malignant neoplasm of breast  -     Mammo Digital Screening Bilat; Future    Other orders  -     valsartan (DIOVAN) 160 MG tablet; Take 1 tablet (160 mg total) by mouth once daily.  -     Influenza - Quadrivalent (PF)     Discontinue hydrochlorothiazide.  Continue other medication as currently.  Follow-up 1 month.      Anticipatory guidance: Don't smoke.  Healthy diet and regular exercise recommended.          Past Medical History:  Past Medical History:   Diagnosis Date    Anticoagulant long-term use     Anxiety     AR (allergic rhinitis)     Arthritis     Asthma, intermittent     Coronary artery disease     Depression     Endometrial  polyp     Fatty liver     GERD (gastroesophageal reflux disease)     Hiatal hernia     Hyperlipidemia LDL goal <100     Hypertension     Mild mitral regurgitation     Multiple thyroid nodules 3/27/2014    Obesity (BMI 30-39.9) 3/27/2014    Renal cell carcinoma 2002    removed - no recurrence    S/P primary angioplasty with coronary stent 07/26/2017    Sleep apnea     Using CPAP    Solitary kidney, acquired     right     Past Surgical History:   Procedure Laterality Date    APPENDECTOMY      CARDIAC CATHETERIZATION  6/27/14    has blockages, but per patient no stents    CHOLECYSTECTOMY      COLONOSCOPY  2002    negative    COLONOSCOPY  3/25/2014         CORONARY ANGIOPLASTY  2008?    balloon angioplasty    CORONARY ANGIOPLASTY WITH STENT PLACEMENT Right 07/26/2017    NEPHRECTOMY Left 2002    [facility]    OVARIAN CYST REMOVAL      PERCUTANEOUS ENDOVENOUS LASER ABLATION OF PERIPHERAL VEIN  06/05/2019    POLYPECTOMY      endometrial polyp removal    ROTATOR CUFF REPAIR      L    THYROIDECTOMY, PARTIAL Left     TUBAL LIGATION      UPPER GASTROINTESTINAL ENDOSCOPY  04/2014     Review of patient's allergies indicates:   Allergen Reactions    Oxycodone Shortness Of Breath, Nausea And Vomiting and Other (See Comments)     Anxiety.    Plavix [clopidogrel] Diarrhea     Stomach cramps    Azithromycin Itching    Codeine Palpitations    Corticosteroids (glucocorticoids) Palpitations    Doxycycline Itching and Nausea Only    Mobic [meloxicam] Itching    Neuromuscular blockers, steroidal Palpitations    Nickel sutures [surgical stainless steel] Rash     Jewelery    Pcn [penicillins] Nausea And Vomiting    Shellfish containing products Itching     topical iodine OK    Sulfa (sulfonamide antibiotics) Nausea And Vomiting     Current Outpatient Medications on File Prior to Visit   Medication Sig Dispense Refill    acetaminophen (TYLENOL) 325 MG tablet Take 2  tablets (650 mg total) by mouth every 6 (six) hours as needed for Pain.  0    albuterol 90 mcg/actuation inhaler Inhale 2 puffs into the lungs every 6 (six) hours as needed for Wheezing. 18 g 5    amLODIPine (NORVASC) 2.5 MG tablet Take 2.5 mg by mouth once daily.      aspirin (ECOTRIN) 81 MG EC tablet Take 325 mg by mouth once daily.       atorvastatin (LIPITOR) 20 MG tablet       citalopram (CELEXA) 20 MG tablet TAKE 1 TABLET BY MOUTH DAILY 90 tablet 3    famotidine (PEPCID) 20 MG tablet Take 1 tablet (20 mg total) by mouth 2 (two) times daily. 60 tablet 11    fluticasone propionate (FLONASE) 50 mcg/actuation nasal spray Use 2 sprays to each nostril daily 16 g 12    ondansetron (ZOFRAN-ODT) 4 MG TbDL Take 1 tablet (4 mg total) by mouth every 6 (six) hours as needed. 20 tablet 0    triamcinolone acetonide 0.5% (KENALOG) 0.5 % Crea Apply topically 2 (two) times daily. 30 g 0    [DISCONTINUED] valsartan-hydrochlorothiazide (DIOVAN-HCT) 160-12.5 mg per tablet Take 1 tablet by mouth.      [DISCONTINUED] pravastatin (PRAVACHOL) 40 MG tablet TAKE 1 TABLET(40 MG) BY MOUTH EVERY DAY 90 tablet 3     No current facility-administered medications on file prior to visit.      Social History     Socioeconomic History    Marital status:      Spouse name: Not on file    Number of children: Not on file    Years of education: Not on file    Highest education level: Not on file   Occupational History    Not on file   Social Needs    Financial resource strain: Not on file    Food insecurity:     Worry: Not on file     Inability: Not on file    Transportation needs:     Medical: Not on file     Non-medical: Not on file   Tobacco Use    Smoking status: Never Smoker    Smokeless tobacco: Never Used   Substance and Sexual Activity    Alcohol use: No    Drug use: No    Sexual activity: Not on file   Lifestyle    Physical activity:     Days per week: Not on file     Minutes per session: Not on file     "Stress: Not on file   Relationships    Social connections:     Talks on phone: Not on file     Gets together: Not on file     Attends [employer] service: Not on file     Active member of club or organization: Not on file     Attends meetings of clubs or organizations: Not on file     Relationship status: Not on file   Other Topics Concern    Not on file   Social History Narrative    Not on file     Family History   Problem Relation Age of Onset    Heart attack Father 67    Diabetes Paternal Aunt     Allergies Paternal Aunt     Breast cancer Paternal Aunt     Diabetes Paternal Uncle     Allergies Maternal Grandmother     Colon cancer Neg Hx     Stomach cancer Neg Hx     Angioedema Neg Hx     Atopy Neg Hx     Immunodeficiency Neg Hx     Urticaria Neg Hx     Rhinitis Neg Hx     Eczema Neg Hx     Asthma Neg Hx              ROS:  GENERAL: No fever, chills,  or significant weight changes.  HEENT: No headache or hearing complaints.  No dysphagia  Eyes: No vision complaints  CHEST: Denies WILLIAMSON, cyanosis, wheezing, cough and sputum production.  CARDIOVASCULAR: Denies chest pain, PND, orthopnea or reduced exercise tolerance.  ABDOMEN: Appetite fine. Denies  abdominal pain, hematemesis or blood in stool.  URINARY: No flank pain, dysuria or hematuria.  MUSCULOSKELETAL: No warmth swelling or tenderness of the joints  NEUROLOGIC: No focal weakness numbness or paresthesia  PSYCHIATRIC: Denies SI/HI             Sitting    standing  Vitals:    12/26/19 0930 12/26/19 0948 12/26/19 0949   BP: 124/69 132/84 124/82   Pulse: (!) 58 60 64   Temp: 98.7 °F (37.1 °C)     SpO2:  98% 97%   Weight: 87.5 kg (192 lb 12.8 oz)     Height: 5' 2" (1.575 m)       Wt Readings from Last 3 Encounters:   12/26/19 87.5 kg (192 lb 12.8 oz)   11/13/19 87.2 kg (192 lb 3.2 oz)   06/05/19 88 kg (194 lb)       OBJECTIVE:   APPEARANCE: Well nourished, well developed, in no acute distress.    HEAD: Normocephalic.  Atraumatic.  No sinus " tenderness.  EYES:   Right eye: Pupil reactive.  Conjunctiva clear.    Left eye: Pupil reactive.  Conjunctiva clear.  EOMI.    EARS: TM's intact. Light reflex normal. No retraction or perforation.    NOSE:  clear.  MOUTH & THROAT:  No pharyngeal erythema or exudate. No lesions.  NECK: Supple. No bruits.  No JVD.  No cervical lymphadenopathy.  No thyromegaly.    CHEST: Breath sounds clear bilaterally.  Normal respiratory effort  CARDIOVASCULAR: Normal rate.  Regular rhythm.  No murmurs.  No rub.  No gallops.  ABDOMEN: Bowel sounds normal.  Soft.  No tenderness.  No organomegaly.  PERIPHERAL VASCULAR: No cyanosis.  No clubbing.  No edema.  NEUROLOGIC: No focal findings.  MENTAL STATUS: Alert.  Oriented x 3.

## 2020-01-31 ENCOUNTER — OFFICE VISIT (OUTPATIENT)
Dept: FAMILY MEDICINE | Facility: CLINIC | Age: 65
End: 2020-01-31
Payer: COMMERCIAL

## 2020-01-31 ENCOUNTER — HOSPITAL ENCOUNTER (OUTPATIENT)
Dept: RADIOLOGY | Facility: HOSPITAL | Age: 65
Discharge: HOME | End: 2020-01-31
Attending: NURSE PRACTITIONER
Payer: COMMERCIAL

## 2020-01-31 VITALS
BODY MASS INDEX: 34.78 KG/M2 | HEART RATE: 60 BPM | DIASTOLIC BLOOD PRESSURE: 74 MMHG | SYSTOLIC BLOOD PRESSURE: 166 MMHG | WEIGHT: 189 LBS | TEMPERATURE: 98 F | HEIGHT: 62 IN

## 2020-01-31 DIAGNOSIS — R53.83 FATIGUE, UNSPECIFIED TYPE: ICD-10-CM

## 2020-01-31 DIAGNOSIS — R11.0 NAUSEA: ICD-10-CM

## 2020-01-31 DIAGNOSIS — R35.0 FREQUENT URINATION: ICD-10-CM

## 2020-01-31 DIAGNOSIS — R35.0 FREQUENT URINATION: Primary | ICD-10-CM

## 2020-01-31 DIAGNOSIS — R10.9 ABDOMINAL DISCOMFORT: ICD-10-CM

## 2020-01-31 LAB
BILIRUB UR QL STRIP: NEGATIVE
CLARITY UR: CLEAR
COLOR UR: YELLOW
GLUCOSE UR QL STRIP: NEGATIVE
HGB UR QL STRIP: ABNORMAL
KETONES UR QL STRIP: NEGATIVE
LEUKOCYTE ESTERASE UR QL STRIP: NEGATIVE
NITRITE UR QL STRIP: NEGATIVE
PH UR STRIP: 6 [PH] (ref 5–8)
PROT UR QL STRIP: NEGATIVE
SP GR UR STRIP: 1.02 (ref 1–1.03)
URN SPEC COLLECT METH UR: ABNORMAL

## 2020-01-31 PROCEDURE — 87086 URINE CULTURE/COLONY COUNT: CPT

## 2020-01-31 PROCEDURE — 3008F BODY MASS INDEX DOCD: CPT | Mod: CPTII,S$GLB,, | Performed by: NURSE PRACTITIONER

## 2020-01-31 PROCEDURE — 3078F PR MOST RECENT DIASTOLIC BLOOD PRESSURE < 80 MM HG: ICD-10-PCS | Mod: CPTII,S$GLB,, | Performed by: NURSE PRACTITIONER

## 2020-01-31 PROCEDURE — 3077F PR MOST RECENT SYSTOLIC BLOOD PRESSURE >= 140 MM HG: ICD-10-PCS | Mod: CPTII,S$GLB,, | Performed by: NURSE PRACTITIONER

## 2020-01-31 PROCEDURE — 99999 PR PBB SHADOW E&M-EST. PATIENT-LVL IV: ICD-10-PCS | Mod: PBBFAC,,, | Performed by: NURSE PRACTITIONER

## 2020-01-31 PROCEDURE — 99213 PR OFFICE/OUTPT VISIT, EST, LEVL III, 20-29 MIN: ICD-10-PCS | Mod: S$GLB,,, | Performed by: NURSE PRACTITIONER

## 2020-01-31 PROCEDURE — 74018 RADEX ABDOMEN 1 VIEW: CPT | Mod: TC,PO

## 2020-01-31 PROCEDURE — 99213 OFFICE O/P EST LOW 20 MIN: CPT | Mod: S$GLB,,, | Performed by: NURSE PRACTITIONER

## 2020-01-31 PROCEDURE — 99999 PR PBB SHADOW E&M-EST. PATIENT-LVL IV: CPT | Mod: PBBFAC,,, | Performed by: NURSE PRACTITIONER

## 2020-01-31 PROCEDURE — 74018 RADEX ABDOMEN 1 VIEW: CPT | Mod: 26,,, | Performed by: RADIOLOGY

## 2020-01-31 PROCEDURE — 3078F DIAST BP <80 MM HG: CPT | Mod: CPTII,S$GLB,, | Performed by: NURSE PRACTITIONER

## 2020-01-31 PROCEDURE — 3077F SYST BP >= 140 MM HG: CPT | Mod: CPTII,S$GLB,, | Performed by: NURSE PRACTITIONER

## 2020-01-31 PROCEDURE — 74018 XR KUB: ICD-10-PCS | Mod: 26,,, | Performed by: RADIOLOGY

## 2020-01-31 PROCEDURE — 3008F PR BODY MASS INDEX (BMI) DOCUMENTED: ICD-10-PCS | Mod: CPTII,S$GLB,, | Performed by: NURSE PRACTITIONER

## 2020-01-31 PROCEDURE — 81002 URINALYSIS NONAUTO W/O SCOPE: CPT | Mod: PO

## 2020-01-31 RX ORDER — DICYCLOMINE HYDROCHLORIDE 20 MG/1
20 TABLET ORAL 2 TIMES DAILY PRN
Qty: 10 TABLET | Refills: 0 | Status: SHIPPED | OUTPATIENT
Start: 2020-01-31 | End: 2020-02-05

## 2020-01-31 NOTE — PROGRESS NOTES
Subjective:       Patient ID: Erinn Silva is a 64 y.o. female.    Chief Complaint: Fatigue; Urinary Frequency; and Nausea    Urinary Frequency    This is a new problem. The current episode started 1 to 4 weeks ago. The problem occurs every urination. The problem has been unchanged. The quality of the pain is described as burning. The pain is mild. There has been no fever. There is no history of pyelonephritis. Associated symptoms include flank pain, frequency and urgency. Pertinent negatives include no behavior changes, chills, discharge, hematuria, hesitancy, nausea, possible pregnancy, sweats, vomiting, weight loss, bubble bath use, constipation, rash or withholding. She has tried nothing for the symptoms. The treatment provided no relief. Her past medical history is significant for hypertension. There is no history of catheterization, diabetes insipidus, diabetes mellitus, genitourinary reflux, kidney stones, recurrent UTIs, a single kidney, STD, urinary stasis or a urological procedure.     Past Medical History:   Diagnosis Date    Anticoagulant long-term use     Anxiety     AR (allergic rhinitis)     Arthritis     Asthma, intermittent     Coronary artery disease     Depression     Endometrial polyp     Fatty liver     GERD (gastroesophageal reflux disease)     Hiatal hernia     Hyperlipidemia LDL goal <100     Hypertension     Mild mitral regurgitation     Multiple thyroid nodules 3/27/2014    Obesity (BMI 30-39.9) 3/27/2014    Renal cell carcinoma 2002    removed - no recurrence    S/P primary angioplasty with coronary stent 07/26/2017    Sleep apnea     Using CPAP    Solitary kidney, acquired     right     Social History     Socioeconomic History    Marital status:      Spouse name: Not on file    Number of children: Not on file    Years of education: Not on file    Highest education level: Not on file   Occupational History    Not on file   Social Needs    Financial  resource strain: Not on file    Food insecurity:     Worry: Not on file     Inability: Not on file    Transportation needs:     Medical: Not on file     Non-medical: Not on file   Tobacco Use    Smoking status: Never Smoker    Smokeless tobacco: Never Used   Substance and Sexual Activity    Alcohol use: No    Drug use: No    Sexual activity: Not on file   Lifestyle    Physical activity:     Days per week: Not on file     Minutes per session: Not on file    Stress: Not on file   Relationships    Social connections:     Talks on phone: Not on file     Gets together: Not on file     Attends Restorationist service: Not on file     Active member of club or organization: Not on file     Attends meetings of clubs or organizations: Not on file     Relationship status: Not on file   Other Topics Concern    Not on file   Social History Narrative    Not on file     Past Surgical History:   Procedure Laterality Date    APPENDECTOMY      CARDIAC CATHETERIZATION  6/27/14    has blockages, but per patient no stents    CHOLECYSTECTOMY      COLONOSCOPY  2002    negative    COLONOSCOPY  3/25/2014         CORONARY ANGIOPLASTY  2008?    balloon angioplasty    CORONARY ANGIOPLASTY WITH STENT PLACEMENT Right 07/26/2017    NEPHRECTOMY Left 2002    [facility]    OVARIAN CYST REMOVAL      PERCUTANEOUS ENDOVENOUS LASER ABLATION OF PERIPHERAL VEIN  06/05/2019    POLYPECTOMY      endometrial polyp removal    ROTATOR CUFF REPAIR      L    THYROIDECTOMY, PARTIAL Left     TUBAL LIGATION      UPPER GASTROINTESTINAL ENDOSCOPY  04/2014       Review of Systems   Constitutional: Negative.  Negative for chills and weight loss.   HENT: Negative.    Eyes: Negative.    Respiratory: Negative.    Cardiovascular: Negative.    Gastrointestinal: Negative.  Negative for constipation, nausea and vomiting.   Endocrine: Negative.    Genitourinary: Positive for flank pain, frequency and urgency. Negative for  hematuria and hesitancy.   Skin: Negative.  Negative for rash.   Allergic/Immunologic: Negative.    Neurological: Negative.    Psychiatric/Behavioral: Negative.        Objective:      Physical Exam   Constitutional: She is oriented to person, place, and time. She appears well-developed and well-nourished.   HENT:   Head: Normocephalic.   Right Ear: External ear normal.   Left Ear: External ear normal.   Nose: Nose normal.   Mouth/Throat: Oropharynx is clear and moist.   Eyes: Pupils are equal, round, and reactive to light. Conjunctivae are normal.   Neck: Normal range of motion. Neck supple.   Cardiovascular: Normal rate, regular rhythm and normal heart sounds.   Pulmonary/Chest: Effort normal and breath sounds normal.   Abdominal: Soft. Bowel sounds are normal. There is generalized tenderness.   Musculoskeletal: Normal range of motion.   Neurological: She is alert and oriented to person, place, and time.   Skin: Skin is warm and dry. Capillary refill takes 2 to 3 seconds.   Psychiatric: She has a normal mood and affect. Her behavior is normal. Judgment and thought content normal.   Nursing note and vitals reviewed.      Assessment:     1. Frequent urination    2       Nausea  3       Fatigue, unspecified type  4       Abdominal discomfort    Plan:           Erinn was seen today for fatigue, urinary frequency and nausea.    Diagnoses and all orders for this visit:    Frequent urination  Nausea  Fatigue, unspecified type  Abdominal discomfort  -     URINALYSIS  -     Urine culture; Future  -     Urine culture  -     X-Ray KUB; Future  -     CBC auto differential; Future  -     Comprehensive metabolic panel; Future  -     TSH; Future  -     dicyclomine (BENTYL) 20 mg tablet; Take 1 tablet (20 mg total) by mouth 2 (two) times daily as needed.  Report to ER immediately if symptoms worsen

## 2020-02-02 LAB — BACTERIA UR CULT: NORMAL

## 2020-02-03 ENCOUNTER — TELEPHONE (OUTPATIENT)
Dept: FAMILY MEDICINE | Facility: CLINIC | Age: 65
End: 2020-02-03

## 2020-02-03 NOTE — TELEPHONE ENCOUNTER
----- Message from Lisseth Baltazar sent at 2/3/2020  9:31 AM CST -----  Contact: patient  Calling in with question about her results from lab work. please call patient @ [phone]. Thanks, brice

## 2020-02-03 NOTE — TELEPHONE ENCOUNTER
----- Message from Rosalinda España sent at 2/3/2020  2:57 PM CST -----  Contact: pt  Type:  Patient Returning Call    Who Called:Patient  Who Left Message for Patient:nurse  Does the patient know what this is regarding?:na  Would the patient rather a call back or a response via [facility]? Call back  Best Call Back Number:[phone]  Additional Information: na

## 2020-02-04 ENCOUNTER — OFFICE VISIT (OUTPATIENT)
Dept: FAMILY MEDICINE | Facility: CLINIC | Age: 65
End: 2020-02-04
Payer: COMMERCIAL

## 2020-02-04 VITALS
DIASTOLIC BLOOD PRESSURE: 64 MMHG | BODY MASS INDEX: 34.23 KG/M2 | HEART RATE: 68 BPM | TEMPERATURE: 98 F | SYSTOLIC BLOOD PRESSURE: 138 MMHG | WEIGHT: 186 LBS | HEIGHT: 62 IN

## 2020-02-04 DIAGNOSIS — Z85.528 PERSONAL HISTORY OF RENAL CANCER: ICD-10-CM

## 2020-02-04 DIAGNOSIS — I10 ESSENTIAL HYPERTENSION: Primary | ICD-10-CM

## 2020-02-04 DIAGNOSIS — G47.30 SLEEP APNEA, UNSPECIFIED TYPE: ICD-10-CM

## 2020-02-04 DIAGNOSIS — J01.90 SUBACUTE SINUSITIS, UNSPECIFIED LOCATION: ICD-10-CM

## 2020-02-04 DIAGNOSIS — R35.0 URINARY FREQUENCY: ICD-10-CM

## 2020-02-04 PROCEDURE — 99214 PR OFFICE/OUTPT VISIT, EST, LEVL IV, 30-39 MIN: ICD-10-PCS | Mod: S$GLB,,, | Performed by: FAMILY MEDICINE

## 2020-02-04 PROCEDURE — 99999 PR PBB SHADOW E&M-EST. PATIENT-LVL V: ICD-10-PCS | Mod: PBBFAC,,, | Performed by: FAMILY MEDICINE

## 2020-02-04 PROCEDURE — 3078F PR MOST RECENT DIASTOLIC BLOOD PRESSURE < 80 MM HG: ICD-10-PCS | Mod: CPTII,S$GLB,, | Performed by: FAMILY MEDICINE

## 2020-02-04 PROCEDURE — 3078F DIAST BP <80 MM HG: CPT | Mod: CPTII,S$GLB,, | Performed by: FAMILY MEDICINE

## 2020-02-04 PROCEDURE — 3008F PR BODY MASS INDEX (BMI) DOCUMENTED: ICD-10-PCS | Mod: CPTII,S$GLB,, | Performed by: FAMILY MEDICINE

## 2020-02-04 PROCEDURE — 99999 PR PBB SHADOW E&M-EST. PATIENT-LVL V: CPT | Mod: PBBFAC,,, | Performed by: FAMILY MEDICINE

## 2020-02-04 PROCEDURE — 3008F BODY MASS INDEX DOCD: CPT | Mod: CPTII,S$GLB,, | Performed by: FAMILY MEDICINE

## 2020-02-04 PROCEDURE — 3075F PR MOST RECENT SYSTOLIC BLOOD PRESS GE 130-139MM HG: ICD-10-PCS | Mod: CPTII,S$GLB,, | Performed by: FAMILY MEDICINE

## 2020-02-04 PROCEDURE — 99214 OFFICE O/P EST MOD 30 MIN: CPT | Mod: S$GLB,,, | Performed by: FAMILY MEDICINE

## 2020-02-04 PROCEDURE — 3075F SYST BP GE 130 - 139MM HG: CPT | Mod: CPTII,S$GLB,, | Performed by: FAMILY MEDICINE

## 2020-02-04 RX ORDER — CEPHALEXIN 500 MG/1
500 CAPSULE ORAL EVERY 12 HOURS
Qty: 14 CAPSULE | Refills: 0 | Status: SHIPPED | OUTPATIENT
Start: 2020-02-04 | End: 2020-02-11

## 2020-02-04 NOTE — PROGRESS NOTES
Follow-up blood pressure.  We discontinued hydrochlorothiazide last visit due to some orthostasis.  She is not orthostatic today.  She did have amlodipine per her cardiologist.  This was not on her list.  Sleep apnea did not tolerate CPAP previously.  She does complain of fatigue and daytime somnolence.  She also complains of about 3 weeks of sinus congestion postnasal drainage slight cough.  No fever.  Also interested in seeing the urologist regarding remote previous renal cancer also states she has been getting some urinary frequency.  Recent urinalysis negative.    Erinn was seen today for follow-up.    Diagnoses and all orders for this visit:    Essential hypertension    Sleep apnea, unspecified type  -     Ambulatory referral/consult to Sleep Disorders; Future    Subacute sinusitis, unspecified location    Personal history of renal cancer  -     Ambulatory referral/consult to Urology; Future    Urinary frequency  -     Ambulatory referral/consult to Urology; Future    Other orders  -     cephALEXin (KEFLEX) 500 MG capsule; Take 1 capsule (500 mg total) by mouth every 12 (twelve) hours. for 7 days      Encourage weight loss.  Follow-up not improving with above.            Past Medical History:  Past Medical History:   Diagnosis Date    Anticoagulant long-term use     Anxiety     AR (allergic rhinitis)     Arthritis     Asthma, intermittent     Coronary artery disease     Depression     Endometrial polyp     Fatty liver     GERD (gastroesophageal reflux disease)     Hiatal hernia     Hyperlipidemia LDL goal <100     Hypertension     Mild mitral regurgitation     Multiple thyroid nodules 3/27/2014    Obesity (BMI 30-39.9) 3/27/2014    Renal cell carcinoma 2002    removed - no recurrence    S/P primary angioplasty with coronary stent 07/26/2017    Sleep apnea     Using CPAP    Solitary kidney, acquired     right     Past Surgical History:   Procedure Laterality Date    APPENDECTOMY       CARDIAC CATHETERIZATION  6/27/14    has blockages, but per patient no stents    CHOLECYSTECTOMY      COLONOSCOPY  2002    negative    COLONOSCOPY  3/25/2014         CORONARY ANGIOPLASTY  2008?    balloon angioplasty    CORONARY ANGIOPLASTY WITH STENT PLACEMENT Right 07/26/2017    NEPHRECTOMY Left 2002    [facility]    OVARIAN CYST REMOVAL      PERCUTANEOUS ENDOVENOUS LASER ABLATION OF PERIPHERAL VEIN  06/05/2019    POLYPECTOMY      endometrial polyp removal    ROTATOR CUFF REPAIR      L    THYROIDECTOMY, PARTIAL Left     TUBAL LIGATION      UPPER GASTROINTESTINAL ENDOSCOPY  04/2014     Review of patient's allergies indicates:   Allergen Reactions    Oxycodone Shortness Of Breath, Nausea And Vomiting and Other (See Comments)     Anxiety.    Plavix [clopidogrel] Diarrhea     Stomach cramps    Azithromycin Itching    Codeine Palpitations    Corticosteroids (glucocorticoids) Palpitations    Doxycycline Itching and Nausea Only    Mobic [meloxicam] Itching    Neuromuscular blockers, steroidal Palpitations    Nickel sutures [surgical stainless steel] Rash     Jewelery    Pcn [penicillins] Nausea And Vomiting    Shellfish containing products Itching     topical iodine OK    Sulfa (sulfonamide antibiotics) Nausea And Vomiting     Current Outpatient Medications on File Prior to Visit   Medication Sig Dispense Refill    acetaminophen (TYLENOL) 325 MG tablet Take 2 tablets (650 mg total) by mouth every 6 (six) hours as needed for Pain.  0    albuterol 90 mcg/actuation inhaler Inhale 2 puffs into the lungs every 6 (six) hours as needed for Wheezing. 18 g 5    amLODIPine (NORVASC) 2.5 MG tablet Take 2.5 mg by mouth once daily.      aspirin (ECOTRIN) 81 MG EC tablet Take 325 mg by mouth once daily.       atorvastatin (LIPITOR) 20 MG tablet       citalopram (CELEXA) 20 MG tablet TAKE 1 TABLET BY MOUTH DAILY (Patient taking differently: 10 mg. ) 90 tablet 3     dicyclomine (BENTYL) 20 mg tablet Take 1 tablet (20 mg total) by mouth 2 (two) times daily as needed. 10 tablet 0    famotidine (PEPCID) 20 MG tablet Take 1 tablet (20 mg total) by mouth 2 (two) times daily. 60 tablet 11    fluticasone propionate (FLONASE) 50 mcg/actuation nasal spray Use 2 sprays to each nostril daily 16 g 12    valsartan (DIOVAN) 160 MG tablet Take 1 tablet (160 mg total) by mouth once daily. 90 tablet 3    ondansetron (ZOFRAN-ODT) 4 MG TbDL Take 1 tablet (4 mg total) by mouth every 6 (six) hours as needed. (Patient not taking: Reported on 1/31/2020) 20 tablet 0    triamcinolone acetonide 0.5% (KENALOG) 0.5 % Crea Apply topically 2 (two) times daily. 30 g 0     No current facility-administered medications on file prior to visit.      Social History     Socioeconomic History    Marital status:      Spouse name: Not on file    Number of children: Not on file    Years of education: Not on file    Highest education level: Not on file   Occupational History    Not on file   Social Needs    Financial resource strain: Not on file    Food insecurity:     Worry: Not on file     Inability: Not on file    Transportation needs:     Medical: Not on file     Non-medical: Not on file   Tobacco Use    Smoking status: Never Smoker    Smokeless tobacco: Never Used   Substance and Sexual Activity    Alcohol use: No    Drug use: No    Sexual activity: Not on file   Lifestyle    Physical activity:     Days per week: Not on file     Minutes per session: Not on file    Stress: Not on file   Relationships    Social connections:     Talks on phone: Not on file     Gets together: Not on file     Attends Alevism service: Not on file     Active member of club or organization: Not on file     Attends meetings of clubs or organizations: Not on file     Relationship status: Not on file   Other Topics Concern    Not on file   Social History Narrative    Not on file     Family History   Problem  "Relation Age of Onset    Heart attack Father 67    Diabetes Paternal Aunt     Allergies Paternal Aunt     Breast cancer Paternal Aunt     Diabetes Paternal Uncle     Allergies Maternal Grandmother     Colon cancer Neg Hx     Stomach cancer Neg Hx     Angioedema Neg Hx     Atopy Neg Hx     Immunodeficiency Neg Hx     Urticaria Neg Hx     Rhinitis Neg Hx     Eczema Neg Hx     Asthma Neg Hx            ROS:  GENERAL: No fever, chills,  or significant weight changes.   CARDIOVASCULAR: Denies chest pain, PND, orthopnea or reduced exercise tolerance.  ABDOMEN: Appetite fine. Denies diarrhea, abdominal pain, hematemesis or blood in stool.  URINARY: No flank pain, dysuria or hematuria.    Vitals:    02/04/20 1600 02/04/20 1636 02/04/20 1637 02/04/20 1639   BP: 138/64 (!) 147/75 (!) 160/81 138/64   Pulse: 77 63 68    Temp: 98 °F (36.7 °C)      TempSrc: Oral      Weight: 84.4 kg (186 lb)      Height: 5' 2" (1.575 m)            Sitting   Standing  initial  Wt Readings from Last 3 Encounters:   02/04/20 84.4 kg (186 lb)   01/31/20 85.7 kg (189 lb)   12/26/19 87.5 kg (192 lb 14.4 oz)       OBJECTIVE:   APPEARANCE: Well nourished, well developed, in no acute distress.    HEAD: Normocephalic.  Atraumatic.  No sinus tenderness.  EYES:   Right eye: Pupil reactive.  Conjunctiva clear.    Left eye: Pupil reactive.  Conjunctiva clear.  EOMI.    EARS: TM's intact. Light reflex normal. No retraction or perforation.    NOSE:  Mild congestion.  MOUTH & THROAT:  No pharyngeal erythema or exudate. No lesions.  NECK: Supple. No bruits.  No JVD.  No cervical lymphadenopathy.  No thyromegaly.    CHEST: Breath sounds clear bilaterally.  Normal respiratory effort  CARDIOVASCULAR: Normal rate.  Regular rhythm.  No murmurs.  No rub.  No gallops.  ABDOMEN: Bowel sounds normal.  Soft.  No tenderness.  No organomegaly.  PERIPHERAL VASCULAR: No cyanosis.  No clubbing.  No edema.  NEUROLOGIC: No focal findings.  MENTAL STATUS: Alert.  " Oriented x 3.

## 2020-02-06 ENCOUNTER — PATIENT MESSAGE (OUTPATIENT)
Dept: FAMILY MEDICINE | Facility: CLINIC | Age: 65
End: 2020-02-06

## 2020-02-07 ENCOUNTER — PATIENT MESSAGE (OUTPATIENT)
Dept: FAMILY MEDICINE | Facility: CLINIC | Age: 65
End: 2020-02-07

## 2020-02-07 ENCOUNTER — TELEPHONE (OUTPATIENT)
Dept: FAMILY MEDICINE | Facility: CLINIC | Age: 65
End: 2020-02-07

## 2020-02-07 DIAGNOSIS — R10.9 ABDOMINAL PAIN, UNSPECIFIED ABDOMINAL LOCATION: Primary | ICD-10-CM

## 2020-02-07 DIAGNOSIS — R10.9 ABDOMINAL DISCOMFORT: Primary | ICD-10-CM

## 2020-03-03 ENCOUNTER — OFFICE VISIT (OUTPATIENT)
Dept: UROLOGY | Facility: CLINIC | Age: 65
End: 2020-03-03
Payer: COMMERCIAL

## 2020-03-03 VITALS — HEIGHT: 62 IN | WEIGHT: 188.06 LBS | BODY MASS INDEX: 34.61 KG/M2

## 2020-03-03 DIAGNOSIS — R35.0 URINARY FREQUENCY: ICD-10-CM

## 2020-03-03 DIAGNOSIS — N39.0 RECURRENT UTI: ICD-10-CM

## 2020-03-03 DIAGNOSIS — R31.29 MICROHEMATURIA: Primary | ICD-10-CM

## 2020-03-03 DIAGNOSIS — Z85.528 PERSONAL HISTORY OF RENAL CANCER: ICD-10-CM

## 2020-03-03 LAB
BILIRUB SERPL-MCNC: NORMAL MG/DL
BLOOD URINE, POC: NORMAL
COLOR, POC UA: YELLOW
GLUCOSE UR QL STRIP: NORMAL
KETONES UR QL STRIP: NORMAL
LEUKOCYTE ESTERASE URINE, POC: NORMAL
NITRITE, POC UA: NORMAL
PH, POC UA: 7
PROTEIN, POC: NORMAL
SPECIFIC GRAVITY, POC UA: 1.01
UROBILINOGEN, POC UA: NORMAL

## 2020-03-03 PROCEDURE — 88112 CYTOPATH CELL ENHANCE TECH: CPT | Mod: 26,,, | Performed by: PATHOLOGY

## 2020-03-03 PROCEDURE — 99214 PR OFFICE/OUTPT VISIT, EST, LEVL IV, 30-39 MIN: ICD-10-PCS | Mod: 25,S$GLB,, | Performed by: UROLOGY

## 2020-03-03 PROCEDURE — 3008F PR BODY MASS INDEX (BMI) DOCUMENTED: ICD-10-PCS | Mod: CPTII,S$GLB,, | Performed by: UROLOGY

## 2020-03-03 PROCEDURE — 88112 PR  CYTOPATH, CELL ENHANCE TECH: ICD-10-PCS | Mod: 26,,, | Performed by: PATHOLOGY

## 2020-03-03 PROCEDURE — 81002 URINALYSIS NONAUTO W/O SCOPE: CPT | Mod: S$GLB,,, | Performed by: UROLOGY

## 2020-03-03 PROCEDURE — 88112 CYTOPATH CELL ENHANCE TECH: CPT | Performed by: PATHOLOGY

## 2020-03-03 PROCEDURE — 99999 PR PBB SHADOW E&M-EST. PATIENT-LVL III: CPT | Mod: PBBFAC,,, | Performed by: UROLOGY

## 2020-03-03 PROCEDURE — 3008F BODY MASS INDEX DOCD: CPT | Mod: CPTII,S$GLB,, | Performed by: UROLOGY

## 2020-03-03 PROCEDURE — 99214 OFFICE O/P EST MOD 30 MIN: CPT | Mod: 25,S$GLB,, | Performed by: UROLOGY

## 2020-03-03 PROCEDURE — 81002 POCT URINE DIPSTICK WITHOUT MICROSCOPE: ICD-10-PCS | Mod: S$GLB,,, | Performed by: UROLOGY

## 2020-03-03 PROCEDURE — 99999 PR PBB SHADOW E&M-EST. PATIENT-LVL III: ICD-10-PCS | Mod: PBBFAC,,, | Performed by: UROLOGY

## 2020-03-03 RX ORDER — CARVEDILOL 3.12 MG/1
TABLET ORAL
COMMUNITY
Start: 2020-02-14 | End: 2020-03-03

## 2020-03-03 NOTE — LETTER
March 3, 2020      Layo Reed MD  [address] [facility] [address]           [facility]  [address] [place] [address]  Phone: [phone]  Fax: [phone]          Patient: Erinn Silva   MR Number: [id number]   YOB: 1955   Date of Visit: 3/3/2020       Dear Dr. Layo Reed:    Thank you for referring Erinn Silva to me for evaluation. Attached you will find relevant portions of my assessment and plan of care.    If you have questions, please do not hesitate to call me. I look forward to following Erinn Silva along with you.    Sincerely,    Kg Barney MD    Enclosure  CC:  No Recipients    If you would like to receive this communication electronically, please contact [email or web address] or [phone] to request more information on [facility] access.    For providers and/or their staff who would like to refer a patient to [facility], please contact us through our one-stop-shop provider referral line, [facility], at [phone].    If you feel you have received this communication in error or would no longer like to receive these types of communications, please e-mail [email or web address]

## 2020-03-03 NOTE — PROGRESS NOTES
UROLOGY [place]  3 3 20    Cc frequent uti    Age 64, says she feels like her urination might not be completely normal. She has had 3 infections in her urine in the last year. Symptoms of uti for her have been urinary frequency, urgency, dysuria, fatigue, low grade fever.     She has also been having some discomfort on the r flank/lumbar area, and wonders if it is the kidney causing that.     From time to time she is told that she has blood in her urine sample. Has never had gross hematuria.     Nocturia x 0-1, no pains or burning.     Has a hx of radical nephrectomy in 2002 for renal cancer, done at [facility] ([facility]). Did very well after that.     Her renal function has remained very good, creatinine 0.8 last month, eGFR >60.     In aug 2017 she had frequent uti's and underwent a urologic w/u for that, finding a strictured urethra, which was dilated, the postvoid residual was 63 ml, and her solitary kidney was normal.     [facility]    Surgical:  has a past surgical history that includes Cholecystectomy; Ovarian cyst removal; Tubal ligation; Rotator cuff repair; Colonoscopy (2002); Colonoscopy (3/25/2014); Coronary angioplasty (2008?); Cardiac catheterization (6/27/14); Nephrectomy (Left, 2002); Polypectomy; Appendectomy; Thyroidectomy, partial (Left); Coronary angioplasty with stent (Right, 07/26/2017); Upper gastrointestinal endoscopy (04/2014); and Percutaneous endovenous laser ablation of peripheral vein (06/05/2019).    Medical:  has a past medical history of Anticoagulant long-term use, Anxiety, AR (allergic rhinitis), Arthritis, Asthma, intermittent, Coronary artery disease, Depression, Endometrial polyp, Fatty liver, GERD (gastroesophageal reflux disease), Hiatal hernia, Hyperlipidemia LDL goal <100, Hypertension, Mild mitral regurgitation, Multiple thyroid nodules, Obesity (BMI 30-39.9), Renal cell carcinoma, S/P primary angioplasty with coronary stent, Sleep apnea, and Solitary kidney,  acquired.    Familial: no fh of renal disease    Social: , lives in [place], [place]    Meds:   Current Outpatient Medications on File Prior to Visit   Medication Sig Dispense Refill    acetaminophen (TYLENOL) 325 MG tablet Take 2 tablets (650 mg to  0    albuterol 90 mcg/actuation inhaler Inhale 2 puffs into the nandini. 18 g 5    amLODIPine (NORVASC) 2.5 MG tablet Take 2.5 mg by mouth once daily.      aspirin (ECOTRIN) 81 MG EC tablet Take 325 mg by mouth once daily.       atorvastatin (LIPITOR) 20       citalopram (CELEXA) 20 M TAKE 1 TABLET 90 tablet 3    famotidine (PEPCID) 20 MG tablet Take 1 tablet (20 mg total) by . 60 tablet 11    fluticasone propionate (FLONASE) 50 mcg/actuation nasal spray Use 2 sprays to each nostril daily 16 g 12    triamcinolone acetonide 0.5% (KENALOG) 0.5 % Crea Apply topically 2 (two) times daily. 30 g 0    valsartan (DIOVAN) 160 MG tablet Take 1 tablet (160 mg total) 90 tablet 3       Pt alert, oriented, no distress  HEENT: wnl.  Neck: supple, no JVD, no lymphadenopathy  Chest: CV NSR  Lungs: normal chest expansion, no labored breathing  Abdomen flat, nontender, no organomegaly, no masses.  Her point of tenderness appears to be in the mid to high back.  It is possible that her pain is musculoskeletal  Extremities: no edema, peripheral pulses nl  Neuro: preserved    IMP  Recurrent uti  Hx of urethral stricture  Solitary kidney  Microhematuria    I am ordering cystoscopy, with possible need to redilate the urethra; urine cytology, renal ultrasound, bladder scan    Will discuss the following points:  -atrophic vaginitis and use of estrace vaginal cream  -hygienic measures for prevention of uti  -need for confirming suspected uti's with urinalysis and especially with urine culture  - the superior reliability of catheterized specimens over voided specimens to determine presence of true uti  - possible use of prophylactic antibiotics for uti prevention  - the notion of  asymptomatic bacteriuria and bladder colonization

## 2020-03-05 ENCOUNTER — PATIENT MESSAGE (OUTPATIENT)
Dept: FAMILY MEDICINE | Facility: CLINIC | Age: 65
End: 2020-03-05

## 2020-03-05 LAB — FINAL PATHOLOGIC DIAGNOSIS: NORMAL

## 2020-03-18 ENCOUNTER — HOSPITAL ENCOUNTER (OUTPATIENT)
Dept: RADIOLOGY | Facility: HOSPITAL | Age: 65
Discharge: HOME | End: 2020-03-18
Attending: UROLOGY
Payer: COMMERCIAL

## 2020-03-18 DIAGNOSIS — Z85.528 PERSONAL HISTORY OF RENAL CANCER: ICD-10-CM

## 2020-03-18 DIAGNOSIS — R35.0 URINARY FREQUENCY: ICD-10-CM

## 2020-03-18 DIAGNOSIS — R31.29 MICROHEMATURIA: ICD-10-CM

## 2020-03-18 PROCEDURE — 76770 US EXAM ABDO BACK WALL COMP: CPT | Mod: 26,,, | Performed by: RADIOLOGY

## 2020-03-18 PROCEDURE — 76770 US RETROPERITONEAL COMPLETE: ICD-10-PCS | Mod: 26,,, | Performed by: RADIOLOGY

## 2020-03-18 PROCEDURE — 76770 US EXAM ABDO BACK WALL COMP: CPT | Mod: TC,PO

## 2020-03-23 ENCOUNTER — PATIENT MESSAGE (OUTPATIENT)
Dept: FAMILY MEDICINE | Facility: CLINIC | Age: 65
End: 2020-03-23

## 2020-03-23 DIAGNOSIS — Z76.0 MEDICATION REFILL: ICD-10-CM

## 2020-03-24 RX ORDER — ALBUTEROL SULFATE 90 UG/1
2 AEROSOL, METERED RESPIRATORY (INHALATION) EVERY 6 HOURS PRN
Qty: 18 G | Refills: 3 | Status: SHIPPED | OUTPATIENT
Start: 2020-03-24 | End: 2023-11-30 | Stop reason: SDUPTHER

## 2020-03-25 RX ORDER — CARVEDILOL 3.12 MG/1
TABLET ORAL
COMMUNITY
Start: 2020-03-12 | End: 2021-01-20

## 2020-06-02 ENCOUNTER — TELEPHONE (OUTPATIENT)
Dept: UROLOGY | Facility: CLINIC | Age: 65
End: 2020-06-02

## 2020-06-02 DIAGNOSIS — N39.0 RECURRENT UTI: Primary | ICD-10-CM

## 2020-06-02 NOTE — TELEPHONE ENCOUNTER
----- Message from Cas Guadarrama sent at 6/2/2020 10:04 AM CDT -----  Contact: pt  Type:  Patient Returning Call    Who Called:  pt   Does the patient know what this is regarding?:  Questions regarding appt tomorrow. Very important.  Best Call Back Number:   Additional Information:  Please follow up. Pt needs to speak with office today. Thank you

## 2020-06-02 NOTE — TELEPHONE ENCOUNTER
Entered orders for UA reflexive to UC. Pt states she is having symptoms of UTI. She will go in for urine to [facility]

## 2020-06-02 NOTE — TELEPHONE ENCOUNTER
Patient wanting to know if she can get a urine specimen cup and get sample at home. Advised should be no problem to  a cup and do home specimen

## 2020-06-02 NOTE — TELEPHONE ENCOUNTER
----- Message from Candice Salinas MA sent at 6/2/2020  8:45 AM CDT -----  Contact: self/   Type:  Needs Medical Advice    Who Called: Patient   Symptoms (please be specific): Bladder infection   How long has patient had these symptoms:    Pharmacy name and phone #:    Would the patient rather a call back or a response via [facility]? Call Back  Best Call Back Number:   Additional Information: Pt is concerned in regards to her procedure (CYSTOSCOPY) she is to have but stated she may have a Bladder infection. Pt is requesting a call back. Please call back to discuss.

## 2020-06-03 ENCOUNTER — LAB VISIT (OUTPATIENT)
Dept: LAB | Facility: HOSPITAL | Age: 65
End: 2020-06-03
Attending: UROLOGY
Payer: COMMERCIAL

## 2020-06-03 DIAGNOSIS — N39.0 RECURRENT UTI: ICD-10-CM

## 2020-06-03 LAB
BILIRUB UR QL STRIP: NEGATIVE
CLARITY UR: CLEAR
COLOR UR: YELLOW
GLUCOSE UR QL STRIP: NEGATIVE
HGB UR QL STRIP: NEGATIVE
KETONES UR QL STRIP: NEGATIVE
LEUKOCYTE ESTERASE UR QL STRIP: NEGATIVE
NITRITE UR QL STRIP: NEGATIVE
PH UR STRIP: 6 [PH] (ref 5–8)
PROT UR QL STRIP: NEGATIVE
SP GR UR STRIP: 1.01 (ref 1–1.03)
URN SPEC COLLECT METH UR: NORMAL

## 2020-06-03 PROCEDURE — 81003 URINALYSIS AUTO W/O SCOPE: CPT | Mod: PO

## 2020-06-09 ENCOUNTER — PROCEDURE VISIT (OUTPATIENT)
Dept: UROLOGY | Facility: CLINIC | Age: 65
End: 2020-06-09
Payer: COMMERCIAL

## 2020-06-09 VITALS
WEIGHT: 188.06 LBS | BODY MASS INDEX: 34.61 KG/M2 | DIASTOLIC BLOOD PRESSURE: 70 MMHG | HEART RATE: 70 BPM | HEIGHT: 62 IN | SYSTOLIC BLOOD PRESSURE: 129 MMHG

## 2020-06-09 DIAGNOSIS — R31.29 MICROHEMATURIA: ICD-10-CM

## 2020-06-09 DIAGNOSIS — N39.0 RECURRENT UTI: Primary | ICD-10-CM

## 2020-06-09 DIAGNOSIS — Z85.528 PERSONAL HISTORY OF RENAL CANCER: ICD-10-CM

## 2020-06-09 DIAGNOSIS — R35.0 URINARY FREQUENCY: ICD-10-CM

## 2020-06-09 DIAGNOSIS — R33.9 INCOMPLETE BLADDER EMPTYING: ICD-10-CM

## 2020-06-09 LAB
BILIRUB SERPL-MCNC: ABNORMAL MG/DL
BLOOD URINE, POC: ABNORMAL
CLARITY, POC UA: CLEAR
COLOR, POC UA: YELLOW
GLUCOSE UR QL STRIP: ABNORMAL
KETONES UR QL STRIP: ABNORMAL
LEUKOCYTE ESTERASE URINE, POC: ABNORMAL
NITRITE, POC UA: ABNORMAL
PH, POC UA: 5.5
PROTEIN, POC: ABNORMAL
SPECIFIC GRAVITY, POC UA: 1.01
UROBILINOGEN, POC UA: 0.2

## 2020-06-09 PROCEDURE — 51798 US URINE CAPACITY MEASURE: CPT | Mod: S$GLB,,, | Performed by: UROLOGY

## 2020-06-09 PROCEDURE — 52000 PR CYSTOURETHROSCOPY: ICD-10-PCS | Mod: S$GLB,,, | Performed by: UROLOGY

## 2020-06-09 PROCEDURE — 81002 POCT URINE DIPSTICK WITHOUT MICROSCOPE: ICD-10-PCS | Mod: S$GLB,,, | Performed by: UROLOGY

## 2020-06-09 PROCEDURE — 51798 PR MEAS,POST-VOID RES,US,NON-IMAGING: ICD-10-PCS | Mod: S$GLB,,, | Performed by: UROLOGY

## 2020-06-09 PROCEDURE — 81002 URINALYSIS NONAUTO W/O SCOPE: CPT | Mod: S$GLB,,, | Performed by: UROLOGY

## 2020-06-09 PROCEDURE — 52000 CYSTOURETHROSCOPY: CPT | Mod: S$GLB,,, | Performed by: UROLOGY

## 2020-06-09 RX ORDER — NEBIVOLOL 5 MG/1
5 TABLET ORAL
COMMUNITY
Start: 2020-05-08 | End: 2021-01-20

## 2020-06-09 NOTE — PROCEDURES
Procedures   UROLOGY [place]  6 9 20     Cc microhematuria and frequent uti     Age 64. Has hx of radical nephrectomy in 2002 for renal cancer, done at [facility] ([facility]). Did very well after that. Her renal function has remained very good     In aug 2017 she had frequent uti's and underwent a urologic w/u for that, finding a strictured urethra, which was dilated, the postvoid residual was 63 ml, and her solitary kidney was normal.      CYSTOSCOPY and dilatation of urethral stricture: attempted placement of cystoscope in urethra, but there is a urethral stenosis that prevents that from being done. The scope was removed and we brought in medardo dilators, and started dilating at 16 F, continuing with even number dilators up to size 28 F. there wsa marked resistance in all the sizes. There was a small amount of bleeding from the dilatation. Then we brought back the olympus flexible. Urethra was now ample, no   diverticulum. Bladder cavity distended symmetrically and equall. Mucosal layer with no lesions. No abnormal trabeculation. Location and shape of the ureteral orifices normal. Pt tolerated the procedure well.     BLADDERSCAN ULTRASOUND 22    ml residual    URINE CYTOLOGY no malignant cells    RENAL ULTRASOUND  1. Prior left nephrectomy  2. Normal sonographic appearance of the right kidney.     IMP  Recurrent uti  Hx of urethral stricture  Solitary kidney  Microhematuria     We discuss atrophic vaginitis and how this leads to urethral stricture, may benefit from use of estrace vaginal cream  - discussed hygienic measures for prevention of uti  -need for confirming suspected uti's with urinalysis and especially with urine culture  - possible use of prophylactic antibiotics for uti prevention if infections are too frequent.  - the notion of asymptomatic bacteriuria and bladder colonization, and how only symptomatic bladder infections ought to be treated.     Pt reassured, RTC yearly

## 2020-06-28 NOTE — PATIENT INSTRUCTIONS
- suspect 2/2 worsening renal function, blood losses,   - no reports of chest pain per ED note   - monitor [id number]      Report to ER immediately if symptoms worsen

## 2020-08-11 ENCOUNTER — OFFICE VISIT (OUTPATIENT)
Dept: FAMILY MEDICINE | Facility: CLINIC | Age: 65
End: 2020-08-11
Payer: COMMERCIAL

## 2020-08-11 VITALS
BODY MASS INDEX: 34.34 KG/M2 | HEART RATE: 66 BPM | TEMPERATURE: 97 F | WEIGHT: 186.63 LBS | HEIGHT: 62 IN | DIASTOLIC BLOOD PRESSURE: 69 MMHG | SYSTOLIC BLOOD PRESSURE: 132 MMHG

## 2020-08-11 DIAGNOSIS — M54.50 RIGHT LOW BACK PAIN, UNSPECIFIED CHRONICITY, UNSPECIFIED WHETHER SCIATICA PRESENT: Primary | ICD-10-CM

## 2020-08-11 DIAGNOSIS — E66.9 CLASS 1 OBESITY WITH SERIOUS COMORBIDITY AND BODY MASS INDEX (BMI) OF 34.0 TO 34.9 IN ADULT, UNSPECIFIED OBESITY TYPE: ICD-10-CM

## 2020-08-11 PROCEDURE — 99213 OFFICE O/P EST LOW 20 MIN: CPT | Mod: S$GLB,,, | Performed by: FAMILY MEDICINE

## 2020-08-11 PROCEDURE — 3008F BODY MASS INDEX DOCD: CPT | Mod: CPTII,S$GLB,, | Performed by: FAMILY MEDICINE

## 2020-08-11 PROCEDURE — 3075F PR MOST RECENT SYSTOLIC BLOOD PRESS GE 130-139MM HG: ICD-10-PCS | Mod: CPTII,S$GLB,, | Performed by: FAMILY MEDICINE

## 2020-08-11 PROCEDURE — 3008F PR BODY MASS INDEX (BMI) DOCUMENTED: ICD-10-PCS | Mod: CPTII,S$GLB,, | Performed by: FAMILY MEDICINE

## 2020-08-11 PROCEDURE — 3075F SYST BP GE 130 - 139MM HG: CPT | Mod: CPTII,S$GLB,, | Performed by: FAMILY MEDICINE

## 2020-08-11 PROCEDURE — 3078F DIAST BP <80 MM HG: CPT | Mod: CPTII,S$GLB,, | Performed by: FAMILY MEDICINE

## 2020-08-11 PROCEDURE — 99213 PR OFFICE/OUTPT VISIT, EST, LEVL III, 20-29 MIN: ICD-10-PCS | Mod: S$GLB,,, | Performed by: FAMILY MEDICINE

## 2020-08-11 PROCEDURE — 99999 PR PBB SHADOW E&M-EST. PATIENT-LVL III: ICD-10-PCS | Mod: PBBFAC,,, | Performed by: FAMILY MEDICINE

## 2020-08-11 PROCEDURE — 99999 PR PBB SHADOW E&M-EST. PATIENT-LVL III: CPT | Mod: PBBFAC,,, | Performed by: FAMILY MEDICINE

## 2020-08-11 PROCEDURE — 3078F PR MOST RECENT DIASTOLIC BLOOD PRESSURE < 80 MM HG: ICD-10-PCS | Mod: CPTII,S$GLB,, | Performed by: FAMILY MEDICINE

## 2020-08-11 RX ORDER — BRIMONIDINE TARTRATE 1.5 MG/ML
1 SOLUTION/ DROPS OPHTHALMIC DAILY
COMMUNITY
End: 2021-10-27

## 2020-08-11 RX ORDER — DEXTROMETHORPHAN HYDROBROMIDE, GUAIFENESIN 5; 100 MG/5ML; MG/5ML
1300 LIQUID ORAL EVERY 8 HOURS
Refills: 0 | COMMUNITY
Start: 2020-08-11

## 2020-08-11 NOTE — PROGRESS NOTES
Patient complains of lower back pain. Located on the_ right lower back. Symptoms started couple months ago _ . No injury. no bowel or bladder complaints. Symptoms do _ radiate rarely to the right posterior thigh.  She has tried low-dose Tylenol without resolution.    Erinn was seen today for back pain and annual exam.    Diagnoses and all orders for this visit:    Right low back pain, unspecified chronicity, unspecified whether sciatica present  -     Ambulatory referral/consult to Physical/Occupational Therapy; Future    Class 1 obesity with serious comorbidity and body mass index (BMI) of 34.0 to 34.9 in adult, unspecified obesity type    Other orders  -     acetaminophen (TYLENOL) 650 MG TbSR; Take 2 tablets (1,300 mg total) by mouth every 8 (eight) hours.  -     Zoster Recombinant Vaccine  -     Zoster Recombinant Vaccine      Encourage weight loss.  Follow-up if symptoms worsen or not improving with above.   Request report from last Pap smear        Past Medical History:  Past Medical History:   Diagnosis Date    Anticoagulant long-term use     Anxiety     AR (allergic rhinitis)     Arthritis     Asthma, intermittent     Coronary artery disease     Depression     Endometrial polyp     Fatty liver     GERD (gastroesophageal reflux disease)     Hiatal hernia     Hyperlipidemia LDL goal <100     Hypertension     Mild mitral regurgitation     Multiple thyroid nodules 3/27/2014    Obesity (BMI 30-39.9) 3/27/2014    Renal cell carcinoma 2002    removed - no recurrence    S/P primary angioplasty with coronary stent 07/26/2017    Sleep apnea     Using CPAP    Solitary kidney, acquired     right     Past Surgical History:   Procedure Laterality Date    APPENDECTOMY      CARDIAC CATHETERIZATION  6/27/14    has blockages, but per patient no stents    CHOLECYSTECTOMY      COLONOSCOPY  2002    negative    COLONOSCOPY  3/25/2014         CORONARY ANGIOPLASTY  2008?    balloon angioplasty     CORONARY ANGIOPLASTY WITH STENT PLACEMENT Right 07/26/2017    NEPHRECTOMY Left 2002    [facility] - [facility]    OVARIAN CYST REMOVAL      PERCUTANEOUS ENDOVENOUS LASER ABLATION OF PERIPHERAL VEIN  06/05/2019    POLYPECTOMY      endometrial polyp removal    ROTATOR CUFF REPAIR      L    THYROIDECTOMY, PARTIAL Left     TUBAL LIGATION      UPPER GASTROINTESTINAL ENDOSCOPY  04/2014     Review of patient's allergies indicates:   Allergen Reactions    Oxycodone Shortness Of Breath, Nausea And Vomiting and Other (See Comments)     Anxiety.    Plavix [clopidogrel] Diarrhea     Stomach cramps    Azithromycin Itching    Codeine Palpitations    Corticosteroids (glucocorticoids) Palpitations    Doxycycline Itching and Nausea Only    Mobic [meloxicam] Itching    Neuromuscular blockers, steroidal Palpitations    Nickel sutures [surgical stainless steel] Rash     Jewelery    Pcn [penicillins] Nausea And Vomiting    Shellfish containing products Itching     topical iodine OK    Sulfa (sulfonamide antibiotics) Nausea And Vomiting     Current Outpatient Medications on File Prior to Visit   Medication Sig Dispense Refill    albuterol (PROVENTIL/VENTOLIN HFA) 90 mcg/actuation inhaler Inhale 2 puffs into the lungs every 6 (six) hours as needed for Wheezing. 18 g 3    amLODIPine (NORVASC) 2.5 MG tablet Take 2.5 mg by mouth once daily.      aspirin (ECOTRIN) 81 MG EC tablet Take 325 mg by mouth once daily.       atorvastatin (LIPITOR) 20 MG tablet       brimonidine 0.15 % OPTH DROP (ALPHAGAN) 0.15 % ophthalmic solution Place 1 drop into both eyes once daily.      citalopram (CELEXA) 20 MG tablet TAKE 1 TABLET BY MOUTH DAILY (Patient taking differently: 10 mg. ) 90 tablet 3    fluticasone propionate (FLONASE) 50 mcg/actuation nasal spray Use 2 sprays to each nostril daily 16 g 12    valsartan (DIOVAN) 160 MG tablet Take 1 tablet (160 mg total) by mouth once daily. 90 tablet 3     [DISCONTINUED] acetaminophen (TYLENOL) 325 MG tablet Take 2 tablets (650 mg total) by mouth every 6 (six) hours as needed for Pain.  0    carvediloL (COREG) 3.125 MG tablet       famotidine (PEPCID) 20 MG tablet Take 1 tablet (20 mg total) by mouth 2 (two) times daily. (Patient not taking: Reported on 8/11/2020) 60 tablet 11    nebivoloL (BYSTOLIC) 5 MG Tab Take 5 mg by mouth.      triamcinolone acetonide 0.5% (KENALOG) 0.5 % Crea Apply topically 2 (two) times daily. 30 g 0     No current facility-administered medications on file prior to visit.      Social History     Socioeconomic History    Marital status:      Spouse name: Not on file    Number of children: Not on file    Years of education: Not on file    Highest education level: Not on file   Occupational History    Not on file   Social Needs    Financial resource strain: Not on file    Food insecurity     Worry: Not on file     Inability: Not on file    Transportation needs     Medical: Not on file     Non-medical: Not on file   Tobacco Use    Smoking status: Never Smoker    Smokeless tobacco: Never Used   Substance and Sexual Activity    Alcohol use: No    Drug use: No    Sexual activity: Not on file   Lifestyle    Physical activity     Days per week: Not on file     Minutes per session: Not on file    Stress: Not on file   Relationships    Social connections     Talks on phone: Not on file     Gets together: Not on file     Attends Alevism service: Not on file     Active member of club or organization: Not on file     Attends meetings of clubs or organizations: Not on file     Relationship status: Not on file   Other Topics Concern    Not on file   Social History Narrative    Not on file     Family History   Problem Relation Age of Onset    Heart attack Father 67    Diabetes Paternal Aunt     Allergies Paternal Aunt     Breast cancer Paternal Aunt     Diabetes Paternal Uncle     Allergies Maternal Grandmother     Colon  "cancer Neg Hx     Stomach cancer Neg Hx     Angioedema Neg Hx     Atopy Neg Hx     Immunodeficiency Neg Hx     Urticaria Neg Hx     Rhinitis Neg Hx     Eczema Neg Hx     Asthma Neg Hx        Vitals:    08/11/20 0843   BP: 132/69   Pulse: 66   Temp: 97.2 °F (36.2 °C)   Weight: 84.6 kg (186 lb 9.6 oz)   Height: 5' 2" (1.575 m)       Physical Exam:   general: No acute distress   Chest clear to auscultation. Normal respiratory effort   Heart: Regular rate and rhythm .   Abdomen: Positive bowel sounds soft nontender no hepato- splenomegaly.   Back:  Mild Decreased range of motion. Tender to palpation over the _ right lower back/SI joint area. No spinous tenderness. Negative straight leg raise. Normal gait. Deep tendon reflexes intact. Able to stand on heels and toes .  No rash.          "

## 2020-10-02 ENCOUNTER — PATIENT OUTREACH (OUTPATIENT)
Dept: ADMINISTRATIVE | Facility: HOSPITAL | Age: 65
End: 2020-10-02

## 2020-10-09 NOTE — PROGRESS NOTES
Received most recent pap smear, hpv, and pathology report scanned into chart today and sent to [facility].

## 2020-10-13 ENCOUNTER — TELEPHONE (OUTPATIENT)
Dept: FAMILY MEDICINE | Facility: CLINIC | Age: 65
End: 2020-10-13

## 2020-10-15 ENCOUNTER — OFFICE VISIT (OUTPATIENT)
Dept: FAMILY MEDICINE | Facility: CLINIC | Age: 65
End: 2020-10-15
Payer: COMMERCIAL

## 2020-10-15 VITALS
WEIGHT: 188.38 LBS | HEART RATE: 64 BPM | TEMPERATURE: 97 F | SYSTOLIC BLOOD PRESSURE: 135 MMHG | DIASTOLIC BLOOD PRESSURE: 68 MMHG | HEIGHT: 62 IN | BODY MASS INDEX: 34.67 KG/M2

## 2020-10-15 DIAGNOSIS — G47.30 SLEEP APNEA, UNSPECIFIED TYPE: ICD-10-CM

## 2020-10-15 DIAGNOSIS — R30.0 DYSURIA: Primary | ICD-10-CM

## 2020-10-15 DIAGNOSIS — E78.5 HYPERLIPIDEMIA LDL GOAL <100: ICD-10-CM

## 2020-10-15 DIAGNOSIS — E04.2 MULTIPLE THYROID NODULES: ICD-10-CM

## 2020-10-15 DIAGNOSIS — I25.10 CORONARY ARTERY DISEASE INVOLVING NATIVE CORONARY ARTERY OF NATIVE HEART WITHOUT ANGINA PECTORIS: ICD-10-CM

## 2020-10-15 DIAGNOSIS — I10 ESSENTIAL HYPERTENSION: ICD-10-CM

## 2020-10-15 DIAGNOSIS — F33.0 MILD EPISODE OF RECURRENT MAJOR DEPRESSIVE DISORDER: ICD-10-CM

## 2020-10-15 LAB
BILIRUB UR QL STRIP: NEGATIVE
CLARITY UR: CLEAR
COLOR UR: YELLOW
GLUCOSE UR QL STRIP: NEGATIVE
HGB UR QL STRIP: ABNORMAL
KETONES UR QL STRIP: NEGATIVE
LEUKOCYTE ESTERASE UR QL STRIP: NEGATIVE
NITRITE UR QL STRIP: NEGATIVE
PH UR STRIP: 7 [PH] (ref 5–8)
PROT UR QL STRIP: NEGATIVE
SP GR UR STRIP: 1.01 (ref 1–1.03)
URN SPEC COLLECT METH UR: ABNORMAL

## 2020-10-15 PROCEDURE — 99999 PR PBB SHADOW E&M-EST. PATIENT-LVL IV: CPT | Mod: PBBFAC,,, | Performed by: FAMILY MEDICINE

## 2020-10-15 PROCEDURE — 3075F SYST BP GE 130 - 139MM HG: CPT | Mod: CPTII,S$GLB,, | Performed by: FAMILY MEDICINE

## 2020-10-15 PROCEDURE — 90750 HZV VACC RECOMBINANT IM: CPT | Mod: S$GLB,,, | Performed by: FAMILY MEDICINE

## 2020-10-15 PROCEDURE — 3008F BODY MASS INDEX DOCD: CPT | Mod: CPTII,S$GLB,, | Performed by: FAMILY MEDICINE

## 2020-10-15 PROCEDURE — 3008F PR BODY MASS INDEX (BMI) DOCUMENTED: ICD-10-PCS | Mod: CPTII,S$GLB,, | Performed by: FAMILY MEDICINE

## 2020-10-15 PROCEDURE — 90750 ZOSTER RECOMBINANT VACCINE: ICD-10-PCS | Mod: S$GLB,,, | Performed by: FAMILY MEDICINE

## 2020-10-15 PROCEDURE — 90686 FLU VACCINE (QUAD) GREATER THAN OR EQUAL TO 3YO PRESERVATIVE FREE IM: ICD-10-PCS | Mod: S$GLB,,, | Performed by: FAMILY MEDICINE

## 2020-10-15 PROCEDURE — 90472 ZOSTER RECOMBINANT VACCINE: ICD-10-PCS | Mod: S$GLB,,, | Performed by: FAMILY MEDICINE

## 2020-10-15 PROCEDURE — 90472 IMMUNIZATION ADMIN EACH ADD: CPT | Mod: S$GLB,,, | Performed by: FAMILY MEDICINE

## 2020-10-15 PROCEDURE — 3078F DIAST BP <80 MM HG: CPT | Mod: CPTII,S$GLB,, | Performed by: FAMILY MEDICINE

## 2020-10-15 PROCEDURE — 81002 URINALYSIS NONAUTO W/O SCOPE: CPT | Mod: PO

## 2020-10-15 PROCEDURE — 99214 PR OFFICE/OUTPT VISIT, EST, LEVL IV, 30-39 MIN: ICD-10-PCS | Mod: 25,S$GLB,, | Performed by: FAMILY MEDICINE

## 2020-10-15 PROCEDURE — 99214 OFFICE O/P EST MOD 30 MIN: CPT | Mod: 25,S$GLB,, | Performed by: FAMILY MEDICINE

## 2020-10-15 PROCEDURE — 90471 IMMUNIZATION ADMIN: CPT | Mod: S$GLB,,, | Performed by: FAMILY MEDICINE

## 2020-10-15 PROCEDURE — 3075F PR MOST RECENT SYSTOLIC BLOOD PRESS GE 130-139MM HG: ICD-10-PCS | Mod: CPTII,S$GLB,, | Performed by: FAMILY MEDICINE

## 2020-10-15 PROCEDURE — 90686 IIV4 VACC NO PRSV 0.5 ML IM: CPT | Mod: S$GLB,,, | Performed by: FAMILY MEDICINE

## 2020-10-15 PROCEDURE — 99999 PR PBB SHADOW E&M-EST. PATIENT-LVL IV: ICD-10-PCS | Mod: PBBFAC,,, | Performed by: FAMILY MEDICINE

## 2020-10-15 PROCEDURE — 90471 FLU VACCINE (QUAD) GREATER THAN OR EQUAL TO 3YO PRESERVATIVE FREE IM: ICD-10-PCS | Mod: S$GLB,,, | Performed by: FAMILY MEDICINE

## 2020-10-15 PROCEDURE — 3078F PR MOST RECENT DIASTOLIC BLOOD PRESSURE < 80 MM HG: ICD-10-PCS | Mod: CPTII,S$GLB,, | Performed by: FAMILY MEDICINE

## 2020-10-15 RX ORDER — ESCITALOPRAM OXALATE 10 MG/1
10 TABLET ORAL DAILY
Qty: 30 TABLET | Refills: 1 | Status: SHIPPED | OUTPATIENT
Start: 2020-10-15 | End: 2020-11-19 | Stop reason: SDUPTHER

## 2020-10-16 ENCOUNTER — PATIENT MESSAGE (OUTPATIENT)
Dept: FAMILY MEDICINE | Facility: CLINIC | Age: 65
End: 2020-10-16

## 2020-10-16 NOTE — PROGRESS NOTES
Patient noted some dysuria a couple of days ago.  Has resolved now.  She did have some intermittent urgency also resolved.  Depression having some continued symptoms.  She is interested in trying Lexapro.  She is only using 10 mg of Celexa.  No SI/HI.  Coronary disease due for lipid panel.  Sees Cardiology.  Sleep apnea needs follow-up with sleep medicine.  Hypertension blood pressure controlled.  Previous multiple thyroid nodules unchanged on previous imaging.      Erinn was seen today for nausea and burning with urination.    Diagnoses and all orders for this visit:    Dysuria  -     Urinalysis    Mild episode of recurrent major depressive disorder    Coronary artery disease involving native coronary artery of native heart without angina pectoris  -     Lipid Panel; Future    Hyperlipidemia LDL goal <100  -     Lipid Panel; Future    Sleep apnea, unspecified type  -     Ambulatory referral/consult to Sleep Disorders; Future    Essential hypertension  -     Comprehensive Metabolic Panel; Future  -     CBC auto differential; Future    Multiple thyroid nodules  -     TSH; Future    Other orders  -     escitalopram oxalate (LEXAPRO) 10 MG tablet; Take 1 tablet (10 mg total) by mouth once daily.  -     Influenza - Quadrivalent (PF)  -     Zoster Recombinant Vaccine     Urinalysis not consistent with UTI.  Follow-up 1 month.              Past Medical History:  Past Medical History:   Diagnosis Date    Anticoagulant long-term use     Anxiety     AR (allergic rhinitis)     Arthritis     Asthma, intermittent     Coronary artery disease     Depression     Endometrial polyp     Fatty liver     GERD (gastroesophageal reflux disease)     Hiatal hernia     Hyperlipidemia LDL goal <100     Hypertension     Mild mitral regurgitation     Multiple thyroid nodules 3/27/2014    Obesity (BMI 30-39.9) 3/27/2014    Renal cell carcinoma 2002    removed - no recurrence    S/P primary angioplasty with coronary stent  07/26/2017    Sleep apnea     Using CPAP    Solitary kidney, acquired     right     Past Surgical History:   Procedure Laterality Date    APPENDECTOMY      CARDIAC CATHETERIZATION  6/27/14    has blockages, but per patient no stents    CHOLECYSTECTOMY      COLONOSCOPY  2002    negative    COLONOSCOPY  3/25/2014         CORONARY ANGIOPLASTY  2008?    balloon angioplasty    CORONARY ANGIOPLASTY WITH STENT PLACEMENT Right 07/26/2017    [facility] 2002    [facility]    OVARIAN CYST REMOVAL      PERCUTANEOUS ENDOVENOUS LASER ABLATION OF PERIPHERAL VEIN  06/05/2019    POLYPECTOMY      endometrial polyp removal    ROTATOR CUFF REPAIR      L    THYROIDECTOMY, PARTIAL Left     TUBAL LIGATION      UPPER GASTROINTESTINAL ENDOSCOPY  04/2014     Review of patient's allergies indicates:   Allergen Reactions    Oxycodone Shortness Of Breath, Nausea And Vomiting and Other (See Comments)     Anxiety.    Plavix [clopidogrel] Diarrhea     Stomach cramps    Azithromycin Itching    Codeine Palpitations    Corticosteroids (glucocorticoids) Palpitations    Doxycycline Itching and Nausea Only    Mobic [meloxicam] Itching    Neuromuscular blockers, steroidal Palpitations    Nickel sutures [surgical stainless steel] Rash     Jewelery    Pcn [penicillins] Nausea And Vomiting    Shellfish containing products Itching     topical iodine OK    Sulfa (sulfonamide antibiotics) Nausea And Vomiting     Current Outpatient Medications on File Prior to Visit   Medication Sig Dispense Refill    acetaminophen (TYLENOL) 650 MG TbSR Take 2 tablets (1,300 mg total) by mouth every 8 (eight) hours.  0    albuterol (PROVENTIL/VENTOLIN HFA) 90 mcg/actuation inhaler Inhale 2 puffs into the lungs every 6 (six) hours as needed for Wheezing. 18 g 3    amLODIPine (NORVASC) 2.5 MG tablet Take 2.5 mg by mouth once daily.      aspirin (ECOTRIN) 81 MG EC tablet Take 325 mg by mouth once daily.        atorvastatin (LIPITOR) 20 MG tablet       brimonidine 0.15 % OPTH DROP (ALPHAGAN) 0.15 % ophthalmic solution Place 1 drop into both eyes once daily.      famotidine (PEPCID) 20 MG tablet Take 1 tablet (20 mg total) by mouth 2 (two) times daily. 60 tablet 11    fluticasone propionate (FLONASE) 50 mcg/actuation nasal spray Use 2 sprays to each nostril daily 16 g 12    nebivoloL (BYSTOLIC) 5 MG Tab Take 5 mg by mouth.      valsartan (DIOVAN) 160 MG tablet Take 1 tablet (160 mg total) by mouth once daily. 90 tablet 3    [DISCONTINUED] citalopram (CELEXA) 20 MG tablet TAKE 1 TABLET BY MOUTH DAILY (Patient taking differently: 10 mg. ) 90 tablet 3    carvediloL (COREG) 3.125 MG tablet       triamcinolone acetonide 0.5% (KENALOG) 0.5 % Crea Apply topically 2 (two) times daily. 30 g 0     No current facility-administered medications on file prior to visit.      Social History     Socioeconomic History    Marital status:      Spouse name: Not on file    Number of children: Not on file    Years of education: Not on file    Highest education level: Not on file   Occupational History    Not on file   Social Needs    Financial resource strain: Not on file    Food insecurity     Worry: Not on file     Inability: Not on file    Transportation needs     Medical: Not on file     Non-medical: Not on file   Tobacco Use    Smoking status: Never Smoker    Smokeless tobacco: Never Used   Substance and Sexual Activity    Alcohol use: No    Drug use: No    Sexual activity: Not on file   Lifestyle    Physical activity     Days per week: Not on file     Minutes per session: Not on file    Stress: Not on file   Relationships    Social connections     Talks on phone: Not on file     Gets together: Not on file     Attends Pentecostal service: Not on file     Active member of club or organization: Not on file     Attends meetings of clubs or organizations: Not on file     Relationship status: Not on file   Other  "Topics Concern    Not on file   Social History Narrative    Not on file     Family History   Problem Relation Age of Onset    Heart attack Father 67    Diabetes Paternal Aunt     Allergies Paternal Aunt     Breast cancer Paternal Aunt     Diabetes Paternal Uncle     Allergies Maternal Grandmother     Colon cancer Neg Hx     Stomach cancer Neg Hx     Angioedema Neg Hx     Atopy Neg Hx     Immunodeficiency Neg Hx     Urticaria Neg Hx     Rhinitis Neg Hx     Eczema Neg Hx     Asthma Neg Hx            ROS:  GENERAL: No fever, chills,  or significant weight changes.   CARDIOVASCULAR: Denies chest pain, PND, orthopnea or reduced exercise tolerance.  ABDOMEN: Appetite fine. Denies diarrhea, abdominal pain, hematemesis or blood in stool.  URINARY: No flank pain, or hematuria.    Vitals:    10/15/20 0835   BP: 135/68   Pulse: 64   Temp: 96.6 °F (35.9 °C)   Weight: 85.5 kg (188 lb 6.4 oz)   Height: 5' 2" (1.575 m)     Wt Readings from Last 3 Encounters:   10/15/20 85.5 kg (188 lb 6.4 oz)   08/11/20 84.6 kg (186 lb 9.6 oz)   06/09/20 85.3 kg (188 lb 0.8 oz)       OBJECTIVE:   APPEARANCE: Well nourished, well developed, in no acute distress.    HEAD: Normocephalic.  Atraumatic.  No sinus tenderness.  EYES:   Right eye: Pupil reactive.  Conjunctiva clear.    Left eye: Pupil reactive.  Conjunctiva clear.  EOMI.    EARS: TM's intact. Light reflex normal. No retraction or perforation.    NOSE:  clear.  MOUTH & THROAT:  No pharyngeal erythema or exudate. No lesions.  NECK: Supple. No bruits.  No JVD.  No cervical lymphadenopathy.  No thyromegaly.    CHEST: Breath sounds clear bilaterally.  Normal respiratory effort  CARDIOVASCULAR: Normal rate.  Regular rhythm.  No murmurs.  No rub.  No gallops.  ABDOMEN: Bowel sounds normal.  Soft.  No tenderness.  No organomegaly.  PERIPHERAL VASCULAR: No cyanosis.  No clubbing.  No edema.  NEUROLOGIC: No focal findings.  MENTAL STATUS: Alert.  Oriented x 3.        "

## 2020-11-12 ENCOUNTER — LAB VISIT (OUTPATIENT)
Dept: LAB | Facility: HOSPITAL | Age: 65
End: 2020-11-12
Attending: FAMILY MEDICINE
Payer: COMMERCIAL

## 2020-11-12 DIAGNOSIS — E78.5 HYPERLIPIDEMIA LDL GOAL <100: ICD-10-CM

## 2020-11-12 DIAGNOSIS — I25.10 CORONARY ARTERY DISEASE INVOLVING NATIVE CORONARY ARTERY OF NATIVE HEART WITHOUT ANGINA PECTORIS: ICD-10-CM

## 2020-11-12 DIAGNOSIS — E04.2 MULTIPLE THYROID NODULES: ICD-10-CM

## 2020-11-12 DIAGNOSIS — I10 ESSENTIAL HYPERTENSION: ICD-10-CM

## 2020-11-12 LAB
ALBUMIN SERPL BCP-MCNC: 3.8 G/DL (ref 3.5–5.2)
ALP SERPL-CCNC: 89 U/L (ref 55–135)
ALT SERPL W/O P-5'-P-CCNC: 20 U/L (ref 10–44)
ANION GAP SERPL CALC-SCNC: 9 MMOL/L (ref 8–16)
AST SERPL-CCNC: 20 U/L (ref 10–40)
BASOPHILS # BLD AUTO: 0.05 K/UL (ref 0–0.2)
BASOPHILS NFR BLD: 0.7 % (ref 0–1.9)
BILIRUB SERPL-MCNC: 1.1 MG/DL (ref 0.1–1)
BUN SERPL-MCNC: 12 MG/DL (ref 8–23)
CALCIUM SERPL-MCNC: 9.4 MG/DL (ref 8.7–10.5)
CHLORIDE SERPL-SCNC: 103 MMOL/L (ref 95–110)
CHOLEST SERPL-MCNC: 144 MG/DL (ref 120–199)
CHOLEST/HDLC SERPL: 3.1 {RATIO} (ref 2–5)
CO2 SERPL-SCNC: 28 MMOL/L (ref 23–29)
CREAT SERPL-MCNC: 0.8 MG/DL (ref 0.5–1.4)
DIFFERENTIAL METHOD: ABNORMAL
EOSINOPHIL # BLD AUTO: 0.2 K/UL (ref 0–0.5)
EOSINOPHIL NFR BLD: 2.8 % (ref 0–8)
ERYTHROCYTE [DISTWIDTH] IN BLOOD BY AUTOMATED COUNT: 13.8 % (ref 11.5–14.5)
EST. GFR  (AFRICAN AMERICAN): >60 ML/MIN/1.73 M^2
EST. GFR  (NON AFRICAN AMERICAN): >60 ML/MIN/1.73 M^2
GLUCOSE SERPL-MCNC: 86 MG/DL (ref 70–110)
HCT VFR BLD AUTO: 42.5 % (ref 37–48.5)
HDLC SERPL-MCNC: 46 MG/DL (ref 40–75)
HDLC SERPL: 31.9 % (ref 20–50)
HGB BLD-MCNC: 13.5 G/DL (ref 12–16)
IMM GRANULOCYTES # BLD AUTO: 0.01 K/UL (ref 0–0.04)
IMM GRANULOCYTES NFR BLD AUTO: 0.1 % (ref 0–0.5)
LDLC SERPL CALC-MCNC: 75 MG/DL (ref 63–159)
LYMPHOCYTES # BLD AUTO: 2.6 K/UL (ref 1–4.8)
LYMPHOCYTES NFR BLD: 37 % (ref 18–48)
MCH RBC QN AUTO: 29.2 PG (ref 27–31)
MCHC RBC AUTO-ENTMCNC: 31.8 G/DL (ref 32–36)
MCV RBC AUTO: 92 FL (ref 82–98)
MONOCYTES # BLD AUTO: 0.4 K/UL (ref 0.3–1)
MONOCYTES NFR BLD: 6.1 % (ref 4–15)
NEUTROPHILS # BLD AUTO: 3.7 K/UL (ref 1.8–7.7)
NEUTROPHILS NFR BLD: 53.3 % (ref 38–73)
NONHDLC SERPL-MCNC: 98 MG/DL
NRBC BLD-RTO: 0 /100 WBC
PLATELET # BLD AUTO: 303 K/UL (ref 150–350)
PMV BLD AUTO: 10.2 FL (ref 9.2–12.9)
POTASSIUM SERPL-SCNC: 4.6 MMOL/L (ref 3.5–5.1)
PROT SERPL-MCNC: 7.2 G/DL (ref 6–8.4)
RBC # BLD AUTO: 4.62 M/UL (ref 4–5.4)
SODIUM SERPL-SCNC: 140 MMOL/L (ref 136–145)
TRIGL SERPL-MCNC: 115 MG/DL (ref 30–150)
TSH SERPL DL<=0.005 MIU/L-ACNC: 3.23 UIU/ML (ref 0.4–4)
WBC # BLD AUTO: 7.02 K/UL (ref 3.9–12.7)

## 2020-11-12 PROCEDURE — 80061 LIPID PANEL: CPT

## 2020-11-12 PROCEDURE — 85025 COMPLETE CBC W/AUTO DIFF WBC: CPT

## 2020-11-12 PROCEDURE — 80053 COMPREHEN METABOLIC PANEL: CPT

## 2020-11-12 PROCEDURE — 36415 COLL VENOUS BLD VENIPUNCTURE: CPT | Mod: PO

## 2020-11-12 PROCEDURE — 84443 ASSAY THYROID STIM HORMONE: CPT

## 2020-11-18 ENCOUNTER — TELEPHONE (OUTPATIENT)
Dept: FAMILY MEDICINE | Facility: CLINIC | Age: 65
End: 2020-11-18

## 2020-11-18 NOTE — TELEPHONE ENCOUNTER
Appointment scheduled for [facility] at 3pm tomorrow, patient has flip phone, not a smart phone so she asked if you can text to her daughter's phone at [phone].

## 2020-11-18 NOTE — TELEPHONE ENCOUNTER
----- Message from Milly Gogn sent at 11/18/2020  3:56 PM CST -----  Contact: [phone]  Type:  Needs Medical Advice    Who Called: Patient  Symptoms (please be specific):    How long has patient had these symptoms:    Pharmacy name and phone #:    Would the patient rather a call back or a response via [facility]? Call back  Best Call Back Number: [phone]  Additional Information:patient would like to change appointment to telemed if possible         Teo/DAVID

## 2020-11-19 ENCOUNTER — OFFICE VISIT (OUTPATIENT)
Dept: FAMILY MEDICINE | Facility: CLINIC | Age: 65
End: 2020-11-19
Payer: COMMERCIAL

## 2020-11-19 DIAGNOSIS — R17 ELEVATED BILIRUBIN: ICD-10-CM

## 2020-11-19 DIAGNOSIS — Z12.31 ENCOUNTER FOR SCREENING MAMMOGRAM FOR MALIGNANT NEOPLASM OF BREAST: ICD-10-CM

## 2020-11-19 DIAGNOSIS — F33.40 RECURRENT MAJOR DEPRESSIVE DISORDER, IN REMISSION: Primary | ICD-10-CM

## 2020-11-19 PROCEDURE — 99213 OFFICE O/P EST LOW 20 MIN: CPT | Mod: 95,,, | Performed by: FAMILY MEDICINE

## 2020-11-19 PROCEDURE — 99213 PR OFFICE/OUTPT VISIT, EST, LEVL III, 20-29 MIN: ICD-10-PCS | Mod: 95,,, | Performed by: FAMILY MEDICINE

## 2020-11-19 RX ORDER — ESCITALOPRAM OXALATE 10 MG/1
10 TABLET ORAL DAILY
Qty: 90 TABLET | Refills: 3 | Status: SHIPPED | OUTPATIENT
Start: 2020-11-19 | End: 2021-01-20

## 2020-11-20 NOTE — PROGRESS NOTES
Primary Care Telemedicine Note    The patient location is:  [place]  The chief complaint leading to consultation is: per below note  Total time spent with patient:  15 min      Visit type: Virtual visit with synchronous audio and video  Each patient to whom he or she provides medical services by telemedicine is:  (1) informed of the relationship between the physician and patient and the respective role of any other health care provider with respect to management of the patient; and (2) notified that he or she may decline to receive medical services by telemedicine and may withdraw from such care at any time.    Follow-up.  Changed from Celexa to Lexapro last visit at her request.  She feels she is doing better with this would like to continue with current medication.  She denies SI/HI.  We reviewed recent laboratory.  She did have a minimal bilirubin elevation 1.1 which she was concerned about.  She is asymptomatic as far as any hematologic or liver disease.    Diagnoses and all orders for this visit:    Recurrent major depressive disorder, in remission    Encounter for screening mammogram for malignant neoplasm of breast  -     Mammo Digital Screening Bilat w/ [place]; Future    Elevated bilirubin  -     Hepatic Function Panel; Future    Other orders  -     escitalopram oxalate (LEXAPRO) 10 MG tablet; Take 1 tablet (10 mg total) by mouth once daily.       Continue current medication.  She will have her liver functions recheck to with her mammogram.         Past Medical History:  Past Medical History:   Diagnosis Date    Anticoagulant long-term use     Anxiety     AR (allergic rhinitis)     Arthritis     Asthma, intermittent     Coronary artery disease     Depression     Endometrial polyp     Fatty liver     GERD (gastroesophageal reflux disease)     Hiatal hernia     Hyperlipidemia LDL goal <100     Hypertension     Mild mitral regurgitation     Multiple thyroid nodules 3/27/2014    Obesity (BMI  30-39.9) 3/27/2014    Renal cell carcinoma 2002    removed - no recurrence    S/P primary angioplasty with coronary stent 07/26/2017    Sleep apnea     Using CPAP    Solitary kidney, acquired     right     Past Surgical History:   Procedure Laterality Date    APPENDECTOMY      CARDIAC CATHETERIZATION  6/27/14    has blockages, but per patient no stents    CHOLECYSTECTOMY      COLONOSCOPY  2002    negative    COLONOSCOPY  3/25/2014         CORONARY ANGIOPLASTY  2008?    balloon angioplasty    CORONARY ANGIOPLASTY WITH STENT PLACEMENT Right 07/26/2017    NEPHRECTOMY Left 2002    [facility]    OVARIAN CYST REMOVAL      PERCUTANEOUS ENDOVENOUS LASER ABLATION OF PERIPHERAL VEIN  06/05/2019    POLYPECTOMY      endometrial polyp removal    ROTATOR CUFF REPAIR      L    THYROIDECTOMY, PARTIAL Left     TUBAL LIGATION      UPPER GASTROINTESTINAL ENDOSCOPY  04/2014     Social History     Socioeconomic History    Marital status:      Spouse name: Not on file    Number of children: Not on file    Years of education: Not on file    Highest education level: Not on file   Occupational History    Not on file   Social Needs    Financial resource strain: Not on file    Food insecurity     Worry: Not on file     Inability: Not on file    Transportation needs     Medical: Not on file     Non-medical: Not on file   Tobacco Use    Smoking status: Never Smoker    Smokeless tobacco: Never Used   Substance and Sexual Activity    Alcohol use: No    Drug use: No    Sexual activity: Not on file   Lifestyle    Physical activity     Days per week: Not on file     Minutes per session: Not on file    Stress: Not on file   Relationships    Social connections     Talks on phone: Not on file     Gets together: Not on file     Attends Catholic service: Not on file     Active member of club or organization: Not on file     Attends meetings of clubs or organizations: Not on file      Relationship status: Not on file   Other Topics Concern    Not on file   Social History Narrative    Not on file     Family History   Problem Relation Age of Onset    Heart attack Father 67    Diabetes Paternal Aunt     Allergies Paternal Aunt     Breast cancer Paternal Aunt     Diabetes Paternal Uncle     Allergies Maternal Grandmother     Colon cancer Neg Hx     Stomach cancer Neg Hx     Angioedema Neg Hx     Atopy Neg Hx     Immunodeficiency Neg Hx     Urticaria Neg Hx     Rhinitis Neg Hx     Eczema Neg Hx     Asthma Neg Hx      Review of patient's allergies indicates:   Allergen Reactions    Oxycodone Shortness Of Breath, Nausea And Vomiting and Other (See Comments)     Anxiety.    Plavix [clopidogrel] Diarrhea     Stomach cramps    Azithromycin Itching    Codeine Palpitations    Corticosteroids (glucocorticoids) Palpitations    Doxycycline Itching and Nausea Only    Mobic [meloxicam] Itching    Neuromuscular blockers, steroidal Palpitations    Nickel sutures [surgical stainless steel] Rash     Jewelery    Pcn [penicillins] Nausea And Vomiting    Shellfish containing products Itching     topical iodine OK    Sulfa (sulfonamide antibiotics) Nausea And Vomiting     Current Outpatient Medications on File Prior to Visit   Medication Sig Dispense Refill    acetaminophen (TYLENOL) 650 MG TbSR Take 2 tablets (1,300 mg total) by mouth every 8 (eight) hours.  0    albuterol (PROVENTIL/VENTOLIN HFA) 90 mcg/actuation inhaler Inhale 2 puffs into the lungs every 6 (six) hours as needed for Wheezing. 18 g 3    amLODIPine (NORVASC) 2.5 MG tablet Take 2.5 mg by mouth once daily.      aspirin (ECOTRIN) 81 MG EC tablet Take 325 mg by mouth once daily.       atorvastatin (LIPITOR) 20 MG tablet       brimonidine 0.15 % OPTH DROP (ALPHAGAN) 0.15 % ophthalmic solution Place 1 drop into both eyes once daily.      carvediloL (COREG) 3.125 MG tablet       famotidine (PEPCID) 20 MG tablet Take 1  tablet (20 mg total) by mouth 2 (two) times daily. 60 tablet 11    fluticasone propionate (FLONASE) 50 mcg/actuation nasal spray Use 2 sprays to each nostril daily 16 g 12    nebivoloL (BYSTOLIC) 5 MG Tab Take 5 mg by mouth.      triamcinolone acetonide 0.5% (KENALOG) 0.5 % Crea Apply topically 2 (two) times daily. 30 g 0    valsartan (DIOVAN) 160 MG tablet Take 1 tablet (160 mg total) by mouth once daily. 90 tablet 3    [DISCONTINUED] escitalopram oxalate (LEXAPRO) 10 MG tablet Take 1 tablet (10 mg total) by mouth once daily. 30 tablet 1     No current facility-administered medications on file prior to visit.          ROS:  GENERAL: No fever, chills,  or significant weight changes.   CARDIOVASCULAR: Denies chest pain, PND, orthopnea or reduced exercise tolerance.  ABDOMEN: Appetite fine. Denies diarrhea, abdominal pain, hematemesis or blood in stool.  URINARY: No flank pain, dysuria or hematuria.    There were no vitals filed for this visit.    Wt Readings from Last 3 Encounters:   10/15/20 85.5 kg (188 lb 6.4 oz)   08/11/20 84.6 kg (186 lb 9.6 oz)   06/09/20 85.3 kg (188 lb 0.8 oz)       APPEARANCE: Well nourished, well developed, in no acute distress.    MENTAL STATUS: Alert.  Oriented x 3.  Head atraumatic normocephalic no obvious sinus swelling  Eyes extraocular movements intact.  No obvious conjunctivitis  Neck supple  Chest no respiratory distress noted.  No accessory muscle use.

## 2020-12-02 ENCOUNTER — PATIENT MESSAGE (OUTPATIENT)
Dept: FAMILY MEDICINE | Facility: CLINIC | Age: 65
End: 2020-12-02

## 2020-12-02 DIAGNOSIS — R91.1 SOLITARY PULMONARY NODULE: ICD-10-CM

## 2020-12-17 ENCOUNTER — CLINICAL SUPPORT (OUTPATIENT)
Dept: FAMILY MEDICINE | Facility: CLINIC | Age: 65
End: 2020-12-17
Payer: COMMERCIAL

## 2020-12-17 DIAGNOSIS — Z23 IMMUNIZATION DUE: Primary | ICD-10-CM

## 2020-12-17 PROCEDURE — 90471 ZOSTER RECOMBINANT VACCINE: ICD-10-PCS | Mod: S$GLB,,, | Performed by: FAMILY MEDICINE

## 2020-12-17 PROCEDURE — 90471 IMMUNIZATION ADMIN: CPT | Mod: S$GLB,,, | Performed by: FAMILY MEDICINE

## 2020-12-17 PROCEDURE — 99999 PR PBB SHADOW E&M-EST. PATIENT-LVL II: CPT | Mod: PBBFAC,,,

## 2020-12-17 PROCEDURE — 90750 HZV VACC RECOMBINANT IM: CPT | Mod: S$GLB,,, | Performed by: FAMILY MEDICINE

## 2020-12-17 PROCEDURE — 90750 ZOSTER RECOMBINANT VACCINE: ICD-10-PCS | Mod: S$GLB,,, | Performed by: FAMILY MEDICINE

## 2020-12-17 PROCEDURE — 99999 PR PBB SHADOW E&M-EST. PATIENT-LVL II: ICD-10-PCS | Mod: PBBFAC,,,

## 2020-12-28 ENCOUNTER — TELEPHONE (OUTPATIENT)
Dept: ADMINISTRATIVE | Facility: HOSPITAL | Age: 65
End: 2020-12-28

## 2020-12-29 ENCOUNTER — HOSPITAL ENCOUNTER (OUTPATIENT)
Dept: RADIOLOGY | Facility: HOSPITAL | Age: 65
Discharge: HOME | End: 2020-12-29
Attending: FAMILY MEDICINE
Payer: COMMERCIAL

## 2020-12-29 VITALS — BODY MASS INDEX: 34.69 KG/M2 | WEIGHT: 188.5 LBS | HEIGHT: 62 IN

## 2020-12-29 DIAGNOSIS — Z12.31 ENCOUNTER FOR SCREENING MAMMOGRAM FOR MALIGNANT NEOPLASM OF BREAST: ICD-10-CM

## 2020-12-29 PROCEDURE — 77067 MAMMO DIGITAL SCREENING BILAT WITH TOMO: ICD-10-PCS | Mod: 26,,, | Performed by: RADIOLOGY

## 2020-12-29 PROCEDURE — 77067 SCR MAMMO BI INCL CAD: CPT | Mod: TC,PO

## 2020-12-29 PROCEDURE — 77067 SCR MAMMO BI INCL CAD: CPT | Mod: 26,,, | Performed by: RADIOLOGY

## 2020-12-29 PROCEDURE — 77063 MAMMO DIGITAL SCREENING BILAT WITH TOMO: ICD-10-PCS | Mod: 26,,, | Performed by: RADIOLOGY

## 2020-12-29 PROCEDURE — 77063 BREAST TOMOSYNTHESIS BI: CPT | Mod: 26,,, | Performed by: RADIOLOGY

## 2020-12-30 ENCOUNTER — TELEPHONE (OUTPATIENT)
Dept: FAMILY MEDICINE | Facility: CLINIC | Age: 65
End: 2020-12-30

## 2020-12-30 RX ORDER — VALSARTAN 160 MG/1
TABLET ORAL
Qty: 90 TABLET | Refills: 3 | Status: SHIPPED | OUTPATIENT
Start: 2020-12-30 | End: 2021-11-29 | Stop reason: SDUPTHER

## 2020-12-30 NOTE — TELEPHONE ENCOUNTER
----- Message from Milly Gong sent at 12/30/2020 11:32 AM CST -----  Contact: [phone]/SELF  Type:  RX Refill Request    Who Called: Patient  Refill or New Rx:NEW RX  RX Name and Strength:valsartan (DIOVAN) 160 MG tablet      How is the patient currently taking it? (ex. 1XDay):1 X day  Is this a 30 day or 90 day RX:90  Preferred Pharmacy with phone number:    [facility] [address] [place] [address] - [address] [facility]  [address] [facility] [address]  Phone: [phone] Fax: [phone]      Local or Mail Order:Local  Ordering Provider:Layo Reed MD  Would the patient rather a call back or a response via [facility]? Call Back  Best Call Back Number:[phone]  Additional Information: Patient  is out of her medication    Teo/DAVID

## 2021-01-05 ENCOUNTER — PATIENT MESSAGE (OUTPATIENT)
Dept: FAMILY MEDICINE | Facility: CLINIC | Age: 66
End: 2021-01-05

## 2021-01-15 ENCOUNTER — HOSPITAL ENCOUNTER (OUTPATIENT)
Dept: RADIOLOGY | Facility: HOSPITAL | Age: 66
Discharge: HOME | End: 2021-01-15
Attending: FAMILY MEDICINE
Payer: COMMERCIAL

## 2021-01-15 DIAGNOSIS — R91.1 SOLITARY PULMONARY NODULE: ICD-10-CM

## 2021-01-15 PROCEDURE — 71250 CT THORAX DX C-: CPT | Mod: 26,,, | Performed by: RADIOLOGY

## 2021-01-15 PROCEDURE — 71250 CT THORAX DX C-: CPT | Mod: TC,PO

## 2021-01-15 PROCEDURE — 71250 CT CHEST WITHOUT CONTRAST: ICD-10-PCS | Mod: 26,,, | Performed by: RADIOLOGY

## 2021-01-20 ENCOUNTER — OFFICE VISIT (OUTPATIENT)
Dept: FAMILY MEDICINE | Facility: CLINIC | Age: 66
End: 2021-01-20
Payer: COMMERCIAL

## 2021-01-20 VITALS
SYSTOLIC BLOOD PRESSURE: 135 MMHG | HEART RATE: 66 BPM | BODY MASS INDEX: 35.03 KG/M2 | TEMPERATURE: 97 F | WEIGHT: 190.38 LBS | HEIGHT: 62 IN | DIASTOLIC BLOOD PRESSURE: 72 MMHG

## 2021-01-20 DIAGNOSIS — I10 ESSENTIAL HYPERTENSION: ICD-10-CM

## 2021-01-20 DIAGNOSIS — F33.40 RECURRENT MAJOR DEPRESSIVE DISORDER, IN REMISSION: ICD-10-CM

## 2021-01-20 DIAGNOSIS — Z78.0 ASYMPTOMATIC MENOPAUSE: Primary | ICD-10-CM

## 2021-01-20 DIAGNOSIS — R91.1 SOLITARY PULMONARY NODULE: ICD-10-CM

## 2021-01-20 PROCEDURE — 99999 PR PBB SHADOW E&M-EST. PATIENT-LVL III: ICD-10-PCS | Mod: PBBFAC,,, | Performed by: FAMILY MEDICINE

## 2021-01-20 PROCEDURE — 90670 PCV13 VACCINE IM: CPT | Mod: S$GLB,,, | Performed by: FAMILY MEDICINE

## 2021-01-20 PROCEDURE — 3288F PR FALLS RISK ASSESSMENT DOCUMENTED: ICD-10-PCS | Mod: CPTII,S$GLB,, | Performed by: FAMILY MEDICINE

## 2021-01-20 PROCEDURE — 3288F FALL RISK ASSESSMENT DOCD: CPT | Mod: CPTII,S$GLB,, | Performed by: FAMILY MEDICINE

## 2021-01-20 PROCEDURE — 3008F PR BODY MASS INDEX (BMI) DOCUMENTED: ICD-10-PCS | Mod: CPTII,S$GLB,, | Performed by: FAMILY MEDICINE

## 2021-01-20 PROCEDURE — 90471 IMMUNIZATION ADMIN: CPT | Mod: S$GLB,,, | Performed by: FAMILY MEDICINE

## 2021-01-20 PROCEDURE — 3078F DIAST BP <80 MM HG: CPT | Mod: CPTII,S$GLB,, | Performed by: FAMILY MEDICINE

## 2021-01-20 PROCEDURE — 3075F PR MOST RECENT SYSTOLIC BLOOD PRESS GE 130-139MM HG: ICD-10-PCS | Mod: CPTII,S$GLB,, | Performed by: FAMILY MEDICINE

## 2021-01-20 PROCEDURE — 1101F PT FALLS ASSESS-DOCD LE1/YR: CPT | Mod: CPTII,S$GLB,, | Performed by: FAMILY MEDICINE

## 2021-01-20 PROCEDURE — 99214 PR OFFICE/OUTPT VISIT, EST, LEVL IV, 30-39 MIN: ICD-10-PCS | Mod: 25,S$GLB,, | Performed by: FAMILY MEDICINE

## 2021-01-20 PROCEDURE — 99214 OFFICE O/P EST MOD 30 MIN: CPT | Mod: 25,S$GLB,, | Performed by: FAMILY MEDICINE

## 2021-01-20 PROCEDURE — 3078F PR MOST RECENT DIASTOLIC BLOOD PRESSURE < 80 MM HG: ICD-10-PCS | Mod: CPTII,S$GLB,, | Performed by: FAMILY MEDICINE

## 2021-01-20 PROCEDURE — 90670 PNEUMOCOCCAL CONJUGATE VACCINE 13-VALENT LESS THAN 5YO & GREATER THAN: ICD-10-PCS | Mod: S$GLB,,, | Performed by: FAMILY MEDICINE

## 2021-01-20 PROCEDURE — 99999 PR PBB SHADOW E&M-EST. PATIENT-LVL III: CPT | Mod: PBBFAC,,, | Performed by: FAMILY MEDICINE

## 2021-01-20 PROCEDURE — 90471 PNEUMOCOCCAL CONJUGATE VACCINE 13-VALENT LESS THAN 5YO & GREATER THAN: ICD-10-PCS | Mod: S$GLB,,, | Performed by: FAMILY MEDICINE

## 2021-01-20 PROCEDURE — 3075F SYST BP GE 130 - 139MM HG: CPT | Mod: CPTII,S$GLB,, | Performed by: FAMILY MEDICINE

## 2021-01-20 PROCEDURE — 1101F PR PT FALLS ASSESS DOC 0-1 FALLS W/OUT INJ PAST YR: ICD-10-PCS | Mod: CPTII,S$GLB,, | Performed by: FAMILY MEDICINE

## 2021-01-20 PROCEDURE — 3008F BODY MASS INDEX DOCD: CPT | Mod: CPTII,S$GLB,, | Performed by: FAMILY MEDICINE

## 2021-01-26 ENCOUNTER — OFFICE VISIT (OUTPATIENT)
Dept: FAMILY MEDICINE | Facility: CLINIC | Age: 66
End: 2021-01-26
Payer: COMMERCIAL

## 2021-01-26 ENCOUNTER — TELEPHONE (OUTPATIENT)
Dept: FAMILY MEDICINE | Facility: CLINIC | Age: 66
End: 2021-01-26

## 2021-01-26 DIAGNOSIS — L03.114 CELLULITIS OF LEFT UPPER EXTREMITY: ICD-10-CM

## 2021-01-26 DIAGNOSIS — T78.40XD ALLERGIC REACTION, SUBSEQUENT ENCOUNTER: Primary | ICD-10-CM

## 2021-01-26 PROCEDURE — 99213 PR OFFICE/OUTPT VISIT, EST, LEVL III, 20-29 MIN: ICD-10-PCS | Mod: 95,,, | Performed by: NURSE PRACTITIONER

## 2021-01-26 PROCEDURE — 99213 OFFICE O/P EST LOW 20 MIN: CPT | Mod: 95,,, | Performed by: NURSE PRACTITIONER

## 2021-01-26 RX ORDER — AMOXICILLIN 500 MG/1
500 TABLET, FILM COATED ORAL EVERY 12 HOURS
Qty: 14 TABLET | Refills: 0 | Status: SHIPPED | OUTPATIENT
Start: 2021-01-26 | End: 2021-02-02

## 2021-01-26 RX ORDER — MUPIROCIN 20 MG/G
OINTMENT TOPICAL 3 TIMES DAILY
Qty: 22 G | Refills: 0 | Status: SHIPPED | OUTPATIENT
Start: 2021-01-26 | End: 2021-02-05

## 2021-02-12 ENCOUNTER — TELEPHONE (OUTPATIENT)
Dept: FAMILY MEDICINE | Facility: CLINIC | Age: 66
End: 2021-02-12

## 2021-02-17 ENCOUNTER — PATIENT MESSAGE (OUTPATIENT)
Dept: FAMILY MEDICINE | Facility: CLINIC | Age: 66
End: 2021-02-17

## 2021-02-19 ENCOUNTER — OFFICE VISIT (OUTPATIENT)
Dept: FAMILY MEDICINE | Facility: CLINIC | Age: 66
End: 2021-02-19
Payer: COMMERCIAL

## 2021-02-19 DIAGNOSIS — E66.9 CLASS 1 OBESITY WITH SERIOUS COMORBIDITY AND BODY MASS INDEX (BMI) OF 34.0 TO 34.9 IN ADULT, UNSPECIFIED OBESITY TYPE: ICD-10-CM

## 2021-02-19 DIAGNOSIS — Z71.85 VACCINE COUNSELING: ICD-10-CM

## 2021-02-19 DIAGNOSIS — I10 ESSENTIAL HYPERTENSION: Primary | ICD-10-CM

## 2021-02-19 PROCEDURE — 99213 PR OFFICE/OUTPT VISIT, EST, LEVL III, 20-29 MIN: ICD-10-PCS | Mod: 95,,, | Performed by: FAMILY MEDICINE

## 2021-02-19 PROCEDURE — 99213 OFFICE O/P EST LOW 20 MIN: CPT | Mod: 95,,, | Performed by: FAMILY MEDICINE

## 2021-06-10 ENCOUNTER — OFFICE VISIT (OUTPATIENT)
Dept: FAMILY MEDICINE | Facility: CLINIC | Age: 66
End: 2021-06-10
Payer: COMMERCIAL

## 2021-06-10 VITALS
HEART RATE: 71 BPM | BODY MASS INDEX: 35.17 KG/M2 | RESPIRATION RATE: 18 BRPM | HEIGHT: 62 IN | DIASTOLIC BLOOD PRESSURE: 72 MMHG | SYSTOLIC BLOOD PRESSURE: 160 MMHG | WEIGHT: 191.13 LBS | TEMPERATURE: 98 F

## 2021-06-10 DIAGNOSIS — I10 ESSENTIAL HYPERTENSION: ICD-10-CM

## 2021-06-10 DIAGNOSIS — E78.5 HYPERLIPIDEMIA LDL GOAL <100: ICD-10-CM

## 2021-06-10 DIAGNOSIS — I25.10 CORONARY ARTERY DISEASE INVOLVING NATIVE CORONARY ARTERY OF NATIVE HEART WITHOUT ANGINA PECTORIS: ICD-10-CM

## 2021-06-10 DIAGNOSIS — R10.13 EPIGASTRIC ABDOMINAL PAIN: Primary | ICD-10-CM

## 2021-06-10 DIAGNOSIS — K21.9 GASTROESOPHAGEAL REFLUX DISEASE WITHOUT ESOPHAGITIS: ICD-10-CM

## 2021-06-10 DIAGNOSIS — K44.9 HIATAL HERNIA: ICD-10-CM

## 2021-06-10 PROCEDURE — 3008F PR BODY MASS INDEX (BMI) DOCUMENTED: ICD-10-PCS | Mod: CPTII,S$GLB,, | Performed by: FAMILY MEDICINE

## 2021-06-10 PROCEDURE — 99214 OFFICE O/P EST MOD 30 MIN: CPT | Mod: S$GLB,,, | Performed by: FAMILY MEDICINE

## 2021-06-10 PROCEDURE — 3288F FALL RISK ASSESSMENT DOCD: CPT | Mod: CPTII,S$GLB,, | Performed by: FAMILY MEDICINE

## 2021-06-10 PROCEDURE — 3288F PR FALLS RISK ASSESSMENT DOCUMENTED: ICD-10-PCS | Mod: CPTII,S$GLB,, | Performed by: FAMILY MEDICINE

## 2021-06-10 PROCEDURE — 3008F BODY MASS INDEX DOCD: CPT | Mod: CPTII,S$GLB,, | Performed by: FAMILY MEDICINE

## 2021-06-10 PROCEDURE — 99999 PR PBB SHADOW E&M-EST. PATIENT-LVL III: CPT | Mod: PBBFAC,,, | Performed by: FAMILY MEDICINE

## 2021-06-10 PROCEDURE — 1101F PT FALLS ASSESS-DOCD LE1/YR: CPT | Mod: CPTII,S$GLB,, | Performed by: FAMILY MEDICINE

## 2021-06-10 PROCEDURE — 1125F PR PAIN SEVERITY QUANTIFIED, PAIN PRESENT: ICD-10-PCS | Mod: S$GLB,,, | Performed by: FAMILY MEDICINE

## 2021-06-10 PROCEDURE — 99214 PR OFFICE/OUTPT VISIT, EST, LEVL IV, 30-39 MIN: ICD-10-PCS | Mod: S$GLB,,, | Performed by: FAMILY MEDICINE

## 2021-06-10 PROCEDURE — 1125F AMNT PAIN NOTED PAIN PRSNT: CPT | Mod: S$GLB,,, | Performed by: FAMILY MEDICINE

## 2021-06-10 PROCEDURE — 99999 PR PBB SHADOW E&M-EST. PATIENT-LVL III: ICD-10-PCS | Mod: PBBFAC,,, | Performed by: FAMILY MEDICINE

## 2021-06-10 PROCEDURE — 1101F PR PT FALLS ASSESS DOC 0-1 FALLS W/OUT INJ PAST YR: ICD-10-PCS | Mod: CPTII,S$GLB,, | Performed by: FAMILY MEDICINE

## 2021-06-10 RX ORDER — PANTOPRAZOLE SODIUM 40 MG/1
40 TABLET, DELAYED RELEASE ORAL DAILY
Qty: 90 TABLET | Refills: 3 | Status: SHIPPED | OUTPATIENT
Start: 2021-06-10 | End: 2021-10-27

## 2021-06-10 RX ORDER — PRAVASTATIN SODIUM 20 MG/1
20 TABLET ORAL DAILY
COMMUNITY
End: 2021-10-27

## 2021-06-11 ENCOUNTER — PATIENT MESSAGE (OUTPATIENT)
Dept: ENDOSCOPY | Facility: HOSPITAL | Age: 66
End: 2021-06-11

## 2021-06-29 ENCOUNTER — TELEPHONE (OUTPATIENT)
Dept: OBSTETRICS AND GYNECOLOGY | Facility: CLINIC | Age: 66
End: 2021-06-29

## 2021-07-13 ENCOUNTER — OFFICE VISIT (OUTPATIENT)
Dept: OBSTETRICS AND GYNECOLOGY | Facility: CLINIC | Age: 66
End: 2021-07-13
Payer: COMMERCIAL

## 2021-07-13 VITALS
BODY MASS INDEX: 35.14 KG/M2 | HEIGHT: 62 IN | SYSTOLIC BLOOD PRESSURE: 124 MMHG | WEIGHT: 190.94 LBS | DIASTOLIC BLOOD PRESSURE: 80 MMHG | RESPIRATION RATE: 16 BRPM

## 2021-07-13 DIAGNOSIS — R10.2 PELVIC PAIN: ICD-10-CM

## 2021-07-13 DIAGNOSIS — Z01.419 ENCOUNTER FOR ROUTINE GYNECOLOGICAL EXAMINATION WITH PAPANICOLAOU SMEAR OF CERVIX: Primary | ICD-10-CM

## 2021-07-13 PROCEDURE — 1101F PT FALLS ASSESS-DOCD LE1/YR: CPT | Mod: CPTII,S$GLB,, | Performed by: SPECIALIST

## 2021-07-13 PROCEDURE — 99999 PR PBB SHADOW E&M-EST. PATIENT-LVL III: CPT | Mod: PBBFAC,,, | Performed by: SPECIALIST

## 2021-07-13 PROCEDURE — 3288F FALL RISK ASSESSMENT DOCD: CPT | Mod: CPTII,S$GLB,, | Performed by: SPECIALIST

## 2021-07-13 PROCEDURE — 99999 PR PBB SHADOW E&M-EST. PATIENT-LVL III: ICD-10-PCS | Mod: PBBFAC,,, | Performed by: SPECIALIST

## 2021-07-13 PROCEDURE — 1101F PR PT FALLS ASSESS DOC 0-1 FALLS W/OUT INJ PAST YR: ICD-10-PCS | Mod: CPTII,S$GLB,, | Performed by: SPECIALIST

## 2021-07-13 PROCEDURE — 99387 INIT PM E/M NEW PAT 65+ YRS: CPT | Mod: S$GLB,,, | Performed by: SPECIALIST

## 2021-07-13 PROCEDURE — 3008F BODY MASS INDEX DOCD: CPT | Mod: CPTII,S$GLB,, | Performed by: SPECIALIST

## 2021-07-13 PROCEDURE — 88175 CYTOPATH C/V AUTO FLUID REDO: CPT | Performed by: SPECIALIST

## 2021-07-13 PROCEDURE — 3288F PR FALLS RISK ASSESSMENT DOCUMENTED: ICD-10-PCS | Mod: CPTII,S$GLB,, | Performed by: SPECIALIST

## 2021-07-13 PROCEDURE — 1125F PR PAIN SEVERITY QUANTIFIED, PAIN PRESENT: ICD-10-PCS | Mod: S$GLB,,, | Performed by: SPECIALIST

## 2021-07-13 PROCEDURE — 3008F PR BODY MASS INDEX (BMI) DOCUMENTED: ICD-10-PCS | Mod: CPTII,S$GLB,, | Performed by: SPECIALIST

## 2021-07-13 PROCEDURE — 1125F AMNT PAIN NOTED PAIN PRSNT: CPT | Mod: S$GLB,,, | Performed by: SPECIALIST

## 2021-07-13 PROCEDURE — 99387 PR PREVENTIVE VISIT,NEW,65 & OVER: ICD-10-PCS | Mod: S$GLB,,, | Performed by: SPECIALIST

## 2021-07-14 ENCOUNTER — ANESTHESIA (OUTPATIENT)
Dept: ENDOSCOPY | Facility: HOSPITAL | Age: 66
End: 2021-07-14
Payer: COMMERCIAL

## 2021-07-14 ENCOUNTER — TELEPHONE (OUTPATIENT)
Dept: RADIOLOGY | Facility: HOSPITAL | Age: 66
End: 2021-07-14

## 2021-07-14 ENCOUNTER — ANESTHESIA EVENT (OUTPATIENT)
Dept: ENDOSCOPY | Facility: HOSPITAL | Age: 66
End: 2021-07-14
Payer: COMMERCIAL

## 2021-07-14 ENCOUNTER — HOSPITAL ENCOUNTER (OUTPATIENT)
Facility: HOSPITAL | Age: 66
Discharge: HOME | End: 2021-07-14
Attending: INTERNAL MEDICINE | Admitting: INTERNAL MEDICINE
Payer: COMMERCIAL

## 2021-07-14 DIAGNOSIS — R10.13 EPIGASTRIC PAIN: ICD-10-CM

## 2021-07-14 DIAGNOSIS — K21.9 GASTROESOPHAGEAL REFLUX DISEASE, UNSPECIFIED WHETHER ESOPHAGITIS PRESENT: Primary | ICD-10-CM

## 2021-07-14 DIAGNOSIS — K29.30 SUPERFICIAL GASTRITIS WITHOUT HEMORRHAGE, UNSPECIFIED CHRONICITY: ICD-10-CM

## 2021-07-14 PROCEDURE — 43239 PR EGD, FLEX, W/BIOPSY, SGL/MULTI: ICD-10-PCS | Mod: ,,, | Performed by: INTERNAL MEDICINE

## 2021-07-14 PROCEDURE — 88305 TISSUE EXAM BY PATHOLOGIST: ICD-10-PCS | Mod: 26,,, | Performed by: PATHOLOGY

## 2021-07-14 PROCEDURE — 37000008 HC ANESTHESIA 1ST 15 MINUTES: Performed by: INTERNAL MEDICINE

## 2021-07-14 PROCEDURE — 43239 EGD BIOPSY SINGLE/MULTIPLE: CPT | Performed by: INTERNAL MEDICINE

## 2021-07-14 PROCEDURE — 63600175 PHARM REV CODE 636 W HCPCS: Performed by: NURSE ANESTHETIST, CERTIFIED REGISTERED

## 2021-07-14 PROCEDURE — 88342 IMHCHEM/IMCYTCHM 1ST ANTB: CPT | Performed by: PATHOLOGY

## 2021-07-14 PROCEDURE — 37000009 HC ANESTHESIA EA ADD 15 MINS: Performed by: INTERNAL MEDICINE

## 2021-07-14 PROCEDURE — 88342 CHG IMMUNOCYTOCHEMISTRY: ICD-10-PCS | Mod: 26,,, | Performed by: PATHOLOGY

## 2021-07-14 PROCEDURE — 25000003 PHARM REV CODE 250: Performed by: NURSE ANESTHETIST, CERTIFIED REGISTERED

## 2021-07-14 PROCEDURE — 88305 TISSUE EXAM BY PATHOLOGIST: CPT | Performed by: PATHOLOGY

## 2021-07-14 PROCEDURE — 27201012 HC FORCEPS, HOT/COLD, DISP: Performed by: INTERNAL MEDICINE

## 2021-07-14 PROCEDURE — 88305 TISSUE EXAM BY PATHOLOGIST: CPT | Mod: 26,,, | Performed by: PATHOLOGY

## 2021-07-14 PROCEDURE — 88342 IMHCHEM/IMCYTCHM 1ST ANTB: CPT | Mod: 26,,, | Performed by: PATHOLOGY

## 2021-07-14 PROCEDURE — 43239 EGD BIOPSY SINGLE/MULTIPLE: CPT | Mod: ,,, | Performed by: INTERNAL MEDICINE

## 2021-07-14 RX ORDER — SODIUM CHLORIDE, SODIUM LACTATE, POTASSIUM CHLORIDE, CALCIUM CHLORIDE 600; 310; 30; 20 MG/100ML; MG/100ML; MG/100ML; MG/100ML
INJECTION, SOLUTION INTRAVENOUS CONTINUOUS
Status: DISCONTINUED | OUTPATIENT
Start: 2021-07-14 | End: 2021-07-14 | Stop reason: HOSPADM

## 2021-07-14 RX ORDER — LIDOCAINE HYDROCHLORIDE 10 MG/ML
INJECTION, SOLUTION EPIDURAL; INFILTRATION; INTRACAUDAL; PERINEURAL
Status: DISCONTINUED | OUTPATIENT
Start: 2021-07-14 | End: 2021-07-14

## 2021-07-14 RX ORDER — PROPOFOL 10 MG/ML
VIAL (ML) INTRAVENOUS
Status: DISCONTINUED | OUTPATIENT
Start: 2021-07-14 | End: 2021-07-14

## 2021-07-14 RX ORDER — SODIUM CHLORIDE 0.9 % (FLUSH) 0.9 %
10 SYRINGE (ML) INJECTION
Status: DISCONTINUED | OUTPATIENT
Start: 2021-07-14 | End: 2021-07-14 | Stop reason: HOSPADM

## 2021-07-14 RX ADMIN — SODIUM CHLORIDE, SODIUM LACTATE, POTASSIUM CHLORIDE, AND CALCIUM CHLORIDE: .6; .31; .03; .02 INJECTION, SOLUTION INTRAVENOUS at 01:07

## 2021-07-14 RX ADMIN — PROPOFOL 110 MG: 10 INJECTION, EMULSION INTRAVENOUS at 02:07

## 2021-07-14 RX ADMIN — PROPOFOL 20 MG: 10 INJECTION, EMULSION INTRAVENOUS at 03:07

## 2021-07-14 RX ADMIN — PROPOFOL 50 MG: 10 INJECTION, EMULSION INTRAVENOUS at 02:07

## 2021-07-14 RX ADMIN — LIDOCAINE HYDROCHLORIDE 50 MG: 10 INJECTION, SOLUTION EPIDURAL; INFILTRATION; INTRACAUDAL; PERINEURAL at 02:07

## 2021-07-15 ENCOUNTER — PATIENT MESSAGE (OUTPATIENT)
Dept: OBSTETRICS AND GYNECOLOGY | Facility: CLINIC | Age: 66
End: 2021-07-15

## 2021-07-15 ENCOUNTER — TELEPHONE (OUTPATIENT)
Dept: GASTROENTEROLOGY | Facility: CLINIC | Age: 66
End: 2021-07-15

## 2021-07-15 ENCOUNTER — HOSPITAL ENCOUNTER (OUTPATIENT)
Dept: RADIOLOGY | Facility: HOSPITAL | Age: 66
Discharge: HOME | End: 2021-07-15
Attending: SPECIALIST
Payer: COMMERCIAL

## 2021-07-15 DIAGNOSIS — R10.2 PELVIC PAIN: ICD-10-CM

## 2021-07-15 PROCEDURE — 76856 US EXAM PELVIC COMPLETE: CPT | Mod: 26,,, | Performed by: RADIOLOGY

## 2021-07-15 PROCEDURE — 76856 US PELVIS COMP WITH TRANSVAG NON-OB (XPD): ICD-10-PCS | Mod: 26,,, | Performed by: RADIOLOGY

## 2021-07-15 PROCEDURE — 76830 TRANSVAGINAL US NON-OB: CPT | Mod: 26,,, | Performed by: RADIOLOGY

## 2021-07-15 PROCEDURE — 76830 US PELVIS COMP WITH TRANSVAG NON-OB (XPD): ICD-10-PCS | Mod: 26,,, | Performed by: RADIOLOGY

## 2021-07-15 PROCEDURE — 76856 US EXAM PELVIC COMPLETE: CPT | Mod: TC,PO

## 2021-07-18 LAB
CLINICAL INFO: NORMAL
CYTO CVX: NORMAL
CYTOLOGIST CVX/VAG CYTO: NORMAL
CYTOLOGY CMNT CVX/VAG CYTO-IMP: NORMAL
CYTOLOGY PAP THIN PREP EXPLANATION: NORMAL
DATE OF PREVIOUS PAP: NORMAL
DATE PREVIOUS BX: NO
LMP START DATE: NORMAL
SPECIMEN SOURCE CVX/VAG CYTO: NORMAL
STAT OF ADQ CVX/VAG CYTO-IMP: NORMAL

## 2021-07-19 VITALS
WEIGHT: 188.5 LBS | TEMPERATURE: 98 F | HEART RATE: 66 BPM | DIASTOLIC BLOOD PRESSURE: 74 MMHG | OXYGEN SATURATION: 99 % | RESPIRATION RATE: 19 BRPM | SYSTOLIC BLOOD PRESSURE: 148 MMHG | BODY MASS INDEX: 34.48 KG/M2

## 2021-07-19 LAB
FINAL PATHOLOGIC DIAGNOSIS: NORMAL
GROSS: NORMAL
Lab: NORMAL

## 2021-07-20 ENCOUNTER — PATIENT MESSAGE (OUTPATIENT)
Dept: GASTROENTEROLOGY | Facility: CLINIC | Age: 66
End: 2021-07-20

## 2021-07-26 ENCOUNTER — PATIENT MESSAGE (OUTPATIENT)
Dept: OBSTETRICS AND GYNECOLOGY | Facility: CLINIC | Age: 66
End: 2021-07-26

## 2021-07-29 ENCOUNTER — OFFICE VISIT (OUTPATIENT)
Dept: OBSTETRICS AND GYNECOLOGY | Facility: CLINIC | Age: 66
End: 2021-07-29
Payer: COMMERCIAL

## 2021-07-29 VITALS
WEIGHT: 194.44 LBS | HEIGHT: 62 IN | BODY MASS INDEX: 35.78 KG/M2 | SYSTOLIC BLOOD PRESSURE: 124 MMHG | DIASTOLIC BLOOD PRESSURE: 80 MMHG

## 2021-07-29 DIAGNOSIS — R10.2 PELVIC PAIN: Primary | ICD-10-CM

## 2021-07-29 PROCEDURE — 3074F SYST BP LT 130 MM HG: CPT | Mod: CPTII,S$GLB,, | Performed by: SPECIALIST

## 2021-07-29 PROCEDURE — 3008F PR BODY MASS INDEX (BMI) DOCUMENTED: ICD-10-PCS | Mod: CPTII,S$GLB,, | Performed by: SPECIALIST

## 2021-07-29 PROCEDURE — 3079F DIAST BP 80-89 MM HG: CPT | Mod: CPTII,S$GLB,, | Performed by: SPECIALIST

## 2021-07-29 PROCEDURE — 3074F PR MOST RECENT SYSTOLIC BLOOD PRESSURE < 130 MM HG: ICD-10-PCS | Mod: CPTII,S$GLB,, | Performed by: SPECIALIST

## 2021-07-29 PROCEDURE — 3288F PR FALLS RISK ASSESSMENT DOCUMENTED: ICD-10-PCS | Mod: CPTII,S$GLB,, | Performed by: SPECIALIST

## 2021-07-29 PROCEDURE — 99213 OFFICE O/P EST LOW 20 MIN: CPT | Mod: S$GLB,,, | Performed by: SPECIALIST

## 2021-07-29 PROCEDURE — 3008F BODY MASS INDEX DOCD: CPT | Mod: CPTII,S$GLB,, | Performed by: SPECIALIST

## 2021-07-29 PROCEDURE — 1101F PT FALLS ASSESS-DOCD LE1/YR: CPT | Mod: CPTII,S$GLB,, | Performed by: SPECIALIST

## 2021-07-29 PROCEDURE — 3079F PR MOST RECENT DIASTOLIC BLOOD PRESSURE 80-89 MM HG: ICD-10-PCS | Mod: CPTII,S$GLB,, | Performed by: SPECIALIST

## 2021-07-29 PROCEDURE — 1159F MED LIST DOCD IN RCRD: CPT | Mod: CPTII,S$GLB,, | Performed by: SPECIALIST

## 2021-07-29 PROCEDURE — 99999 PR PBB SHADOW E&M-EST. PATIENT-LVL III: ICD-10-PCS | Mod: PBBFAC,,, | Performed by: SPECIALIST

## 2021-07-29 PROCEDURE — 3288F FALL RISK ASSESSMENT DOCD: CPT | Mod: CPTII,S$GLB,, | Performed by: SPECIALIST

## 2021-07-29 PROCEDURE — 1159F PR MEDICATION LIST DOCUMENTED IN MEDICAL RECORD: ICD-10-PCS | Mod: CPTII,S$GLB,, | Performed by: SPECIALIST

## 2021-07-29 PROCEDURE — 1125F AMNT PAIN NOTED PAIN PRSNT: CPT | Mod: CPTII,S$GLB,, | Performed by: SPECIALIST

## 2021-07-29 PROCEDURE — 99999 PR PBB SHADOW E&M-EST. PATIENT-LVL III: CPT | Mod: PBBFAC,,, | Performed by: SPECIALIST

## 2021-07-29 PROCEDURE — 1125F PR PAIN SEVERITY QUANTIFIED, PAIN PRESENT: ICD-10-PCS | Mod: CPTII,S$GLB,, | Performed by: SPECIALIST

## 2021-07-29 PROCEDURE — 1101F PR PT FALLS ASSESS DOC 0-1 FALLS W/OUT INJ PAST YR: ICD-10-PCS | Mod: CPTII,S$GLB,, | Performed by: SPECIALIST

## 2021-07-29 PROCEDURE — 99213 PR OFFICE/OUTPT VISIT, EST, LEVL III, 20-29 MIN: ICD-10-PCS | Mod: S$GLB,,, | Performed by: SPECIALIST

## 2021-08-12 ENCOUNTER — PATIENT MESSAGE (OUTPATIENT)
Dept: GASTROENTEROLOGY | Facility: CLINIC | Age: 66
End: 2021-08-12

## 2021-09-14 ENCOUNTER — PATIENT OUTREACH (OUTPATIENT)
Dept: ADMINISTRATIVE | Facility: HOSPITAL | Age: 66
End: 2021-09-14

## 2021-10-12 ENCOUNTER — PATIENT OUTREACH (OUTPATIENT)
Dept: ADMINISTRATIVE | Facility: HOSPITAL | Age: 66
End: 2021-10-12

## 2021-10-12 ENCOUNTER — TELEPHONE (OUTPATIENT)
Dept: FAMILY MEDICINE | Facility: CLINIC | Age: 66
End: 2021-10-12

## 2021-10-12 ENCOUNTER — IMMUNIZATION (OUTPATIENT)
Dept: FAMILY MEDICINE | Facility: CLINIC | Age: 66
End: 2021-10-12
Payer: COMMERCIAL

## 2021-10-12 DIAGNOSIS — Z23 NEED FOR VACCINATION: Primary | ICD-10-CM

## 2021-10-12 PROCEDURE — 91300 COVID-19, MRNA, LNP-S, PF, 30 MCG/0.3 ML DOSE VACCINE: CPT | Mod: PBBFAC | Performed by: NURSE PRACTITIONER

## 2021-10-12 PROCEDURE — 0003A COVID-19, MRNA, LNP-S, PF, 30 MCG/0.3 ML DOSE VACCINE: CPT | Mod: PBBFAC | Performed by: NURSE PRACTITIONER

## 2021-10-27 ENCOUNTER — OFFICE VISIT (OUTPATIENT)
Dept: FAMILY MEDICINE | Facility: CLINIC | Age: 66
End: 2021-10-27
Payer: COMMERCIAL

## 2021-10-27 VITALS
WEIGHT: 190 LBS | HEIGHT: 62 IN | SYSTOLIC BLOOD PRESSURE: 136 MMHG | TEMPERATURE: 97 F | BODY MASS INDEX: 34.96 KG/M2 | DIASTOLIC BLOOD PRESSURE: 70 MMHG | HEART RATE: 82 BPM

## 2021-10-27 DIAGNOSIS — I25.10 CORONARY ARTERY DISEASE INVOLVING NATIVE CORONARY ARTERY OF NATIVE HEART WITHOUT ANGINA PECTORIS: ICD-10-CM

## 2021-10-27 DIAGNOSIS — F41.9 ANXIETY: ICD-10-CM

## 2021-10-27 DIAGNOSIS — F33.9 RECURRENT MAJOR DEPRESSIVE DISORDER, REMISSION STATUS UNSPECIFIED: ICD-10-CM

## 2021-10-27 DIAGNOSIS — Z00.00 ROUTINE HISTORY AND PHYSICAL EXAMINATION OF ADULT: Primary | ICD-10-CM

## 2021-10-27 DIAGNOSIS — B07.9 VIRAL WARTS, UNSPECIFIED TYPE: ICD-10-CM

## 2021-10-27 DIAGNOSIS — Z85.528 PERSONAL HISTORY OF RENAL CANCER: ICD-10-CM

## 2021-10-27 DIAGNOSIS — Z12.31 ENCOUNTER FOR SCREENING MAMMOGRAM FOR MALIGNANT NEOPLASM OF BREAST: ICD-10-CM

## 2021-10-27 DIAGNOSIS — I10 ESSENTIAL HYPERTENSION: ICD-10-CM

## 2021-10-27 DIAGNOSIS — K21.9 GASTROESOPHAGEAL REFLUX DISEASE WITHOUT ESOPHAGITIS: ICD-10-CM

## 2021-10-27 DIAGNOSIS — E66.9 CLASS 1 OBESITY WITH SERIOUS COMORBIDITY AND BODY MASS INDEX (BMI) OF 34.0 TO 34.9 IN ADULT, UNSPECIFIED OBESITY TYPE: ICD-10-CM

## 2021-10-27 PROBLEM — R10.13 EPIGASTRIC PAIN: Status: RESOLVED | Noted: 2021-07-14 | Resolved: 2021-10-27

## 2021-10-27 PROCEDURE — 99999 PR PBB SHADOW E&M-EST. PATIENT-LVL III: ICD-10-PCS | Mod: PBBFAC,,, | Performed by: FAMILY MEDICINE

## 2021-10-27 PROCEDURE — 90471 FLU VACCINE - QUADRIVALENT - ADJUVANTED: ICD-10-PCS | Mod: S$GLB,,, | Performed by: FAMILY MEDICINE

## 2021-10-27 PROCEDURE — 4010F PR ACE/ARB THEARPY RXD/TAKEN: ICD-10-PCS | Mod: CPTII,S$GLB,, | Performed by: FAMILY MEDICINE

## 2021-10-27 PROCEDURE — 90694 VACC AIIV4 NO PRSRV 0.5ML IM: CPT | Mod: S$GLB,,, | Performed by: FAMILY MEDICINE

## 2021-10-27 PROCEDURE — 90471 IMMUNIZATION ADMIN: CPT | Mod: S$GLB,,, | Performed by: FAMILY MEDICINE

## 2021-10-27 PROCEDURE — 99397 PER PM REEVAL EST PAT 65+ YR: CPT | Mod: 25,S$GLB,, | Performed by: FAMILY MEDICINE

## 2021-10-27 PROCEDURE — 90694 FLU VACCINE - QUADRIVALENT - ADJUVANTED: ICD-10-PCS | Mod: S$GLB,,, | Performed by: FAMILY MEDICINE

## 2021-10-27 PROCEDURE — 3008F PR BODY MASS INDEX (BMI) DOCUMENTED: ICD-10-PCS | Mod: CPTII,S$GLB,, | Performed by: FAMILY MEDICINE

## 2021-10-27 PROCEDURE — 3008F BODY MASS INDEX DOCD: CPT | Mod: CPTII,S$GLB,, | Performed by: FAMILY MEDICINE

## 2021-10-27 PROCEDURE — 3288F FALL RISK ASSESSMENT DOCD: CPT | Mod: CPTII,S$GLB,, | Performed by: FAMILY MEDICINE

## 2021-10-27 PROCEDURE — 1159F MED LIST DOCD IN RCRD: CPT | Mod: CPTII,S$GLB,, | Performed by: FAMILY MEDICINE

## 2021-10-27 PROCEDURE — 4010F ACE/ARB THERAPY RXD/TAKEN: CPT | Mod: CPTII,S$GLB,, | Performed by: FAMILY MEDICINE

## 2021-10-27 PROCEDURE — 99397 PR PREVENTIVE VISIT,EST,65 & OVER: ICD-10-PCS | Mod: 25,S$GLB,, | Performed by: FAMILY MEDICINE

## 2021-10-27 PROCEDURE — 3075F SYST BP GE 130 - 139MM HG: CPT | Mod: CPTII,S$GLB,, | Performed by: FAMILY MEDICINE

## 2021-10-27 PROCEDURE — 1101F PT FALLS ASSESS-DOCD LE1/YR: CPT | Mod: CPTII,S$GLB,, | Performed by: FAMILY MEDICINE

## 2021-10-27 PROCEDURE — 3078F DIAST BP <80 MM HG: CPT | Mod: CPTII,S$GLB,, | Performed by: FAMILY MEDICINE

## 2021-10-27 PROCEDURE — 1101F PR PT FALLS ASSESS DOC 0-1 FALLS W/OUT INJ PAST YR: ICD-10-PCS | Mod: CPTII,S$GLB,, | Performed by: FAMILY MEDICINE

## 2021-10-27 PROCEDURE — 1159F PR MEDICATION LIST DOCUMENTED IN MEDICAL RECORD: ICD-10-PCS | Mod: CPTII,S$GLB,, | Performed by: FAMILY MEDICINE

## 2021-10-27 PROCEDURE — 3075F PR MOST RECENT SYSTOLIC BLOOD PRESS GE 130-139MM HG: ICD-10-PCS | Mod: CPTII,S$GLB,, | Performed by: FAMILY MEDICINE

## 2021-10-27 PROCEDURE — 3078F PR MOST RECENT DIASTOLIC BLOOD PRESSURE < 80 MM HG: ICD-10-PCS | Mod: CPTII,S$GLB,, | Performed by: FAMILY MEDICINE

## 2021-10-27 PROCEDURE — 3288F PR FALLS RISK ASSESSMENT DOCUMENTED: ICD-10-PCS | Mod: CPTII,S$GLB,, | Performed by: FAMILY MEDICINE

## 2021-10-27 PROCEDURE — 99999 PR PBB SHADOW E&M-EST. PATIENT-LVL III: CPT | Mod: PBBFAC,,, | Performed by: FAMILY MEDICINE

## 2021-10-27 RX ORDER — ESCITALOPRAM OXALATE 10 MG/1
TABLET ORAL
Qty: 90 TABLET | Refills: 3 | Status: SHIPPED | OUTPATIENT
Start: 2021-10-27 | End: 2022-12-23

## 2021-10-27 RX ORDER — FAMOTIDINE 10 MG/1
10 TABLET ORAL DAILY
COMMUNITY
End: 2021-10-27

## 2021-10-27 RX ORDER — ATORVASTATIN CALCIUM 20 MG/1
10 TABLET, FILM COATED ORAL
COMMUNITY
Start: 2021-08-25 | End: 2021-11-29 | Stop reason: ALTCHOICE

## 2021-10-27 RX ORDER — FAMOTIDINE 20 MG/1
20 TABLET, FILM COATED ORAL 2 TIMES DAILY
Qty: 180 TABLET | Refills: 3 | Status: SHIPPED | OUTPATIENT
Start: 2021-10-27 | End: 2023-09-20

## 2021-11-03 ENCOUNTER — TELEPHONE (OUTPATIENT)
Dept: FAMILY MEDICINE | Facility: CLINIC | Age: 66
End: 2021-11-03
Payer: COMMERCIAL

## 2021-11-03 DIAGNOSIS — B07.9 WART OF FACE: Primary | ICD-10-CM

## 2021-11-07 ENCOUNTER — PATIENT MESSAGE (OUTPATIENT)
Dept: FAMILY MEDICINE | Facility: CLINIC | Age: 66
End: 2021-11-07
Payer: COMMERCIAL

## 2021-11-09 ENCOUNTER — OFFICE VISIT (OUTPATIENT)
Dept: FAMILY MEDICINE | Facility: CLINIC | Age: 66
End: 2021-11-09
Payer: COMMERCIAL

## 2021-11-09 VITALS
TEMPERATURE: 98 F | WEIGHT: 187.31 LBS | SYSTOLIC BLOOD PRESSURE: 147 MMHG | HEIGHT: 62 IN | DIASTOLIC BLOOD PRESSURE: 70 MMHG | HEART RATE: 67 BPM | BODY MASS INDEX: 34.47 KG/M2

## 2021-11-09 DIAGNOSIS — I25.10 CORONARY ARTERY DISEASE INVOLVING NATIVE CORONARY ARTERY OF NATIVE HEART, UNSPECIFIED WHETHER ANGINA PRESENT: ICD-10-CM

## 2021-11-09 DIAGNOSIS — H93.A9 PULSATILE TINNITUS: Primary | ICD-10-CM

## 2021-11-09 DIAGNOSIS — I65.23 BILATERAL CAROTID ARTERY STENOSIS: ICD-10-CM

## 2021-11-09 DIAGNOSIS — I10 ESSENTIAL HYPERTENSION: ICD-10-CM

## 2021-11-09 DIAGNOSIS — E78.5 HYPERLIPIDEMIA LDL GOAL <100: ICD-10-CM

## 2021-11-09 PROCEDURE — 3008F BODY MASS INDEX DOCD: CPT | Mod: CPTII,S$GLB,, | Performed by: FAMILY MEDICINE

## 2021-11-09 PROCEDURE — 1159F MED LIST DOCD IN RCRD: CPT | Mod: CPTII,S$GLB,, | Performed by: FAMILY MEDICINE

## 2021-11-09 PROCEDURE — 3288F FALL RISK ASSESSMENT DOCD: CPT | Mod: CPTII,S$GLB,, | Performed by: FAMILY MEDICINE

## 2021-11-09 PROCEDURE — 3288F PR FALLS RISK ASSESSMENT DOCUMENTED: ICD-10-PCS | Mod: CPTII,S$GLB,, | Performed by: FAMILY MEDICINE

## 2021-11-09 PROCEDURE — 4010F PR ACE/ARB THEARPY RXD/TAKEN: ICD-10-PCS | Mod: CPTII,S$GLB,, | Performed by: FAMILY MEDICINE

## 2021-11-09 PROCEDURE — 99999 PR PBB SHADOW E&M-EST. PATIENT-LVL IV: ICD-10-PCS | Mod: PBBFAC,,, | Performed by: FAMILY MEDICINE

## 2021-11-09 PROCEDURE — 99214 OFFICE O/P EST MOD 30 MIN: CPT | Mod: S$GLB,,, | Performed by: FAMILY MEDICINE

## 2021-11-09 PROCEDURE — 3078F DIAST BP <80 MM HG: CPT | Mod: CPTII,S$GLB,, | Performed by: FAMILY MEDICINE

## 2021-11-09 PROCEDURE — 3078F PR MOST RECENT DIASTOLIC BLOOD PRESSURE < 80 MM HG: ICD-10-PCS | Mod: CPTII,S$GLB,, | Performed by: FAMILY MEDICINE

## 2021-11-09 PROCEDURE — 1126F PR PAIN SEVERITY QUANTIFIED, NO PAIN PRESENT: ICD-10-PCS | Mod: CPTII,S$GLB,, | Performed by: FAMILY MEDICINE

## 2021-11-09 PROCEDURE — 3077F PR MOST RECENT SYSTOLIC BLOOD PRESSURE >= 140 MM HG: ICD-10-PCS | Mod: CPTII,S$GLB,, | Performed by: FAMILY MEDICINE

## 2021-11-09 PROCEDURE — 1101F PR PT FALLS ASSESS DOC 0-1 FALLS W/OUT INJ PAST YR: ICD-10-PCS | Mod: CPTII,S$GLB,, | Performed by: FAMILY MEDICINE

## 2021-11-09 PROCEDURE — 3008F PR BODY MASS INDEX (BMI) DOCUMENTED: ICD-10-PCS | Mod: CPTII,S$GLB,, | Performed by: FAMILY MEDICINE

## 2021-11-09 PROCEDURE — 99999 PR PBB SHADOW E&M-EST. PATIENT-LVL IV: CPT | Mod: PBBFAC,,, | Performed by: FAMILY MEDICINE

## 2021-11-09 PROCEDURE — 4010F ACE/ARB THERAPY RXD/TAKEN: CPT | Mod: CPTII,S$GLB,, | Performed by: FAMILY MEDICINE

## 2021-11-09 PROCEDURE — 1159F PR MEDICATION LIST DOCUMENTED IN MEDICAL RECORD: ICD-10-PCS | Mod: CPTII,S$GLB,, | Performed by: FAMILY MEDICINE

## 2021-11-09 PROCEDURE — 99214 PR OFFICE/OUTPT VISIT, EST, LEVL IV, 30-39 MIN: ICD-10-PCS | Mod: S$GLB,,, | Performed by: FAMILY MEDICINE

## 2021-11-09 PROCEDURE — 1126F AMNT PAIN NOTED NONE PRSNT: CPT | Mod: CPTII,S$GLB,, | Performed by: FAMILY MEDICINE

## 2021-11-09 PROCEDURE — 3077F SYST BP >= 140 MM HG: CPT | Mod: CPTII,S$GLB,, | Performed by: FAMILY MEDICINE

## 2021-11-09 PROCEDURE — 1101F PT FALLS ASSESS-DOCD LE1/YR: CPT | Mod: CPTII,S$GLB,, | Performed by: FAMILY MEDICINE

## 2021-11-09 RX ORDER — HYDROCHLOROTHIAZIDE 25 MG/1
25 TABLET ORAL DAILY
COMMUNITY
End: 2021-11-29

## 2021-11-09 RX ORDER — DOXAZOSIN 4 MG/1
4 TABLET ORAL DAILY
COMMUNITY
Start: 2021-10-21 | End: 2021-11-29

## 2021-11-12 ENCOUNTER — HOSPITAL ENCOUNTER (OUTPATIENT)
Dept: RADIOLOGY | Facility: HOSPITAL | Age: 66
Discharge: HOME | End: 2021-11-12
Attending: FAMILY MEDICINE
Payer: COMMERCIAL

## 2021-11-12 DIAGNOSIS — H93.A9 PULSATILE TINNITUS: ICD-10-CM

## 2021-11-12 DIAGNOSIS — I65.23 BILATERAL CAROTID ARTERY STENOSIS: ICD-10-CM

## 2021-11-12 PROCEDURE — 93880 EXTRACRANIAL BILAT STUDY: CPT | Mod: TC,PO

## 2021-11-12 PROCEDURE — 93880 EXTRACRANIAL BILAT STUDY: CPT | Mod: 26,,, | Performed by: RADIOLOGY

## 2021-11-12 PROCEDURE — 93880 US CAROTID BILATERAL: ICD-10-PCS | Mod: 26,,, | Performed by: RADIOLOGY

## 2021-11-23 ENCOUNTER — TELEPHONE (OUTPATIENT)
Dept: FAMILY MEDICINE | Facility: CLINIC | Age: 66
End: 2021-11-23
Payer: COMMERCIAL

## 2021-11-23 ENCOUNTER — PATIENT MESSAGE (OUTPATIENT)
Dept: FAMILY MEDICINE | Facility: CLINIC | Age: 66
End: 2021-11-23
Payer: COMMERCIAL

## 2021-11-23 DIAGNOSIS — E78.5 HYPERLIPIDEMIA LDL GOAL <100: Primary | ICD-10-CM

## 2021-11-24 ENCOUNTER — OFFICE VISIT (OUTPATIENT)
Dept: DERMATOLOGY | Facility: CLINIC | Age: 66
End: 2021-11-24
Payer: COMMERCIAL

## 2021-11-24 ENCOUNTER — LAB VISIT (OUTPATIENT)
Dept: LAB | Facility: HOSPITAL | Age: 66
End: 2021-11-24
Attending: FAMILY MEDICINE
Payer: COMMERCIAL

## 2021-11-24 DIAGNOSIS — E78.5 HYPERLIPIDEMIA LDL GOAL <100: ICD-10-CM

## 2021-11-24 DIAGNOSIS — B07.9 WART OF FACE: ICD-10-CM

## 2021-11-24 DIAGNOSIS — L82.1 SEBORRHEIC KERATOSES: Primary | ICD-10-CM

## 2021-11-24 DIAGNOSIS — I10 ESSENTIAL HYPERTENSION: ICD-10-CM

## 2021-11-24 LAB
ALBUMIN SERPL BCP-MCNC: 3.6 G/DL (ref 3.5–5.2)
ALP SERPL-CCNC: 83 U/L (ref 55–135)
ALT SERPL W/O P-5'-P-CCNC: 27 U/L (ref 10–44)
ANION GAP SERPL CALC-SCNC: 9 MMOL/L (ref 8–16)
AST SERPL-CCNC: 22 U/L (ref 10–40)
BILIRUB DIRECT SERPL-MCNC: 0.2 MG/DL (ref 0.1–0.3)
BILIRUB SERPL-MCNC: 0.6 MG/DL (ref 0.1–1)
BUN SERPL-MCNC: 13 MG/DL (ref 8–23)
CALCIUM SERPL-MCNC: 9.7 MG/DL (ref 8.7–10.5)
CHLORIDE SERPL-SCNC: 101 MMOL/L (ref 95–110)
CO2 SERPL-SCNC: 28 MMOL/L (ref 23–29)
CREAT SERPL-MCNC: 0.8 MG/DL (ref 0.5–1.4)
EST. GFR  (AFRICAN AMERICAN): >60 ML/MIN/1.73 M^2
EST. GFR  (NON AFRICAN AMERICAN): >60 ML/MIN/1.73 M^2
GLUCOSE SERPL-MCNC: 92 MG/DL (ref 70–110)
POTASSIUM SERPL-SCNC: 5 MMOL/L (ref 3.5–5.1)
PROT SERPL-MCNC: 7.1 G/DL (ref 6–8.4)
SODIUM SERPL-SCNC: 138 MMOL/L (ref 136–145)

## 2021-11-24 PROCEDURE — 17110 PR DESTRUCTION BENIGN LESIONS UP TO 14: ICD-10-PCS | Mod: S$GLB,,, | Performed by: DERMATOLOGY

## 2021-11-24 PROCEDURE — 80076 HEPATIC FUNCTION PANEL: CPT | Performed by: FAMILY MEDICINE

## 2021-11-24 PROCEDURE — 4010F ACE/ARB THERAPY RXD/TAKEN: CPT | Mod: CPTII,S$GLB,, | Performed by: DERMATOLOGY

## 2021-11-24 PROCEDURE — 99999 PR PBB SHADOW E&M-EST. PATIENT-LVL III: CPT | Mod: PBBFAC,,, | Performed by: DERMATOLOGY

## 2021-11-24 PROCEDURE — 4010F PR ACE/ARB THEARPY RXD/TAKEN: ICD-10-PCS | Mod: CPTII,S$GLB,, | Performed by: DERMATOLOGY

## 2021-11-24 PROCEDURE — 99202 PR OFFICE/OUTPT VISIT, NEW, LEVL II, 15-29 MIN: ICD-10-PCS | Mod: 25,S$GLB,, | Performed by: DERMATOLOGY

## 2021-11-24 PROCEDURE — 99999 PR PBB SHADOW E&M-EST. PATIENT-LVL III: ICD-10-PCS | Mod: PBBFAC,,, | Performed by: DERMATOLOGY

## 2021-11-24 PROCEDURE — 80048 BASIC METABOLIC PNL TOTAL CA: CPT | Performed by: FAMILY MEDICINE

## 2021-11-24 PROCEDURE — 99202 OFFICE O/P NEW SF 15 MIN: CPT | Mod: 25,S$GLB,, | Performed by: DERMATOLOGY

## 2021-11-24 PROCEDURE — 17110 DESTRUCTION B9 LES UP TO 14: CPT | Mod: S$GLB,,, | Performed by: DERMATOLOGY

## 2021-11-24 PROCEDURE — 36415 COLL VENOUS BLD VENIPUNCTURE: CPT | Mod: PO | Performed by: FAMILY MEDICINE

## 2021-11-29 ENCOUNTER — OFFICE VISIT (OUTPATIENT)
Dept: FAMILY MEDICINE | Facility: CLINIC | Age: 66
End: 2021-11-29
Payer: COMMERCIAL

## 2021-11-29 VITALS
HEART RATE: 73 BPM | BODY MASS INDEX: 34.96 KG/M2 | HEIGHT: 62 IN | SYSTOLIC BLOOD PRESSURE: 128 MMHG | WEIGHT: 190 LBS | DIASTOLIC BLOOD PRESSURE: 78 MMHG | TEMPERATURE: 98 F

## 2021-11-29 DIAGNOSIS — H93.A9 PULSATILE TINNITUS: ICD-10-CM

## 2021-11-29 DIAGNOSIS — I25.10 CORONARY ARTERY DISEASE INVOLVING NATIVE CORONARY ARTERY OF NATIVE HEART WITHOUT ANGINA PECTORIS: ICD-10-CM

## 2021-11-29 DIAGNOSIS — I10 ESSENTIAL HYPERTENSION: Primary | ICD-10-CM

## 2021-11-29 DIAGNOSIS — E78.5 HYPERLIPIDEMIA LDL GOAL <100: ICD-10-CM

## 2021-11-29 PROCEDURE — 4010F ACE/ARB THERAPY RXD/TAKEN: CPT | Mod: CPTII,S$GLB,, | Performed by: FAMILY MEDICINE

## 2021-11-29 PROCEDURE — 99999 PR PBB SHADOW E&M-EST. PATIENT-LVL III: CPT | Mod: PBBFAC,,, | Performed by: FAMILY MEDICINE

## 2021-11-29 PROCEDURE — 4010F PR ACE/ARB THEARPY RXD/TAKEN: ICD-10-PCS | Mod: CPTII,S$GLB,, | Performed by: FAMILY MEDICINE

## 2021-11-29 PROCEDURE — 99214 OFFICE O/P EST MOD 30 MIN: CPT | Mod: S$GLB,,, | Performed by: FAMILY MEDICINE

## 2021-11-29 PROCEDURE — 99999 PR PBB SHADOW E&M-EST. PATIENT-LVL III: ICD-10-PCS | Mod: PBBFAC,,, | Performed by: FAMILY MEDICINE

## 2021-11-29 PROCEDURE — 99214 PR OFFICE/OUTPT VISIT, EST, LEVL IV, 30-39 MIN: ICD-10-PCS | Mod: S$GLB,,, | Performed by: FAMILY MEDICINE

## 2021-11-29 RX ORDER — ATORVASTATIN CALCIUM 10 MG/1
10 TABLET, FILM COATED ORAL DAILY
COMMUNITY
End: 2021-11-29 | Stop reason: ALTCHOICE

## 2021-11-29 RX ORDER — VALSARTAN 160 MG/1
160 TABLET ORAL DAILY
Qty: 90 TABLET | Refills: 3 | Status: SHIPPED | OUTPATIENT
Start: 2021-11-29 | End: 2022-09-02

## 2021-11-29 RX ORDER — ROSUVASTATIN CALCIUM 10 MG/1
10 TABLET, COATED ORAL DAILY
Qty: 90 TABLET | Refills: 3 | Status: SHIPPED | OUTPATIENT
Start: 2021-11-29 | End: 2021-12-14 | Stop reason: ALTCHOICE

## 2021-11-29 RX ORDER — HYDROCHLOROTHIAZIDE 12.5 MG/1
12.5 TABLET ORAL DAILY
Qty: 90 TABLET | Refills: 3 | Status: SHIPPED | OUTPATIENT
Start: 2021-11-29 | End: 2021-12-14

## 2021-11-29 RX ORDER — LORAZEPAM 0.5 MG/1
0.5 TABLET ORAL
COMMUNITY
Start: 2021-11-23 | End: 2022-01-10

## 2021-11-30 ENCOUNTER — PATIENT MESSAGE (OUTPATIENT)
Dept: FAMILY MEDICINE | Facility: CLINIC | Age: 66
End: 2021-11-30
Payer: COMMERCIAL

## 2021-12-01 ENCOUNTER — PATIENT MESSAGE (OUTPATIENT)
Dept: FAMILY MEDICINE | Facility: CLINIC | Age: 66
End: 2021-12-01
Payer: COMMERCIAL

## 2021-12-02 ENCOUNTER — PATIENT MESSAGE (OUTPATIENT)
Dept: FAMILY MEDICINE | Facility: CLINIC | Age: 66
End: 2021-12-02
Payer: COMMERCIAL

## 2021-12-03 ENCOUNTER — TELEPHONE (OUTPATIENT)
Dept: OTOLARYNGOLOGY | Facility: CLINIC | Age: 66
End: 2021-12-03
Payer: COMMERCIAL

## 2021-12-07 ENCOUNTER — TELEPHONE (OUTPATIENT)
Dept: FAMILY MEDICINE | Facility: CLINIC | Age: 66
End: 2021-12-07
Payer: COMMERCIAL

## 2021-12-13 ENCOUNTER — TELEPHONE (OUTPATIENT)
Dept: FAMILY MEDICINE | Facility: CLINIC | Age: 66
End: 2021-12-13
Payer: COMMERCIAL

## 2021-12-14 ENCOUNTER — OFFICE VISIT (OUTPATIENT)
Dept: FAMILY MEDICINE | Facility: CLINIC | Age: 66
End: 2021-12-14
Payer: COMMERCIAL

## 2021-12-14 VITALS
HEIGHT: 62 IN | DIASTOLIC BLOOD PRESSURE: 80 MMHG | WEIGHT: 189 LBS | HEART RATE: 73 BPM | BODY MASS INDEX: 34.78 KG/M2 | SYSTOLIC BLOOD PRESSURE: 170 MMHG | TEMPERATURE: 98 F

## 2021-12-14 DIAGNOSIS — R00.2 PALPITATIONS: ICD-10-CM

## 2021-12-14 DIAGNOSIS — I10 ESSENTIAL HYPERTENSION: Primary | ICD-10-CM

## 2021-12-14 DIAGNOSIS — F41.9 ANXIETY: ICD-10-CM

## 2021-12-14 PROCEDURE — 4010F ACE/ARB THERAPY RXD/TAKEN: CPT | Mod: CPTII,S$GLB,, | Performed by: FAMILY MEDICINE

## 2021-12-14 PROCEDURE — 99496 TRANSITIONAL CARE MANAGE SERVICE 7 DAY DISCHARGE: ICD-10-PCS | Mod: S$GLB,,, | Performed by: FAMILY MEDICINE

## 2021-12-14 PROCEDURE — 4010F PR ACE/ARB THEARPY RXD/TAKEN: ICD-10-PCS | Mod: CPTII,S$GLB,, | Performed by: FAMILY MEDICINE

## 2021-12-14 PROCEDURE — 99999 PR PBB SHADOW E&M-EST. PATIENT-LVL III: ICD-10-PCS | Mod: PBBFAC,,, | Performed by: FAMILY MEDICINE

## 2021-12-14 PROCEDURE — 99999 PR PBB SHADOW E&M-EST. PATIENT-LVL III: CPT | Mod: PBBFAC,,, | Performed by: FAMILY MEDICINE

## 2021-12-14 PROCEDURE — 99496 TRANSJ CARE MGMT HIGH F2F 7D: CPT | Mod: S$GLB,,, | Performed by: FAMILY MEDICINE

## 2021-12-14 RX ORDER — HYDROCHLOROTHIAZIDE 12.5 MG/1
TABLET ORAL
Qty: 90 TABLET | Refills: 3 | COMMUNITY
Start: 2021-12-14 | End: 2022-07-07

## 2021-12-14 RX ORDER — PRAVASTATIN SODIUM 40 MG/1
40 TABLET ORAL
COMMUNITY
Start: 2021-11-30 | End: 2022-03-18 | Stop reason: ALTCHOICE

## 2021-12-17 ENCOUNTER — TELEPHONE (OUTPATIENT)
Dept: FAMILY MEDICINE | Facility: CLINIC | Age: 66
End: 2021-12-17
Payer: COMMERCIAL

## 2021-12-17 ENCOUNTER — PATIENT MESSAGE (OUTPATIENT)
Dept: FAMILY MEDICINE | Facility: CLINIC | Age: 66
End: 2021-12-17
Payer: COMMERCIAL

## 2021-12-17 RX ORDER — DILTIAZEM HYDROCHLORIDE 120 MG/1
120 CAPSULE, EXTENDED RELEASE ORAL DAILY
Qty: 30 CAPSULE | Refills: 1 | Status: SHIPPED | OUTPATIENT
Start: 2021-12-17 | End: 2022-01-10 | Stop reason: SDDI

## 2021-12-17 RX ORDER — PANTOPRAZOLE SODIUM 40 MG/1
40 TABLET, DELAYED RELEASE ORAL DAILY
Qty: 30 TABLET | Refills: 1 | Status: SHIPPED | OUTPATIENT
Start: 2021-12-17 | End: 2022-02-22

## 2021-12-18 ENCOUNTER — HOSPITAL ENCOUNTER (EMERGENCY)
Facility: HOSPITAL | Age: 66
Discharge: HOME | End: 2021-12-19
Attending: FAMILY MEDICINE
Payer: COMMERCIAL

## 2021-12-18 DIAGNOSIS — K59.00 CONSTIPATION, UNSPECIFIED CONSTIPATION TYPE: ICD-10-CM

## 2021-12-18 DIAGNOSIS — R00.2 PALPITATIONS: ICD-10-CM

## 2021-12-18 DIAGNOSIS — K21.9 GASTROESOPHAGEAL REFLUX DISEASE, UNSPECIFIED WHETHER ESOPHAGITIS PRESENT: ICD-10-CM

## 2021-12-18 DIAGNOSIS — F41.9 ANXIETY: Primary | ICD-10-CM

## 2021-12-18 LAB
BASOPHILS # BLD AUTO: 0.03 K/UL (ref 0–0.2)
BASOPHILS NFR BLD: 0.4 % (ref 0–1.9)
DIFFERENTIAL METHOD: ABNORMAL
EOSINOPHIL # BLD AUTO: 0.2 K/UL (ref 0–0.5)
EOSINOPHIL NFR BLD: 1.9 % (ref 0–8)
ERYTHROCYTE [DISTWIDTH] IN BLOOD BY AUTOMATED COUNT: 14 % (ref 11.5–14.5)
HCT VFR BLD AUTO: 39.7 % (ref 37–48.5)
HGB BLD-MCNC: 13.1 G/DL (ref 12–16)
IMM GRANULOCYTES # BLD AUTO: 0.02 K/UL (ref 0–0.04)
IMM GRANULOCYTES NFR BLD AUTO: 0.3 % (ref 0–0.5)
LYMPHOCYTES # BLD AUTO: 2.5 K/UL (ref 1–4.8)
LYMPHOCYTES NFR BLD: 31.7 % (ref 18–48)
MCH RBC QN AUTO: 28.9 PG (ref 27–31)
MCHC RBC AUTO-ENTMCNC: 33 G/DL (ref 32–36)
MCV RBC AUTO: 88 FL (ref 82–98)
MONOCYTES # BLD AUTO: 0.5 K/UL (ref 0.3–1)
MONOCYTES NFR BLD: 6.7 % (ref 4–15)
NEUTROPHILS # BLD AUTO: 4.6 K/UL (ref 1.8–7.7)
NEUTROPHILS NFR BLD: 59 % (ref 38–73)
NRBC BLD-RTO: 0 /100 WBC
PLATELET # BLD AUTO: 304 K/UL (ref 150–450)
PMV BLD AUTO: 8.7 FL (ref 9.2–12.9)
RBC # BLD AUTO: 4.53 M/UL (ref 4–5.4)
WBC # BLD AUTO: 7.77 K/UL (ref 3.9–12.7)

## 2021-12-18 PROCEDURE — 93005 ELECTROCARDIOGRAM TRACING: CPT

## 2021-12-18 PROCEDURE — 80053 COMPREHEN METABOLIC PANEL: CPT | Performed by: FAMILY MEDICINE

## 2021-12-18 PROCEDURE — 84443 ASSAY THYROID STIM HORMONE: CPT | Performed by: FAMILY MEDICINE

## 2021-12-18 PROCEDURE — 99285 EMERGENCY DEPT VISIT HI MDM: CPT | Mod: 25

## 2021-12-18 PROCEDURE — 84484 ASSAY OF TROPONIN QUANT: CPT | Performed by: FAMILY MEDICINE

## 2021-12-18 PROCEDURE — 25000003 PHARM REV CODE 250: Performed by: FAMILY MEDICINE

## 2021-12-18 PROCEDURE — 83880 ASSAY OF NATRIURETIC PEPTIDE: CPT | Performed by: FAMILY MEDICINE

## 2021-12-18 PROCEDURE — 83690 ASSAY OF LIPASE: CPT | Performed by: FAMILY MEDICINE

## 2021-12-18 PROCEDURE — 96374 THER/PROPH/DIAG INJ IV PUSH: CPT

## 2021-12-18 PROCEDURE — 93010 ELECTROCARDIOGRAM REPORT: CPT | Mod: ,,, | Performed by: STUDENT IN AN ORGANIZED HEALTH CARE EDUCATION/TRAINING PROGRAM

## 2021-12-18 PROCEDURE — 85025 COMPLETE CBC W/AUTO DIFF WBC: CPT | Performed by: FAMILY MEDICINE

## 2021-12-18 PROCEDURE — 93010 EKG 12-LEAD: ICD-10-PCS | Mod: ,,, | Performed by: STUDENT IN AN ORGANIZED HEALTH CARE EDUCATION/TRAINING PROGRAM

## 2021-12-18 PROCEDURE — 83735 ASSAY OF MAGNESIUM: CPT | Performed by: FAMILY MEDICINE

## 2021-12-18 PROCEDURE — 63600175 PHARM REV CODE 636 W HCPCS: Performed by: FAMILY MEDICINE

## 2021-12-18 RX ORDER — HYDRALAZINE HYDROCHLORIDE 20 MG/ML
10 INJECTION INTRAMUSCULAR; INTRAVENOUS
Status: COMPLETED | OUTPATIENT
Start: 2021-12-18 | End: 2021-12-18

## 2021-12-18 RX ORDER — ALPRAZOLAM 0.5 MG/1
0.5 TABLET ORAL
Status: COMPLETED | OUTPATIENT
Start: 2021-12-19 | End: 2021-12-18

## 2021-12-18 RX ADMIN — MAGNESIUM HYDROXIDE/ALUMINUM HYDROXICE/SIMETHICONE 50 ML: 120; 1200; 1200 SUSPENSION ORAL at 11:12

## 2021-12-18 RX ADMIN — HYDRALAZINE HYDROCHLORIDE 10 MG: 20 INJECTION INTRAMUSCULAR; INTRAVENOUS at 11:12

## 2021-12-18 RX ADMIN — ALPRAZOLAM 0.5 MG: 0.5 TABLET ORAL at 11:12

## 2021-12-19 VITALS
RESPIRATION RATE: 15 BRPM | WEIGHT: 190 LBS | OXYGEN SATURATION: 97 % | BODY MASS INDEX: 34.96 KG/M2 | SYSTOLIC BLOOD PRESSURE: 155 MMHG | HEIGHT: 62 IN | HEART RATE: 68 BPM | TEMPERATURE: 98 F | DIASTOLIC BLOOD PRESSURE: 72 MMHG

## 2021-12-19 LAB
ALBUMIN SERPL BCP-MCNC: 3.8 G/DL (ref 3.5–5.2)
ALP SERPL-CCNC: 84 U/L (ref 55–135)
ALT SERPL W/O P-5'-P-CCNC: 24 U/L (ref 10–44)
ANION GAP SERPL CALC-SCNC: 9 MMOL/L (ref 8–16)
AST SERPL-CCNC: 23 U/L (ref 10–40)
BILIRUB SERPL-MCNC: 0.5 MG/DL (ref 0.1–1)
BILIRUB UR QL STRIP: NEGATIVE
BNP SERPL-MCNC: <10 PG/ML (ref 0–99)
BUN SERPL-MCNC: 15 MG/DL (ref 8–23)
CALCIUM SERPL-MCNC: 9.3 MG/DL (ref 8.7–10.5)
CHLORIDE SERPL-SCNC: 102 MMOL/L (ref 95–110)
CLARITY UR: CLEAR
CO2 SERPL-SCNC: 25 MMOL/L (ref 23–29)
COLOR UR: YELLOW
CREAT SERPL-MCNC: 0.8 MG/DL (ref 0.5–1.4)
EST. GFR  (AFRICAN AMERICAN): >60 ML/MIN/1.73 M^2
EST. GFR  (NON AFRICAN AMERICAN): >60 ML/MIN/1.73 M^2
GLUCOSE SERPL-MCNC: 104 MG/DL (ref 70–110)
GLUCOSE UR QL STRIP: NEGATIVE
HGB UR QL STRIP: NEGATIVE
KETONES UR QL STRIP: NEGATIVE
LEUKOCYTE ESTERASE UR QL STRIP: NEGATIVE
LIPASE SERPL-CCNC: 37 U/L (ref 4–60)
MAGNESIUM SERPL-MCNC: 1.7 MG/DL (ref 1.6–2.6)
NITRITE UR QL STRIP: NEGATIVE
PH UR STRIP: 6 [PH] (ref 5–8)
POTASSIUM SERPL-SCNC: 4.1 MMOL/L (ref 3.5–5.1)
PROT SERPL-MCNC: 7.1 G/DL (ref 6–8.4)
PROT UR QL STRIP: NEGATIVE
SODIUM SERPL-SCNC: 136 MMOL/L (ref 136–145)
SP GR UR STRIP: <=1.005 (ref 1–1.03)
TROPONIN I SERPL DL<=0.01 NG/ML-MCNC: <0.006 NG/ML (ref 0–0.03)
TSH SERPL DL<=0.005 MIU/L-ACNC: 1.69 UIU/ML (ref 0.4–4)
URN SPEC COLLECT METH UR: ABNORMAL
UROBILINOGEN UR STRIP-ACNC: NEGATIVE EU/DL

## 2021-12-19 PROCEDURE — 93005 ELECTROCARDIOGRAM TRACING: CPT

## 2021-12-19 PROCEDURE — 81003 URINALYSIS AUTO W/O SCOPE: CPT | Performed by: FAMILY MEDICINE

## 2021-12-19 PROCEDURE — 25000003 PHARM REV CODE 250: Performed by: FAMILY MEDICINE

## 2021-12-19 PROCEDURE — 93010 ELECTROCARDIOGRAM REPORT: CPT | Mod: ,,, | Performed by: STUDENT IN AN ORGANIZED HEALTH CARE EDUCATION/TRAINING PROGRAM

## 2021-12-19 PROCEDURE — 93010 EKG 12-LEAD: ICD-10-PCS | Mod: ,,, | Performed by: STUDENT IN AN ORGANIZED HEALTH CARE EDUCATION/TRAINING PROGRAM

## 2021-12-19 RX ORDER — POLYETHYLENE GLYCOL 3350 17 G/17G
17 POWDER, FOR SOLUTION ORAL DAILY
Qty: 119 G | Refills: 0 | Status: SHIPPED | OUTPATIENT
Start: 2021-12-19 | End: 2021-12-26

## 2021-12-19 RX ORDER — DOCUSATE SODIUM 100 MG/1
100 CAPSULE, LIQUID FILLED ORAL 2 TIMES DAILY
Qty: 6 CAPSULE | Refills: 0 | Status: SHIPPED | OUTPATIENT
Start: 2021-12-19 | End: 2021-12-22

## 2021-12-22 ENCOUNTER — OFFICE VISIT (OUTPATIENT)
Dept: FAMILY MEDICINE | Facility: CLINIC | Age: 66
End: 2021-12-22
Payer: COMMERCIAL

## 2021-12-22 ENCOUNTER — TELEPHONE (OUTPATIENT)
Dept: FAMILY MEDICINE | Facility: CLINIC | Age: 66
End: 2021-12-22
Payer: COMMERCIAL

## 2021-12-22 ENCOUNTER — TELEPHONE (OUTPATIENT)
Dept: FAMILY MEDICINE | Facility: CLINIC | Age: 66
End: 2021-12-22

## 2021-12-22 DIAGNOSIS — K59.00 CONSTIPATION, UNSPECIFIED CONSTIPATION TYPE: Primary | ICD-10-CM

## 2021-12-22 DIAGNOSIS — R10.9 ABDOMINAL PAIN, UNSPECIFIED ABDOMINAL LOCATION: ICD-10-CM

## 2021-12-22 DIAGNOSIS — Z85.528 PERSONAL HISTORY OF RENAL CANCER: ICD-10-CM

## 2021-12-22 DIAGNOSIS — Z12.11 SCREENING FOR COLON CANCER: ICD-10-CM

## 2021-12-22 PROCEDURE — 99213 PR OFFICE/OUTPT VISIT, EST, LEVL III, 20-29 MIN: ICD-10-PCS | Mod: 95,,, | Performed by: FAMILY MEDICINE

## 2021-12-22 PROCEDURE — 99213 OFFICE O/P EST LOW 20 MIN: CPT | Mod: 95,,, | Performed by: FAMILY MEDICINE

## 2021-12-22 PROCEDURE — 4010F ACE/ARB THERAPY RXD/TAKEN: CPT | Mod: CPTII,95,, | Performed by: FAMILY MEDICINE

## 2021-12-22 PROCEDURE — 4010F PR ACE/ARB THEARPY RXD/TAKEN: ICD-10-PCS | Mod: CPTII,95,, | Performed by: FAMILY MEDICINE

## 2021-12-22 RX ORDER — SIMETHICONE 80 MG
80 TABLET,CHEWABLE ORAL EVERY 6 HOURS PRN
Refills: 0 | COMMUNITY
Start: 2021-12-22 | End: 2022-07-07

## 2021-12-22 RX ORDER — PSYLLIUM SEED
PACKET (EA) ORAL
COMMUNITY
Start: 2021-12-22 | End: 2023-06-02

## 2021-12-23 ENCOUNTER — PATIENT MESSAGE (OUTPATIENT)
Dept: ENDOSCOPY | Facility: HOSPITAL | Age: 66
End: 2021-12-23
Payer: COMMERCIAL

## 2021-12-23 RX ORDER — POLYETHYLENE GLYCOL 3350, SODIUM SULFATE ANHYDROUS, SODIUM BICARBONATE, SODIUM CHLORIDE, POTASSIUM CHLORIDE 236; 22.74; 6.74; 5.86; 2.97 G/4L; G/4L; G/4L; G/4L; G/4L
4 POWDER, FOR SOLUTION ORAL ONCE
Qty: 1 EACH | Refills: 0 | Status: SHIPPED | OUTPATIENT
Start: 2021-12-23 | End: 2021-12-23

## 2021-12-27 ENCOUNTER — HOSPITAL ENCOUNTER (OUTPATIENT)
Dept: RADIOLOGY | Facility: HOSPITAL | Age: 66
Discharge: HOME | End: 2021-12-27
Attending: FAMILY MEDICINE
Payer: COMMERCIAL

## 2021-12-27 ENCOUNTER — PATIENT MESSAGE (OUTPATIENT)
Dept: FAMILY MEDICINE | Facility: CLINIC | Age: 66
End: 2021-12-27
Payer: COMMERCIAL

## 2021-12-27 DIAGNOSIS — R10.9 ABDOMINAL PAIN, UNSPECIFIED ABDOMINAL LOCATION: ICD-10-CM

## 2021-12-27 DIAGNOSIS — K59.00 CONSTIPATION, UNSPECIFIED CONSTIPATION TYPE: ICD-10-CM

## 2021-12-27 PROCEDURE — 76700 US EXAM ABDOM COMPLETE: CPT | Mod: TC

## 2022-01-04 ENCOUNTER — PATIENT MESSAGE (OUTPATIENT)
Dept: FAMILY MEDICINE | Facility: CLINIC | Age: 67
End: 2022-01-04
Payer: COMMERCIAL

## 2022-01-06 ENCOUNTER — TELEPHONE (OUTPATIENT)
Dept: FAMILY MEDICINE | Facility: CLINIC | Age: 67
End: 2022-01-06
Payer: COMMERCIAL

## 2022-01-06 NOTE — TELEPHONE ENCOUNTER
----- Message from Roxana Mcgraw sent at 1/6/2022  3:40 PM CST -----  .Type:  Needs Medical Advice    Who Called: YANIRA ORTIZ [[id number]]  Symptoms (please be specific):   How long has patient had these symptoms:    Pharmacy name and phone #:    Would the patient rather a call back or a response via [facility]?   Best Call Back Number:  [phone]  Additional Information:  pt is calling back to accept Monday @ 3p with Dr Reed. Please call to confirm appt has been made.

## 2022-01-06 NOTE — TELEPHONE ENCOUNTER
Appointment scheduled for Monday, Patient informed, is it ok to take ibuprofen or aleve for pain until then as tylenol not helping

## 2022-01-06 NOTE — TELEPHONE ENCOUNTER
----- Message from Christine Lee sent at 1/6/2022  1:44 PM CST -----  Contact: Self   Caller is requesting an earlier appointment then we can schedule.  Caller is requesting a message be sent to the provider.  If this is for urgent care symptoms, did you offer other providers at this location, providers at other locations, or [facility]? (yes, no, n/a):  n/a  If this is for the patients physical, did you offer to schedule next available and put on wait list, or to see NP or PA for their physical?  (yes, no, n/a):  yes  When is the next available appointment with their provider:  1/10 NP 1/11   Reason for the appointment:  shoulder pain  Patient preference of timeframe to be scheduled:  tomorrow  Would the patient like a call back, or a response through their [facility] portal?:   call back  Comments:   pt is currently schedule with Laney Eagle NP. Pt is requesting to see if nurse can work her in with her provider if at all possible.

## 2022-01-10 ENCOUNTER — OFFICE VISIT (OUTPATIENT)
Dept: FAMILY MEDICINE | Facility: CLINIC | Age: 67
End: 2022-01-10
Payer: COMMERCIAL

## 2022-01-10 VITALS
BODY MASS INDEX: 34.16 KG/M2 | HEIGHT: 62 IN | HEART RATE: 70 BPM | SYSTOLIC BLOOD PRESSURE: 133 MMHG | TEMPERATURE: 98 F | WEIGHT: 185.63 LBS | DIASTOLIC BLOOD PRESSURE: 74 MMHG

## 2022-01-10 DIAGNOSIS — F33.9 RECURRENT MAJOR DEPRESSIVE DISORDER, REMISSION STATUS UNSPECIFIED: ICD-10-CM

## 2022-01-10 DIAGNOSIS — M54.2 NECK PAIN: Primary | ICD-10-CM

## 2022-01-10 DIAGNOSIS — I10 ESSENTIAL HYPERTENSION: ICD-10-CM

## 2022-01-10 DIAGNOSIS — M25.511 ACUTE PAIN OF RIGHT SHOULDER: ICD-10-CM

## 2022-01-10 DIAGNOSIS — I25.10 CORONARY ARTERY DISEASE INVOLVING NATIVE CORONARY ARTERY OF NATIVE HEART WITHOUT ANGINA PECTORIS: ICD-10-CM

## 2022-01-10 DIAGNOSIS — M54.12 RIGHT CERVICAL RADICULOPATHY: ICD-10-CM

## 2022-01-10 PROCEDURE — 3075F SYST BP GE 130 - 139MM HG: CPT | Mod: CPTII,S$GLB,, | Performed by: FAMILY MEDICINE

## 2022-01-10 PROCEDURE — 3288F FALL RISK ASSESSMENT DOCD: CPT | Mod: CPTII,S$GLB,, | Performed by: FAMILY MEDICINE

## 2022-01-10 PROCEDURE — 1101F PT FALLS ASSESS-DOCD LE1/YR: CPT | Mod: CPTII,S$GLB,, | Performed by: FAMILY MEDICINE

## 2022-01-10 PROCEDURE — 3008F PR BODY MASS INDEX (BMI) DOCUMENTED: ICD-10-PCS | Mod: CPTII,S$GLB,, | Performed by: FAMILY MEDICINE

## 2022-01-10 PROCEDURE — 99213 OFFICE O/P EST LOW 20 MIN: CPT | Mod: S$GLB,,, | Performed by: FAMILY MEDICINE

## 2022-01-10 PROCEDURE — 1159F PR MEDICATION LIST DOCUMENTED IN MEDICAL RECORD: ICD-10-PCS | Mod: CPTII,S$GLB,, | Performed by: FAMILY MEDICINE

## 2022-01-10 PROCEDURE — 99999 PR PBB SHADOW E&M-EST. PATIENT-LVL IV: ICD-10-PCS | Mod: PBBFAC,,, | Performed by: FAMILY MEDICINE

## 2022-01-10 PROCEDURE — 3008F BODY MASS INDEX DOCD: CPT | Mod: CPTII,S$GLB,, | Performed by: FAMILY MEDICINE

## 2022-01-10 PROCEDURE — 1101F PR PT FALLS ASSESS DOC 0-1 FALLS W/OUT INJ PAST YR: ICD-10-PCS | Mod: CPTII,S$GLB,, | Performed by: FAMILY MEDICINE

## 2022-01-10 PROCEDURE — 1126F AMNT PAIN NOTED NONE PRSNT: CPT | Mod: CPTII,S$GLB,, | Performed by: FAMILY MEDICINE

## 2022-01-10 PROCEDURE — 3075F PR MOST RECENT SYSTOLIC BLOOD PRESS GE 130-139MM HG: ICD-10-PCS | Mod: CPTII,S$GLB,, | Performed by: FAMILY MEDICINE

## 2022-01-10 PROCEDURE — 99213 PR OFFICE/OUTPT VISIT, EST, LEVL III, 20-29 MIN: ICD-10-PCS | Mod: S$GLB,,, | Performed by: FAMILY MEDICINE

## 2022-01-10 PROCEDURE — 1159F MED LIST DOCD IN RCRD: CPT | Mod: CPTII,S$GLB,, | Performed by: FAMILY MEDICINE

## 2022-01-10 PROCEDURE — 3078F DIAST BP <80 MM HG: CPT | Mod: CPTII,S$GLB,, | Performed by: FAMILY MEDICINE

## 2022-01-10 PROCEDURE — 1126F PR PAIN SEVERITY QUANTIFIED, NO PAIN PRESENT: ICD-10-PCS | Mod: CPTII,S$GLB,, | Performed by: FAMILY MEDICINE

## 2022-01-10 PROCEDURE — 3288F PR FALLS RISK ASSESSMENT DOCUMENTED: ICD-10-PCS | Mod: CPTII,S$GLB,, | Performed by: FAMILY MEDICINE

## 2022-01-10 PROCEDURE — 99999 PR PBB SHADOW E&M-EST. PATIENT-LVL IV: CPT | Mod: PBBFAC,,, | Performed by: FAMILY MEDICINE

## 2022-01-10 PROCEDURE — 3078F PR MOST RECENT DIASTOLIC BLOOD PRESSURE < 80 MM HG: ICD-10-PCS | Mod: CPTII,S$GLB,, | Performed by: FAMILY MEDICINE

## 2022-01-10 RX ORDER — TIZANIDINE 4 MG/1
4 TABLET ORAL EVERY 8 HOURS PRN
Qty: 30 TABLET | Refills: 0 | Status: SHIPPED | OUTPATIENT
Start: 2022-01-10 | End: 2022-01-18

## 2022-01-11 ENCOUNTER — TELEPHONE (OUTPATIENT)
Dept: FAMILY MEDICINE | Facility: CLINIC | Age: 67
End: 2022-01-11
Payer: COMMERCIAL

## 2022-01-11 NOTE — PROGRESS NOTES
Patient complains of right shoulder neck and arm pain.  Symptoms radiate to the forearm _. Symptoms started _ approximately 1 week ago . No injury. no bowel or bladder complaints.   Blood pressure elevated in the office, but states at home 133/74.  She did not start the diltiazem prescribed last visit.  Depression stable current medication.  Coronary disease no chest pain.      Erinn was seen today for shoulder pain.    Diagnoses and all orders for this visit:    Neck pain  -     Ambulatory referral/consult to Physical/Occupational Therapy; Future    Acute pain of right shoulder  -     Ambulatory referral/consult to Physical/Occupational Therapy; Future    Right cervical radiculopathy  -     Ambulatory referral/consult to Physical/Occupational Therapy; Future    Essential hypertension    Recurrent major depressive disorder, remission status unspecified    Coronary artery disease involving native coronary artery of native heart without angina pectoris    Other orders  -     tiZANidine (ZANAFLEX) 4 MG tablet; Take 1 tablet (4 mg total) by mouth every 8 (eight) hours as needed (pain).    She may use Tylenol.  Continue to monitor home blood pressure readings.    Follow-up if symptoms worsen or not improving with recommended treatment.            Past Medical History:  Past Medical History:   Diagnosis Date    Anticoagulant long-term use     Anxiety     AR (allergic rhinitis)     Arthritis     Asthma, intermittent     Bilateral carotid artery stenosis 11/9/2021    Coronary artery disease     Depression     Endometrial polyp     Fatty liver     GERD (gastroesophageal reflux disease)     Hiatal hernia     Hyperlipidemia LDL goal <100     Hypertension     Mild mitral regurgitation     Multiple thyroid nodules 3/27/2014    Obesity (BMI 30-39.9) 3/27/2014    Renal cell carcinoma 2002    removed - no recurrence    S/P primary angioplasty with coronary stent 07/26/2017    Sleep apnea     Using CPAP     Solitary kidney, acquired     right     Past Surgical History:   Procedure Laterality Date    APPENDECTOMY      CARDIAC CATHETERIZATION  6/27/14    has blockages, but per patient no stents    CHOLECYSTECTOMY      COLONOSCOPY  2002    negative    COLONOSCOPY  3/25/2014         CORONARY ANGIOPLASTY  2008?    balloon angioplasty    CORONARY ANGIOPLASTY WITH STENT PLACEMENT Right 07/26/2017    ESOPHAGOGASTRODUODENOSCOPY N/A 7/14/2021    Procedure: ESOPHAGOGASTRODUODENOSCOPY (EGD);  Surgeon: Sheng Bell III, MD;  Location[facility];  Service: Endoscopy;  Laterality: N/A;    NEPHRECTOMY Left 2002    [facility] - [facility]    OVARIAN CYST REMOVAL      PERCUTANEOUS ENDOVENOUS LASER ABLATION OF PERIPHERAL VEIN  06/05/2019    POLYPECTOMY      endometrial polyp removal    ROTATOR CUFF REPAIR      L    THYROIDECTOMY, PARTIAL Left     TUBAL LIGATION      UPPER GASTROINTESTINAL ENDOSCOPY  04/2014     Review of patient's allergies indicates:   Allergen Reactions    Oxycodone Shortness Of Breath, Nausea And Vomiting and Other (See Comments)     Anxiety.    Plavix [clopidogrel] Diarrhea     Stomach cramps    Azithromycin Itching    Codeine Palpitations    Corticosteroids (glucocorticoids) Palpitations    Doxycycline Itching and Nausea Only    Mobic [meloxicam] Itching    Neuromuscular blockers, steroidal Palpitations    Nickel sutures [surgical stainless steel] Rash     Jewelery    Pcn [penicillins] Nausea And Vomiting    Shellfish containing products Itching     topical iodine OK    Sulfa (sulfonamide antibiotics) Nausea And Vomiting     Current Outpatient Medications on File Prior to Visit   Medication Sig Dispense Refill    acetaminophen (TYLENOL) 650 MG TbSR Take 2 tablets (1,300 mg total) by mouth every 8 (eight) hours.  0    albuterol (PROVENTIL/VENTOLIN HFA) 90 mcg/actuation inhaler Inhale 2 puffs into the lungs every 6 (six) hours as needed for Wheezing. 18 g 3     aspirin (ECOTRIN) 81 MG EC tablet Take 325 mg by mouth once daily.       EScitalopram oxalate (LEXAPRO) 10 MG tablet Take 0.5 tablets (5 mg total) by mouth once daily for 7 days, THEN 1 tablet (10 mg total) once daily. 90 tablet 3    famotidine (PEPCID) 20 MG tablet Take 1 tablet (20 mg total) by mouth 2 (two) times a day. 180 tablet 3    hydroCHLOROthiazide (HYDRODIURIL) 12.5 MG Tab Take 1/2 tablet daily 90 tablet 3    pantoprazole (PROTONIX) 40 MG tablet Take 1 tablet (40 mg total) by mouth once daily. 30 tablet 1    pravastatin (PRAVACHOL) 40 MG tablet Take 40 mg by mouth.      psyllium husk (METAMUCIL) 3.4 gram/5.4 gram Powd Use daily as directed      simethicone (MYLICON) 80 MG chewable tablet Take 1 tablet (80 mg total) by mouth every 6 (six) hours as needed for Flatulence.  0    valsartan (DIOVAN) 160 MG tablet Take 1 tablet (160 mg total) by mouth once daily. 90 tablet 3    [DISCONTINUED] LORazepam (ATIVAN) 0.5 MG tablet Take 0.5 mg by mouth.      [DISCONTINUED] diltiaZEM HCl (TIAZAC) 120 mg 24 hr capsule Take 1 capsule (120 mg total) by mouth once daily. (Patient not taking: Reported on 1/10/2022) 30 capsule 1     No current facility-administered medications on file prior to visit.     Social History     Socioeconomic History    Marital status:    Tobacco Use    Smoking status: Never Smoker    Smokeless tobacco: Never Used   Substance and Sexual Activity    Alcohol use: No    Drug use: No    Sexual activity: Yes     Partners: Male     Birth control/protection: None     Family History   Problem Relation Age of Onset    Heart attack Father 67    Arthritis Father     Hypertension Father     Diabetes Paternal Aunt     Allergies Paternal Aunt     Breast cancer Paternal Aunt     Diabetes Paternal Uncle     Allergies Maternal Grandmother     Asthma Brother     Colon cancer Neg Hx     Stomach cancer Neg Hx     Angioedema Neg Hx     Atopy Neg Hx     Immunodeficiency Neg Hx      "Urticaria Neg Hx     Rhinitis Neg Hx     Eczema Neg Hx     Asthma Neg Hx          Vitals:    01/10/22 1500 01/10/22 1544   BP: (!) 155/70 133/74   Pulse: 70    Temp: 97.9 °F (36.6 °C)    TempSrc: Temporal    Weight: 84.2 kg (185 lb 9.6 oz)    Height: 5' 2" (1.575 m)          Physical Exam: See vital signs note   general: No acute distress   neck:  Good range of motion. Tenderness to palpation over the_ right trapezius and rhomboid muscles. No spinous tenderness. No adenopathy or thyromegaly.   Chest clear to auscultation. Normal respiratory effort   Heart: Regular rate and rhythm .   NEUROLOGIC: Cranial nerves two through 12 are intact. Motor strength is normal in the upper and lower extremities proximally and distally. Deep tendon reflexes are intact. Sensation intact.  Shoulder has full range of motion without significant impingement.  No weakness noted      "

## 2022-01-11 NOTE — TELEPHONE ENCOUNTER
----- Message from Azar Crespo sent at 1/11/2022  7:48 AM CST -----  Contact: pt  Calling to resched her holter monitor hook up and can be reached at [phone]//thanks/dbw

## 2022-01-17 NOTE — TELEPHONE ENCOUNTER
No new care gaps identified.  Powered by [employer]. Reference number: [id number].   1/17/2022 4:04:23 AM CST

## 2022-01-18 ENCOUNTER — PATIENT MESSAGE (OUTPATIENT)
Dept: ENDOSCOPY | Facility: HOSPITAL | Age: 67
End: 2022-01-18
Payer: COMMERCIAL

## 2022-01-18 ENCOUNTER — TELEPHONE (OUTPATIENT)
Dept: ENDOSCOPY | Facility: HOSPITAL | Age: 67
End: 2022-01-18
Payer: COMMERCIAL

## 2022-01-18 RX ORDER — TIZANIDINE 4 MG/1
TABLET ORAL
Qty: 30 TABLET | Refills: 0 | Status: SHIPPED | OUTPATIENT
Start: 2022-01-18 | End: 2022-03-18

## 2022-01-20 DIAGNOSIS — H93.A9 PULSATILE TINNITUS: Primary | ICD-10-CM

## 2022-01-25 ENCOUNTER — CLINICAL SUPPORT (OUTPATIENT)
Dept: AUDIOLOGY | Facility: CLINIC | Age: 67
End: 2022-01-25
Payer: COMMERCIAL

## 2022-01-25 ENCOUNTER — OFFICE VISIT (OUTPATIENT)
Dept: OTOLARYNGOLOGY | Facility: CLINIC | Age: 67
End: 2022-01-25
Payer: COMMERCIAL

## 2022-01-25 VITALS — WEIGHT: 185.63 LBS | BODY MASS INDEX: 33.95 KG/M2

## 2022-01-25 DIAGNOSIS — H93.A9 PULSATILE TINNITUS: ICD-10-CM

## 2022-01-25 DIAGNOSIS — H93.13 TINNITUS OF BOTH EARS: ICD-10-CM

## 2022-01-25 DIAGNOSIS — H91.91 HIGH-FREQUENCY HEARING LOSS OF RIGHT EAR: Primary | ICD-10-CM

## 2022-01-25 PROCEDURE — 99999 PR PBB SHADOW E&M-EST. PATIENT-LVL II: CPT | Mod: PBBFAC,,,

## 2022-01-25 PROCEDURE — 1159F PR MEDICATION LIST DOCUMENTED IN MEDICAL RECORD: ICD-10-PCS | Mod: CPTII,S$GLB,, | Performed by: OTOLARYNGOLOGY

## 2022-01-25 PROCEDURE — 3288F FALL RISK ASSESSMENT DOCD: CPT | Mod: CPTII,S$GLB,, | Performed by: OTOLARYNGOLOGY

## 2022-01-25 PROCEDURE — 99999 PR PBB SHADOW E&M-EST. PATIENT-LVL III: CPT | Mod: PBBFAC,,, | Performed by: OTOLARYNGOLOGY

## 2022-01-25 PROCEDURE — 3008F BODY MASS INDEX DOCD: CPT | Mod: CPTII,S$GLB,, | Performed by: OTOLARYNGOLOGY

## 2022-01-25 PROCEDURE — 92557 COMPREHENSIVE HEARING TEST: CPT | Mod: S$GLB,,, | Performed by: AUDIOLOGIST

## 2022-01-25 PROCEDURE — 99999 PR PBB SHADOW E&M-EST. PATIENT-LVL III: ICD-10-PCS | Mod: PBBFAC,,, | Performed by: OTOLARYNGOLOGY

## 2022-01-25 PROCEDURE — 1101F PR PT FALLS ASSESS DOC 0-1 FALLS W/OUT INJ PAST YR: ICD-10-PCS | Mod: CPTII,S$GLB,, | Performed by: OTOLARYNGOLOGY

## 2022-01-25 PROCEDURE — 1159F MED LIST DOCD IN RCRD: CPT | Mod: CPTII,S$GLB,, | Performed by: OTOLARYNGOLOGY

## 2022-01-25 PROCEDURE — 99203 PR OFFICE/OUTPT VISIT, NEW, LEVL III, 30-44 MIN: ICD-10-PCS | Mod: S$GLB,,, | Performed by: OTOLARYNGOLOGY

## 2022-01-25 PROCEDURE — 99999 PR PBB SHADOW E&M-EST. PATIENT-LVL II: ICD-10-PCS | Mod: PBBFAC,,,

## 2022-01-25 PROCEDURE — 1160F RVW MEDS BY RX/DR IN RCRD: CPT | Mod: CPTII,S$GLB,, | Performed by: OTOLARYNGOLOGY

## 2022-01-25 PROCEDURE — 1160F PR REVIEW ALL MEDS BY PRESCRIBER/CLIN PHARMACIST DOCUMENTED: ICD-10-PCS | Mod: CPTII,S$GLB,, | Performed by: OTOLARYNGOLOGY

## 2022-01-25 PROCEDURE — 1126F PR PAIN SEVERITY QUANTIFIED, NO PAIN PRESENT: ICD-10-PCS | Mod: CPTII,S$GLB,, | Performed by: OTOLARYNGOLOGY

## 2022-01-25 PROCEDURE — 92567 PR TYMPA2METRY: ICD-10-PCS | Mod: S$GLB,,, | Performed by: AUDIOLOGIST

## 2022-01-25 PROCEDURE — 3288F PR FALLS RISK ASSESSMENT DOCUMENTED: ICD-10-PCS | Mod: CPTII,S$GLB,, | Performed by: OTOLARYNGOLOGY

## 2022-01-25 PROCEDURE — 99203 OFFICE O/P NEW LOW 30 MIN: CPT | Mod: S$GLB,,, | Performed by: OTOLARYNGOLOGY

## 2022-01-25 PROCEDURE — 1101F PT FALLS ASSESS-DOCD LE1/YR: CPT | Mod: CPTII,S$GLB,, | Performed by: OTOLARYNGOLOGY

## 2022-01-25 PROCEDURE — 92557 PR COMPREHENSIVE HEARING TEST: ICD-10-PCS | Mod: S$GLB,,, | Performed by: AUDIOLOGIST

## 2022-01-25 PROCEDURE — 92567 TYMPANOMETRY: CPT | Mod: S$GLB,,, | Performed by: AUDIOLOGIST

## 2022-01-25 PROCEDURE — 1126F AMNT PAIN NOTED NONE PRSNT: CPT | Mod: CPTII,S$GLB,, | Performed by: OTOLARYNGOLOGY

## 2022-01-25 PROCEDURE — 3008F PR BODY MASS INDEX (BMI) DOCUMENTED: ICD-10-PCS | Mod: CPTII,S$GLB,, | Performed by: OTOLARYNGOLOGY

## 2022-01-25 NOTE — PROGRESS NOTES
Subjective:       Patient ID: Erinn Silva is a 66 y.o. female.    Chief Complaint: Tinnitus (Hear heartbeat in both ears)    Erinn is here for pulsatile tinnitus.   L>R, present constantly. Hearing slightly muffled at that time.   Length of symptoms: occurred around 2 months ago.  During that time, she was ill with other things: stomach pain, pericardial effusion, and elevated BP.   It improved about 3-4 weeks ago. It is still present mildly to some degree but very faint at this time.   No vertigo. No ear surgeries.        Patient validated questionnaires (if applicable):      %       No flowsheet data found.  No flowsheet data found.  No flowsheet data found.         Social History     Tobacco Use   Smoking Status Never Smoker   Smokeless Tobacco Never Used     Social History     Substance and Sexual Activity   Alcohol Use No          Objective:        Constitutional:   She is oriented to person, place, and time. She appears well-developed and well-nourished. She appears alert. She is active. Normal speech.      Head:  Normocephalic and atraumatic. Head is without TMJ tenderness. No scars. Salivary glands normal.  Facial strength is normal.      Ears:    Right Ear: No drainage or swelling. No middle ear effusion.   Left Ear: No drainage or swelling.  No middle ear effusion.     Nose:  No mucosal edema, rhinorrhea or sinus tenderness. No turbinate hypertrophy.      Mouth/Throat  Oropharynx clear and moist without lesions or asymmetry, normal uvula midline and mirror exam normal. Normal dentition. No uvula swelling, lacerations or trismus. No oropharyngeal exudate. Tonsillar erythema, tonsillar exudate.      Neck:  Full range of motion with neck supple and no adenopathy. Thyroid tenderness is present. No tracheal deviation, no edema, no erythema, normal range of motion, no stridor, no crepitus and no neck rigidity present. No thyroid mass present.     Cardiovascular:   Intact distal pulses and normal pulses.       Pulmonary/Chest:   Effort normal and breath sounds normal. No stridor.     Psychiatric:   Her speech is normal and behavior is normal. Her mood appears not anxious. Her affect is not labile.     Neurological:   She is alert and oriented to person, place, and time. No sensory deficit.     Skin:   No abrasions, lacerations, lesions, or rashes. No abrasion and no bruising noted.         Tests / Results:  Carotid U/S:  FINDINGS:  The following velocities represent peak systolic velocity and will be measured in cm/sec.     Right:     CCA: 61     ICA: 114     IC/CC ratio: 1.9.     Vertebral artery: Antegrade flow and normal waveform.     There is mild plaque noted in the region of the right ICA origin/carotid bulb. The degree of stenosis is less than 50%.     Left:     CCA: 97 cm/sec     ICA: 118     IC/CC ratio: 1.2.     Vertebral artery: Antegrade flow and normal waveform.     There is no significant plaque noted in the region of the left ICA origin/carotid bulb. No stenosis suggested.     Impression:     No hemodynamically significant stenosis.    Audiogram  Largely normal Hearing AU with mild HF dip          Assessment:       1. Pulsatile tinnitus          Plan:         Reassurance provided. Exam and audiogram wnl. Symptoms have largely resolves and occurred around the time of acute illness with pericardial effusion. We discussed transient CV changes can cause pulsatile tinnitus.  She will RTC if symptoms worsen

## 2022-01-26 ENCOUNTER — NURSE TRIAGE (OUTPATIENT)
Dept: ADMINISTRATIVE | Facility: CLINIC | Age: 67
End: 2022-01-26
Payer: COMMERCIAL

## 2022-01-27 ENCOUNTER — TELEPHONE (OUTPATIENT)
Dept: ENDOSCOPY | Facility: HOSPITAL | Age: 67
End: 2022-01-27
Payer: COMMERCIAL

## 2022-01-27 ENCOUNTER — TELEPHONE (OUTPATIENT)
Dept: FAMILY MEDICINE | Facility: CLINIC | Age: 67
End: 2022-01-27
Payer: COMMERCIAL

## 2022-01-27 ENCOUNTER — PATIENT MESSAGE (OUTPATIENT)
Dept: FAMILY MEDICINE | Facility: CLINIC | Age: 67
End: 2022-01-27
Payer: COMMERCIAL

## 2022-01-27 DIAGNOSIS — I25.10 CORONARY ARTERY DISEASE INVOLVING NATIVE CORONARY ARTERY OF NATIVE HEART WITHOUT ANGINA PECTORIS: ICD-10-CM

## 2022-01-27 DIAGNOSIS — Z01.818 PRE-OPERATIVE EXAMINATION: Primary | ICD-10-CM

## 2022-01-27 NOTE — TELEPHONE ENCOUNTER
Hunter, that is fine , I will refer her to her cardiologist.  She sees Dr. Colby at [place].  She does not have heart failure.  I think the no salt advice was more just general advice regarding blood pressure and she has a lot of anxiety issues.  She does however have previous coronary disease.  Apparently they were discussing repeat heart catheterization last year, but ultimately decided against it.  Thanks    Nursing staff please contact patient to make an appointment with her cardiologist to get clearance for procedure.  Thanks

## 2022-01-27 NOTE — TELEPHONE ENCOUNTER
Pt calling & wanting to know amount of sodium in prep needed for colonoscopy tomorrow, Pt refereed to pharmacy for questions    Reason for Disposition   Question about upcoming scheduled test, no triage required and triager able to answer question    Protocols used: INFORMATION ONLY CALL - NO TRIAGE-A-

## 2022-01-27 NOTE — TELEPHONE ENCOUNTER
Layo,   The patient called to cancel her colonoscopy as she was told by her cardiologist to not use salt and she was afraid to use the prep.  We could use a miralax prep in the future but feel that she needs to have cardiac clearance prior to rescheduling for her colonoscopy.  Please refer her to her cardiologist for a colonoscopy and ask her to rebook the colonoscopy after that.  Hunter

## 2022-01-28 ENCOUNTER — TELEPHONE (OUTPATIENT)
Dept: FAMILY MEDICINE | Facility: CLINIC | Age: 67
End: 2022-01-28
Payer: COMMERCIAL

## 2022-01-28 NOTE — TELEPHONE ENCOUNTER
Lm on vm to verify which I see that she has seen message sent via portal yesterday . Asked that she give the office a call back with any questions or concerns.

## 2022-02-03 ENCOUNTER — TELEPHONE (OUTPATIENT)
Dept: CARDIOLOGY | Facility: CLINIC | Age: 67
End: 2022-02-03
Payer: COMMERCIAL

## 2022-02-03 ENCOUNTER — TELEPHONE (OUTPATIENT)
Dept: FAMILY MEDICINE | Facility: CLINIC | Age: 67
End: 2022-02-03
Payer: COMMERCIAL

## 2022-02-03 NOTE — TELEPHONE ENCOUNTER
Spoke w/ pt . I let her know that the finance dept contacted Dr Reed and he responded informing them that the Holter monitor is medically necessary. They are working out a payment plan with her.

## 2022-02-03 NOTE — TELEPHONE ENCOUNTER
----- Message from Anne-Marie Álvarez sent at 2/3/2022  4:40 PM CST -----  Patient would like a call back at [phone] in regards to call she had earlier.    Thanks

## 2022-02-03 NOTE — TELEPHONE ENCOUNTER
----- Message from Reyna Montejo sent at 2/3/2022 10:05 AM CST -----  Contact: [phone]  Patient would like to get medical advice.  Please call patient about a Holter monitor.

## 2022-02-03 NOTE — TELEPHONE ENCOUNTER
Pt is allergic to sticky glue - needs the hypoallergenic tape - wants to make sure we have that before she comes to get monitor on    Please give her a call back

## 2022-02-04 ENCOUNTER — PATIENT MESSAGE (OUTPATIENT)
Dept: CARDIOLOGY | Facility: CLINIC | Age: 67
End: 2022-02-04
Payer: COMMERCIAL

## 2022-02-04 ENCOUNTER — HOSPITAL ENCOUNTER (OUTPATIENT)
Dept: CARDIOLOGY | Facility: HOSPITAL | Age: 67
Discharge: HOME | End: 2022-02-04
Attending: FAMILY MEDICINE
Payer: COMMERCIAL

## 2022-02-04 ENCOUNTER — PATIENT MESSAGE (OUTPATIENT)
Dept: FAMILY MEDICINE | Facility: CLINIC | Age: 67
End: 2022-02-04
Payer: COMMERCIAL

## 2022-02-04 DIAGNOSIS — Z12.11 SCREENING FOR COLON CANCER: Primary | ICD-10-CM

## 2022-02-04 DIAGNOSIS — K59.00 CONSTIPATION, UNSPECIFIED CONSTIPATION TYPE: ICD-10-CM

## 2022-02-04 DIAGNOSIS — R00.2 PALPITATIONS: ICD-10-CM

## 2022-02-04 PROCEDURE — 93227 HOLTER MONITOR - 48 HOUR (CUPID ONLY): ICD-10-PCS | Mod: ,,, | Performed by: INTERNAL MEDICINE

## 2022-02-04 PROCEDURE — 93226 XTRNL ECG REC<48 HR SCAN A/R: CPT | Mod: PO

## 2022-02-04 PROCEDURE — 93227 XTRNL ECG REC<48 HR R&I: CPT | Mod: ,,, | Performed by: INTERNAL MEDICINE

## 2022-02-05 NOTE — TELEPHONE ENCOUNTER
Mateusz, we usually have the PCP do the ordering of the actual procedure.  They usually review to make sure that the patient actually needs the procedure and since they know the patient, they make sure that the patient is cleared on their end of things also.

## 2022-02-07 ENCOUNTER — TELEPHONE (OUTPATIENT)
Dept: FAMILY MEDICINE | Facility: CLINIC | Age: 67
End: 2022-02-07
Payer: COMMERCIAL

## 2022-02-07 NOTE — TELEPHONE ENCOUNTER
Patient sent message to cardiology staff as below:   I had to take monitor off Saturday because of skin irritation under red chest patch  that also caused  a rash on my chest where skin was clean with the abrasive cleaning pad that you mentioned.  Under the other patches, skin was red but no rash.  Per your instructions I removed it.   Benadryl cream was applied to area and it was fine.   I'd like to discuss other options if necessary.  When I've used monitors in the past, patches that could be removed, skin area cleaned with alcohol and new patch replaced (when they got slighty itchy), seemed to work a little better.  That might be a better option for me.           Cardiology wrote:  I have reviewed your message and will let your provider know that your device was removed early. And see if they are wanting to make any other recommendations. Thank you

## 2022-02-07 NOTE — TELEPHONE ENCOUNTER
I would just let them read what ever is on the recording for as long she had it and then we can decide if we need to do anything else.

## 2022-02-08 LAB
OHS CV EVENT MONITOR DAY: 0
OHS CV HOLTER LENGTH DECIMAL HOURS: 48
OHS CV HOLTER LENGTH HOURS: 48
OHS CV HOLTER LENGTH MINUTES: 0
OHS CV HOLTER SINUS AVERAGE HR: 60
OHS CV HOLTER SINUS MAX HR: 108
OHS CV HOLTER SINUS MIN HR: 45

## 2022-02-09 ENCOUNTER — HOSPITAL ENCOUNTER (OUTPATIENT)
Dept: RADIOLOGY | Facility: HOSPITAL | Age: 67
Discharge: HOME | End: 2022-02-09
Attending: FAMILY MEDICINE
Payer: COMMERCIAL

## 2022-02-09 VITALS — WEIGHT: 185.63 LBS | BODY MASS INDEX: 34.16 KG/M2 | HEIGHT: 62 IN

## 2022-02-09 DIAGNOSIS — Z12.31 ENCOUNTER FOR SCREENING MAMMOGRAM FOR MALIGNANT NEOPLASM OF BREAST: ICD-10-CM

## 2022-02-09 PROCEDURE — 77063 BREAST TOMOSYNTHESIS BI: CPT | Mod: TC,PO

## 2022-02-09 PROCEDURE — 77063 MAMMO DIGITAL SCREENING BILAT WITH TOMO: ICD-10-PCS | Mod: 26,,, | Performed by: RADIOLOGY

## 2022-02-09 PROCEDURE — 77063 BREAST TOMOSYNTHESIS BI: CPT | Mod: 26,,, | Performed by: RADIOLOGY

## 2022-02-09 PROCEDURE — 77067 MAMMO DIGITAL SCREENING BILAT WITH TOMO: ICD-10-PCS | Mod: 26,,, | Performed by: RADIOLOGY

## 2022-02-09 PROCEDURE — 77067 SCR MAMMO BI INCL CAD: CPT | Mod: 26,,, | Performed by: RADIOLOGY

## 2022-02-09 PROCEDURE — 77067 SCR MAMMO BI INCL CAD: CPT | Mod: TC,PO

## 2022-02-10 ENCOUNTER — TELEPHONE (OUTPATIENT)
Dept: FAMILY MEDICINE | Facility: CLINIC | Age: 67
End: 2022-02-10
Payer: COMMERCIAL

## 2022-02-10 NOTE — TELEPHONE ENCOUNTER
----- Message from Bina Bell sent at 2/10/2022 12:56 PM CST -----  Contact: self [phone]  Would like to consult with nurse regarding a letter she received from the office, please call her at [phone]. Thanks ah

## 2022-02-11 ENCOUNTER — PATIENT MESSAGE (OUTPATIENT)
Dept: FAMILY MEDICINE | Facility: CLINIC | Age: 67
End: 2022-02-11
Payer: COMMERCIAL

## 2022-02-11 NOTE — TELEPHONE ENCOUNTER
Patient informed not sure who sent letter or why, she stated she is waiting for colonoscopy to be scheduled, number to endoscopy scheduling provided.

## 2022-02-21 ENCOUNTER — PATIENT MESSAGE (OUTPATIENT)
Dept: ENDOSCOPY | Facility: HOSPITAL | Age: 67
End: 2022-02-21
Payer: COMMERCIAL

## 2022-02-21 DIAGNOSIS — Z01.818 PRE-OP TESTING: Primary | ICD-10-CM

## 2022-02-22 RX ORDER — PANTOPRAZOLE SODIUM 40 MG/1
TABLET, DELAYED RELEASE ORAL
Qty: 90 TABLET | Refills: 3 | Status: SHIPPED | OUTPATIENT
Start: 2022-02-22 | End: 2022-03-18

## 2022-02-22 NOTE — TELEPHONE ENCOUNTER
Refill Routing Note   Medication(s) are not appropriate for processing by [facility] for the following reason(s):      - PER EPIC DATA PT. NO LONGER TAKING, PLEASE ADVISE    [facility] action(s):  Defer          --->Care Gap information included in message below if applicable.   Medication reconciliation completed: No   Automatic Epic Generated Protocol Data:        Requested Prescriptions   Pending Prescriptions Disp Refills    pantoprazole (PROTONIX) 40 MG tablet [Pharmacy Med Name: PANTOPRAZOLE 40MG TABLETS] 90 tablet 3     Sig: TAKE 1 TABLET(40 MG) BY MOUTH EVERY DAY       Gastroenterology: Proton Pump Inhibitors 2 Passed - 2/22/2022  6:50 AM        Passed - Patient is at least 18 years old        Passed - Osteoporosis is not on problem list        Passed - An appropriate indication is on the problem list        Passed - Valid encounter within last 15 months     Recent Visits  Date Type Provider Dept   01/10/22 Office Visit Layo Reed MD [facility]   12/22/21 Office Visit Layo Reed MD [facility]   12/14/21 Office Visit Layo Reed MD [facility]   11/29/21 Office Visit Layo Reed MD [facility]   11/09/21 Office Visit Layo Reed MD [facility]   10/27/21 Office Visit Layo Reed MD [facility]   06/10/21 Office Visit Layo Reed MD [facility]   02/19/21 Office Visit Layo Reed MD [facility]   01/20/21 Office Visit Layo Reed MD [facility]   11/19/20 Office Visit Layo Reed MD [facility]   Showing recent visits within past 720 days and meeting all other requirements  Future Appointments  No visits were found meeting these conditions.  Showing future appointments within next 150 days and meeting all other requirements      Future Appointments              In 1 week HARSHAL, LUANA Sexton - [facility]    In 3 weeks NURSE, DARSHAN Sexton -  Family [facility], [facility]                      Appointments  past 12m or future 3m with PCP    Date Provider   Last Visit   1/10/2022 Layo Reed MD   Next Visit   Visit date not found Layo Reed MD   ED visits in past 90 days: 1        Note composed:7:35 AM 02/22/2022

## 2022-02-22 NOTE — TELEPHONE ENCOUNTER
No new care gaps identified.  Powered by [employer]. Reference number: [id number].   2/22/2022 6:50:37 AM CST   normal (ped)... In no apparent distress.

## 2022-03-02 ENCOUNTER — PATIENT MESSAGE (OUTPATIENT)
Dept: FAMILY MEDICINE | Facility: CLINIC | Age: 67
End: 2022-03-02
Payer: COMMERCIAL

## 2022-03-08 ENCOUNTER — PATIENT MESSAGE (OUTPATIENT)
Dept: FAMILY MEDICINE | Facility: CLINIC | Age: 67
End: 2022-03-08
Payer: COMMERCIAL

## 2022-03-08 ENCOUNTER — PATIENT MESSAGE (OUTPATIENT)
Dept: ENDOSCOPY | Facility: HOSPITAL | Age: 67
End: 2022-03-08
Payer: COMMERCIAL

## 2022-03-08 RX ORDER — POLYETHYLENE GLYCOL 3350, SODIUM SULFATE ANHYDROUS, SODIUM BICARBONATE, SODIUM CHLORIDE, POTASSIUM CHLORIDE 236; 22.74; 6.74; 5.86; 2.97 G/4L; G/4L; G/4L; G/4L; G/4L
4 POWDER, FOR SOLUTION ORAL ONCE
Qty: 4000 ML | Refills: 0 | Status: SHIPPED | OUTPATIENT
Start: 2022-03-08 | End: 2022-03-08

## 2022-03-16 ENCOUNTER — PATIENT MESSAGE (OUTPATIENT)
Dept: FAMILY MEDICINE | Facility: CLINIC | Age: 67
End: 2022-03-16
Payer: COMMERCIAL

## 2022-03-18 ENCOUNTER — OFFICE VISIT (OUTPATIENT)
Dept: FAMILY MEDICINE | Facility: CLINIC | Age: 67
End: 2022-03-18
Payer: COMMERCIAL

## 2022-03-18 ENCOUNTER — LAB VISIT (OUTPATIENT)
Dept: LAB | Facility: HOSPITAL | Age: 67
End: 2022-03-18
Attending: FAMILY MEDICINE
Payer: COMMERCIAL

## 2022-03-18 VITALS
WEIGHT: 185 LBS | SYSTOLIC BLOOD PRESSURE: 130 MMHG | HEART RATE: 72 BPM | TEMPERATURE: 98 F | DIASTOLIC BLOOD PRESSURE: 84 MMHG | BODY MASS INDEX: 34.04 KG/M2 | HEIGHT: 62 IN

## 2022-03-18 DIAGNOSIS — R10.13 EPIGASTRIC ABDOMINAL PAIN: Primary | ICD-10-CM

## 2022-03-18 DIAGNOSIS — Z78.0 ASYMPTOMATIC MENOPAUSE: ICD-10-CM

## 2022-03-18 DIAGNOSIS — K29.50 CHRONIC GASTRITIS WITHOUT BLEEDING, UNSPECIFIED GASTRITIS TYPE: ICD-10-CM

## 2022-03-18 DIAGNOSIS — R10.13 EPIGASTRIC ABDOMINAL PAIN: ICD-10-CM

## 2022-03-18 DIAGNOSIS — I25.10 CORONARY ARTERY DISEASE INVOLVING NATIVE CORONARY ARTERY OF NATIVE HEART WITHOUT ANGINA PECTORIS: ICD-10-CM

## 2022-03-18 LAB
AMYLASE SERPL-CCNC: 65 U/L (ref 20–110)
LIPASE SERPL-CCNC: 39 U/L (ref 4–60)

## 2022-03-18 PROCEDURE — 82150 ASSAY OF AMYLASE: CPT | Performed by: FAMILY MEDICINE

## 2022-03-18 PROCEDURE — 4010F PR ACE/ARB THEARPY RXD/TAKEN: ICD-10-PCS | Mod: CPTII,S$GLB,, | Performed by: FAMILY MEDICINE

## 2022-03-18 PROCEDURE — 1159F MED LIST DOCD IN RCRD: CPT | Mod: CPTII,S$GLB,, | Performed by: FAMILY MEDICINE

## 2022-03-18 PROCEDURE — 83690 ASSAY OF LIPASE: CPT | Performed by: FAMILY MEDICINE

## 2022-03-18 PROCEDURE — 1159F PR MEDICATION LIST DOCUMENTED IN MEDICAL RECORD: ICD-10-PCS | Mod: CPTII,S$GLB,, | Performed by: FAMILY MEDICINE

## 2022-03-18 PROCEDURE — 3079F PR MOST RECENT DIASTOLIC BLOOD PRESSURE 80-89 MM HG: ICD-10-PCS | Mod: CPTII,S$GLB,, | Performed by: FAMILY MEDICINE

## 2022-03-18 PROCEDURE — 3008F BODY MASS INDEX DOCD: CPT | Mod: CPTII,S$GLB,, | Performed by: FAMILY MEDICINE

## 2022-03-18 PROCEDURE — 3008F PR BODY MASS INDEX (BMI) DOCUMENTED: ICD-10-PCS | Mod: CPTII,S$GLB,, | Performed by: FAMILY MEDICINE

## 2022-03-18 PROCEDURE — 99999 PR PBB SHADOW E&M-EST. PATIENT-LVL III: CPT | Mod: PBBFAC,,, | Performed by: FAMILY MEDICINE

## 2022-03-18 PROCEDURE — 99214 PR OFFICE/OUTPT VISIT, EST, LEVL IV, 30-39 MIN: ICD-10-PCS | Mod: S$GLB,,, | Performed by: FAMILY MEDICINE

## 2022-03-18 PROCEDURE — 3288F PR FALLS RISK ASSESSMENT DOCUMENTED: ICD-10-PCS | Mod: CPTII,S$GLB,, | Performed by: FAMILY MEDICINE

## 2022-03-18 PROCEDURE — 4010F ACE/ARB THERAPY RXD/TAKEN: CPT | Mod: CPTII,S$GLB,, | Performed by: FAMILY MEDICINE

## 2022-03-18 PROCEDURE — 3288F FALL RISK ASSESSMENT DOCD: CPT | Mod: CPTII,S$GLB,, | Performed by: FAMILY MEDICINE

## 2022-03-18 PROCEDURE — 99999 PR PBB SHADOW E&M-EST. PATIENT-LVL III: ICD-10-PCS | Mod: PBBFAC,,, | Performed by: FAMILY MEDICINE

## 2022-03-18 PROCEDURE — 3079F DIAST BP 80-89 MM HG: CPT | Mod: CPTII,S$GLB,, | Performed by: FAMILY MEDICINE

## 2022-03-18 PROCEDURE — 36415 COLL VENOUS BLD VENIPUNCTURE: CPT | Mod: PO | Performed by: FAMILY MEDICINE

## 2022-03-18 PROCEDURE — 99214 OFFICE O/P EST MOD 30 MIN: CPT | Mod: S$GLB,,, | Performed by: FAMILY MEDICINE

## 2022-03-18 PROCEDURE — 3075F PR MOST RECENT SYSTOLIC BLOOD PRESS GE 130-139MM HG: ICD-10-PCS | Mod: CPTII,S$GLB,, | Performed by: FAMILY MEDICINE

## 2022-03-18 PROCEDURE — 1101F PT FALLS ASSESS-DOCD LE1/YR: CPT | Mod: CPTII,S$GLB,, | Performed by: FAMILY MEDICINE

## 2022-03-18 PROCEDURE — 1101F PR PT FALLS ASSESS DOC 0-1 FALLS W/OUT INJ PAST YR: ICD-10-PCS | Mod: CPTII,S$GLB,, | Performed by: FAMILY MEDICINE

## 2022-03-18 PROCEDURE — 3075F SYST BP GE 130 - 139MM HG: CPT | Mod: CPTII,S$GLB,, | Performed by: FAMILY MEDICINE

## 2022-03-18 RX ORDER — ROSUVASTATIN CALCIUM 10 MG/1
10 TABLET, COATED ORAL NIGHTLY
COMMUNITY
Start: 2022-02-04 | End: 2022-07-07

## 2022-03-18 RX ORDER — OMEPRAZOLE 20 MG/1
20 TABLET, DELAYED RELEASE ORAL DAILY
COMMUNITY
End: 2022-03-18 | Stop reason: DRUGHIGH

## 2022-03-18 RX ORDER — OMEPRAZOLE 40 MG/1
40 CAPSULE, DELAYED RELEASE ORAL DAILY
Qty: 90 CAPSULE | Refills: 3 | Status: SHIPPED | OUTPATIENT
Start: 2022-03-18 | End: 2023-06-02

## 2022-03-18 NOTE — PROGRESS NOTES
Patient states she stop taking Crestor about 2 weeks ago because she thought it was causing her to have some epigastric discomfort.  Occasionally goes towards the back.  She has complained about several statins recently to include Lipitor and pravastatin with her stomach.  Not clear whether this is actually the cause.  She does have previous gastritis on upper endoscopy last July.  She is currently using over-the-counter Prilosec as well as occasional Pepcid.  She is pending a colonoscopy.  She had an ultrasound of the abdomen in December which was negative.  She denies any chest pain.  Followed by [facility].    Erinn was seen today for follow-up.    Diagnoses and all orders for this visit:    Epigastric abdominal pain  -     Amylase; Future  -     Lipase; Future    Coronary artery disease involving native coronary artery of native heart without angina pectoris    Chronic gastritis without bleeding, unspecified gastritis type    Asymptomatic menopause  -     DXA Bone Density Spine And Hip; Future    Other orders  -     omeprazole (PRILOSEC) 40 MG capsule; Take 1 capsule (40 mg total) by mouth once daily.    She is currently holding the Crestor.  Will have her increase her omeprazole as above and check above laboratory.  Follow-up in about 2 weeks.  Consider resuming statin at that time if able.  If continued symptoms then unlikely this would be related to the statin probably resume as well.  If she is not able tolerate statin then consider Zetia.  Continue follow-up with her cardiologist.  Proceed with the colonoscopy as scheduled.              Past Medical History:  Past Medical History:   Diagnosis Date    Anticoagulant long-term use     Anxiety     AR (allergic rhinitis)     Arthritis     Asthma, intermittent     Bilateral carotid artery stenosis 11/9/2021    Coronary artery disease     Depression     Endometrial polyp     Fatty liver     GERD (gastroesophageal reflux disease)     Hiatal  hernia     Hyperlipidemia LDL goal <100     Hypertension     Mild mitral regurgitation     Multiple thyroid nodules 3/27/2014    Obesity (BMI 30-39.9) 3/27/2014    Renal cell carcinoma 2002    removed - no recurrence    S/P primary angioplasty with coronary stent 07/26/2017    Sleep apnea     Using CPAP    Solitary kidney, acquired     right     Past Surgical History:   Procedure Laterality Date    APPENDECTOMY      CARDIAC CATHETERIZATION  6/27/14    has blockages, but per patient no stents    CHOLECYSTECTOMY      COLONOSCOPY  2002    negative    COLONOSCOPY  3/25/2014         CORONARY ANGIOPLASTY  2008?    balloon angioplasty    CORONARY ANGIOPLASTY WITH STENT PLACEMENT Right 07/26/2017    ESOPHAGOGASTRODUODENOSCOPY N/A 7/14/2021    Procedure: ESOPHAGOGASTRODUODENOSCOPY (EGD);  Surgeon: Sheng Bell III, MD;  Location: [facility];  Service: Endoscopy;  Laterality: N/A;    NEPHRECTOMY Left 2002    [facility] - cancer    OVARIAN CYST REMOVAL      PERCUTANEOUS ENDOVENOUS LASER ABLATION OF PERIPHERAL VEIN  06/05/2019    POLYPECTOMY      endometrial polyp removal    ROTATOR CUFF REPAIR      L    THYROIDECTOMY, PARTIAL Left     TUBAL LIGATION      UPPER GASTROINTESTINAL ENDOSCOPY  04/2014     Review of patient's allergies indicates:   Allergen Reactions    Oxycodone Shortness Of Breath, Nausea And Vomiting and Other (See Comments)     Anxiety.    Plavix [clopidogrel] Diarrhea     Stomach cramps    Azithromycin Itching    Codeine Palpitations    Corticosteroids (glucocorticoids) Palpitations    Doxycycline Itching and Nausea Only    Mobic [meloxicam] Itching    Neuromuscular blockers, steroidal Palpitations    Nickel sutures [surgical stainless steel] Rash     Jewelery    Pcn [penicillins] Nausea And Vomiting    Shellfish containing products Itching     topical iodine OK    Sulfa (sulfonamide antibiotics) Nausea And Vomiting     Current Outpatient Medications  on File Prior to Visit   Medication Sig Dispense Refill    acetaminophen (TYLENOL) 650 MG TbSR Take 2 tablets (1,300 mg total) by mouth every 8 (eight) hours.  0    albuterol (PROVENTIL/VENTOLIN HFA) 90 mcg/actuation inhaler Inhale 2 puffs into the lungs every 6 (six) hours as needed for Wheezing. 18 g 3    aspirin (ECOTRIN) 81 MG EC tablet Take 325 mg by mouth once daily.       EScitalopram oxalate (LEXAPRO) 10 MG tablet Take 0.5 tablets (5 mg total) by mouth once daily for 7 days, THEN 1 tablet (10 mg total) once daily. 90 tablet 3    famotidine (PEPCID) 20 MG tablet Take 1 tablet (20 mg total) by mouth 2 (two) times a day. 180 tablet 3    hydroCHLOROthiazide (HYDRODIURIL) 12.5 MG Tab Take 1/2 tablet daily 90 tablet 3    psyllium husk (METAMUCIL) 3.4 gram/5.4 gram Powd Use daily as directed      simethicone (MYLICON) 80 MG chewable tablet Take 1 tablet (80 mg total) by mouth every 6 (six) hours as needed for Flatulence.  0    valsartan (DIOVAN) 160 MG tablet Take 1 tablet (160 mg total) by mouth once daily. 90 tablet 3    [DISCONTINUED] omeprazole (PRILOSEC OTC) 20 MG tablet Take 20 mg by mouth once daily.      rosuvastatin (CRESTOR) 10 MG tablet Take 10 mg by mouth nightly.      [DISCONTINUED] pantoprazole (PROTONIX) 40 MG tablet TAKE 1 TABLET(40 MG) BY MOUTH EVERY DAY (Patient not taking: Reported on 3/18/2022) 90 tablet 3    [DISCONTINUED] pravastatin (PRAVACHOL) 40 MG tablet Take 40 mg by mouth.      [DISCONTINUED] tiZANidine (ZANAFLEX) 4 MG tablet TAKE 1 TABLET(4 MG) BY MOUTH EVERY 8 HOURS AS NEEDED FOR PAIN 30 tablet 0     No current facility-administered medications on file prior to visit.     Social History     Socioeconomic History    Marital status:    Tobacco Use    Smoking status: Never Smoker    Smokeless tobacco: Never Used   Substance and Sexual Activity    Alcohol use: No    Drug use: No    Sexual activity: Yes     Partners: Male     Birth control/protection: None  "    Family History   Problem Relation Age of Onset    Heart attack Father 67    Arthritis Father     Hypertension Father     Diabetes Paternal Aunt     Allergies Paternal Aunt     Breast cancer Paternal Aunt     Diabetes Paternal Uncle     Allergies Maternal Grandmother     Asthma Brother     Colon cancer Neg Hx     Stomach cancer Neg Hx     Angioedema Neg Hx     Atopy Neg Hx     Immunodeficiency Neg Hx     Urticaria Neg Hx     Rhinitis Neg Hx     Eczema Neg Hx     Asthma Neg Hx            ROS:  GENERAL: No fever, chills,  or significant weight changes.   CARDIOVASCULAR: Denies chest pain, PND, orthopnea or reduced exercise tolerance.  ABDOMEN: Appetite fine. Denies diarrhea, hematemesis or blood in stool.  URINARY: No flank pain, dysuria or hematuria.    Vitals:    03/18/22 1133   BP: 130/84   Pulse: 72   Temp: 97.7 °F (36.5 °C)   TempSrc: Temporal   Weight: 83.9 kg (185 lb)   Height: 5' 2" (1.575 m)     Wt Readings from Last 3 Encounters:   03/18/22 83.9 kg (185 lb)   02/09/22 84.2 kg (185 lb 10 oz)   01/25/22 84.2 kg (185 lb 10 oz)       OBJECTIVE:   APPEARANCE: Well nourished, well developed, in no acute distress.    HEAD: Normocephalic.  Atraumatic.  No sinus tenderness.  EYES:   Right eye: Pupil reactive.  Conjunctiva clear.    Left eye: Pupil reactive.  Conjunctiva clear.  EOMI.    EARS: TM's intact. Light reflex normal. No retraction or perforation.    NOSE:  clear.  MOUTH & THROAT:  No pharyngeal erythema or exudate. No lesions.  NECK: Supple. No bruits.  No JVD.  No cervical lymphadenopathy.  No thyromegaly.    CHEST: Breath sounds clear bilaterally.  Normal respiratory effort  CARDIOVASCULAR: Normal rate.  Regular rhythm.  No murmurs.  No rub.  No gallops.  ABDOMEN: Bowel sounds normal.  Soft.  Minimal epigastric tenderness.  No rebound.  No guarding.  No organomegaly.  PERIPHERAL VASCULAR: No cyanosis.  No clubbing.  No edema.  NEUROLOGIC: No focal findings.  MENTAL STATUS: Alert.  " Oriented x 3.

## 2022-03-22 ENCOUNTER — ANESTHESIA EVENT (OUTPATIENT)
Dept: ENDOSCOPY | Facility: HOSPITAL | Age: 67
End: 2022-03-22
Payer: COMMERCIAL

## 2022-03-22 ENCOUNTER — HOSPITAL ENCOUNTER (OUTPATIENT)
Facility: HOSPITAL | Age: 67
Discharge: HOME | End: 2022-03-22
Attending: FAMILY MEDICINE | Admitting: FAMILY MEDICINE
Payer: COMMERCIAL

## 2022-03-22 ENCOUNTER — ANESTHESIA (OUTPATIENT)
Dept: ENDOSCOPY | Facility: HOSPITAL | Age: 67
End: 2022-03-22
Payer: COMMERCIAL

## 2022-03-22 ENCOUNTER — NURSE TRIAGE (OUTPATIENT)
Dept: ADMINISTRATIVE | Facility: CLINIC | Age: 67
End: 2022-03-22
Payer: COMMERCIAL

## 2022-03-22 VITALS
HEIGHT: 62 IN | HEART RATE: 58 BPM | WEIGHT: 185 LBS | BODY MASS INDEX: 34.04 KG/M2 | SYSTOLIC BLOOD PRESSURE: 160 MMHG | OXYGEN SATURATION: 98 % | TEMPERATURE: 97 F | RESPIRATION RATE: 18 BRPM | DIASTOLIC BLOOD PRESSURE: 71 MMHG

## 2022-03-22 DIAGNOSIS — K63.5 POLYP OF COLON, UNSPECIFIED PART OF COLON, UNSPECIFIED TYPE: ICD-10-CM

## 2022-03-22 DIAGNOSIS — K59.09 OTHER CONSTIPATION: Primary | ICD-10-CM

## 2022-03-22 DIAGNOSIS — K64.8 INTERNAL HEMORRHOIDS: ICD-10-CM

## 2022-03-22 PROCEDURE — 63600175 PHARM REV CODE 636 W HCPCS: Performed by: NURSE ANESTHETIST, CERTIFIED REGISTERED

## 2022-03-22 PROCEDURE — 27201012 HC FORCEPS, HOT/COLD, DISP: Performed by: FAMILY MEDICINE

## 2022-03-22 PROCEDURE — 45380 PR COLONOSCOPY,BIOPSY: ICD-10-PCS | Mod: ,,, | Performed by: FAMILY MEDICINE

## 2022-03-22 PROCEDURE — 88305 TISSUE EXAM BY PATHOLOGIST: CPT | Performed by: PATHOLOGY

## 2022-03-22 PROCEDURE — 88305 TISSUE EXAM BY PATHOLOGIST: ICD-10-PCS | Mod: 26,,, | Performed by: PATHOLOGY

## 2022-03-22 PROCEDURE — 37000009 HC ANESTHESIA EA ADD 15 MINS: Performed by: FAMILY MEDICINE

## 2022-03-22 PROCEDURE — 45380 COLONOSCOPY AND BIOPSY: CPT | Performed by: FAMILY MEDICINE

## 2022-03-22 PROCEDURE — 37000008 HC ANESTHESIA 1ST 15 MINUTES: Performed by: FAMILY MEDICINE

## 2022-03-22 PROCEDURE — 25000003 PHARM REV CODE 250: Performed by: NURSE ANESTHETIST, CERTIFIED REGISTERED

## 2022-03-22 PROCEDURE — 88305 TISSUE EXAM BY PATHOLOGIST: CPT | Mod: 26,,, | Performed by: PATHOLOGY

## 2022-03-22 PROCEDURE — 45380 COLONOSCOPY AND BIOPSY: CPT | Mod: ,,, | Performed by: FAMILY MEDICINE

## 2022-03-22 RX ORDER — LIDOCAINE HYDROCHLORIDE 10 MG/ML
INJECTION, SOLUTION EPIDURAL; INFILTRATION; INTRACAUDAL; PERINEURAL
Status: DISCONTINUED | OUTPATIENT
Start: 2022-03-22 | End: 2022-03-22

## 2022-03-22 RX ORDER — PROPOFOL 10 MG/ML
VIAL (ML) INTRAVENOUS
Status: DISCONTINUED | OUTPATIENT
Start: 2022-03-22 | End: 2022-03-22

## 2022-03-22 RX ADMIN — PROPOFOL 20 MG: 10 INJECTION, EMULSION INTRAVENOUS at 09:03

## 2022-03-22 RX ADMIN — PROPOFOL 70 MG: 10 INJECTION, EMULSION INTRAVENOUS at 09:03

## 2022-03-22 RX ADMIN — LIDOCAINE HYDROCHLORIDE 50 MG: 10 INJECTION, SOLUTION EPIDURAL; INFILTRATION; INTRACAUDAL; PERINEURAL at 09:03

## 2022-03-22 RX ADMIN — SODIUM CHLORIDE, SODIUM LACTATE, POTASSIUM CHLORIDE, AND CALCIUM CHLORIDE: .6; .31; .03; .02 INJECTION, SOLUTION INTRAVENOUS at 08:03

## 2022-03-22 NOTE — TELEPHONE ENCOUNTER
Pt calling and states has finished her bowel prep and reports having discomfort around her anal sphincter.Protocol reviewed and care advice given. Pt agrees with advice and verbalizes understanding. Instructed to call for questions, concerns or changes.  Reason for Disposition   Colonoscopy, questions about    Protocols used: COLONOSCOPY SYMPTOMS AND [id number]

## 2022-03-22 NOTE — ANESTHESIA RELEASE NOTE
"Anesthesia Release from PACU Note    Patient: Erinn Silva    Procedure(s) Performed: Procedure(s) (LRB):  COLONOSCOPY (N/A)    Anesthesia type: MAC    Post pain: Adequate analgesia    Post assessment: no apparent anesthetic complications    Last Vitals:   Visit Vitals  BP (!) 179/88   Pulse 69   Temp 36.3 °C (97.3 °F) (Temporal)   Resp 18   Ht 5' 2" (1.575 m)   Wt 83.9 kg (185 lb)   SpO2 100%   Breastfeeding No   BMI 33.84 kg/m²       Post vital signs: stable    Level of consciousness: awake    Nausea/Vomiting: no nausea/no vomiting    Complications: none    Airway Patency: patent    Respiratory: unassisted    Cardiovascular: stable and blood pressure at baseline    Hydration: euvolemic  "

## 2022-03-22 NOTE — ANESTHESIA PREPROCEDURE EVALUATION
03/22/2022  Erinn Silva is a 66 y.o., female.      Pre-op Assessment    I have reviewed the Patient Summary Reports.     I have reviewed the Nursing Notes. I have reviewed the NPO Status.   I have reviewed the Medications.     Review of Systems  Anesthesia Hx:  No problems with previous Anesthesia    Social:  Non-Smoker    Hematology/Oncology:  Hematology Normal   Oncology Normal     EENT/Dental:EENT/Dental Normal   Cardiovascular:   Hypertension, well controlled Valvular problems/Murmurs, MR CAD asymptomatic CABG/stent  hyperlipidemia    Pulmonary:   Asthma moderate Sleep Apnea, CPAP    Renal/:   Chronic Renal Disease, CRI    Hepatic/GI:   Bowel Prep. Hiatal Hernia, GERD, poorly controlled Liver Disease,    Musculoskeletal:  Musculoskeletal Normal    Neurological:  Neurology Normal    Endocrine:  Endocrine Normal  Metabolic Disorders, Obesity / BMI > 30  Dermatological:  Skin Normal    Psych:   Psychiatric History anxiety depression          Physical Exam  General: Well nourished    Airway:  Mallampati: II   Mouth Opening: Normal  TM Distance: Normal  Tongue: Normal  Neck ROM: Normal ROM    Dental:  Intact    Chest/Lungs:  Clear to auscultation    Heart:  Rate: Normal        Anesthesia Plan  Type of Anesthesia, risks & benefits discussed:    Anesthesia Type: MAC  Intra-op Monitoring Plan: Standard ASA Monitors  Induction:  IV  Informed Consent: Informed consent signed with the Patient and all parties understand the risks and agree with anesthesia plan.  All questions answered. Patient consented to blood products? Yes  ASA Score: 3    Ready For Surgery From Anesthesia Perspective.     .

## 2022-03-22 NOTE — PROVATION PATIENT INSTRUCTIONS
Discharge Summary/Instructions after an Endoscopic Procedure  Patient Name: Erinn Silva  Patient MRN: [id number]  Patient YOB: 1955 Tuesday, March 22, 2022 Hunter Garibay MD  Dear patient,  As a result of recent federal legislation (The Federal Cures Act), you may   receive lab or pathology results from your procedure in your [facility]   account before your physician is able to contact you. Your physician or   their representative will relay the results to you with their   recommendations at their soonest availability.  Thank you,  RESTRICTIONS:  During your procedure today, you received medications for sedation.  These   medications may affect your judgment, balance and coordination.  Therefore,   for 24 hours, you have the following restrictions:   - DO NOT drive a car, operate machinery, make legal/financial decisions,   sign important papers or drink alcohol.    ACTIVITY:  Today: no heavy lifting, straining or running due to procedural   sedation/anesthesia.  The following day: return to full activity including work.  DIET:  Eat and drink normally unless instructed otherwise.     TREATMENT FOR COMMON SIDE EFFECTS:  - Mild abdominal pain, nausea, belching, bloating or excessive gas:  rest,   eat lightly and use a heating pad.  - Sore Throat: treat with throat lozenges and/or gargle with warm salt   water.  - Because air was used during the procedure, expelling large amounts of air   from your rectum or belching is normal.  - If a bowel prep was taken, you may not have a bowel movement for 1-3 days.    This is normal.  SYMPTOMS TO WATCH FOR AND REPORT TO YOUR PHYSICIAN:  1. Abdominal pain or bloating, other than gas cramps.  2. Chest pain.  3. Back pain.  4. Signs of infection such as: chills or fever occurring within 24 hours   after the procedure.  5. Rectal bleeding, which would show as bright red, maroon, or black stools.   (A tablespoon of blood from the rectum is not serious, especially if    hemorrhoids are present.)  6. Vomiting.  7. Weakness or dizziness.  GO DIRECTLY TO THE NEAREST EMERGENCY ROOM IF YOU HAVE ANY OF THE FOLLOWING:      Difficulty breathing              Chills and/or fever over 101 F   Persistent vomiting and/or vomiting blood   Severe abdominal pain   Severe chest pain   Black, tarry stools   Bleeding- more than one tablespoon   Any other symptom or condition that you feel may need urgent attention  Your doctor recommends these additional instructions:  If any biopsies were taken, your doctors clinic will contact you in 1 to 2   weeks with any results.  - Patient has a contact number available for emergencies.  The signs and   symptoms of potential delayed complications were discussed with the   patient.  Return to normal activities tomorrow.  Written discharge   instructions were provided to the patient.   - Resume previous diet.   - Continue present medications.   - Await pathology results.   - Repeat colonoscopy in 5 years for surveillance.   - Telephone my office for pathology results in 2 weeks.   - Discharge patient to home (via wheelchair).  For questions, problems or results please call your physician Hunter Garibay MD at Work:  [phone]  If you have any questions about the above instructions, call the GI   department at [phone] or call the endoscopy unit at [phone]   from 7am until 3 pm.  [facility] [place], EMERGENCY ROOM PHONE NUMBER:   [phone]  IF A COMPLICATION OR EMERGENCY SITUATION ARISES AND YOU ARE UNABLE TO REACH   YOUR PHYSICIAN - GO DIRECTLY TO THE EMERGENCY ROOM.  I have read or have had read to me these discharge instructions for my   procedure and have received a written copy.  I understand these   instructions and will follow-up with my physician if I have any questions.     __________________________________       _____________________________________  Nurse Signature                                           Patient/Designated   Responsible Party Signature  Hunter Garibay MD  3/22/2022 9:40:03 AM  This report has been verified and signed electronically.  Dear patient,  As a result of recent federal legislation (The Federal Cures Act), you may   receive lab or pathology results from your procedure in your [facility]   account before your physician is able to contact you. Your physician or   their representative will relay the results to you with their   recommendations at their soonest availability.  Thank you,  PROVATION

## 2022-03-22 NOTE — ANESTHESIA POSTPROCEDURE EVALUATION
Anesthesia Post Evaluation    Patient: Erinn Silva    Procedure(s) Performed: Procedure(s) (LRB):  COLONOSCOPY (N/A)    Final Anesthesia Type: MAC      Patient location during evaluation: PACU  Patient participation: Yes- Able to Participate  Level of consciousness: awake and alert  Post-procedure vital signs: reviewed and stable  Pain management: adequate  Airway patency: patent    PONV status at discharge: No PONV  Anesthetic complications: no      Cardiovascular status: blood pressure returned to baseline  Respiratory status: unassisted  Hydration status: euvolemic  Follow-up not needed.          Vitals Value Taken Time   /66 03/22/22 0943   Temp 98 03/22/22 0943   Pulse 66 03/22/22 0943   Resp 12 03/22/22 0943   SpO2 98 03/22/22 0943         No case tracking events are documented in the log.      Pain/Saksia Score: No data recorded

## 2022-03-22 NOTE — TRANSFER OF CARE
"Anesthesia Transfer of Care Note    Patient: Erinn Silva    Procedure(s) Performed: Procedure(s) (LRB):  COLONOSCOPY (N/A)    Patient location: PACU    Anesthesia Type: MAC    Transport from OR: Transported from OR on room air with adequate spontaneous ventilation    Post pain: adequate analgesia    Post assessment: no apparent anesthetic complications    Post vital signs: stable    Level of consciousness: awake    Nausea/Vomiting: no nausea/vomiting    Complications: none    Transfer of care protocol was followed      Last vitals:   Visit Vitals  BP (!) 179/88   Pulse 69   Temp 36.3 °C (97.3 °F) (Temporal)   Resp 18   Ht 5' 2" (1.575 m)   Wt 83.9 kg (185 lb)   SpO2 100%   Breastfeeding No   BMI 33.84 kg/m²     "

## 2022-03-22 NOTE — H&P
Short Stay Endoscopy History and Physical    PCP - Layo Reed MD    Procedure - Colonoscopy  ASA - 2  Mallampati - per anesthesia  History of Anesthesia problems - no  Family history Anesthesia problems -  no     HPI:  This is a 66 y.o. female here for evaluation of :   Active Hospital Problems    Diagnosis  POA    *Other constipation [K59.09]  Yes      Resolved Hospital Problems   No resolved problems to display.         Health Maintenance       Date Due Completion Date    DEXA Scan Never done ---    Pneumococcal Vaccines (Age 65+) (2 of 2 - PPSV23) 01/23/2022 1/20/2021    Lipid Panel 11/09/2022 11/9/2021    High Dose Statin 02/04/2023 2/4/2022    Mammogram 02/09/2023 2/9/2022    Aspirin/Antiplatelet Therapy 03/22/2023 3/22/2022    Colorectal Cancer Screening 03/25/2024 11/10/2021    Cervical Cancer Screening 07/13/2024 7/13/2021    TETANUS VACCINE 03/06/2028 3/6/2018          Screening - No  History of polyps - No     Diarrhea - no  Anemia - no  Blood in stools - no  Abdominal pain - no  Other - constipation    ROS:  CONSTITUTIONAL: Denies weight change,  fatigue, fevers, chills, night sweats.  CARDIOVASCULAR: Denies chest pain, shortness of breath, orthopnea and edema.  RESPIRATORY: Denies cough, hemoptysis, dyspnea, and wheezing.  GI: See HPI.    Medical History:   Past Medical History:   Diagnosis Date    Anticoagulant long-term use     Anxiety     AR (allergic rhinitis)     Arthritis     Asthma, intermittent     Bilateral carotid artery stenosis 11/9/2021    Coronary artery disease     Depression     Endometrial polyp     Fatty liver     GERD (gastroesophageal reflux disease)     Hiatal hernia     Hyperlipidemia LDL goal <100     Hypertension     Mild mitral regurgitation     Multiple thyroid nodules 3/27/2014    Obesity (BMI 30-39.9) 3/27/2014    Renal cell carcinoma 2002    removed - no recurrence    S/P primary angioplasty with coronary stent 07/26/2017    Sleep apnea     Using  CPAP    Solitary kidney, acquired     right       Surgical History:   Past Surgical History:   Procedure Laterality Date    APPENDECTOMY      CARDIAC CATHETERIZATION  6/27/14    has blockages, but per patient no stents    CHOLECYSTECTOMY      COLONOSCOPY  2002    negative    COLONOSCOPY  3/25/2014         CORONARY ANGIOPLASTY  2008?    balloon angioplasty    CORONARY ANGIOPLASTY WITH STENT PLACEMENT Right 07/26/2017    ESOPHAGOGASTRODUODENOSCOPY N/A 7/14/2021    Procedure: ESOPHAGOGASTRODUODENOSCOPY (EGD);  Surgeon: Sheng Bell III, MD;  Location: [facility];  Service: Endoscopy;  Laterality: N/A;    NEPHRECTOMY Left 2002    [facility] - cancer    OVARIAN CYST REMOVAL      PERCUTANEOUS ENDOVENOUS LASER ABLATION OF PERIPHERAL VEIN  06/05/2019    POLYPECTOMY      endometrial polyp removal    ROTATOR CUFF REPAIR      L    THYROIDECTOMY, PARTIAL Left     TUBAL LIGATION      UPPER GASTROINTESTINAL ENDOSCOPY  04/2014       Family History:   Family History   Problem Relation Age of Onset    Heart attack Father 67    Arthritis Father     Hypertension Father     Diabetes Paternal Aunt     Allergies Paternal Aunt     Breast cancer Paternal Aunt     Diabetes Paternal Uncle     Allergies Maternal Grandmother     Asthma Brother     Colon cancer Neg Hx     Stomach cancer Neg Hx     Angioedema Neg Hx     Atopy Neg Hx     Immunodeficiency Neg Hx     Urticaria Neg Hx     Rhinitis Neg Hx     Eczema Neg Hx     Asthma Neg Hx        Social History:   Social History     Tobacco Use    Smoking status: Never Smoker    Smokeless tobacco: Never Used   Substance Use Topics    Alcohol use: No    Drug use: No       Allergies:   Review of patient's allergies indicates:   Allergen Reactions    Oxycodone Shortness Of Breath, Nausea And Vomiting and Other (See Comments)     Anxiety.    Plavix [clopidogrel] Diarrhea     Stomach cramps    Azithromycin Itching    Codeine Palpitations     Corticosteroids (glucocorticoids) Palpitations    Doxycycline Itching and Nausea Only    Mobic [meloxicam] Itching    Neuromuscular blockers, steroidal Palpitations    Nickel sutures [surgical stainless steel] Rash     Jewelery    Pcn [penicillins] Nausea And Vomiting    Shellfish containing products Itching     topical iodine OK    Sulfa (sulfonamide antibiotics) Nausea And Vomiting       Medications:   No current facility-administered medications on file prior to encounter.     Current Outpatient Medications on File Prior to Encounter   Medication Sig Dispense Refill    aspirin (ECOTRIN) 81 MG EC tablet Take 325 mg by mouth once daily.       EScitalopram oxalate (LEXAPRO) 10 MG tablet Take 0.5 tablets (5 mg total) by mouth once daily for 7 days, THEN 1 tablet (10 mg total) once daily. 90 tablet 3    famotidine (PEPCID) 20 MG tablet Take 1 tablet (20 mg total) by mouth 2 (two) times a day. 180 tablet 3    hydroCHLOROthiazide (HYDRODIURIL) 12.5 MG Tab Take 1/2 tablet daily 90 tablet 3    psyllium husk (METAMUCIL) 3.4 gram/5.4 gram Powd Use daily as directed      simethicone (MYLICON) 80 MG chewable tablet Take 1 tablet (80 mg total) by mouth every 6 (six) hours as needed for Flatulence.  0    valsartan (DIOVAN) 160 MG tablet Take 1 tablet (160 mg total) by mouth once daily. 90 tablet 3    acetaminophen (TYLENOL) 650 MG TbSR Take 2 tablets (1,300 mg total) by mouth every 8 (eight) hours.  0    albuterol (PROVENTIL/VENTOLIN HFA) 90 mcg/actuation inhaler Inhale 2 puffs into the lungs every 6 (six) hours as needed for Wheezing. 18 g 3       Physical Exam:  Vital Signs:   Vitals:    03/22/22 0826   BP: (!) 179/88   Pulse:    Resp:    Temp:      General Appearance: Well appearing in no acute distress  ENT: OP clear  Chest: CTA B  CV: RRR, no m/r/g  Abd: s/nt/nd/nabs  Ext: no edema    Labs:Reviewed    IMP:  Active Hospital Problems    Diagnosis  POA    *Other constipation [K59.09]  Yes      Resolved  Hospital Problems   No resolved problems to display.         Plan:   I have explained the risks and benefits of colonoscopy to the patient including but not limited to bleeding, perforation, infection, and death. The patient wishes to proceed.

## 2022-03-24 ENCOUNTER — NURSE TRIAGE (OUTPATIENT)
Dept: ADMINISTRATIVE | Facility: CLINIC | Age: 67
End: 2022-03-24
Payer: COMMERCIAL

## 2022-03-24 ENCOUNTER — PATIENT MESSAGE (OUTPATIENT)
Dept: ENDOSCOPY | Facility: HOSPITAL | Age: 67
End: 2022-03-24
Payer: COMMERCIAL

## 2022-03-24 NOTE — TELEPHONE ENCOUNTER
Patient calling regarding constipation. Reports she has not had a BM since her colonoscopy on Tuesday. Per protocol, home care advice given. Verbalized understanding. Knows to call back with new or worsening symptoms.       Reason for Disposition   MILD constipation    Additional Information   Negative: Patient sounds very sick or weak to the triager   Negative: [1] Vomiting AND [2] abdomen looks much more swollen than usual   Negative: [1] Vomiting AND [2] contains bile (green color)   Negative: [1] Constant abdominal pain AND [2] present > 2 hours   Negative: [1] Rectal pain or fullness from fecal impaction (rectum full of stool) AND [2] NOT better after SITZ bath, suppository or enema   Negative: [1] Intermittent mild abdominal pain AND [2] fever   Negative: Abdomen is more swollen than usual   Negative: Last bowel movement (BM) > 4 days ago   Negative: Leaking stool   Negative: Unable to have a bowel movement (BM) without manually removing stool (using finger to pull out stool or perform disimpaction)   Negative: Unable to have a bowel movement (BM) without laxative or enema   Negative: [1] Constipation persists > 1 week AND [2] no improvement after using CARE ADVICE   Negative: [1] Weight loss > 10 pounds (5 kg) AND [2] not dieting   Negative: Pencil-like, narrow stools   Negative: Taking new prescription medication   Negative: [1] Minor bleeding from rectum (e.g., blood just on toilet paper, few drops, streaks on surface of normal formed BM) AND [2] 3 or more times   Negative: [1] Uses laxative (e.g., PEG / Miralax. Milk of magnesia) or enema AND [2] > once a month   Negative: Constipation is a chronic symptom (recurrent or ongoing AND present > 4 weeks)    Protocols used: CONSTIPATION-A-

## 2022-03-25 ENCOUNTER — LAB VISIT (OUTPATIENT)
Dept: LAB | Facility: HOSPITAL | Age: 67
End: 2022-03-25
Attending: FAMILY MEDICINE
Payer: COMMERCIAL

## 2022-03-25 DIAGNOSIS — E66.9 CLASS 1 OBESITY WITH SERIOUS COMORBIDITY AND BODY MASS INDEX (BMI) OF 34.0 TO 34.9 IN ADULT, UNSPECIFIED OBESITY TYPE: ICD-10-CM

## 2022-03-25 DIAGNOSIS — Z00.00 ROUTINE HISTORY AND PHYSICAL EXAMINATION OF ADULT: ICD-10-CM

## 2022-03-25 DIAGNOSIS — I10 ESSENTIAL HYPERTENSION: ICD-10-CM

## 2022-03-25 LAB
ALBUMIN SERPL BCP-MCNC: 3.7 G/DL (ref 3.5–5.2)
ALP SERPL-CCNC: 76 U/L (ref 55–135)
ALT SERPL W/O P-5'-P-CCNC: 18 U/L (ref 10–44)
ANION GAP SERPL CALC-SCNC: 10 MMOL/L (ref 8–16)
AST SERPL-CCNC: 20 U/L (ref 10–40)
BASOPHILS # BLD AUTO: 0.04 K/UL (ref 0–0.2)
BASOPHILS NFR BLD: 0.7 % (ref 0–1.9)
BILIRUB SERPL-MCNC: 0.6 MG/DL (ref 0.1–1)
BUN SERPL-MCNC: 12 MG/DL (ref 8–23)
CALCIUM SERPL-MCNC: 9.4 MG/DL (ref 8.7–10.5)
CHLORIDE SERPL-SCNC: 102 MMOL/L (ref 95–110)
CHOLEST SERPL-MCNC: 251 MG/DL (ref 120–199)
CHOLEST/HDLC SERPL: 5 {RATIO} (ref 2–5)
CO2 SERPL-SCNC: 27 MMOL/L (ref 23–29)
CREAT SERPL-MCNC: 0.7 MG/DL (ref 0.5–1.4)
DIFFERENTIAL METHOD: NORMAL
EOSINOPHIL # BLD AUTO: 0.1 K/UL (ref 0–0.5)
EOSINOPHIL NFR BLD: 2 % (ref 0–8)
ERYTHROCYTE [DISTWIDTH] IN BLOOD BY AUTOMATED COUNT: 13.4 % (ref 11.5–14.5)
EST. GFR  (AFRICAN AMERICAN): >60 ML/MIN/1.73 M^2
EST. GFR  (NON AFRICAN AMERICAN): >60 ML/MIN/1.73 M^2
GLUCOSE SERPL-MCNC: 84 MG/DL (ref 70–110)
HCT VFR BLD AUTO: 41.9 % (ref 37–48.5)
HDLC SERPL-MCNC: 50 MG/DL (ref 40–75)
HDLC SERPL: 19.9 % (ref 20–50)
HGB BLD-MCNC: 13.5 G/DL (ref 12–16)
IMM GRANULOCYTES # BLD AUTO: 0.01 K/UL (ref 0–0.04)
IMM GRANULOCYTES NFR BLD AUTO: 0.2 % (ref 0–0.5)
LDLC SERPL CALC-MCNC: 159.8 MG/DL (ref 63–159)
LYMPHOCYTES # BLD AUTO: 2.5 K/UL (ref 1–4.8)
LYMPHOCYTES NFR BLD: 40 % (ref 18–48)
MCH RBC QN AUTO: 28.8 PG (ref 27–31)
MCHC RBC AUTO-ENTMCNC: 32.2 G/DL (ref 32–36)
MCV RBC AUTO: 90 FL (ref 82–98)
MONOCYTES # BLD AUTO: 0.4 K/UL (ref 0.3–1)
MONOCYTES NFR BLD: 6 % (ref 4–15)
NEUTROPHILS # BLD AUTO: 3.2 K/UL (ref 1.8–7.7)
NEUTROPHILS NFR BLD: 51.1 % (ref 38–73)
NONHDLC SERPL-MCNC: 201 MG/DL
NRBC BLD-RTO: 0 /100 WBC
PLATELET # BLD AUTO: 304 K/UL (ref 150–450)
PMV BLD AUTO: 10 FL (ref 9.2–12.9)
POTASSIUM SERPL-SCNC: 3.9 MMOL/L (ref 3.5–5.1)
PROT SERPL-MCNC: 7.3 G/DL (ref 6–8.4)
RBC # BLD AUTO: 4.68 M/UL (ref 4–5.4)
SODIUM SERPL-SCNC: 139 MMOL/L (ref 136–145)
TRIGL SERPL-MCNC: 206 MG/DL (ref 30–150)
WBC # BLD AUTO: 6.15 K/UL (ref 3.9–12.7)

## 2022-03-25 PROCEDURE — 80053 COMPREHEN METABOLIC PANEL: CPT | Performed by: FAMILY MEDICINE

## 2022-03-25 PROCEDURE — 85025 COMPLETE CBC W/AUTO DIFF WBC: CPT | Performed by: FAMILY MEDICINE

## 2022-03-25 PROCEDURE — 36415 COLL VENOUS BLD VENIPUNCTURE: CPT | Mod: PO | Performed by: FAMILY MEDICINE

## 2022-03-25 PROCEDURE — 80061 LIPID PANEL: CPT | Performed by: FAMILY MEDICINE

## 2022-03-30 LAB
FINAL PATHOLOGIC DIAGNOSIS: NORMAL
GROSS: NORMAL
Lab: NORMAL

## 2022-04-06 ENCOUNTER — PATIENT MESSAGE (OUTPATIENT)
Dept: FAMILY MEDICINE | Facility: CLINIC | Age: 67
End: 2022-04-06
Payer: COMMERCIAL

## 2022-04-07 NOTE — PROGRESS NOTES
Dear Layo Reed MD,    I recently cared for Erinn Silva and performed an endoscopy.  Tissue was sent for pathology evaluation and I will have a letter written to ask the patient to repeat the colonoscopy in 5 years.  The pathology showed that there was adenomatous tissue present.  Thank you for allowing me to participate in the care of your patient.  Please call me for any questions that you might have.      Dr. Hunter Garibay  [phone] cell  [phone] office      NURSING STAFF:Please  inform the patient that I reviewed the recent pathology obtained at the time of colonoscopy.    The results showed that there was adenomatous tissue present which is benign and based on that, I recommend that the patient have a repeat colonoscopy performed in 5 years.     If the patient has MyChart, this message has been sent to them.  Confirm that they read the note.  If not, copy the information and print a letter to send to the patient at this time.  Confirm that a notation to the PCP was done.      Dear Erinn Silva,    This is to inform you that I have reviewed your recent colonoscopy pathology.  The results showed that you had adenomatous tissue present which is benign and based on that, I recommend that you have a repeat colonoscopy performed in 5 years.      Dr. Hunter Garibay  [phone]

## 2022-04-08 ENCOUNTER — PATIENT MESSAGE (OUTPATIENT)
Dept: FAMILY MEDICINE | Facility: CLINIC | Age: 67
End: 2022-04-08
Payer: COMMERCIAL

## 2022-04-20 ENCOUNTER — HOSPITAL ENCOUNTER (OUTPATIENT)
Dept: RADIOLOGY | Facility: HOSPITAL | Age: 67
Discharge: HOME | End: 2022-04-20
Attending: FAMILY MEDICINE
Payer: COMMERCIAL

## 2022-04-20 DIAGNOSIS — Z78.0 ASYMPTOMATIC MENOPAUSE: ICD-10-CM

## 2022-04-20 PROCEDURE — 77080 DEXA BONE DENSITY SPINE HIP: ICD-10-PCS | Mod: 26,,, | Performed by: RADIOLOGY

## 2022-04-20 PROCEDURE — 77080 DXA BONE DENSITY AXIAL: CPT | Mod: TC,PO

## 2022-04-20 PROCEDURE — 77080 DXA BONE DENSITY AXIAL: CPT | Mod: 26,,, | Performed by: RADIOLOGY

## 2022-07-07 ENCOUNTER — HOSPITAL ENCOUNTER (OUTPATIENT)
Dept: RADIOLOGY | Facility: HOSPITAL | Age: 67
Discharge: HOME | End: 2022-07-07
Attending: FAMILY MEDICINE
Payer: COMMERCIAL

## 2022-07-07 ENCOUNTER — OFFICE VISIT (OUTPATIENT)
Dept: FAMILY MEDICINE | Facility: CLINIC | Age: 67
End: 2022-07-07
Payer: COMMERCIAL

## 2022-07-07 VITALS
BODY MASS INDEX: 34.72 KG/M2 | HEART RATE: 65 BPM | TEMPERATURE: 97 F | WEIGHT: 188.69 LBS | DIASTOLIC BLOOD PRESSURE: 90 MMHG | SYSTOLIC BLOOD PRESSURE: 180 MMHG | HEIGHT: 62 IN

## 2022-07-07 DIAGNOSIS — M54.50 ACUTE BILATERAL LOW BACK PAIN WITHOUT SCIATICA: ICD-10-CM

## 2022-07-07 DIAGNOSIS — I10 ESSENTIAL HYPERTENSION: ICD-10-CM

## 2022-07-07 DIAGNOSIS — M25.562 ACUTE PAIN OF LEFT KNEE: ICD-10-CM
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
## 2022-07-07 DIAGNOSIS — M70.72 BURSITIS OF LEFT HIP, UNSPECIFIED BURSA: ICD-10-CM

## 2022-07-07 PROCEDURE — 73560 XR KNEE ORTHO LEFT: ICD-10-PCS | Mod: 26,RT,, | Performed by: RADIOLOGY

## 2022-07-07 PROCEDURE — 72110 X-RAY EXAM L-2 SPINE 4/>VWS: CPT | Mod: 26,,, | Performed by: RADIOLOGY

## 2022-07-07 PROCEDURE — 99214 PR OFFICE/OUTPT VISIT, EST, LEVL IV, 30-39 MIN: ICD-10-PCS | Mod: S$GLB,,, | Performed by: FAMILY MEDICINE

## 2022-07-07 PROCEDURE — 3288F FALL RISK ASSESSMENT DOCD: CPT | Mod: CPTII,S$GLB,, | Performed by: FAMILY MEDICINE

## 2022-07-07 PROCEDURE — 73560 X-RAY EXAM OF KNEE 1 OR 2: CPT | Mod: TC,PO,RT

## 2022-07-07 PROCEDURE — 1101F PT FALLS ASSESS-DOCD LE1/YR: CPT | Mod: CPTII,S$GLB,, | Performed by: FAMILY MEDICINE

## 2022-07-07 PROCEDURE — 3080F PR MOST RECENT DIASTOLIC BLOOD PRESSURE >= 90 MM HG: ICD-10-PCS | Mod: CPTII,S$GLB,, | Performed by: FAMILY MEDICINE

## 2022-07-07 PROCEDURE — 73560 X-RAY EXAM OF KNEE 1 OR 2: CPT | Mod: 26,RT,, | Performed by: RADIOLOGY

## 2022-07-07 PROCEDURE — 3080F DIAST BP >= 90 MM HG: CPT | Mod: CPTII,S$GLB,, | Performed by: FAMILY MEDICINE

## 2022-07-07 PROCEDURE — 3077F SYST BP >= 140 MM HG: CPT | Mod: CPTII,S$GLB,, | Performed by: FAMILY MEDICINE

## 2022-07-07 PROCEDURE — 99999 PR PBB SHADOW E&M-EST. PATIENT-LVL V: ICD-10-PCS | Mod: PBBFAC,,, | Performed by: FAMILY MEDICINE

## 2022-07-07 PROCEDURE — 99999 PR PBB SHADOW E&M-EST. PATIENT-LVL V: CPT | Mod: PBBFAC,,, | Performed by: FAMILY MEDICINE

## 2022-07-07 PROCEDURE — 73562 X-RAY EXAM OF KNEE 3: CPT | Mod: 26,LT,, | Performed by: RADIOLOGY

## 2022-07-07 PROCEDURE — 73502 X-RAY EXAM HIP UNI 2-3 VIEWS: CPT | Mod: 26,LT,, | Performed by: RADIOLOGY

## 2022-07-07 PROCEDURE — 72110 XR LUMBAR SPINE COMPLETE 5 VIEW: ICD-10-PCS | Mod: 26,,, | Performed by: RADIOLOGY

## 2022-07-07 PROCEDURE — 72110 X-RAY EXAM L-2 SPINE 4/>VWS: CPT | Mod: TC,PO

## 2022-07-07 PROCEDURE — 3288F PR FALLS RISK ASSESSMENT DOCUMENTED: ICD-10-PCS | Mod: CPTII,S$GLB,, | Performed by: FAMILY MEDICINE

## 2022-07-07 PROCEDURE — 3008F PR BODY MASS INDEX (BMI) DOCUMENTED: ICD-10-PCS | Mod: CPTII,S$GLB,, | Performed by: FAMILY MEDICINE

## 2022-07-07 PROCEDURE — 73562 XR KNEE ORTHO LEFT: ICD-10-PCS | Mod: 26,LT,, | Performed by: RADIOLOGY

## 2022-07-07 PROCEDURE — 1159F MED LIST DOCD IN RCRD: CPT | Mod: CPTII,S$GLB,, | Performed by: FAMILY MEDICINE

## 2022-07-07 PROCEDURE — 3008F BODY MASS INDEX DOCD: CPT | Mod: CPTII,S$GLB,, | Performed by: FAMILY MEDICINE

## 2022-07-07 PROCEDURE — 99214 OFFICE O/P EST MOD 30 MIN: CPT | Mod: S$GLB,,, | Performed by: FAMILY MEDICINE

## 2022-07-07 PROCEDURE — 1101F PR PT FALLS ASSESS DOC 0-1 FALLS W/OUT INJ PAST YR: ICD-10-PCS | Mod: CPTII,S$GLB,, | Performed by: FAMILY MEDICINE

## 2022-07-07 PROCEDURE — 1159F PR MEDICATION LIST DOCUMENTED IN MEDICAL RECORD: ICD-10-PCS | Mod: CPTII,S$GLB,, | Performed by: FAMILY MEDICINE

## 2022-07-07 PROCEDURE — 3077F PR MOST RECENT SYSTOLIC BLOOD PRESSURE >= 140 MM HG: ICD-10-PCS | Mod: CPTII,S$GLB,, | Performed by: FAMILY MEDICINE

## 2022-07-07 PROCEDURE — 73502 X-RAY EXAM HIP UNI 2-3 VIEWS: CPT | Mod: TC,PO,LT

## 2022-07-07 PROCEDURE — 73502 XR HIP WITH PELVIS WHEN PERFORMED, 2 OR 3 VIEWS LEFT: ICD-10-PCS | Mod: 26,LT,, | Performed by: RADIOLOGY

## 2022-07-07 PROCEDURE — 4010F ACE/ARB THERAPY RXD/TAKEN: CPT | Mod: CPTII,S$GLB,, | Performed by: FAMILY MEDICINE

## 2022-07-07 PROCEDURE — 4010F PR ACE/ARB THEARPY RXD/TAKEN: ICD-10-PCS | Mod: CPTII,S$GLB,, | Performed by: FAMILY MEDICINE

## 2022-07-07 RX ORDER — ATORVASTATIN CALCIUM 20 MG/1
20 TABLET, FILM COATED ORAL NIGHTLY
Qty: 90 TABLET | Refills: 3 | Status: SHIPPED | OUTPATIENT
Start: 2022-07-07 | End: 2023-09-20 | Stop reason: ALTCHOICE

## 2022-07-07 RX ORDER — HYDROCHLOROTHIAZIDE 25 MG/1
25 TABLET ORAL DAILY
Qty: 30 TABLET | Refills: 1 | Status: SHIPPED | OUTPATIENT
Start: 2022-07-07 | End: 2023-01-17 | Stop reason: SDUPTHER

## 2022-07-07 RX ORDER — ATORVASTATIN CALCIUM 20 MG/1
20 TABLET, FILM COATED ORAL DAILY
COMMUNITY
End: 2022-07-07 | Stop reason: SDUPTHER

## 2022-07-07 RX ORDER — DICLOFENAC SODIUM 10 MG/G
2 GEL TOPICAL 3 TIMES DAILY
Qty: 100 G | Refills: 5 | Status: SHIPPED | OUTPATIENT
Start: 2022-07-07 | End: 2022-09-02 | Stop reason: SDUPTHER

## 2022-07-07 RX ORDER — HYDROCHLOROTHIAZIDE 25 MG/1
TABLET ORAL
Qty: 90 TABLET | OUTPATIENT
Start: 2022-07-07

## 2022-07-07 NOTE — TELEPHONE ENCOUNTER
No new care gaps identified.  [facility] Embedded Care Gaps. Reference number: [id number]. 7/07/2022   10:49:17 AM RYLIET

## 2022-07-08 NOTE — PROGRESS NOTES
Patient states that she fell on to her left lateral hip about 2 weeks ago.  She tripped over a mayte standing out from the side of the sofa when she was staying with her  at the hospital.  She did not notice immediate injury, but noticed soreness at the lower back left hip and left knee over next few days.  Occasionally feels like her knee may give way when she is getting out of a car.  She denies any swelling or pain in the knee currently.  She does use Tylenol with improvement.  Her blood pressure is elevated today.  States compliance medication but only using half dose of hydrochlorothiazide some days.      Erinn was seen today for hip pain.    Diagnoses and all orders for this visit:    Fall, initial encounter  -     X-Ray Hip 2 or 3 views Left (with Pelvis when performed); Future  -     X-ray Knee Ortho Left; Future  -     X-Ray Lumbar Spine 5 View; Future  -     Ambulatory referral/consult to Physical/Occupational Therapy; Future    Bursitis of left hip, unspecified bursa  -     X-Ray Hip 2 or 3 views Left (with Pelvis when performed); Future  -     Ambulatory referral/consult to Physical/Occupational Therapy; Future    Acute bilateral low back pain without sciatica  -     X-Ray Lumbar Spine 5 View; Future  -     Ambulatory referral/consult to Physical/Occupational Therapy; Future    Acute pain of left knee  -     X-ray Knee Ortho Left; Future  -     Ambulatory referral/consult to Physical/Occupational Therapy; Future    Essential hypertension  -     Basic Metabolic Panel; Future    Other orders  -     diclofenac sodium (VOLTAREN) 1 % Gel; Apply 2 g topically 3 (three) times daily.  -     hydroCHLOROthiazide (HYDRODIURIL) 25 MG tablet; Take 1 tablet (25 mg total) by mouth once daily.  -     atorvastatin (LIPITOR) 20 MG tablet; Take 1 tablet (20 mg total) by mouth every evening.      Recheck blood pressure with nurse in 4 weeks.  Check BMP same day.  Follow-up symptoms not improving with above.   Continue Tylenol.            Past Medical History:  Past Medical History:   Diagnosis Date    Anticoagulant long-term use     Anxiety     AR (allergic rhinitis)     Arthritis     Asthma, intermittent     Bilateral carotid artery stenosis 11/9/2021    Coronary artery disease     Depression     Endometrial polyp     Fatty liver     GERD (gastroesophageal reflux disease)     Hiatal hernia     Hyperlipidemia LDL goal <100     Hypertension     Mild mitral regurgitation     Multiple thyroid nodules 3/27/2014    Obesity (BMI 30-39.9) 3/27/2014    Renal cell carcinoma 2002    removed - no recurrence    S/P primary angioplasty with coronary stent 07/26/2017    Sleep apnea     Using CPAP    Solitary kidney, acquired     right     Past Surgical History:   Procedure Laterality Date    APPENDECTOMY      CARDIAC CATHETERIZATION  6/27/14    has blockages, but per patient no stents    CHOLECYSTECTOMY      COLONOSCOPY  2002    negative    COLONOSCOPY  3/25/2014         COLONOSCOPY N/A 3/22/2022    Procedure: COLONOSCOPY;  Surgeon: Hunter Garibay MD;  Location: [facility];  Service: Endoscopy;  Laterality: N/A;    CORONARY ANGIOPLASTY  2008?    balloon angioplasty    CORONARY ANGIOPLASTY WITH STENT PLACEMENT Right 07/26/2017    ESOPHAGOGASTRODUODENOSCOPY N/A 7/14/2021    Procedure: ESOPHAGOGASTRODUODENOSCOPY (EGD);  Surgeon: Sheng Bell III, MD;  Location: [facility];  Service: Endoscopy;  Laterality: N/A;    NEPHRECTOMY Left 2002    [facility] - cancer    OVARIAN CYST REMOVAL      PERCUTANEOUS ENDOVENOUS LASER ABLATION OF PERIPHERAL VEIN  06/05/2019    POLYPECTOMY      endometrial polyp removal    ROTATOR CUFF REPAIR      L    THYROIDECTOMY, PARTIAL Left     TUBAL LIGATION      UPPER GASTROINTESTINAL ENDOSCOPY  04/2014     Review of patient's allergies indicates:   Allergen Reactions    Oxycodone Shortness Of Breath, Nausea And Vomiting and Other (See Comments)      Anxiety.    Plavix [clopidogrel] Diarrhea     Stomach cramps    Azithromycin Itching    Codeine Palpitations    Corticosteroids (glucocorticoids) Palpitations    Doxycycline Itching and Nausea Only    Mobic [meloxicam] Itching    Neuromuscular blockers, steroidal Palpitations    Nickel sutures [surgical stainless steel] Rash     Jewelery    Pcn [penicillins] Nausea And Vomiting    Shellfish containing products Itching     topical iodine OK    Sulfa (sulfonamide antibiotics) Nausea And Vomiting     Current Outpatient Medications on File Prior to Visit   Medication Sig Dispense Refill    acetaminophen (TYLENOL) 650 MG TbSR Take 2 tablets (1,300 mg total) by mouth every 8 (eight) hours.  0    albuterol (PROVENTIL/VENTOLIN HFA) 90 mcg/actuation inhaler Inhale 2 puffs into the lungs every 6 (six) hours as needed for Wheezing. 18 g 3    aspirin (ECOTRIN) 81 MG EC tablet Take 325 mg by mouth once daily.       EScitalopram oxalate (LEXAPRO) 10 MG tablet Take 0.5 tablets (5 mg total) by mouth once daily for 7 days, THEN 1 tablet (10 mg total) once daily. 90 tablet 3    famotidine (PEPCID) 20 MG tablet Take 1 tablet (20 mg total) by mouth 2 (two) times a day. 180 tablet 3    omeprazole (PRILOSEC) 40 MG capsule Take 1 capsule (40 mg total) by mouth once daily. 90 capsule 3    psyllium husk (METAMUCIL) 3.4 gram/5.4 gram Powd Use daily as directed      valsartan (DIOVAN) 160 MG tablet Take 1 tablet (160 mg total) by mouth once daily. 90 tablet 3    [DISCONTINUED] atorvastatin (LIPITOR) 20 MG tablet Take 20 mg by mouth once daily.      [DISCONTINUED] hydroCHLOROthiazide (HYDRODIURIL) 12.5 MG Tab Take 1/2 tablet daily 90 tablet 3    [DISCONTINUED] rosuvastatin (CRESTOR) 10 MG tablet Take 10 mg by mouth nightly.      [DISCONTINUED] simethicone (MYLICON) 80 MG chewable tablet Take 1 tablet (80 mg total) by mouth every 6 (six) hours as needed for Flatulence.  0     No current facility-administered medications  "on file prior to visit.     Social History     Socioeconomic History    Marital status:    Tobacco Use    Smoking status: Never Smoker    Smokeless tobacco: Never Used   Substance and Sexual Activity    Alcohol use: No    Drug use: No    Sexual activity: Yes     Partners: Male     Birth control/protection: None     Family History   Problem Relation Age of Onset    Heart attack Father 67    Arthritis Father     Hypertension Father     Diabetes Paternal Aunt     Allergies Paternal Aunt     Breast cancer Paternal Aunt     Diabetes Paternal Uncle     Allergies Maternal Grandmother     Asthma Brother     Colon cancer Neg Hx     Stomach cancer Neg Hx     Angioedema Neg Hx     Atopy Neg Hx     Immunodeficiency Neg Hx     Urticaria Neg Hx     Rhinitis Neg Hx     Eczema Neg Hx     Asthma Neg Hx            ROS:  GENERAL: No fever, chills,  or significant weight changes.   CARDIOVASCULAR: Denies chest pain, PND, orthopnea or reduced exercise tolerance.  ABDOMEN: Appetite fine. Denies diarrhea, abdominal pain, hematemesis or blood in stool.  URINARY: No flank pain, dysuria or hematuria.    Vitals:    07/07/22 0935 07/07/22 1013   BP: (!) 142/80 (!) 180/90   Pulse: 65    Temp: 97.2 °F (36.2 °C)    TempSrc: Temporal    Weight: 85.6 kg (188 lb 11.2 oz)    Height: 5' 2" (1.575 m)      Wt Readings from Last 3 Encounters:   07/07/22 85.6 kg (188 lb 11.2 oz)   03/22/22 83.9 kg (185 lb)   03/18/22 83.9 kg (185 lb)       OBJECTIVE:   APPEARANCE: Well nourished, well developed, in no acute distress.    HEAD: Normocephalic.  Atraumatic.  No sinus tenderness.  EYES:   Right eye: Pupil reactive.  Conjunctiva clear.    Left eye: Pupil reactive.  Conjunctiva clear.  EOMI.    EARS: TM's intact. Light reflex normal. No retraction or perforation.    NOSE:  clear.  MOUTH & THROAT:  No pharyngeal erythema or exudate. No lesions.  NECK: Supple. No bruits.  No JVD.  No cervical lymphadenopathy.  No thyromegaly.  "   CHEST: Breath sounds clear bilaterally.  Normal respiratory effort  CARDIOVASCULAR: Normal rate.  Regular rhythm.  No murmurs.  No rub.  No gallops.  ABDOMEN: Bowel sounds normal.  Soft.  No tenderness.  No organomegaly.  PERIPHERAL VASCULAR: No cyanosis.  No clubbing.  No edema.  NEUROLOGIC: No focal findings.  MENTAL STATUS: Alert.  Oriented x 3.  Back with mild decreased range of motion.  Minimal tenderness palpation bilateral lower paraspinous muscles.  Deep tendon reflexes are intact.  Negative straight leg raise.  Left hip with mild tenderness over the greater trochanter.  No tenderness at the groin.  Full range of motion.  Normal gait.  Left knee full range of motion without effusion.  Not significantly tender.  Negative Lachman's and Amarjit's.

## 2022-07-22 ENCOUNTER — CLINICAL SUPPORT (OUTPATIENT)
Dept: REHABILITATION | Facility: HOSPITAL | Age: 67
End: 2022-07-22
Attending: FAMILY MEDICINE
Payer: COMMERCIAL

## 2022-07-22 DIAGNOSIS — M54.50 ACUTE BILATERAL LOW BACK PAIN WITHOUT SCIATICA: ICD-10-CM

## 2022-07-22 DIAGNOSIS — M79.605 LUMBAR PAIN WITH RADIATION DOWN LEFT LEG: ICD-10-CM

## 2022-07-22 DIAGNOSIS — M54.50 LUMBAR PAIN WITH RADIATION DOWN LEFT LEG: ICD-10-CM
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
## 2022-07-22 DIAGNOSIS — M70.72 BURSITIS OF LEFT HIP, UNSPECIFIED BURSA: ICD-10-CM

## 2022-07-22 DIAGNOSIS — M25.562 ACUTE PAIN OF LEFT KNEE: ICD-10-CM

## 2022-07-22 PROCEDURE — 97161 PT EVAL LOW COMPLEX 20 MIN: CPT | Mod: PN | Performed by: PHYSICAL THERAPIST

## 2022-07-22 NOTE — PLAN OF CARE
[facility]  Physical Therapy Initial Evaluation    Date: 7/22/2022   Name: Erinn Silva  Clinic Number: [id number]    Therapy Diagnosis:   Encounter Diagnoses   Name Primary?    Bursitis of left hip, unspecified bursa     Acute bilateral low back pain without sciatica     Acute pain of left knee     Fall, initial encounter     Lumbar pain with radiation down left leg      Physician: Layo Reed MD    Physician Orders: PT Eval and Treat   Medical Diagnosis from Referral: Acute bilateral low back pain   Evaluation Date: 7/22/2022  Authorization Period Expiration: 12/31/22  Plan of Care Expiration: 09/02/22  Progress Note Due: 08/22/22  Visit # / Visits authorized: 1/ 1   FOTO: 1/ 3     Precautions: Standard    Time In: 8:05 AM   Time Out: 8:45 AM  Total Appointment Time (timed & untimed codes): 40 minutes    Subjective   Date of onset: About 1 month ago   History of current condition - Erinn reports: Falling about one month ago and landing on her left side. Since the fall, she has had intermittent left buttock, lateral hip and thigh pain. She does have an intermittent tingling into same distribution. Her pain is worsened with periods of prolonged periods of sitting, lying down and long durations of walking,        Past Medical History:   Diagnosis Date    Anticoagulant long-term use     Anxiety     AR (allergic rhinitis)     Arthritis     Asthma, intermittent     Bilateral carotid artery stenosis 11/9/2021    Coronary artery disease     Depression     Endometrial polyp     Fatty liver     GERD (gastroesophageal reflux disease)     Hiatal hernia     Hyperlipidemia LDL goal <100     Hypertension     Mild mitral regurgitation     Multiple thyroid nodules 3/27/2014    Obesity (BMI 30-39.9) 3/27/2014    Renal cell carcinoma 2002    removed - no recurrence    S/P primary angioplasty with coronary stent 07/26/2017    Sleep apnea     Using CPAP    Solitary kidney,  acquired     right     Erinn Silva  has a past surgical history that includes Cholecystectomy; Ovarian cyst removal; Tubal ligation; Rotator cuff repair; Colonoscopy (2002); Colonoscopy (3/25/2014); Coronary angioplasty (2008?); Cardiac catheterization (6/27/14); Nephrectomy (Left, 2002); Polypectomy; Appendectomy; Thyroidectomy, partial (Left); Coronary angioplasty with stent (Right, 07/26/2017); Upper gastrointestinal endoscopy (04/2014); Percutaneous endovenous laser ablation of peripheral vein (06/05/2019); Esophagogastroduodenoscopy (N/A, 7/14/2021); and Colonoscopy (N/A, 3/22/2022).    Erinn has a current medication list which includes the following prescription(s): acetaminophen, albuterol, aspirin, atorvastatin, diclofenac sodium, escitalopram oxalate, famotidine, hydrochlorothiazide, omeprazole, metamucil, and valsartan.    Review of patient's allergies indicates:   Allergen Reactions    Oxycodone Shortness Of Breath, Nausea And Vomiting and Other (See Comments)     Anxiety.    Plavix [clopidogrel] Diarrhea     Stomach cramps    Azithromycin Itching    Codeine Palpitations    Corticosteroids (glucocorticoids) Palpitations    Doxycycline Itching and Nausea Only    Mobic [meloxicam] Itching    Neuromuscular blockers, steroidal Palpitations    Nickel sutures [surgical stainless steel] Rash     Jewelery    Pcn [penicillins] Nausea And Vomiting    Shellfish containing products Itching     topical iodine OK    Sulfa (sulfonamide antibiotics) Nausea And Vomiting        Imaging: x-rays    Prior Therapy: None   Social History:  lives with their spouse in a single story home   Occupation: retired   Prior Level of Function: Independent with ADL's   Current Level of Function: Limited with household cleaning activities, limited walking long distances     Pain:  Current 7/10, worst 9/10, best 0/10   Location: left buttocks  and lateral thigh    Description: Aching, Throbbing, burning   Aggravating  Factors: Standing, Laying and Walking  Easing Factors: ice and lying down    Pts goals: To get rid of the pain       Objective   Red Flag Screening:   Cough  Sneeze  Strain: (--)  Bladder/ bowel: (--)  Falls: (--)  Night pain: (--)  Unexplained weight loss: (--)  General health: Good     Posture/ Alignment: Poor, Protruded Head, Protracted Scapula    GAIT DEVIATIONS: Erinn amb with normal gait mechanics and speed .    ROM:   AROM  Comment   Flexion: 75%  *pulling    Extension: 75%     Lat Flex R: 100%     Lat Flex L: 100%     Rotation R: 75%     Rotation L: 75%     *pain    Strength: Dermatomes:   Right Left Comment   L2 (lateral thigh) intact intact    L3 (medial thigh) intact intact    L4 (medial calf) intact intact    L5 (lateral calf) intact intact    S1 (lateral foot) intact intact    S2 (gastro/HS) intact intact    Saddle (cauda equina) intact intact      Myotomes:   Right Left Comment   Iliacus: (L2-3) 5/5 5/5    Knee extension (L3-4): 5/5 5/5    DF (L4-5): 5/5 5/5    Extensor digitorum longus, Peroneus(L5-S1): 5/5 5/5    Gastroc/Soleus(S1-S2): 5/5 5/5      DTR:   Right Left Comment   Patellar (L3-4) 3+ 3+    Achilles (S1) 3+ 3+        Special Tests:  REPEATED TEST MOVEMENTS:  Repeated Flexion in Standing NT   Repeated Extension in Standing NT   Repeated Flexion in lying better  decreased    Repeated Extension in lying  NT     - NO (-)  Slump:(+) L   SLR:(+) L       Functional Tests (* indicates w/ pain)   30 second stand test: 5x    Palpation:   TTP left lumbar paraspinals, left buttocks, lateral thigh      Pt/family was provided educational information, including: role of PT, goals for PT, scheduling - pt verbalized understanding. Discussed insurance plan with pt.       CMS Impairment/Limitation/Restriction for FOTO Hip Survey    Therapist reviewed FOTO scores for Erinn Silva on 7/22/2022.   FOTO documents entered into [facility] - see Media section.    Limitation Score: 64%  Category:  Mobility    Current : CL = least 60% but < 80% impaired, limited or restricted         TREATMENT   Treatment Time In: 8:40 AM   Treatment Time Out: 8:45 AM   Total Treatment time separate from Evaluation: 5 minutes    Erinn received therapeutic exercises to develop ROM for 5 minutes including:  HEP setup and instruction; handout issued     Home Exercises and Patient Education Provided    Education provided:   - Pathophysiology of condition   - POC   - HEP     Written Home Exercises Provided: yes.  Exercises were reviewed and Erinn was able to demonstrate them prior to the end of the session.  Erinn demonstrated good  understanding of the education provided.     See EMR under Patient Instructions for exercises provided 7/22/2022.      Assessment:   Patient presents with a medical diagnosis of acute lumbar and left hip pain. Physical exam is consistent with the referring diagnosis. Primary impairments include limited AROM,  joint mobility, strength,  and pain which limits functional mobility. This pt is a good candidate for skilled PT tx and stands to benefit from a combination of manual therapy, therapeutic exercise, neuromuscular re-education, dry needling and modalities Prn. The pt has been educated on their dx/POC and consents to further PT tx.      Pt prognosis is Excellent.   Pt will benefit from skilled outpatient Physical Therapy to address the deficits stated above and in the chart below, provide pt/family education, and to maximize pt's level of independence.     Plan of care discussed with patient: Yes  Pt's spiritual, cultural and educational needs considered and patient is agreeable to the plan of care and goals as stated below:     Anticipated Barriers for therapy: None    Medical Necessity is demonstrated by the following  History  Co-morbidities and personal factors that may impact the plan of care Co-morbidities:   advanced age, anxiety, CAD, depression, high BMI and HTN    Personal Factors:    age  lifestyle     high   Examination  Body Structures and Functions, activity limitations and participation restrictions that may impact the plan of care Body Regions:   back  lower extremities    Body Systems:    gross symmetry  ROM  strength  gait  motor control    Participation Restrictions:       Activity limitations:   Learning and applying knowledge  no deficits    General Tasks and Commands  no deficits    Communication  no deficits    Mobility  lifting and carrying objects  walking    Self care  no deficits    Domestic Life  shopping  cooking  doing house work (cleaning house, washing dishes, laundry)    Interactions/Relationships  no deficits    Life Areas  no deficits    Community and Social Life  community life         high   Clinical Presentation stable and uncomplicated low   Decision Making/ Complexity Score: low     Goals:  Short Term Goals: 3 weeks   1) Pt will be I with established HEP   2) Pt will perform household cleaning activities with pain no worse than 5/10  3) Pt will walk community distances with pain no worse than 5/10    Long Term Goals: 6 weeks   1) Pt will have 90% of normal lumbar ROM   2) Pt will perform all household ADL's with lumbar pain no worse than 2/10   3) All LE pain will be resolved         Plan   Plan of care Certification: 7/22/2022 to 09/02/22.    Outpatient Physical Therapy 2 times weekly for 6 weeks to include the following interventions: Manual Therapy, Moist Heat/ Ice, Neuromuscular Re-ed, Patient Education, Therapeutic Exercise and dry needling .     Alexander Bailey, PT      I CERTIFY THE NEED FOR THESE SERVICES FURNISHED UNDER THIS PLAN OF TREATMENT AND WHILE UNDER MY CARE   Physician's comments:     Physician's Signature: ___________________________________________________

## 2022-07-25 NOTE — PROGRESS NOTES
[facility]   Physical Therapy Treatment Note     Name: Erinn Silva  Clinic Number: [id number]    Therapy Diagnosis:   Encounter Diagnosis   Name Primary?    Lumbar pain with radiation down left leg Yes     Physician: Layo Reed MD    Visit Date: 7/26/2022  Physician Orders: PT Eval and Treat   Medical Diagnosis from Referral: Acute bilateral low back pain   Evaluation Date: 7/22/2022  Authorization Period Expiration: 12/31/22  Plan of Care Expiration: 09/02/22  Progress Note Due: 08/22/22  Visit # / Visits authorized: 1/20 (1/1 eval)  FOTO: 1/3   PTA Visit #: 1/5     Precautions: Standard    Time In: 745 am  Time Out: 825 am  Total Appointment Time: 40 minutes    SUBJECTIVE     Pt reports: the exercises seemed to make her feel worse. She sat for a while talking with family and her leg started to go numb a little.    She was compliant with home exercise program.  Response to previous treatment: Initial evaluation  Functional change: Too soon to tell    Pain: 8/10  Location: left lower extremity     OBJECTIVE     Objective Measures updated at progress report unless specified.     Treatment     Erinn received the treatments listed below:      Therapeutic exercises to develop strength, endurance, ROM, flexibility, posture and core stabilization for 40 minutes including:    Recumbent bike 5 min (Seat: 3, Level: 1)  SB roll out 2 min  Seated lumbar flexion 2 x10  Seated hip adduction with ball 3s x20  Seated thoracic extension with ball 3s x20  Supine piriformis rocks 10s x2 min  LTR with SB 2 min  SKTC with towel 10s x2 min ea  Supine hip abduction RTB x20  Glut sets 3s x20    Possible next session: pelvic tilts    Patient Education and Home Exercises     Education provided:   - Pathophysiology of condition   - POC   - HEP     Written Home Exercises Provided: Patient instructed to cont prior HEP. Exercises were reviewed and Erinn was able to demonstrate them prior to the end of  the session.  Erinn demonstrated good  understanding of the education provided. See EMR under Patient Instructions for exercises provided during therapy sessions    ASSESSMENT     Patient did well with all prescribed therex. Piriformis stretch was modified to a piriformis rock to decrease discomfort/pain felt in lateral left thigh. Hip abduction was done in supine due to discomfort felt when attempted in seated.     Erinn Is progressing well towards her goals.   Pt prognosis is Excellent.     Pt will continue to benefit from skilled outpatient physical therapy to address the deficits listed in the problem list box on initial evaluation, provide pt/family education and to maximize pt's level of independence in the home and community environment.     Pt's spiritual, cultural and educational needs considered and pt agreeable to plan of care and goals.     Anticipated barriers to physical therapy: none stated at this time    Goals:  Short Term Goals: 3 weeks   1) Pt will be I with established HEP (Progressing, not met)  2) Pt will perform household cleaning activities with pain no worse than 5/10 (Progressing, not met)  3) Pt will walk community distances with pain no worse than 5/10 (Progressing, not met)     Long Term Goals: 6 weeks   1) Pt will have 90% of normal lumbar ROM  (Progressing, not met)  2) Pt will perform all household ADL's with lumbar pain no worse than 2/10  (Progressing, not met)  3) All LE pain will be resolved  (Progressing, not met)    PLAN     Continue PT current POC to improve functional mobility.    Byron Daniel, PTA

## 2022-07-26 ENCOUNTER — CLINICAL SUPPORT (OUTPATIENT)
Dept: REHABILITATION | Facility: HOSPITAL | Age: 67
End: 2022-07-26
Attending: FAMILY MEDICINE
Payer: COMMERCIAL

## 2022-07-26 DIAGNOSIS — M79.605 LUMBAR PAIN WITH RADIATION DOWN LEFT LEG: Primary | ICD-10-CM

## 2022-07-26 DIAGNOSIS — M54.50 LUMBAR PAIN WITH RADIATION DOWN LEFT LEG: Primary | ICD-10-CM

## 2022-07-26 PROCEDURE — 97110 THERAPEUTIC EXERCISES: CPT | Mod: PN,CQ

## 2022-08-03 ENCOUNTER — TELEPHONE (OUTPATIENT)
Dept: FAMILY MEDICINE | Facility: CLINIC | Age: 67
End: 2022-08-03
Payer: COMMERCIAL

## 2022-08-03 NOTE — TELEPHONE ENCOUNTER
----- Message from Alice Koenig sent at 8/3/2022 11:25 AM CDT -----  Regarding: reschedule  Contact: [phone]  Reschedule Request    Who called:pt   Date/Time/Type: B/P  Contact #[phone]

## 2022-08-19 ENCOUNTER — PATIENT MESSAGE (OUTPATIENT)
Dept: FAMILY MEDICINE | Facility: CLINIC | Age: 67
End: 2022-08-19
Payer: COMMERCIAL

## 2022-09-01 ENCOUNTER — PATIENT MESSAGE (OUTPATIENT)
Dept: FAMILY MEDICINE | Facility: CLINIC | Age: 67
End: 2022-09-01
Payer: COMMERCIAL

## 2022-09-02 ENCOUNTER — OFFICE VISIT (OUTPATIENT)
Dept: FAMILY MEDICINE | Facility: CLINIC | Age: 67
End: 2022-09-02
Payer: COMMERCIAL

## 2022-09-02 VITALS
DIASTOLIC BLOOD PRESSURE: 71 MMHG | WEIGHT: 189.13 LBS | SYSTOLIC BLOOD PRESSURE: 180 MMHG | BODY MASS INDEX: 34.8 KG/M2 | HEIGHT: 62 IN | HEART RATE: 65 BPM | TEMPERATURE: 98 F

## 2022-09-02 DIAGNOSIS — M53.3 PAIN OF LEFT SACROILIAC JOINT: ICD-10-CM

## 2022-09-02 DIAGNOSIS — I10 ESSENTIAL HYPERTENSION: ICD-10-CM

## 2022-09-02 DIAGNOSIS — M47.26 OSTEOARTHRITIS OF SPINE WITH RADICULOPATHY, LUMBAR REGION: ICD-10-CM

## 2022-09-02 DIAGNOSIS — M70.62 TROCHANTERIC BURSITIS OF LEFT HIP: Primary | ICD-10-CM

## 2022-09-02 DIAGNOSIS — M54.50 LUMBAR PAIN WITH RADIATION DOWN LEFT LEG: ICD-10-CM

## 2022-09-02 DIAGNOSIS — M79.605 LUMBAR PAIN WITH RADIATION DOWN LEFT LEG: ICD-10-CM

## 2022-09-02 PROCEDURE — 3078F PR MOST RECENT DIASTOLIC BLOOD PRESSURE < 80 MM HG: ICD-10-PCS | Mod: CPTII,S$GLB,, | Performed by: FAMILY MEDICINE

## 2022-09-02 PROCEDURE — 3008F BODY MASS INDEX DOCD: CPT | Mod: CPTII,S$GLB,, | Performed by: FAMILY MEDICINE

## 2022-09-02 PROCEDURE — 3288F FALL RISK ASSESSMENT DOCD: CPT | Mod: CPTII,S$GLB,, | Performed by: FAMILY MEDICINE

## 2022-09-02 PROCEDURE — 3288F PR FALLS RISK ASSESSMENT DOCUMENTED: ICD-10-PCS | Mod: CPTII,S$GLB,, | Performed by: FAMILY MEDICINE

## 2022-09-02 PROCEDURE — 99214 PR OFFICE/OUTPT VISIT, EST, LEVL IV, 30-39 MIN: ICD-10-PCS | Mod: S$GLB,,, | Performed by: FAMILY MEDICINE

## 2022-09-02 PROCEDURE — 4010F PR ACE/ARB THEARPY RXD/TAKEN: ICD-10-PCS | Mod: CPTII,S$GLB,, | Performed by: FAMILY MEDICINE

## 2022-09-02 PROCEDURE — 1100F PTFALLS ASSESS-DOCD GE2>/YR: CPT | Mod: CPTII,S$GLB,, | Performed by: FAMILY MEDICINE

## 2022-09-02 PROCEDURE — 1159F PR MEDICATION LIST DOCUMENTED IN MEDICAL RECORD: ICD-10-PCS | Mod: CPTII,S$GLB,, | Performed by: FAMILY MEDICINE

## 2022-09-02 PROCEDURE — 1100F PR PT FALLS ASSESS DOC 2+ FALLS/FALL W/INJURY/YR: ICD-10-PCS | Mod: CPTII,S$GLB,, | Performed by: FAMILY MEDICINE

## 2022-09-02 PROCEDURE — 1125F PR PAIN SEVERITY QUANTIFIED, PAIN PRESENT: ICD-10-PCS | Mod: CPTII,S$GLB,, | Performed by: FAMILY MEDICINE

## 2022-09-02 PROCEDURE — 1125F AMNT PAIN NOTED PAIN PRSNT: CPT | Mod: CPTII,S$GLB,, | Performed by: FAMILY MEDICINE

## 2022-09-02 PROCEDURE — 99999 PR PBB SHADOW E&M-EST. PATIENT-LVL V: CPT | Mod: PBBFAC,,, | Performed by: FAMILY MEDICINE

## 2022-09-02 PROCEDURE — 1159F MED LIST DOCD IN RCRD: CPT | Mod: CPTII,S$GLB,, | Performed by: FAMILY MEDICINE

## 2022-09-02 PROCEDURE — 99999 PR PBB SHADOW E&M-EST. PATIENT-LVL V: ICD-10-PCS | Mod: PBBFAC,,, | Performed by: FAMILY MEDICINE

## 2022-09-02 PROCEDURE — 3008F PR BODY MASS INDEX (BMI) DOCUMENTED: ICD-10-PCS | Mod: CPTII,S$GLB,, | Performed by: FAMILY MEDICINE

## 2022-09-02 PROCEDURE — 3078F DIAST BP <80 MM HG: CPT | Mod: CPTII,S$GLB,, | Performed by: FAMILY MEDICINE

## 2022-09-02 PROCEDURE — 3077F SYST BP >= 140 MM HG: CPT | Mod: CPTII,S$GLB,, | Performed by: FAMILY MEDICINE

## 2022-09-02 PROCEDURE — 4010F ACE/ARB THERAPY RXD/TAKEN: CPT | Mod: CPTII,S$GLB,, | Performed by: FAMILY MEDICINE

## 2022-09-02 PROCEDURE — 3077F PR MOST RECENT SYSTOLIC BLOOD PRESSURE >= 140 MM HG: ICD-10-PCS | Mod: CPTII,S$GLB,, | Performed by: FAMILY MEDICINE

## 2022-09-02 PROCEDURE — 99214 OFFICE O/P EST MOD 30 MIN: CPT | Mod: S$GLB,,, | Performed by: FAMILY MEDICINE

## 2022-09-02 RX ORDER — DICLOFENAC SODIUM 10 MG/G
2 GEL TOPICAL 3 TIMES DAILY
Qty: 100 G | Refills: 5 | Status: SHIPPED | OUTPATIENT
Start: 2022-09-02

## 2022-09-02 RX ORDER — VALSARTAN 320 MG/1
320 TABLET ORAL DAILY
Qty: 90 TABLET | Refills: 0 | Status: SHIPPED | OUTPATIENT
Start: 2022-09-02 | End: 2023-05-03

## 2022-09-02 NOTE — PATIENT INSTRUCTIONS
Please monitor blood pressure and pulse once or twice a day and write down readings. Bring numbers and machine in when you come in for blood pressure check

## 2022-09-02 NOTE — PROGRESS NOTES
Presents for follow-up.  Last visit she was seen after she fell on to her left lateral hip . She tripped over a mayte standing out from the side of the sofa when she was staying with her  at the hospital.  She did not notice immediate injury, but noticed soreness at the lower back left hip and left knee over next few days.  Occasionally feels like her knee may give way when she is getting out of a car.  She denies any swelling or pain in the knee currently.  She does use Tylenol with improvement.  She did not try the Voltaren gel.  She did go to 2 visits with [facility], but felt like it aggravated symptoms.  Current symptoms seem to be mostly at the left SI joint as well as at the lateral hip.  She occasionally has symptoms radiate all the way down to the ankle.  Previous history of sat  She would like to go somewhere else for therapy.  Her blood pressure is elevated today.  States compliance medication but only using half dose of hydrochlorothiazide .  She did have imaging last visit as below.      X-ray Knee Ortho Left  Narrative: EXAMINATION:  XR KNEE ORTHO LEFT    CLINICAL HISTORY:  Pain in left knee    TECHNIQUE:  AP standing of both knees, Merchant views of both knees as well as a lateral view of the left knee were performed.    COMPARISON:  Knee radiographs October 25, 2006    FINDINGS:  No left knee fracture.  Minimal left knee medial compartment joint space narrowing.  No osseous erosion or suspicious osseous lesion.  Enthesophyte arises from the inferior pole of the left patella.  No left knee effusion.    Limited evaluation of the right knee also demonstrates minimal medial compartment joint space narrowing.  Impression: As above.    Electronically signed by: Sterling Stock  Date:    07/07/2022  Time:    11:21  X-Ray Hip 2 or 3 views Left (with Pelvis when performed)  Narrative: EXAMINATION:  XR HIP WITH PELVIS WHEN PERFORMED, 2 OR 3 VIEWS LEFT    CLINICAL HISTORY:  Other bursitis  of hip, left hip    TECHNIQUE:  AP view of the pelvis and frog leg lateral view of the left hip were performed.    COMPARISON:  Pelvic and hip radiographs January 6, 2016    FINDINGS:  No pelvic or hip fracture identified.  Hip joint spaces are preserved.  Unchanged symmetric mild lateral spurring of the acetabula.  No femoral head avascular necrosis.  Mild sacroiliac joint degenerative changes.  No osseous erosion or suspicious osseous lesion.  Impression: As above.    Electronically signed by: Sterling Stock  Date:    07/07/2022  Time:    11:19  X-Ray Lumbar Spine 5 View  Narrative: EXAMINATION:  XR LUMBAR SPINE COMPLETE 5 VIEW    CLINICAL HISTORY:  Low back pain, unspecified    TECHNIQUE:  AP, lateral, spot and bilateral oblique views of the lumbar spine were performed.    COMPARISON:  None    FINDINGS:  Alignment of the lumbar spine is normal.  No fracture or pars defect.  Mild multilevel lumbar spine degenerative disc disease.  There is also prominent inferior lumbar spine facet arthropathy.  Mild sacroiliac joint degenerative changes.  No osseous erosion or suspicious osseous lesion.  Multiple surgical clips are scattered within the left abdomen.  Impression: As above.    Electronically signed by: Sterling Stock  Date:    07/07/2022  Time:    11:17    Erinn was seen today for follow-up.    Diagnoses and all orders for this visit:    Trochanteric bursitis of left hip  -     Ambulatory referral/consult to Physical/Occupational Therapy; Future    Lumbar pain with radiation down left leg  -     Ambulatory referral/consult to Physical/Occupational Therapy; Future    Pain of left sacroiliac joint  -     Ambulatory referral/consult to Physical/Occupational Therapy; Future    Essential hypertension  -     Ambulatory referral/consult to Physical/Occupational Therapy; Future    Osteoarthritis of spine with radiculopathy, lumbar region    Other orders  -     valsartan (DIOVAN) 320 MG tablet; Take 1 tablet (320 mg  total) by mouth once daily.  -     diclofenac sodium (VOLTAREN) 1 % Gel; Apply 2 g topically 3 (three) times daily.    Increase valsartan.  Will refer to [facility] per her request.  Follow-up 1 month.  She can continue with the Tylenol.          Past Medical History:  Past Medical History:   Diagnosis Date    Anticoagulant long-term use     Anxiety     AR (allergic rhinitis)     Arthritis     Asthma, intermittent     Bilateral carotid artery stenosis 11/9/2021    Coronary artery disease     Depression     Endometrial polyp     Fatty liver     GERD (gastroesophageal reflux disease)     Hiatal hernia     Hyperlipidemia LDL goal <100     Hypertension     Mild mitral regurgitation     Multiple thyroid nodules 3/27/2014    Obesity (BMI 30-39.9) 3/27/2014    Renal cell carcinoma 2002    removed - no recurrence    S/P primary angioplasty with coronary stent 07/26/2017    Sleep apnea     Using CPAP    Solitary kidney, acquired     right     Past Surgical History:   Procedure Laterality Date    APPENDECTOMY      CARDIAC CATHETERIZATION  6/27/14    has blockages, but per patient no stents    CHOLECYSTECTOMY      COLONOSCOPY  2002    negative    COLONOSCOPY  3/25/2014         COLONOSCOPY N/A 3/22/2022    Procedure: COLONOSCOPY;  Surgeon: Hunter Garibay MD;  Location: [facility];  Service: Endoscopy;  Laterality: N/A;    CORONARY ANGIOPLASTY  2008?    balloon angioplasty    CORONARY ANGIOPLASTY WITH STENT PLACEMENT Right 07/26/2017    ESOPHAGOGASTRODUODENOSCOPY N/A 7/14/2021    Procedure: ESOPHAGOGASTRODUODENOSCOPY (EGD);  Surgeon: Sheng Bell III, MD;  Location: [facility];  Service: Endoscopy;  Laterality: N/A;    NEPHRECTOMY Left 2002    [facility] - cancer    OVARIAN CYST REMOVAL      PERCUTANEOUS ENDOVENOUS LASER ABLATION OF PERIPHERAL VEIN  06/05/2019    POLYPECTOMY      endometrial polyp removal    ROTATOR CUFF REPAIR      L    THYROIDECTOMY, PARTIAL Left     TUBAL LIGATION       UPPER GASTROINTESTINAL ENDOSCOPY  04/2014     Review of patient's allergies indicates:   Allergen Reactions    Oxycodone Shortness Of Breath, Nausea And Vomiting and Other (See Comments)     Anxiety.    Plavix [clopidogrel] Diarrhea     Stomach cramps    Azithromycin Itching    Codeine Palpitations    Corticosteroids (glucocorticoids) Palpitations    Doxycycline Itching and Nausea Only    Hydralazine analogues Palpitations    Mobic [meloxicam] Itching    Neuromuscular blockers, steroidal Palpitations    Nickel sutures [surgical stainless steel] Rash     Jewelery    Pcn [penicillins] Nausea And Vomiting    Shellfish containing products Itching     topical iodine OK    Sulfa (sulfonamide antibiotics) Nausea And Vomiting     Current Outpatient Medications on File Prior to Visit   Medication Sig Dispense Refill    acetaminophen (TYLENOL) 650 MG TbSR Take 2 tablets (1,300 mg total) by mouth every 8 (eight) hours.  0    albuterol (PROVENTIL/VENTOLIN HFA) 90 mcg/actuation inhaler Inhale 2 puffs into the lungs every 6 (six) hours as needed for Wheezing. 18 g 3    aspirin (ECOTRIN) 81 MG EC tablet Take 325 mg by mouth once daily.       atorvastatin (LIPITOR) 20 MG tablet Take 1 tablet (20 mg total) by mouth every evening. 90 tablet 3    EScitalopram oxalate (LEXAPRO) 10 MG tablet Take 0.5 tablets (5 mg total) by mouth once daily for 7 days, THEN 1 tablet (10 mg total) once daily. 90 tablet 3    famotidine (PEPCID) 20 MG tablet Take 1 tablet (20 mg total) by mouth 2 (two) times a day. (Patient taking differently: Take 20 mg by mouth 2 (two) times a day. Uses prn) 180 tablet 3    hydroCHLOROthiazide (HYDRODIURIL) 25 MG tablet Take 1 tablet (25 mg total) by mouth once daily. (Patient taking differently: Take 25 mg by mouth once daily. Taking 1/2 tab daily) 30 tablet 1    omeprazole (PRILOSEC) 40 MG capsule Take 1 capsule (40 mg total) by mouth once daily. 90 capsule 3    psyllium husk (METAMUCIL) 3.4 gram/5.4 gram Powd Use  "daily as directed      [DISCONTINUED] valsartan (DIOVAN) 160 MG tablet Take 1 tablet (160 mg total) by mouth once daily. 90 tablet 3    [DISCONTINUED] diclofenac sodium (VOLTAREN) 1 % Gel Apply 2 g topically 3 (three) times daily. (Patient not taking: Reported on 9/2/2022) 100 g 5     No current facility-administered medications on file prior to visit.     Social History     Socioeconomic History    Marital status:    Tobacco Use    Smoking status: Never    Smokeless tobacco: Never   Substance and Sexual Activity    Alcohol use: No    Drug use: No    Sexual activity: Yes     Partners: Male     Birth control/protection: None     Family History   Problem Relation Age of Onset    Heart attack Father 67    Arthritis Father     Hypertension Father     Diabetes Paternal Aunt     Allergies Paternal Aunt     Breast cancer Paternal Aunt     Diabetes Paternal Uncle     Allergies Maternal Grandmother     Asthma Brother     Colon cancer Neg Hx     Stomach cancer Neg Hx     Angioedema Neg Hx     Atopy Neg Hx     Immunodeficiency Neg Hx     Urticaria Neg Hx     Rhinitis Neg Hx     Eczema Neg Hx     Asthma Neg Hx            ROS:  GENERAL: No fever, chills,  or significant weight changes.   CARDIOVASCULAR: Denies chest pain, PND, orthopnea or reduced exercise tolerance.  ABDOMEN: Appetite fine. Denies diarrhea, abdominal pain, hematemesis or blood in stool.  URINARY: No flank pain, dysuria or hematuria.    Vitals:    09/02/22 0805 09/02/22 0833   BP: (!) 167/72 (!) 180/71   Pulse: 65    Temp: 98 °F (36.7 °C)    TempSrc: Oral    Weight: 85.8 kg (189 lb 2.5 oz)    Height: 5' 2" (1.575 m)      Wt Readings from Last 3 Encounters:   09/02/22 85.8 kg (189 lb 2.5 oz)   07/07/22 85.6 kg (188 lb 11.2 oz)   03/22/22 83.9 kg (185 lb)       OBJECTIVE:   APPEARANCE: Well nourished, well developed, in no acute distress.    HEAD: Normocephalic.  Atraumatic.  No sinus tenderness.  EYES:   Right eye: Pupil reactive.  Conjunctiva clear.  "   Left eye: Pupil reactive.  Conjunctiva clear.  EOMI.    EARS: TM's intact. Light reflex normal. No retraction or perforation.    NOSE:  clear.  MOUTH & THROAT:  No pharyngeal erythema or exudate. No lesions.  NECK: Supple. No bruits.  No JVD.  No cervical lymphadenopathy.  No thyromegaly.    CHEST: Breath sounds clear bilaterally.  Normal respiratory effort  CARDIOVASCULAR: Normal rate.  Regular rhythm.  No murmurs.  No rub.  No gallops.  ABDOMEN: Bowel sounds normal.  Soft.  No tenderness.  No organomegaly.  PERIPHERAL VASCULAR: No cyanosis.  No clubbing.  No edema.  NEUROLOGIC: No focal findings.  MENTAL STATUS: Alert.  Oriented x 3.  Back with mild decreased range of motion.  Minimal tenderness palpation left SI joint area.  Deep tendon reflexes are intact.  Negative straight leg raise.  Left hip with mild tenderness over the greater trochanter.  No tenderness at the groin.  Full range of motion.  Minimal limp with gait.

## 2022-09-18 ENCOUNTER — PATIENT MESSAGE (OUTPATIENT)
Dept: FAMILY MEDICINE | Facility: CLINIC | Age: 67
End: 2022-09-18
Payer: COMMERCIAL

## 2022-10-26 ENCOUNTER — PATIENT MESSAGE (OUTPATIENT)
Dept: FAMILY MEDICINE | Facility: CLINIC | Age: 67
End: 2022-10-26
Payer: COMMERCIAL

## 2022-11-04 ENCOUNTER — PATIENT MESSAGE (OUTPATIENT)
Dept: ADMINISTRATIVE | Facility: HOSPITAL | Age: 67
End: 2022-11-04
Payer: COMMERCIAL

## 2022-11-04 ENCOUNTER — PATIENT OUTREACH (OUTPATIENT)
Dept: ADMINISTRATIVE | Facility: HOSPITAL | Age: 67
End: 2022-11-04
Payer: COMMERCIAL

## 2022-11-04 NOTE — PROGRESS NOTES
Blood Pressure Report: Called Pt to obtain home blood pressure reading, Pt did not answer, left voicemail with callback number, Portal msg sent.

## 2023-01-10 ENCOUNTER — OFFICE VISIT (OUTPATIENT)
Dept: FAMILY MEDICINE | Facility: CLINIC | Age: 68
End: 2023-01-10
Payer: COMMERCIAL

## 2023-01-10 VITALS
DIASTOLIC BLOOD PRESSURE: 68 MMHG | HEART RATE: 68 BPM | WEIGHT: 196 LBS | HEIGHT: 62 IN | TEMPERATURE: 98 F | RESPIRATION RATE: 18 BRPM | BODY MASS INDEX: 36.07 KG/M2 | SYSTOLIC BLOOD PRESSURE: 177 MMHG

## 2023-01-10 DIAGNOSIS — J32.9 SINUSITIS, UNSPECIFIED CHRONICITY, UNSPECIFIED LOCATION: Primary | ICD-10-CM

## 2023-01-10 DIAGNOSIS — R05.9 COUGH, UNSPECIFIED TYPE: ICD-10-CM

## 2023-01-10 DIAGNOSIS — R10.9 ABDOMINAL CRAMPING: ICD-10-CM

## 2023-01-10 DIAGNOSIS — J02.9 PHARYNGITIS, UNSPECIFIED ETIOLOGY: ICD-10-CM

## 2023-01-10 LAB
CTP QC/QA: YES
CTP QC/QA: YES
FLUAV AG NPH QL: NEGATIVE
FLUBV AG NPH QL: NEGATIVE
SARS-COV-2 RDRP RESP QL NAA+PROBE: NEGATIVE

## 2023-01-10 PROCEDURE — 99999 PR PBB SHADOW E&M-EST. PATIENT-LVL V: CPT | Mod: PBBFAC,,, | Performed by: NURSE PRACTITIONER

## 2023-01-10 PROCEDURE — 87804 POCT INFLUENZA A/B: ICD-10-PCS | Mod: QW,S$GLB,, | Performed by: NURSE PRACTITIONER

## 2023-01-10 PROCEDURE — 87804 INFLUENZA ASSAY W/OPTIC: CPT | Mod: QW,S$GLB,, | Performed by: NURSE PRACTITIONER

## 2023-01-10 PROCEDURE — 1125F PR PAIN SEVERITY QUANTIFIED, PAIN PRESENT: ICD-10-PCS | Mod: CPTII,S$GLB,, | Performed by: NURSE PRACTITIONER

## 2023-01-10 PROCEDURE — 1100F PR PT FALLS ASSESS DOC 2+ FALLS/FALL W/INJURY/YR: ICD-10-PCS | Mod: CPTII,S$GLB,, | Performed by: NURSE PRACTITIONER

## 2023-01-10 PROCEDURE — 1159F PR MEDICATION LIST DOCUMENTED IN MEDICAL RECORD: ICD-10-PCS | Mod: CPTII,S$GLB,, | Performed by: NURSE PRACTITIONER

## 2023-01-10 PROCEDURE — 3078F DIAST BP <80 MM HG: CPT | Mod: CPTII,S$GLB,, | Performed by: NURSE PRACTITIONER

## 2023-01-10 PROCEDURE — 3078F PR MOST RECENT DIASTOLIC BLOOD PRESSURE < 80 MM HG: ICD-10-PCS | Mod: CPTII,S$GLB,, | Performed by: NURSE PRACTITIONER

## 2023-01-10 PROCEDURE — 3077F PR MOST RECENT SYSTOLIC BLOOD PRESSURE >= 140 MM HG: ICD-10-PCS | Mod: CPTII,S$GLB,, | Performed by: NURSE PRACTITIONER

## 2023-01-10 PROCEDURE — 3288F FALL RISK ASSESSMENT DOCD: CPT | Mod: CPTII,S$GLB,, | Performed by: NURSE PRACTITIONER

## 2023-01-10 PROCEDURE — 1125F AMNT PAIN NOTED PAIN PRSNT: CPT | Mod: CPTII,S$GLB,, | Performed by: NURSE PRACTITIONER

## 2023-01-10 PROCEDURE — 1159F MED LIST DOCD IN RCRD: CPT | Mod: CPTII,S$GLB,, | Performed by: NURSE PRACTITIONER

## 2023-01-10 PROCEDURE — 3008F BODY MASS INDEX DOCD: CPT | Mod: CPTII,S$GLB,, | Performed by: NURSE PRACTITIONER

## 2023-01-10 PROCEDURE — 87635: ICD-10-PCS | Mod: QW,S$GLB,, | Performed by: NURSE PRACTITIONER

## 2023-01-10 PROCEDURE — 99213 OFFICE O/P EST LOW 20 MIN: CPT | Mod: S$GLB,,, | Performed by: NURSE PRACTITIONER

## 2023-01-10 PROCEDURE — 99999 PR PBB SHADOW E&M-EST. PATIENT-LVL V: ICD-10-PCS | Mod: PBBFAC,,, | Performed by: NURSE PRACTITIONER

## 2023-01-10 PROCEDURE — 1160F RVW MEDS BY RX/DR IN RCRD: CPT | Mod: CPTII,S$GLB,, | Performed by: NURSE PRACTITIONER

## 2023-01-10 PROCEDURE — 87635 SARS-COV-2 COVID-19 AMP PRB: CPT | Mod: QW,S$GLB,, | Performed by: NURSE PRACTITIONER

## 2023-01-10 PROCEDURE — 1160F PR REVIEW ALL MEDS BY PRESCRIBER/CLIN PHARMACIST DOCUMENTED: ICD-10-PCS | Mod: CPTII,S$GLB,, | Performed by: NURSE PRACTITIONER

## 2023-01-10 PROCEDURE — 3008F PR BODY MASS INDEX (BMI) DOCUMENTED: ICD-10-PCS | Mod: CPTII,S$GLB,, | Performed by: NURSE PRACTITIONER

## 2023-01-10 PROCEDURE — 99213 PR OFFICE/OUTPT VISIT, EST, LEVL III, 20-29 MIN: ICD-10-PCS | Mod: S$GLB,,, | Performed by: NURSE PRACTITIONER

## 2023-01-10 PROCEDURE — 3288F PR FALLS RISK ASSESSMENT DOCUMENTED: ICD-10-PCS | Mod: CPTII,S$GLB,, | Performed by: NURSE PRACTITIONER

## 2023-01-10 PROCEDURE — 1100F PTFALLS ASSESS-DOCD GE2>/YR: CPT | Mod: CPTII,S$GLB,, | Performed by: NURSE PRACTITIONER

## 2023-01-10 PROCEDURE — 3077F SYST BP >= 140 MM HG: CPT | Mod: CPTII,S$GLB,, | Performed by: NURSE PRACTITIONER

## 2023-01-10 RX ORDER — DICYCLOMINE HYDROCHLORIDE 10 MG/1
10 CAPSULE ORAL 2 TIMES DAILY PRN
Qty: 10 CAPSULE | Refills: 0 | Status: SHIPPED | OUTPATIENT
Start: 2023-01-10 | End: 2023-01-15

## 2023-01-10 RX ORDER — AZELASTINE 1 MG/ML
1 SPRAY, METERED NASAL 2 TIMES DAILY
Qty: 30 ML | Refills: 0 | Status: SHIPPED | OUTPATIENT
Start: 2023-01-10 | End: 2023-05-12

## 2023-01-10 RX ORDER — BENZONATATE 200 MG/1
200 CAPSULE ORAL 3 TIMES DAILY PRN
Qty: 30 CAPSULE | Refills: 0 | Status: SHIPPED | OUTPATIENT
Start: 2023-01-10 | End: 2023-01-20

## 2023-01-10 RX ORDER — AMOXICILLIN 875 MG/1
875 TABLET, FILM COATED ORAL EVERY 12 HOURS
Qty: 20 TABLET | Refills: 0 | Status: SHIPPED | OUTPATIENT
Start: 2023-01-10 | End: 2023-01-20

## 2023-01-10 NOTE — PATIENT INSTRUCTIONS
Warm salt water gargles PRN  Hydrate well  Rest  Report to ER immediately if symptoms worsen or persist    Instructions for Patients with Confirmed or Suspected COVID-19    If you are awaiting your test result, you will either be called or it will be released to the patient portal.  If you have any questions about your test, please visit [email or web address] or call our COVID-19 information line at [phone].      Please isolate yourself at home.  You may leave home and/or return to work once the following conditions are met:    If you have symptoms and tested positive:  More than 5 days since symptoms first appeared AND  More than 24 hours fever free without medications AND       symptoms have improved   For five days after ending isolation, masks are required.    If you had no symptoms but tested positive:  More than 5 days since the date of the first positive test. If you develop symptoms, then use the guidelines above  For five days after ending isolation, masks are required.      Testing is not recommended if you are symptom free after completing isolation.

## 2023-01-11 ENCOUNTER — TELEPHONE (OUTPATIENT)
Dept: FAMILY MEDICINE | Facility: CLINIC | Age: 68
End: 2023-01-11
Payer: COMMERCIAL

## 2023-01-11 NOTE — PROGRESS NOTES
Subjective:       Patient ID: Erinn Silva is a 67 y.o. female.    Chief Complaint: Sinus Problem, Sore Throat, Ear Fullness, and Dizziness    Sinus Problem  This is a new problem. The current episode started in the past 7 days. The problem is unchanged. There has been no fever. The pain is moderate. Associated symptoms include congestion, coughing and sinus pressure. Pertinent negatives include no chills, diaphoresis, ear pain, headaches, hoarse voice, neck pain, shortness of breath, sneezing, sore throat or swollen glands. (Post nasal drip, mild abdominal cramping/diarrhea) Past treatments include nothing. The treatment provided no relief.   Past Medical History:   Diagnosis Date    Anticoagulant long-term use     Anxiety     AR (allergic rhinitis)     Arthritis     Asthma, intermittent     Bilateral carotid artery stenosis 11/9/2021    Coronary artery disease     Depression     Endometrial polyp     Fatty liver     GERD (gastroesophageal reflux disease)     Hiatal hernia     Hyperlipidemia LDL goal <100     Hypertension     Mild mitral regurgitation     Multiple thyroid nodules 3/27/2014    Obesity (BMI 30-39.9) 3/27/2014    Renal cell carcinoma 2002    removed - no recurrence    S/P primary angioplasty with coronary stent 07/26/2017    Sleep apnea     Using CPAP    Solitary kidney, acquired     right     Social History     Socioeconomic History    Marital status:    Tobacco Use    Smoking status: Never    Smokeless tobacco: Never   Substance and Sexual Activity    Alcohol use: No    Drug use: No    Sexual activity: Yes     Partners: Male     Birth control/protection: None     Past Surgical History:   Procedure Laterality Date    APPENDECTOMY      CARDIAC CATHETERIZATION  6/27/14    has blockages, but per patient no stents    CHOLECYSTECTOMY      COLONOSCOPY  2002    negative    COLONOSCOPY  3/25/2014         COLONOSCOPY N/A 3/22/2022    Procedure: COLONOSCOPY;  Surgeon: Hunter Garibay MD;  Location:  [facility] [place] [facility];  Service: Endoscopy;  Laterality: N/A;    CORONARY ANGIOPLASTY  2008?    balloon angioplasty    CORONARY ANGIOPLASTY WITH STENT PLACEMENT Right 07/26/2017    ESOPHAGOGASTRODUODENOSCOPY N/A 7/14/2021    Procedure: ESOPHAGOGASTRODUODENOSCOPY (EGD);  Surgeon: Sheng Bell III, MD;  Location: [facility] [place] [facility];  Service: Endoscopy;  Laterality: N/A;    NEPHRECTOMY Left 2002    [facility] - cancer    OVARIAN CYST REMOVAL      PERCUTANEOUS ENDOVENOUS LASER ABLATION OF PERIPHERAL VEIN  06/05/2019    POLYPECTOMY      endometrial polyp removal    ROTATOR CUFF REPAIR      L    THYROIDECTOMY, PARTIAL Left     TUBAL LIGATION      UPPER GASTROINTESTINAL ENDOSCOPY  04/2014       Review of Systems   Constitutional: Negative.  Negative for chills and diaphoresis.   HENT:  Positive for nasal congestion, postnasal drip and sinus pressure/congestion. Negative for ear pain, hoarse voice, sneezing and sore throat.    Eyes: Negative.    Respiratory:  Positive for cough. Negative for shortness of breath.    Cardiovascular: Negative.    Gastrointestinal:  Positive for diarrhea.        Abdominal cramping (mild), diarrhea   Endocrine: Negative.    Genitourinary: Negative.    Musculoskeletal: Negative.  Negative for neck pain.   Integumentary:  Negative.   Allergic/Immunologic: Negative.    Neurological: Negative.  Negative for headaches.   Psychiatric/Behavioral: Negative.         Objective:      Physical Exam  Vitals and nursing note reviewed.   Constitutional:       Appearance: Normal appearance.   HENT:      Head: Normocephalic.      Right Ear: Hearing, tympanic membrane, ear canal and external ear normal.      Left Ear: Hearing, tympanic membrane, ear canal and external ear normal.      Nose: Congestion present.      Right Sinus: Maxillary sinus tenderness present. No frontal sinus tenderness.      Left Sinus: Maxillary sinus tenderness present. No frontal sinus tenderness.      Mouth/Throat:      Mouth:  Mucous membranes are moist.      Pharynx: Oropharynx is clear.   Eyes:      Conjunctiva/sclera: Conjunctivae normal.      Pupils: Pupils are equal, round, and reactive to light.   Cardiovascular:      Rate and Rhythm: Normal rate and regular rhythm.      Pulses: Normal pulses.      Heart sounds: Normal heart sounds.   Pulmonary:      Effort: Pulmonary effort is normal.      Breath sounds: Normal breath sounds.   Abdominal:      General: Bowel sounds are normal.      Palpations: Abdomen is soft.      Tenderness: There is no abdominal tenderness.   Musculoskeletal:         General: Normal range of motion.      Cervical back: Normal range of motion and neck supple.   Skin:     General: Skin is warm and dry.      Capillary Refill: Capillary refill takes 2 to 3 seconds.   Neurological:      Mental Status: She is alert and oriented to person, place, and time.   Psychiatric:         Mood and Affect: Mood normal.         Behavior: Behavior normal.         Thought Content: Thought content normal.         Judgment: Judgment normal.       Assessment:       Problem List Items Addressed This Visit    None  Visit Diagnoses       Sinusitis, unspecified chronicity, unspecified location    -  Primary    Cough, unspecified type        Relevant Orders    POCT COVID-19 Rapid Screening (Completed)    POCT Influenza A/B (Completed)    Pharyngitis, unspecified etiology      Abdominal cramping              Plan:           Erinn was seen today for sinus problem, sore throat, ear fullness and dizziness.    Diagnoses and all orders for this visit:    Sinusitis, unspecified chronicity, unspecified location  Cough, unspecified type  Pharyngitis, unspecified etiology  Abdominal cramping  -     POCT COVID-19 Rapid Screening  -     POCT Influenza A/B        -     benzonatate (TESSALON) 200 MG capsule; Take 1 capsule (200 mg total) by mouth 3 (three) times daily as needed.  -     amoxicillin (AMOXIL) 875 MG tablet; Take 1 tablet (875 mg total) by  mouth every 12 (twelve) hours. for 10 days  -     azelastine (ASTELIN) 137 mcg (0.1 %) nasal spray; 1 spray (137 mcg total) by Nasal route 2 (two) times daily. for 7 days  -     dicyclomine (BENTYL) 10 MG capsule; Take 1 capsule (10 mg total) by mouth 2 (two) times daily as needed.  Warm salt water gargles PRN  Hydrate well  Rest  F/U nurse visit scheduled due to elevated BP reading  Report to ER immediately if symptoms worsen or persist

## 2023-01-15 ENCOUNTER — PATIENT MESSAGE (OUTPATIENT)
Dept: FAMILY MEDICINE | Facility: CLINIC | Age: 68
End: 2023-01-15
Payer: COMMERCIAL

## 2023-01-17 RX ORDER — HYDROCHLOROTHIAZIDE 12.5 MG/1
12.5 TABLET ORAL DAILY
Qty: 90 TABLET | Refills: 0 | Status: SHIPPED | OUTPATIENT
Start: 2023-01-17 | End: 2023-03-23

## 2023-01-17 NOTE — TELEPHONE ENCOUNTER
No new care gaps identified.  [facility] Embedded Care Gaps. Reference number: [id number]. 1/17/2023   3:15:26 PM CST

## 2023-02-10 ENCOUNTER — NURSE TRIAGE (OUTPATIENT)
Dept: ADMINISTRATIVE | Facility: CLINIC | Age: 68
End: 2023-02-10
Payer: COMMERCIAL

## 2023-02-11 NOTE — TELEPHONE ENCOUNTER
OOC incoming call - Pt c/o having coughing spells so severe she is moderately to severely out of breath and they are persistent. Cough protocol followed and pt advised to go to the ER now for further assessment and tx d/t possible pneumonia and if she becomes severely sob again to call 911 now instead. Education provided on severity of sob during coughing spells requires assessment and intervention. Pt shows understanding after education provided. Encounter routed to PCP/staff as high priority.   Reason for Disposition   [1] MODERATE difficulty breathing (e.g., speaks in phrases, SOB even at rest, pulse 100-120) AND [2] still present when not coughing    Additional Information   Negative: SEVERE difficulty breathing (e.g., struggling for each breath, speaks in single words)    Protocols used: Cough - Chronic-A-AH

## 2023-02-14 ENCOUNTER — TELEPHONE (OUTPATIENT)
Dept: FAMILY MEDICINE | Facility: CLINIC | Age: 68
End: 2023-02-14
Payer: COMMERCIAL

## 2023-02-14 NOTE — TELEPHONE ENCOUNTER
Patient had called about cough and triage nurse referred her to ER, she did not go, cough is mild now and using cough drops, does not feel that a visit is needed. She is asking what type of cough med, OTC is good for HTN, please advise.  Per pt, ok to send information in my chart message once PCP advises.

## 2023-02-14 NOTE — TELEPHONE ENCOUNTER
----- Message from Tavia Han sent at 2/14/2023  8:26 AM CST -----  Contact: [phone]  Type:  Patient Returning Call    Who Called: Erinn  Who Left Message for Patient: Nurse  Does the patient know what this is regarding?: No  Would the patient rather a call back or a response via [employer]? Call  Best Call Back Number:[phone]  Additional Information:

## 2023-02-15 ENCOUNTER — PATIENT MESSAGE (OUTPATIENT)
Dept: FAMILY MEDICINE | Facility: CLINIC | Age: 68
End: 2023-02-15
Payer: COMMERCIAL

## 2023-02-15 DIAGNOSIS — Z12.31 OTHER SCREENING MAMMOGRAM: ICD-10-CM

## 2023-03-03 ENCOUNTER — HOSPITAL ENCOUNTER (OUTPATIENT)
Dept: RADIOLOGY | Facility: HOSPITAL | Age: 68
Discharge: HOME | End: 2023-03-03
Attending: FAMILY MEDICINE
Payer: COMMERCIAL

## 2023-03-03 VITALS — BODY MASS INDEX: 35.88 KG/M2 | WEIGHT: 195 LBS | HEIGHT: 62 IN

## 2023-03-03 DIAGNOSIS — Z12.31 OTHER SCREENING MAMMOGRAM: ICD-10-CM

## 2023-03-03 PROCEDURE — 77067 SCR MAMMO BI INCL CAD: CPT | Mod: TC,PO

## 2023-03-03 PROCEDURE — 77067 SCR MAMMO BI INCL CAD: CPT | Mod: 26,,, | Performed by: RADIOLOGY

## 2023-03-03 PROCEDURE — 77067 MAMMO DIGITAL SCREENING BILAT WITH TOMO: ICD-10-PCS | Mod: 26,,, | Performed by: RADIOLOGY

## 2023-03-03 PROCEDURE — 77063 BREAST TOMOSYNTHESIS BI: CPT | Mod: 26,,, | Performed by: RADIOLOGY

## 2023-03-03 PROCEDURE — 77063 MAMMO DIGITAL SCREENING BILAT WITH TOMO: ICD-10-PCS | Mod: 26,,, | Performed by: RADIOLOGY

## 2023-03-07 ENCOUNTER — TELEPHONE (OUTPATIENT)
Dept: FAMILY MEDICINE | Facility: CLINIC | Age: 68
End: 2023-03-07
Payer: COMMERCIAL

## 2023-03-07 ENCOUNTER — PATIENT MESSAGE (OUTPATIENT)
Dept: FAMILY MEDICINE | Facility: CLINIC | Age: 68
End: 2023-03-07
Payer: COMMERCIAL

## 2023-03-07 NOTE — TELEPHONE ENCOUNTER
----- Message from Lauren Hameed sent at 3/7/2023  8:51 AM CST -----  Contact: [phone]  Patient is requesting a call back at [phone].Thanks

## 2023-03-07 NOTE — TELEPHONE ENCOUNTER
Left message on voice mail to return call, see my chart message, we need to confirm cholesterol medication

## 2023-03-23 RX ORDER — HYDROCHLOROTHIAZIDE 12.5 MG/1
TABLET ORAL
Qty: 90 TABLET | Refills: 0 | Status: SHIPPED | OUTPATIENT
Start: 2023-03-23 | End: 2023-07-31 | Stop reason: SDUPTHER

## 2023-03-23 NOTE — TELEPHONE ENCOUNTER
Refill Routing Note   Medication(s) are not appropriate for processing by [facility] for the following reason(s):      Required labs outdated  Required vitals abnormal    [facility] action(s):  Defer Labs due          Appointments  past 12m or future 3m with PCP    Date Provider   Last Visit   9/2/2022 Layo Reed MD   Next Visit   Visit date not found Layo Reed MD   ED visits in past 90 days: 0        Note composed:9:55 AM 03/23/2023

## 2023-03-23 NOTE — TELEPHONE ENCOUNTER
Care Due:                  Date            Visit Type   Department     Provider  --------------------------------------------------------------------------------                                EP -                              PRIMARY      [facility] FAMILY  Last Visit: 09-      CARE ([facility])   MEDICINE       Layo Reed  Next Visit: None Scheduled  None         None Found                                                            Last  Test          Frequency    Reason                     Performed    Due Date  --------------------------------------------------------------------------------    CMP.........  12 months..  atorvastatin, famotidine,   03- 03-                             hydroCHLOROthiazide,                             valsartan................    Lipid Panel.  12 months..  atorvastatin.............  03- 03-    [facility] Embedded Care Gaps. Reference number: [id number]. 3/23/2023   5:58:32 AM CDT

## 2023-05-03 ENCOUNTER — OFFICE VISIT (OUTPATIENT)
Dept: NEPHROLOGY | Facility: CLINIC | Age: 68
End: 2023-05-03
Payer: COMMERCIAL

## 2023-05-03 VITALS
HEIGHT: 62 IN | SYSTOLIC BLOOD PRESSURE: 142 MMHG | DIASTOLIC BLOOD PRESSURE: 80 MMHG | WEIGHT: 194 LBS | OXYGEN SATURATION: 99 % | BODY MASS INDEX: 35.7 KG/M2 | HEART RATE: 64 BPM

## 2023-05-03 DIAGNOSIS — R73.9 HYPERGLYCEMIA: ICD-10-CM

## 2023-05-03 DIAGNOSIS — I10 ESSENTIAL HYPERTENSION: ICD-10-CM

## 2023-05-03 DIAGNOSIS — I34.0 MILD MITRAL REGURGITATION: ICD-10-CM

## 2023-05-03 DIAGNOSIS — E66.9 CLASS 2 OBESITY WITH BODY MASS INDEX (BMI) OF 35.0 TO 35.9 IN ADULT, UNSPECIFIED OBESITY TYPE, UNSPECIFIED WHETHER SERIOUS COMORBIDITY PRESENT: ICD-10-CM

## 2023-05-03 DIAGNOSIS — E55.9 VITAMIN D DEFICIENCY: ICD-10-CM

## 2023-05-03 DIAGNOSIS — Z90.5 SOLITARY KIDNEY, ACQUIRED: Primary | ICD-10-CM

## 2023-05-03 PROCEDURE — 99999 PR PBB SHADOW E&M-EST. PATIENT-LVL V: CPT | Mod: PBBFAC,,, | Performed by: INTERNAL MEDICINE

## 2023-05-03 PROCEDURE — 3066F PR DOCUMENTATION OF TREATMENT FOR NEPHROPATHY: ICD-10-PCS | Mod: CPTII,S$GLB,, | Performed by: INTERNAL MEDICINE

## 2023-05-03 PROCEDURE — 99203 PR OFFICE/OUTPT VISIT, NEW, LEVL III, 30-44 MIN: ICD-10-PCS | Mod: S$GLB,,, | Performed by: INTERNAL MEDICINE

## 2023-05-03 PROCEDURE — 3008F BODY MASS INDEX DOCD: CPT | Mod: CPTII,S$GLB,, | Performed by: INTERNAL MEDICINE

## 2023-05-03 PROCEDURE — 1126F PR PAIN SEVERITY QUANTIFIED, NO PAIN PRESENT: ICD-10-PCS | Mod: CPTII,S$GLB,, | Performed by: INTERNAL MEDICINE

## 2023-05-03 PROCEDURE — 1160F RVW MEDS BY RX/DR IN RCRD: CPT | Mod: CPTII,S$GLB,, | Performed by: INTERNAL MEDICINE

## 2023-05-03 PROCEDURE — 1126F AMNT PAIN NOTED NONE PRSNT: CPT | Mod: CPTII,S$GLB,, | Performed by: INTERNAL MEDICINE

## 2023-05-03 PROCEDURE — 4010F ACE/ARB THERAPY RXD/TAKEN: CPT | Mod: CPTII,S$GLB,, | Performed by: INTERNAL MEDICINE

## 2023-05-03 PROCEDURE — 4010F PR ACE/ARB THEARPY RXD/TAKEN: ICD-10-PCS | Mod: CPTII,S$GLB,, | Performed by: INTERNAL MEDICINE

## 2023-05-03 PROCEDURE — 3288F FALL RISK ASSESSMENT DOCD: CPT | Mod: CPTII,S$GLB,, | Performed by: INTERNAL MEDICINE

## 2023-05-03 PROCEDURE — 1101F PT FALLS ASSESS-DOCD LE1/YR: CPT | Mod: CPTII,S$GLB,, | Performed by: INTERNAL MEDICINE

## 2023-05-03 PROCEDURE — 1159F PR MEDICATION LIST DOCUMENTED IN MEDICAL RECORD: ICD-10-PCS | Mod: CPTII,S$GLB,, | Performed by: INTERNAL MEDICINE

## 2023-05-03 PROCEDURE — 3008F PR BODY MASS INDEX (BMI) DOCUMENTED: ICD-10-PCS | Mod: CPTII,S$GLB,, | Performed by: INTERNAL MEDICINE

## 2023-05-03 PROCEDURE — 3288F PR FALLS RISK ASSESSMENT DOCUMENTED: ICD-10-PCS | Mod: CPTII,S$GLB,, | Performed by: INTERNAL MEDICINE

## 2023-05-03 PROCEDURE — 99203 OFFICE O/P NEW LOW 30 MIN: CPT | Mod: S$GLB,,, | Performed by: INTERNAL MEDICINE

## 2023-05-03 PROCEDURE — 1160F PR REVIEW ALL MEDS BY PRESCRIBER/CLIN PHARMACIST DOCUMENTED: ICD-10-PCS | Mod: CPTII,S$GLB,, | Performed by: INTERNAL MEDICINE

## 2023-05-03 PROCEDURE — 3077F PR MOST RECENT SYSTOLIC BLOOD PRESSURE >= 140 MM HG: ICD-10-PCS | Mod: CPTII,S$GLB,, | Performed by: INTERNAL MEDICINE

## 2023-05-03 PROCEDURE — 99999 PR PBB SHADOW E&M-EST. PATIENT-LVL V: ICD-10-PCS | Mod: PBBFAC,,, | Performed by: INTERNAL MEDICINE

## 2023-05-03 PROCEDURE — 1159F MED LIST DOCD IN RCRD: CPT | Mod: CPTII,S$GLB,, | Performed by: INTERNAL MEDICINE

## 2023-05-03 PROCEDURE — 3079F DIAST BP 80-89 MM HG: CPT | Mod: CPTII,S$GLB,, | Performed by: INTERNAL MEDICINE

## 2023-05-03 PROCEDURE — 3077F SYST BP >= 140 MM HG: CPT | Mod: CPTII,S$GLB,, | Performed by: INTERNAL MEDICINE

## 2023-05-03 PROCEDURE — 3066F NEPHROPATHY DOC TX: CPT | Mod: CPTII,S$GLB,, | Performed by: INTERNAL MEDICINE

## 2023-05-03 PROCEDURE — 3079F PR MOST RECENT DIASTOLIC BLOOD PRESSURE 80-89 MM HG: ICD-10-PCS | Mod: CPTII,S$GLB,, | Performed by: INTERNAL MEDICINE

## 2023-05-03 PROCEDURE — 1101F PR PT FALLS ASSESS DOC 0-1 FALLS W/OUT INJ PAST YR: ICD-10-PCS | Mod: CPTII,S$GLB,, | Performed by: INTERNAL MEDICINE

## 2023-05-03 RX ORDER — VALSARTAN 160 MG/1
160 TABLET ORAL
COMMUNITY
Start: 2023-03-01 | End: 2023-09-20

## 2023-05-03 NOTE — PROGRESS NOTES
Subjective:       Patient ID: Erinn Silva is a 67 y.o. Black or  female who presents for new evaluation of solitary kidney    HPI    2017: Initial Consult for solitary kidney     May 2023:   She is referred by her PCP for evaluation of CKD< in setting of solitary kidney s/p nephrectomy 2002 (renal cell ca)  No known kidney disease in family  Brother has DM 2  Water and juice and rare soda   LE edema at end of day   Low sodium diet but uses canned foods  PCP and Cards manages BP     Review of Systems   Constitutional:  Negative for activity change, appetite change and unexpected weight change.   HENT:  Negative for facial swelling.    Respiratory:  Negative for shortness of breath.    Cardiovascular:  Positive for leg swelling (at end of day).   Gastrointestinal:  Negative for abdominal pain.   Genitourinary:  Negative for difficulty urinating.   Musculoskeletal:  Positive for arthralgias and back pain.   Allergic/Immunologic: Positive for immunocompromised state (age CKD).   Psychiatric/Behavioral:  Negative for confusion and decreased concentration.      Objective:      Physical Exam  Vitals and nursing note reviewed.   Constitutional:       Appearance: Normal appearance. She is obese.   HENT:      Head: Atraumatic.   Musculoskeletal:      Right lower leg: No edema.      Left lower leg: No edema.   Neurological:      Mental Status: She is alert and oriented to person, place, and time.       Assessment:       1. Solitary kidney, acquired    2. Vitamin D deficiency    3. Hyperglycemia    4. Essential hypertension    5. Mild mitral regurgitation    6. Class 2 obesity with body mass index (BMI) of 35.0 to 35.9 in adult, unspecified obesity type, unspecified whether serious comorbidity present          Plan:           Solitary kidney from R nephrectomy for renal cell ca in 2002  - check labs and renal u/s   Continue ARB  - discussed healthy tips  - weight loss would be beneficial       Labs and renal  u/s--post results

## 2023-05-03 NOTE — PATIENT INSTRUCTIONS
High Fructose Corn Syrup  Cut way back on sugar   Good BP  Avoiding getting Diabetes  Being active  Lots of fruits and vegetables, not canned (due to salt)  Low salt diet   Valsartan helps your kidney   Avoiding use of NSAIDs   Stay hydrated--water is best

## 2023-05-05 ENCOUNTER — TELEPHONE (OUTPATIENT)
Dept: NEPHROLOGY | Facility: CLINIC | Age: 68
End: 2023-05-05
Payer: COMMERCIAL

## 2023-05-05 NOTE — TELEPHONE ENCOUNTER
The renal u/s is not necessary, but results would be helpful. If she perhaps meets her deductible can she have it performed later in the year?

## 2023-05-05 NOTE — TELEPHONE ENCOUNTER
Patient's out of pocket for her kidney ultrasound is almost $300.00. Is this a necessary test? Please advise.

## 2023-05-08 ENCOUNTER — HOSPITAL ENCOUNTER (OUTPATIENT)
Dept: RADIOLOGY | Facility: HOSPITAL | Age: 68
Discharge: HOME | End: 2023-05-08
Attending: INTERNAL MEDICINE
Payer: COMMERCIAL

## 2023-05-08 DIAGNOSIS — Z90.5 SOLITARY KIDNEY, ACQUIRED: ICD-10-CM

## 2023-05-08 PROCEDURE — 76770 US EXAM ABDO BACK WALL COMP: CPT | Mod: TC,PO

## 2023-05-08 PROCEDURE — 76770 US RETROPERITONEAL COMPLETE: ICD-10-PCS | Mod: 26,,, | Performed by: STUDENT IN AN ORGANIZED HEALTH CARE EDUCATION/TRAINING PROGRAM

## 2023-05-08 PROCEDURE — 76770 US EXAM ABDO BACK WALL COMP: CPT | Mod: 26,,, | Performed by: STUDENT IN AN ORGANIZED HEALTH CARE EDUCATION/TRAINING PROGRAM

## 2023-05-08 NOTE — TELEPHONE ENCOUNTER
Patient was called and confirmed she is able to have her renal ultrasound with no copay. She will have labs also today and we will be in touch for a follow up appointment.

## 2023-05-09 ENCOUNTER — PATIENT MESSAGE (OUTPATIENT)
Dept: NEPHROLOGY | Facility: CLINIC | Age: 68
End: 2023-05-09
Payer: COMMERCIAL

## 2023-05-09 DIAGNOSIS — Z90.5 SOLITARY KIDNEY, ACQUIRED: Primary | ICD-10-CM

## 2023-05-12 ENCOUNTER — OFFICE VISIT (OUTPATIENT)
Dept: FAMILY MEDICINE | Facility: CLINIC | Age: 68
End: 2023-05-12
Payer: COMMERCIAL

## 2023-05-12 VITALS
SYSTOLIC BLOOD PRESSURE: 130 MMHG | DIASTOLIC BLOOD PRESSURE: 74 MMHG | WEIGHT: 196.13 LBS | HEIGHT: 62 IN | HEART RATE: 77 BPM | BODY MASS INDEX: 36.09 KG/M2 | TEMPERATURE: 98 F

## 2023-05-12 DIAGNOSIS — G47.30 SLEEP APNEA, UNSPECIFIED TYPE: ICD-10-CM

## 2023-05-12 DIAGNOSIS — Z00.00 ROUTINE HISTORY AND PHYSICAL EXAMINATION OF ADULT: Primary | ICD-10-CM

## 2023-05-12 DIAGNOSIS — I10 ESSENTIAL HYPERTENSION: ICD-10-CM

## 2023-05-12 DIAGNOSIS — E78.5 HYPERLIPIDEMIA, UNSPECIFIED HYPERLIPIDEMIA TYPE: ICD-10-CM

## 2023-05-12 DIAGNOSIS — E89.0 H/O PARTIAL THYROIDECTOMY: ICD-10-CM

## 2023-05-12 DIAGNOSIS — F41.9 ANXIETY: ICD-10-CM

## 2023-05-12 DIAGNOSIS — K21.9 GASTROESOPHAGEAL REFLUX DISEASE WITHOUT ESOPHAGITIS: ICD-10-CM

## 2023-05-12 DIAGNOSIS — Z90.5 SOLITARY KIDNEY, ACQUIRED: ICD-10-CM

## 2023-05-12 DIAGNOSIS — Z85.528 PERSONAL HISTORY OF RENAL CANCER: ICD-10-CM

## 2023-05-12 DIAGNOSIS — F33.40 RECURRENT MAJOR DEPRESSIVE DISORDER, IN REMISSION: ICD-10-CM

## 2023-05-12 DIAGNOSIS — D12.6 TUBULAR ADENOMA OF COLON: ICD-10-CM

## 2023-05-12 DIAGNOSIS — E66.01 SEVERE OBESITY (BMI 35.0-39.9) WITH COMORBIDITY: ICD-10-CM

## 2023-05-12 DIAGNOSIS — I65.21 STENOSIS OF RIGHT CAROTID ARTERY: ICD-10-CM

## 2023-05-12 PROCEDURE — 3044F PR MOST RECENT HEMOGLOBIN A1C LEVEL <7.0%: ICD-10-PCS | Mod: CPTII,S$GLB,, | Performed by: FAMILY MEDICINE

## 2023-05-12 PROCEDURE — 3066F NEPHROPATHY DOC TX: CPT | Mod: CPTII,S$GLB,, | Performed by: FAMILY MEDICINE

## 2023-05-12 PROCEDURE — 90471 IMMUNIZATION ADMIN: CPT | Mod: S$GLB,,, | Performed by: FAMILY MEDICINE

## 2023-05-12 PROCEDURE — 3078F PR MOST RECENT DIASTOLIC BLOOD PRESSURE < 80 MM HG: ICD-10-PCS | Mod: CPTII,S$GLB,, | Performed by: FAMILY MEDICINE

## 2023-05-12 PROCEDURE — 99999 PR PBB SHADOW E&M-EST. PATIENT-LVL V: ICD-10-PCS | Mod: PBBFAC,,, | Performed by: FAMILY MEDICINE

## 2023-05-12 PROCEDURE — 3075F PR MOST RECENT SYSTOLIC BLOOD PRESS GE 130-139MM HG: ICD-10-PCS | Mod: CPTII,S$GLB,, | Performed by: FAMILY MEDICINE

## 2023-05-12 PROCEDURE — 1159F MED LIST DOCD IN RCRD: CPT | Mod: CPTII,S$GLB,, | Performed by: FAMILY MEDICINE

## 2023-05-12 PROCEDURE — 90677 PNEUMOCOCCAL CONJUGATE VACCINE 20-VALENT: ICD-10-PCS | Mod: S$GLB,,, | Performed by: FAMILY MEDICINE

## 2023-05-12 PROCEDURE — 1159F PR MEDICATION LIST DOCUMENTED IN MEDICAL RECORD: ICD-10-PCS | Mod: CPTII,S$GLB,, | Performed by: FAMILY MEDICINE

## 2023-05-12 PROCEDURE — 3288F PR FALLS RISK ASSESSMENT DOCUMENTED: ICD-10-PCS | Mod: CPTII,S$GLB,, | Performed by: FAMILY MEDICINE

## 2023-05-12 PROCEDURE — 4010F PR ACE/ARB THEARPY RXD/TAKEN: ICD-10-PCS | Mod: CPTII,S$GLB,, | Performed by: FAMILY MEDICINE

## 2023-05-12 PROCEDURE — 99999 PR PBB SHADOW E&M-EST. PATIENT-LVL V: CPT | Mod: PBBFAC,,, | Performed by: FAMILY MEDICINE

## 2023-05-12 PROCEDURE — 3008F BODY MASS INDEX DOCD: CPT | Mod: CPTII,S$GLB,, | Performed by: FAMILY MEDICINE

## 2023-05-12 PROCEDURE — 3008F PR BODY MASS INDEX (BMI) DOCUMENTED: ICD-10-PCS | Mod: CPTII,S$GLB,, | Performed by: FAMILY MEDICINE

## 2023-05-12 PROCEDURE — 4010F ACE/ARB THERAPY RXD/TAKEN: CPT | Mod: CPTII,S$GLB,, | Performed by: FAMILY MEDICINE

## 2023-05-12 PROCEDURE — 1101F PR PT FALLS ASSESS DOC 0-1 FALLS W/OUT INJ PAST YR: ICD-10-PCS | Mod: CPTII,S$GLB,, | Performed by: FAMILY MEDICINE

## 2023-05-12 PROCEDURE — 3288F FALL RISK ASSESSMENT DOCD: CPT | Mod: CPTII,S$GLB,, | Performed by: FAMILY MEDICINE

## 2023-05-12 PROCEDURE — 90677 PCV20 VACCINE IM: CPT | Mod: S$GLB,,, | Performed by: FAMILY MEDICINE

## 2023-05-12 PROCEDURE — 3044F HG A1C LEVEL LT 7.0%: CPT | Mod: CPTII,S$GLB,, | Performed by: FAMILY MEDICINE

## 2023-05-12 PROCEDURE — 99397 PR PREVENTIVE VISIT,EST,65 & OVER: ICD-10-PCS | Mod: 25,S$GLB,, | Performed by: FAMILY MEDICINE

## 2023-05-12 PROCEDURE — 90471 PNEUMOCOCCAL CONJUGATE VACCINE 20-VALENT: ICD-10-PCS | Mod: S$GLB,,, | Performed by: FAMILY MEDICINE

## 2023-05-12 PROCEDURE — 3066F PR DOCUMENTATION OF TREATMENT FOR NEPHROPATHY: ICD-10-PCS | Mod: CPTII,S$GLB,, | Performed by: FAMILY MEDICINE

## 2023-05-12 PROCEDURE — 1101F PT FALLS ASSESS-DOCD LE1/YR: CPT | Mod: CPTII,S$GLB,, | Performed by: FAMILY MEDICINE

## 2023-05-12 PROCEDURE — 3075F SYST BP GE 130 - 139MM HG: CPT | Mod: CPTII,S$GLB,, | Performed by: FAMILY MEDICINE

## 2023-05-12 PROCEDURE — 99397 PER PM REEVAL EST PAT 65+ YR: CPT | Mod: 25,S$GLB,, | Performed by: FAMILY MEDICINE

## 2023-05-12 PROCEDURE — 3078F DIAST BP <80 MM HG: CPT | Mod: CPTII,S$GLB,, | Performed by: FAMILY MEDICINE

## 2023-05-12 NOTE — PROGRESS NOTES
Presents physical exam.  Blood pressure at home 130s over 70s.  Elevated in the office.  States compliance medication.  Sleep apnea it did not tolerate CPAP.  She is interested in follow-up sleep evaluation.  Remote renal cancer without evidence recurrence.  Recently saw Nephrology.  Reflux stable.  Moderate right carotid stenosis and coronary disease followed by [facility].  At depression doing well with current Lexapro.  Obesity noted.  Colonoscopy up-to-date.  Previous partial thyroidectomy due to nodules-benign.  Clinically euthyroid.    Erinn was seen today for annual exam.    Diagnoses and all orders for this visit:    Routine history and physical examination of adult  -     Lipid Panel; Future  -     ALT (SGPT); Future  -     TSH; Future    Essential hypertension    Hyperlipidemia, unspecified hyperlipidemia type  -     Lipid Panel; Future  -     ALT (SGPT); Future    Personal history of renal cancer    Solitary kidney, acquired    Gastroesophageal reflux disease without esophagitis    Stenosis of right carotid artery    Severe obesity (BMI 35.0-39.9) with comorbidity    Sleep apnea, unspecified type  -     Ambulatory referral/consult to Sleep Disorders; Future    Tubular adenoma of colon    Recurrent major depressive disorder, in remission    Anxiety    H/O partial thyroidectomy  -     TSH; Future    Other orders  -     Pneumococcal Conjugate Vaccine (20 Valent) (IM); Future  -     Pneumococcal Conjugate Vaccine (20 Valent) (IM)      Reviewed recent laboratory in [place] [facility].  Obtain additional laboratory evaluation as above.  Continue follow-up Nephrology, Cardiology.  Recommend COVID booster.            Past Medical History:  Past Medical History:   Diagnosis Date    Anxiety     AR (allergic rhinitis)     Arthritis     Asthma, intermittent     Bilateral carotid artery stenosis 11/09/2021    Coronary artery disease     Depression     Endometrial polyp     Fatty liver     GERD (gastroesophageal reflux  disease)     Hiatal hernia     Hyperlipidemia LDL goal <100     Hypertension     Mild mitral regurgitation     Multiple thyroid nodules 03/27/2014    Obesity (BMI 30-39.9) 03/27/2014    Renal cell carcinoma 2002    removed - no recurrence    S/P primary angioplasty with coronary stent 07/26/2017    Sleep apnea     Using CPAP    Solitary kidney, acquired     right    Tubular adenoma of colon 03/22/2022     Past Surgical History:   Procedure Laterality Date    APPENDECTOMY      CARDIAC CATHETERIZATION  6/27/14    has blockages, but per patient no stents    CHOLECYSTECTOMY      COLONOSCOPY  2002    negative    COLONOSCOPY  3/25/2014         COLONOSCOPY N/A 3/22/2022    Procedure: COLONOSCOPY;  Surgeon: Hunter Garibay MD;  Location: [facility] ENDO;  Service: Endoscopy;  Laterality: N/A;    CORONARY ANGIOPLASTY  2008?    balloon angioplasty    CORONARY ANGIOPLASTY WITH STENT PLACEMENT Right 07/26/2017    ESOPHAGOGASTRODUODENOSCOPY N/A 7/14/2021    Procedure: ESOPHAGOGASTRODUODENOSCOPY (EGD);  Surgeon: Sheng Bell III, MD;  Location: [facility] ENDO;  Service: Endoscopy;  Laterality: N/A;    NEPHRECTOMY Left 2002    [facility] - cancer    OVARIAN CYST REMOVAL      PERCUTANEOUS ENDOVENOUS LASER ABLATION OF PERIPHERAL VEIN  06/05/2019    POLYPECTOMY      endometrial polyp removal    ROTATOR CUFF REPAIR      L    THYROIDECTOMY, PARTIAL Left     TUBAL LIGATION      UPPER GASTROINTESTINAL ENDOSCOPY  04/2014     Review of patient's allergies indicates:   Allergen Reactions    Oxycodone Shortness Of Breath, Nausea And Vomiting and Other (See Comments)     Anxiety.    Plavix [clopidogrel] Diarrhea     Stomach cramps    Azithromycin Itching    Codeine Palpitations    Corticosteroids (glucocorticoids) Palpitations    Doxycycline Itching and Nausea Only    Hydralazine analogues Palpitations    Mobic [meloxicam] Itching    Neuromuscular blockers, steroidal Palpitations    Nickel sutures [surgical stainless steel]  Rash     Jewelery    Pcn [penicillins] Nausea And Vomiting    Shellfish containing products Itching     topical iodine OK    Sulfa (sulfonamide antibiotics) Nausea And Vomiting     Current Outpatient Medications on File Prior to Visit   Medication Sig Dispense Refill    acetaminophen (TYLENOL) 650 MG TbSR Take 2 tablets (1,300 mg total) by mouth every 8 (eight) hours.  0    albuterol (PROVENTIL/VENTOLIN HFA) 90 mcg/actuation inhaler Inhale 2 puffs into the lungs every 6 (six) hours as needed for Wheezing. 18 g 3    aspirin (ECOTRIN) 81 MG EC tablet Take 81 mg by mouth once daily.      atorvastatin (LIPITOR) 20 MG tablet Take 1 tablet (20 mg total) by mouth every evening. 90 tablet 3    diclofenac sodium (VOLTAREN) 1 % Gel Apply 2 g topically 3 (three) times daily. 100 g 5    EScitalopram oxalate (LEXAPRO) 10 MG tablet Take 1 tablet (10 mg total) by mouth once daily. 90 tablet 3    famotidine (PEPCID) 20 MG tablet Take 1 tablet (20 mg total) by mouth 2 (two) times a day. 180 tablet 3    hydroCHLOROthiazide (HYDRODIURIL) 12.5 MG Tab TAKE 1 TABLET(12.5 MG) BY MOUTH EVERY DAY 90 tablet 0    omeprazole magnesium (PRILOSEC OTC ORAL) Take by mouth.      psyllium husk (METAMUCIL) 3.4 gram/5.4 gram Powd Use daily as directed      valsartan (DIOVAN) 160 MG tablet Take 160 mg by mouth.      omeprazole (PRILOSEC) 40 MG capsule Take 1 capsule (40 mg total) by mouth once daily. 90 capsule 3    [DISCONTINUED] azelastine (ASTELIN) 137 mcg (0.1 %) nasal spray 1 spray (137 mcg total) by Nasal route 2 (two) times daily. for 7 days (Patient not taking: Reported on 5/3/2023) 30 mL 0     No current facility-administered medications on file prior to visit.     Social History     Socioeconomic History    Marital status:    Tobacco Use    Smoking status: Never    Smokeless tobacco: Never   Substance and Sexual Activity    Alcohol use: No    Drug use: No    Sexual activity: Yes     Partners: Male     Birth control/protection: None  "    Family History   Problem Relation Age of Onset    Heart attack Father 67    Arthritis Father     Hypertension Father     Diabetes Paternal Aunt     Allergies Paternal Aunt     Breast cancer Paternal Aunt     Diabetes Paternal Uncle     Allergies Maternal Grandmother     Asthma Brother     Colon cancer Neg Hx     Stomach cancer Neg Hx     Angioedema Neg Hx     Atopy Neg Hx     Immunodeficiency Neg Hx     Urticaria Neg Hx     Rhinitis Neg Hx     Eczema Neg Hx     Asthma Neg Hx            ROS:  GENERAL: No fever, chills,  or significant weight changes.   CARDIOVASCULAR: Denies chest pain, PND, orthopnea or reduced exercise tolerance.  ABDOMEN: Appetite fine. Denies diarrhea, abdominal pain, hematemesis or blood in stool.  URINARY: No flank pain, dysuria or hematuria.    Vitals:    05/12/23 1121 05/12/23 1143   BP: (!) 160/72 130/74   Pulse: 77    Temp: 97.9 °F (36.6 °C)    TempSrc: Temporal    Weight: 89 kg (196 lb 1.6 oz)    Height: 5' 2" (1.575 m)      Wt Readings from Last 3 Encounters:   05/12/23 89 kg (196 lb 1.6 oz)   05/03/23 88 kg (194 lb)   03/03/23 88.5 kg (195 lb)       OBJECTIVE:   APPEARANCE: Well nourished, well developed, in no acute distress.    HEAD: Normocephalic.  Atraumatic.  No sinus tenderness.  EYES:   Right eye: Pupil reactive.  Conjunctiva clear.    Left eye: Pupil reactive.  Conjunctiva clear.  EOMI.    EARS: TM's intact. Light reflex normal. No retraction or perforation.    NOSE:  clear.  MOUTH & THROAT:  No pharyngeal erythema or exudate. No lesions.  NECK: Supple. No bruits.  No JVD.  No cervical lymphadenopathy.  No thyromegaly.    CHEST: Breath sounds clear bilaterally.  Normal respiratory effort  CARDIOVASCULAR: Normal rate.  Regular rhythm.  No murmurs.  No rub.  No gallops.  ABDOMEN: Bowel sounds normal.  Soft.  No tenderness.  No organomegaly.  PERIPHERAL VASCULAR: No cyanosis.  No clubbing.  No edema.  NEUROLOGIC: No focal findings.  MENTAL STATUS: Alert.  Oriented x 3.        "

## 2023-05-28 ENCOUNTER — PATIENT MESSAGE (OUTPATIENT)
Dept: FAMILY MEDICINE | Facility: CLINIC | Age: 68
End: 2023-05-28
Payer: COMMERCIAL

## 2023-05-29 ENCOUNTER — TELEPHONE (OUTPATIENT)
Dept: FAMILY MEDICINE | Facility: CLINIC | Age: 68
End: 2023-05-29
Payer: COMMERCIAL

## 2023-05-29 NOTE — TELEPHONE ENCOUNTER
The initial symptoms may have been related to the vaccine however this would most likely not last more than a day or 2.  I would not expect her to still have symptoms at this point.  If she is continuing to have problems recommend follow-up appointment.

## 2023-05-29 NOTE — TELEPHONE ENCOUNTER
----- Message from Yaquelin Choi sent at 5/29/2023  3:04 PM CDT -----  Pt is requesting the nurse give her a call back at [phone]    Thx jm

## 2023-05-29 NOTE — TELEPHONE ENCOUNTER
Had pneumonia shot on 5/12 and felt bad after, feverish for a couple of days and stomach felt bad.  Fever resolved but still with stomach feeling funny, can this be from the shot, since it started after the shot?  No other issues.

## 2023-06-02 ENCOUNTER — OFFICE VISIT (OUTPATIENT)
Dept: FAMILY MEDICINE | Facility: CLINIC | Age: 68
End: 2023-06-02
Payer: COMMERCIAL

## 2023-06-02 VITALS
HEIGHT: 62 IN | BODY MASS INDEX: 36.14 KG/M2 | HEART RATE: 69 BPM | SYSTOLIC BLOOD PRESSURE: 175 MMHG | WEIGHT: 196.38 LBS | TEMPERATURE: 98 F | DIASTOLIC BLOOD PRESSURE: 74 MMHG

## 2023-06-02 DIAGNOSIS — G47.30 SLEEP APNEA, UNSPECIFIED TYPE: ICD-10-CM

## 2023-06-02 DIAGNOSIS — I10 ESSENTIAL HYPERTENSION: ICD-10-CM

## 2023-06-02 DIAGNOSIS — K21.9 GASTROESOPHAGEAL REFLUX DISEASE WITHOUT ESOPHAGITIS: Primary | ICD-10-CM

## 2023-06-02 PROCEDURE — 4010F PR ACE/ARB THEARPY RXD/TAKEN: ICD-10-PCS | Mod: CPTII,S$GLB,, | Performed by: FAMILY MEDICINE

## 2023-06-02 PROCEDURE — 3008F PR BODY MASS INDEX (BMI) DOCUMENTED: ICD-10-PCS | Mod: CPTII,S$GLB,, | Performed by: FAMILY MEDICINE

## 2023-06-02 PROCEDURE — 3288F FALL RISK ASSESSMENT DOCD: CPT | Mod: CPTII,S$GLB,, | Performed by: FAMILY MEDICINE

## 2023-06-02 PROCEDURE — 3077F PR MOST RECENT SYSTOLIC BLOOD PRESSURE >= 140 MM HG: ICD-10-PCS | Mod: CPTII,S$GLB,, | Performed by: FAMILY MEDICINE

## 2023-06-02 PROCEDURE — 4010F ACE/ARB THERAPY RXD/TAKEN: CPT | Mod: CPTII,S$GLB,, | Performed by: FAMILY MEDICINE

## 2023-06-02 PROCEDURE — 3008F BODY MASS INDEX DOCD: CPT | Mod: CPTII,S$GLB,, | Performed by: FAMILY MEDICINE

## 2023-06-02 PROCEDURE — 3078F DIAST BP <80 MM HG: CPT | Mod: CPTII,S$GLB,, | Performed by: FAMILY MEDICINE

## 2023-06-02 PROCEDURE — 99999 PR PBB SHADOW E&M-EST. PATIENT-LVL IV: ICD-10-PCS | Mod: PBBFAC,,, | Performed by: FAMILY MEDICINE

## 2023-06-02 PROCEDURE — 1101F PR PT FALLS ASSESS DOC 0-1 FALLS W/OUT INJ PAST YR: ICD-10-PCS | Mod: CPTII,S$GLB,, | Performed by: FAMILY MEDICINE

## 2023-06-02 PROCEDURE — 3066F NEPHROPATHY DOC TX: CPT | Mod: CPTII,S$GLB,, | Performed by: FAMILY MEDICINE

## 2023-06-02 PROCEDURE — 3044F PR MOST RECENT HEMOGLOBIN A1C LEVEL <7.0%: ICD-10-PCS | Mod: CPTII,S$GLB,, | Performed by: FAMILY MEDICINE

## 2023-06-02 PROCEDURE — 3066F PR DOCUMENTATION OF TREATMENT FOR NEPHROPATHY: ICD-10-PCS | Mod: CPTII,S$GLB,, | Performed by: FAMILY MEDICINE

## 2023-06-02 PROCEDURE — 3044F HG A1C LEVEL LT 7.0%: CPT | Mod: CPTII,S$GLB,, | Performed by: FAMILY MEDICINE

## 2023-06-02 PROCEDURE — 3078F PR MOST RECENT DIASTOLIC BLOOD PRESSURE < 80 MM HG: ICD-10-PCS | Mod: CPTII,S$GLB,, | Performed by: FAMILY MEDICINE

## 2023-06-02 PROCEDURE — 99999 PR PBB SHADOW E&M-EST. PATIENT-LVL IV: CPT | Mod: PBBFAC,,, | Performed by: FAMILY MEDICINE

## 2023-06-02 PROCEDURE — 3077F SYST BP >= 140 MM HG: CPT | Mod: CPTII,S$GLB,, | Performed by: FAMILY MEDICINE

## 2023-06-02 PROCEDURE — 99214 PR OFFICE/OUTPT VISIT, EST, LEVL IV, 30-39 MIN: ICD-10-PCS | Mod: S$GLB,,, | Performed by: FAMILY MEDICINE

## 2023-06-02 PROCEDURE — 99214 OFFICE O/P EST MOD 30 MIN: CPT | Mod: S$GLB,,, | Performed by: FAMILY MEDICINE

## 2023-06-02 PROCEDURE — 3288F PR FALLS RISK ASSESSMENT DOCUMENTED: ICD-10-PCS | Mod: CPTII,S$GLB,, | Performed by: FAMILY MEDICINE

## 2023-06-02 PROCEDURE — 1159F PR MEDICATION LIST DOCUMENTED IN MEDICAL RECORD: ICD-10-PCS | Mod: CPTII,S$GLB,, | Performed by: FAMILY MEDICINE

## 2023-06-02 PROCEDURE — 1159F MED LIST DOCD IN RCRD: CPT | Mod: CPTII,S$GLB,, | Performed by: FAMILY MEDICINE

## 2023-06-02 PROCEDURE — 1101F PT FALLS ASSESS-DOCD LE1/YR: CPT | Mod: CPTII,S$GLB,, | Performed by: FAMILY MEDICINE

## 2023-06-02 RX ORDER — OMEPRAZOLE 40 MG/1
40 CAPSULE, DELAYED RELEASE ORAL DAILY
Qty: 90 CAPSULE | Refills: 3 | Status: SHIPPED | OUTPATIENT
Start: 2023-06-02 | End: 2023-09-20 | Stop reason: DRUGHIGH

## 2023-06-02 NOTE — PROGRESS NOTES
Patient states that her arm was sore and she felt tired after she had her pneumonia vaccine a few weeks ago.  Later felt like she had some discomfort at her abdomen with a cramping gripping sensation and some nausea.  This does seem to be improving.  She does note some reflux.  She is using Pepcid or omeprazole intermittently, but not regularly.  She denies any dysphagia.  No rectal bleeding.  No diarrhea constipation currently.  Blood pressure elevated states better at home.  Sleep apnea treated.    Erinn was seen today for abdominal pain and nausea.    Diagnoses and all orders for this visit:    Gastroesophageal reflux disease without esophagitis    Essential hypertension    Sleep apnea, unspecified type    Other orders  -     omeprazole (PRILOSEC) 40 MG capsule; Take 1 capsule (40 mg total) by mouth once daily.    Take the omeprazole as above regularly.  Recheck blood blood pressure with nurse in 2 weeks.  She had laboratory ordered previous visit which was not done we will schedule this now.  Follow-up if symptoms not resolved in the next 2 weeks.              Past Medical History:  Past Medical History:   Diagnosis Date    Anxiety     AR (allergic rhinitis)     Arthritis     Asthma, intermittent     Bilateral carotid artery stenosis 11/09/2021    Coronary artery disease     Depression     Endometrial polyp     Fatty liver     GERD (gastroesophageal reflux disease)     Hiatal hernia     Hyperlipidemia LDL goal <100     Hypertension     Mild mitral regurgitation     Multiple thyroid nodules 03/27/2014    Obesity (BMI 30-39.9) 03/27/2014    Renal cell carcinoma 2002    removed - no recurrence    S/P primary angioplasty with coronary stent 07/26/2017    Sleep apnea     Using CPAP    Solitary kidney, acquired     right    Tubular adenoma of colon 03/22/2022     Past Surgical History:   Procedure Laterality Date    APPENDECTOMY      CARDIAC CATHETERIZATION  6/27/14    has blockages, but per patient no stents     CHOLECYSTECTOMY      COLONOSCOPY  2002    negative    COLONOSCOPY  3/25/2014         COLONOSCOPY N/A 3/22/2022    Procedure: COLONOSCOPY;  Surgeon: Hunter Garibay MD;  Location: [facility];  Service: Endoscopy;  Laterality: N/A;    CORONARY ANGIOPLASTY  2008?    balloon angioplasty    CORONARY ANGIOPLASTY WITH STENT PLACEMENT Right 07/26/2017    ESOPHAGOGASTRODUODENOSCOPY N/A 7/14/2021    Procedure: ESOPHAGOGASTRODUODENOSCOPY (EGD);  Surgeon: Sheng Bell III, MD;  Location: [facility];  Service: Endoscopy;  Laterality: N/A;    NEPHRECTOMY Left 2002    [facility] - cancer    OVARIAN CYST REMOVAL      PERCUTANEOUS ENDOVENOUS LASER ABLATION OF PERIPHERAL VEIN  06/05/2019    POLYPECTOMY      endometrial polyp removal    ROTATOR CUFF REPAIR      L    THYROIDECTOMY, PARTIAL Left     TUBAL LIGATION      UPPER GASTROINTESTINAL ENDOSCOPY  04/2014     Review of patient's allergies indicates:   Allergen Reactions    Oxycodone Shortness Of Breath, Nausea And Vomiting and Other (See Comments)     Anxiety.    Plavix [clopidogrel] Diarrhea     Stomach cramps    Azithromycin Itching    Codeine Palpitations    Corticosteroids (glucocorticoids) Palpitations    Doxycycline Itching and Nausea Only    Hydralazine analogues Palpitations    Mobic [meloxicam] Itching    Neuromuscular blockers, steroidal Palpitations    Nickel sutures [surgical stainless steel] Rash     Jewelery    Pcn [penicillins] Nausea And Vomiting    Shellfish containing products Itching     topical iodine OK    Sulfa (sulfonamide antibiotics) Nausea And Vomiting     Current Outpatient Medications on File Prior to Visit   Medication Sig Dispense Refill    acetaminophen (TYLENOL) 650 MG TbSR Take 2 tablets (1,300 mg total) by mouth every 8 (eight) hours.  0    albuterol (PROVENTIL/VENTOLIN HFA) 90 mcg/actuation inhaler Inhale 2 puffs into the lungs every 6 (six) hours as needed for Wheezing. 18 g 3    aspirin (ECOTRIN) 81 MG EC tablet Take 81  mg by mouth once daily.      atorvastatin (LIPITOR) 20 MG tablet Take 1 tablet (20 mg total) by mouth every evening. 90 tablet 3    diclofenac sodium (VOLTAREN) 1 % Gel Apply 2 g topically 3 (three) times daily. 100 g 5    EScitalopram oxalate (LEXAPRO) 10 MG tablet Take 1 tablet (10 mg total) by mouth once daily. 90 tablet 3    famotidine (PEPCID) 20 MG tablet Take 1 tablet (20 mg total) by mouth 2 (two) times a day. 180 tablet 3    hydroCHLOROthiazide (HYDRODIURIL) 12.5 MG Tab TAKE 1 TABLET(12.5 MG) BY MOUTH EVERY DAY 90 tablet 0    valsartan (DIOVAN) 160 MG tablet Take 160 mg by mouth.      [DISCONTINUED] omeprazole magnesium (PRILOSEC OTC ORAL) Take 20 mg by mouth daily as needed.      [DISCONTINUED] psyllium husk (METAMUCIL) 3.4 gram/5.4 gram Powd Use daily as directed      [DISCONTINUED] omeprazole (PRILOSEC) 40 MG capsule Take 1 capsule (40 mg total) by mouth once daily. 90 capsule 3     No current facility-administered medications on file prior to visit.     Social History     Socioeconomic History    Marital status:    Tobacco Use    Smoking status: Never    Smokeless tobacco: Never   Substance and Sexual Activity    Alcohol use: No    Drug use: No    Sexual activity: Yes     Partners: Male     Birth control/protection: None     Family History   Problem Relation Age of Onset    Heart attack Father 67    Arthritis Father     Hypertension Father     Diabetes Paternal Aunt     Allergies Paternal Aunt     Breast cancer Paternal Aunt     Diabetes Paternal Uncle     Allergies Maternal Grandmother     Asthma Brother     Colon cancer Neg Hx     Stomach cancer Neg Hx     Angioedema Neg Hx     Atopy Neg Hx     Immunodeficiency Neg Hx     Urticaria Neg Hx     Rhinitis Neg Hx     Eczema Neg Hx     Asthma Neg Hx            ROS:  GENERAL: No fever, chills,  or significant weight changes.   CARDIOVASCULAR: Denies chest pain, PND, orthopnea or reduced exercise tolerance.  ABDOMEN: Appetite fine. Denies diarrhea,   "hematemesis or blood in stool.  URINARY: No flank pain, dysuria or hematuria.    Vitals:    06/02/23 1012 06/02/23 1013   BP: (!) 156/72 (!) 175/74   Pulse: 69    Temp: 97.5 °F (36.4 °C)    TempSrc: Temporal    Weight: 89.1 kg (196 lb 6.4 oz)    Height: 5' 2" (1.575 m)      Wt Readings from Last 3 Encounters:   06/02/23 89.1 kg (196 lb 6.4 oz)   05/12/23 89 kg (196 lb 1.6 oz)   05/03/23 88 kg (194 lb)       OBJECTIVE:   APPEARANCE: Well nourished, well developed, in no acute distress.    HEAD: Normocephalic.  Atraumatic.  No sinus tenderness.  EYES:   Right eye: Pupil reactive.  Conjunctiva clear.    Left eye: Pupil reactive.  Conjunctiva clear.  EOMI.    EARS: TM's intact. Light reflex normal. No retraction or perforation.    NOSE:  clear.  MOUTH & THROAT:  No pharyngeal erythema or exudate. No lesions.  NECK: Supple. No bruits.  No JVD.  No cervical lymphadenopathy.  No thyromegaly.    CHEST: Breath sounds clear bilaterally.  Normal respiratory effort  CARDIOVASCULAR: Normal rate.  Regular rhythm.  No murmurs.  No rub.  No gallops.  ABDOMEN: Bowel sounds normal.  Soft.  No tenderness.  No organomegaly.  PERIPHERAL VASCULAR: No cyanosis.  No clubbing.  No edema.  NEUROLOGIC: No focal findings.  MENTAL STATUS: Alert.  Oriented x 3.        "

## 2023-06-06 ENCOUNTER — TELEPHONE (OUTPATIENT)
Dept: FAMILY MEDICINE | Facility: CLINIC | Age: 68
End: 2023-06-06
Payer: COMMERCIAL

## 2023-06-06 DIAGNOSIS — M54.50 LUMBAR PAIN WITH RADIATION DOWN LEFT LEG: Primary | ICD-10-CM

## 2023-06-06 DIAGNOSIS — M92.62 HAGLUND'S DEFORMITY OF LEFT HEEL: ICD-10-CM

## 2023-06-06 DIAGNOSIS — M79.605 LUMBAR PAIN WITH RADIATION DOWN LEFT LEG: Primary | ICD-10-CM

## 2023-06-06 DIAGNOSIS — M76.62 TENDONITIS, ACHILLES, LEFT: ICD-10-CM

## 2023-06-06 NOTE — TELEPHONE ENCOUNTER
Pt states that her standard  Rollator walker has broken twice . The brakes are not locking .she is asking for a new one .  You just saw her on 6/2 , does she need an appt to come back in to see you ? Please advise, thanks

## 2023-06-09 ENCOUNTER — LAB VISIT (OUTPATIENT)
Dept: LAB | Facility: HOSPITAL | Age: 68
End: 2023-06-09
Attending: FAMILY MEDICINE
Payer: COMMERCIAL

## 2023-06-09 DIAGNOSIS — Z00.00 ROUTINE HISTORY AND PHYSICAL EXAMINATION OF ADULT: ICD-10-CM

## 2023-06-09 DIAGNOSIS — E89.0 H/O PARTIAL THYROIDECTOMY: ICD-10-CM

## 2023-06-09 DIAGNOSIS — E78.5 HYPERLIPIDEMIA, UNSPECIFIED HYPERLIPIDEMIA TYPE: ICD-10-CM

## 2023-06-09 LAB
ALT SERPL W/O P-5'-P-CCNC: 20 U/L (ref 10–44)
CHOLEST SERPL-MCNC: 153 MG/DL (ref 120–199)
CHOLEST/HDLC SERPL: 3.2 {RATIO} (ref 2–5)
HDLC SERPL-MCNC: 48 MG/DL (ref 40–75)
HDLC SERPL: 31.4 % (ref 20–50)
LDLC SERPL CALC-MCNC: 88 MG/DL (ref 63–159)
NONHDLC SERPL-MCNC: 105 MG/DL
TRIGL SERPL-MCNC: 85 MG/DL (ref 30–150)
TSH SERPL DL<=0.005 MIU/L-ACNC: 1.82 UIU/ML (ref 0.4–4)

## 2023-06-09 PROCEDURE — 36415 COLL VENOUS BLD VENIPUNCTURE: CPT | Mod: PO | Performed by: FAMILY MEDICINE

## 2023-06-09 PROCEDURE — 80061 LIPID PANEL: CPT | Performed by: FAMILY MEDICINE

## 2023-06-09 PROCEDURE — 84443 ASSAY THYROID STIM HORMONE: CPT | Performed by: FAMILY MEDICINE

## 2023-06-09 PROCEDURE — 84460 ALANINE AMINO (ALT) (SGPT): CPT | Performed by: FAMILY MEDICINE

## 2023-06-10 ENCOUNTER — PATIENT MESSAGE (OUTPATIENT)
Dept: FAMILY MEDICINE | Facility: CLINIC | Age: 68
End: 2023-06-10
Payer: COMMERCIAL

## 2023-07-31 RX ORDER — HYDROCHLOROTHIAZIDE 12.5 MG/1
12.5 TABLET ORAL DAILY
Qty: 90 TABLET | Refills: 0 | Status: SHIPPED | OUTPATIENT
Start: 2023-07-31 | End: 2023-11-06 | Stop reason: SDUPTHER

## 2023-07-31 NOTE — TELEPHONE ENCOUNTER
Care Due:                  Date            Visit Type   Department     Provider  --------------------------------------------------------------------------------                                EP -                              PRIMARY      [facility] FAMILY  Last Visit: 06-      CARE ([facility])   MEDICINE       Layo Reed  Next Visit: None Scheduled  None         None Found                                                            Last  Test          Frequency    Reason                     Performed    Due Date  --------------------------------------------------------------------------------    CMP.........  12 months..  atorvastatin.............  03- 03-    [facility] Embedded Care Due Messages. Reference number: [id number].   7/31/2023 3:53:17 AM CDT

## 2023-08-01 ENCOUNTER — PATIENT MESSAGE (OUTPATIENT)
Dept: FAMILY MEDICINE | Facility: CLINIC | Age: 68
End: 2023-08-01
Payer: COMMERCIAL

## 2023-09-20 ENCOUNTER — OFFICE VISIT (OUTPATIENT)
Dept: FAMILY MEDICINE | Facility: CLINIC | Age: 68
End: 2023-09-20
Payer: COMMERCIAL

## 2023-09-20 VITALS
WEIGHT: 193 LBS | HEIGHT: 62 IN | HEART RATE: 64 BPM | DIASTOLIC BLOOD PRESSURE: 66 MMHG | TEMPERATURE: 98 F | BODY MASS INDEX: 35.51 KG/M2 | SYSTOLIC BLOOD PRESSURE: 177 MMHG

## 2023-09-20 DIAGNOSIS — I10 ESSENTIAL HYPERTENSION: Primary | ICD-10-CM

## 2023-09-20 DIAGNOSIS — E78.5 HYPERLIPIDEMIA LDL GOAL <100: ICD-10-CM

## 2023-09-20 DIAGNOSIS — I25.10 CORONARY ARTERY DISEASE INVOLVING NATIVE CORONARY ARTERY OF NATIVE HEART WITHOUT ANGINA PECTORIS: ICD-10-CM

## 2023-09-20 PROCEDURE — 1126F AMNT PAIN NOTED NONE PRSNT: CPT | Mod: CPTII,S$GLB,, | Performed by: FAMILY MEDICINE

## 2023-09-20 PROCEDURE — 4010F PR ACE/ARB THEARPY RXD/TAKEN: ICD-10-PCS | Mod: CPTII,S$GLB,, | Performed by: FAMILY MEDICINE

## 2023-09-20 PROCEDURE — 1126F PR PAIN SEVERITY QUANTIFIED, NO PAIN PRESENT: ICD-10-PCS | Mod: CPTII,S$GLB,, | Performed by: FAMILY MEDICINE

## 2023-09-20 PROCEDURE — 3066F NEPHROPATHY DOC TX: CPT | Mod: CPTII,S$GLB,, | Performed by: FAMILY MEDICINE

## 2023-09-20 PROCEDURE — 3078F DIAST BP <80 MM HG: CPT | Mod: CPTII,S$GLB,, | Performed by: FAMILY MEDICINE

## 2023-09-20 PROCEDURE — 99214 PR OFFICE/OUTPT VISIT, EST, LEVL IV, 30-39 MIN: ICD-10-PCS | Mod: S$GLB,,, | Performed by: FAMILY MEDICINE

## 2023-09-20 PROCEDURE — 3044F PR MOST RECENT HEMOGLOBIN A1C LEVEL <7.0%: ICD-10-PCS | Mod: CPTII,S$GLB,, | Performed by: FAMILY MEDICINE

## 2023-09-20 PROCEDURE — 3008F PR BODY MASS INDEX (BMI) DOCUMENTED: ICD-10-PCS | Mod: CPTII,S$GLB,, | Performed by: FAMILY MEDICINE

## 2023-09-20 PROCEDURE — 1101F PT FALLS ASSESS-DOCD LE1/YR: CPT | Mod: CPTII,S$GLB,, | Performed by: FAMILY MEDICINE

## 2023-09-20 PROCEDURE — 3078F PR MOST RECENT DIASTOLIC BLOOD PRESSURE < 80 MM HG: ICD-10-PCS | Mod: CPTII,S$GLB,, | Performed by: FAMILY MEDICINE

## 2023-09-20 PROCEDURE — 1159F MED LIST DOCD IN RCRD: CPT | Mod: CPTII,S$GLB,, | Performed by: FAMILY MEDICINE

## 2023-09-20 PROCEDURE — 1101F PR PT FALLS ASSESS DOC 0-1 FALLS W/OUT INJ PAST YR: ICD-10-PCS | Mod: CPTII,S$GLB,, | Performed by: FAMILY MEDICINE

## 2023-09-20 PROCEDURE — 1159F PR MEDICATION LIST DOCUMENTED IN MEDICAL RECORD: ICD-10-PCS | Mod: CPTII,S$GLB,, | Performed by: FAMILY MEDICINE

## 2023-09-20 PROCEDURE — 3008F BODY MASS INDEX DOCD: CPT | Mod: CPTII,S$GLB,, | Performed by: FAMILY MEDICINE

## 2023-09-20 PROCEDURE — 3044F HG A1C LEVEL LT 7.0%: CPT | Mod: CPTII,S$GLB,, | Performed by: FAMILY MEDICINE

## 2023-09-20 PROCEDURE — 3077F SYST BP >= 140 MM HG: CPT | Mod: CPTII,S$GLB,, | Performed by: FAMILY MEDICINE

## 2023-09-20 PROCEDURE — 99214 OFFICE O/P EST MOD 30 MIN: CPT | Mod: S$GLB,,, | Performed by: FAMILY MEDICINE

## 2023-09-20 PROCEDURE — 3288F FALL RISK ASSESSMENT DOCD: CPT | Mod: CPTII,S$GLB,, | Performed by: FAMILY MEDICINE

## 2023-09-20 PROCEDURE — 4010F ACE/ARB THERAPY RXD/TAKEN: CPT | Mod: CPTII,S$GLB,, | Performed by: FAMILY MEDICINE

## 2023-09-20 PROCEDURE — 99999 PR PBB SHADOW E&M-EST. PATIENT-LVL IV: CPT | Mod: PBBFAC,,, | Performed by: FAMILY MEDICINE

## 2023-09-20 PROCEDURE — 3077F PR MOST RECENT SYSTOLIC BLOOD PRESSURE >= 140 MM HG: ICD-10-PCS | Mod: CPTII,S$GLB,, | Performed by: FAMILY MEDICINE

## 2023-09-20 PROCEDURE — 3066F PR DOCUMENTATION OF TREATMENT FOR NEPHROPATHY: ICD-10-PCS | Mod: CPTII,S$GLB,, | Performed by: FAMILY MEDICINE

## 2023-09-20 PROCEDURE — 3288F PR FALLS RISK ASSESSMENT DOCUMENTED: ICD-10-PCS | Mod: CPTII,S$GLB,, | Performed by: FAMILY MEDICINE

## 2023-09-20 PROCEDURE — 99999 PR PBB SHADOW E&M-EST. PATIENT-LVL IV: ICD-10-PCS | Mod: PBBFAC,,, | Performed by: FAMILY MEDICINE

## 2023-09-20 RX ORDER — PRAVASTATIN SODIUM 40 MG/1
40 TABLET ORAL NIGHTLY
Qty: 90 TABLET | Refills: 3 | Status: SHIPPED | OUTPATIENT
Start: 2023-09-20

## 2023-09-20 RX ORDER — OMEPRAZOLE 20 MG/1
20 TABLET, DELAYED RELEASE ORAL DAILY
COMMUNITY
End: 2023-10-31 | Stop reason: DRUGHIGH

## 2023-09-20 RX ORDER — VALSARTAN 320 MG/1
320 TABLET ORAL DAILY
Qty: 90 TABLET | Refills: 0 | Status: SHIPPED | OUTPATIENT
Start: 2023-09-20 | End: 2023-12-07

## 2023-09-20 NOTE — PROGRESS NOTES
Follow-up blood pressure not well controlled.  States readings 140s-170s at home systolic.  Diastolic okay.    Hyperlipidemia feels like atorvastatin makes her sleepy.  She wants to go back to pravastatin which she felt like she tolerated.  Did not tolerate Crestor either.  Prior coronary disease.    Erinn was seen today for follow-up and hypertension.    Diagnoses and all orders for this visit:    Essential hypertension    Hyperlipidemia LDL goal <100  -     pravastatin (PRAVACHOL) 40 MG tablet; Take 1 tablet (40 mg total) by mouth every evening.  -     Comprehensive Metabolic Panel; Future  -     Lipid Panel; Future    Coronary artery disease involving native coronary artery of native heart without angina pectoris  -     Comprehensive Metabolic Panel; Future  -     Lipid Panel; Future    Other orders  -     valsartan (DIOVAN) 320 MG tablet; Take 1 tablet (320 mg total) by mouth once daily.      Increase Diovan as above.  Recheck blood pressure with nurse in 4 weeks.  Discussed rationale for higher potency statin.  Patient does not feel she is tolerating atorvastatin or Crestor previously and would like to go back to pravastatin which was prescribed.  Will plan on rechecking her lab work again in 6 months.          Past Medical History:  Past Medical History:   Diagnosis Date    Anxiety     AR (allergic rhinitis)     Arthritis     Asthma, intermittent     Bilateral carotid artery stenosis 11/09/2021    Coronary artery disease     Depression     Endometrial polyp     Fatty liver     GERD (gastroesophageal reflux disease)     Hiatal hernia     Hyperlipidemia LDL goal <100     Hypertension     Mild mitral regurgitation     Multiple thyroid nodules 03/27/2014    Obesity (BMI 30-39.9) 03/27/2014    Renal cell carcinoma 2002    removed - no recurrence    S/P primary angioplasty with coronary stent 07/26/2017    Sleep apnea     Using CPAP    Solitary kidney, acquired     right    Tubular adenoma of colon 03/22/2022      Past Surgical History:   Procedure Laterality Date    APPENDECTOMY      CARDIAC CATHETERIZATION  6/27/14    has blockages, but per patient no stents    CHOLECYSTECTOMY      COLONOSCOPY  2002    negative    COLONOSCOPY  3/25/2014         COLONOSCOPY N/A 3/22/2022    Procedure: COLONOSCOPY;  Surgeon: Hunter Garibay MD;  Location: [facility] ENDO;  Service: Endoscopy;  Laterality: N/A;    CORONARY ANGIOPLASTY  2008?    balloon angioplasty    CORONARY ANGIOPLASTY WITH STENT PLACEMENT Right 07/26/2017    ESOPHAGOGASTRODUODENOSCOPY N/A 7/14/2021    Procedure: ESOPHAGOGASTRODUODENOSCOPY (EGD);  Surgeon: Sheng Bell III, MD;  Location: [facility] ENDO;  Service: Endoscopy;  Laterality: N/A;    NEPHRECTOMY Left 2002    [facility] - [facility] - cancer    OVARIAN CYST REMOVAL      PERCUTANEOUS ENDOVENOUS LASER ABLATION OF PERIPHERAL VEIN  06/05/2019    POLYPECTOMY      endometrial polyp removal    ROTATOR CUFF REPAIR      L    THYROIDECTOMY, PARTIAL Left     TUBAL LIGATION      UPPER GASTROINTESTINAL ENDOSCOPY  04/2014     Review of patient's allergies indicates:   Allergen Reactions    Oxycodone Shortness Of Breath, Nausea And Vomiting and Other (See Comments)     Anxiety.    Plavix [clopidogrel] Diarrhea     Stomach cramps    Azithromycin Itching    Codeine Palpitations    Corticosteroids (glucocorticoids) Palpitations    Doxycycline Itching and Nausea Only    Hydralazine analogues Palpitations    Mobic [meloxicam] Itching    Neuromuscular blockers, steroidal Palpitations    Nickel sutures [surgical stainless steel] Rash     Jewelery    Pcn [penicillins] Nausea And Vomiting    Shellfish containing products Itching     topical iodine OK    Sulfa (sulfonamide antibiotics) Nausea And Vomiting     Current Outpatient Medications on File Prior to Visit   Medication Sig Dispense Refill    acetaminophen (TYLENOL) 650 MG TbSR Take 2 tablets (1,300 mg total) by mouth every 8 (eight) hours.  0    albuterol (PROVENTIL/VENTOLIN  HFA) 90 mcg/actuation inhaler Inhale 2 puffs into the lungs every 6 (six) hours as needed for Wheezing. 18 g 3    aspirin (ECOTRIN) 81 MG EC tablet Take 81 mg by mouth once daily.      diclofenac sodium (VOLTAREN) 1 % Gel Apply 2 g topically 3 (three) times daily. 100 g 5    EScitalopram oxalate (LEXAPRO) 10 MG tablet Take 1 tablet (10 mg total) by mouth once daily. 90 tablet 3    hydroCHLOROthiazide (HYDRODIURIL) 12.5 MG Tab Take 1 tablet (12.5 mg total) by mouth once daily. 90 tablet 0    multivitamin capsule Take 1 capsule by mouth once daily.      omeprazole (PRILOSEC OTC) 20 MG tablet Take 20 mg by mouth once daily.      [DISCONTINUED] atorvastatin (LIPITOR) 20 MG tablet Take 1 tablet (20 mg total) by mouth every evening. 90 tablet 3    [DISCONTINUED] famotidine (PEPCID) 20 MG tablet Take 1 tablet (20 mg total) by mouth 2 (two) times a day. 180 tablet 3    [DISCONTINUED] omeprazole (PRILOSEC) 40 MG capsule Take 1 capsule (40 mg total) by mouth once daily. 90 capsule 3    [DISCONTINUED] valsartan (DIOVAN) 160 MG tablet Take 160 mg by mouth.       No current facility-administered medications on file prior to visit.     Social History     Socioeconomic History    Marital status:    Tobacco Use    Smoking status: Never    Smokeless tobacco: Never   Substance and Sexual Activity    Alcohol use: No    Drug use: No    Sexual activity: Yes     Partners: Male     Birth control/protection: None     Family History   Problem Relation Age of Onset    Heart attack Father 67    Arthritis Father     Hypertension Father     Asthma Brother     Allergies Maternal Grandmother     Diabetes Paternal Aunt     Allergies Paternal Aunt     Breast cancer Paternal Aunt     Diabetes Paternal Uncle     Breast cancer Daughter 40    Colon cancer Neg Hx     Stomach cancer Neg Hx     Angioedema Neg Hx     Atopy Neg Hx     Immunodeficiency Neg Hx     Urticaria Neg Hx     Rhinitis Neg Hx     Eczema Neg Hx     Asthma Neg Hx   "          ROS:  GENERAL: No fever, chills,  or significant weight changes.   CARDIOVASCULAR: Denies chest pain, PND, orthopnea or reduced exercise tolerance.  ABDOMEN: Appetite fine. Denies diarrhea, abdominal pain, hematemesis or blood in stool.  URINARY: No flank pain, dysuria or hematuria.    Vitals:    09/20/23 1027 09/20/23 1035   BP: (!) 148/66 (!) 177/66   Pulse: 64    Temp: 97.8 °F (36.6 °C)    TempSrc: Temporal    Weight: 87.5 kg (193 lb)    Height: 5' 2" (1.575 m)        Wt Readings from Last 3 Encounters:   09/20/23 87.5 kg (193 lb)   06/02/23 89.1 kg (196 lb 6.4 oz)   05/12/23 89 kg (196 lb 1.6 oz)       APPEARANCE: Well nourished, well developed, in no acute distress.    HEAD: Normocephalic.  Atraumatic.  EYES:   Right eye: Pupil reactive.  Conjunctiva clear.    Left eye: Pupil reactive.  Conjunctiva clear.    NECK: Supple. No bruits.  No JVD.  No cervical lymphadenopathy.  No thyromegaly.    CHEST: Breath sounds clear bilaterally.  Normal respiratory effort  CARDIOVASCULAR: Normal rate.  Regular rhythm.  No murmurs.  No rub.  No gallops.   No edema.  MENTAL STATUS: Alert.  Oriented x 3.  "

## 2023-09-22 ENCOUNTER — PATIENT MESSAGE (OUTPATIENT)
Dept: FAMILY MEDICINE | Facility: CLINIC | Age: 68
End: 2023-09-22
Payer: COMMERCIAL

## 2023-09-22 DIAGNOSIS — R10.9 ABDOMINAL DISCOMFORT: Primary | ICD-10-CM

## 2023-10-31 ENCOUNTER — OFFICE VISIT (OUTPATIENT)
Dept: GASTROENTEROLOGY | Facility: CLINIC | Age: 68
End: 2023-10-31
Payer: COMMERCIAL

## 2023-10-31 VITALS — BODY MASS INDEX: 34.56 KG/M2 | HEIGHT: 62 IN | WEIGHT: 187.81 LBS

## 2023-10-31 DIAGNOSIS — Z87.19 HISTORY OF GASTROESOPHAGEAL REFLUX (GERD): ICD-10-CM

## 2023-10-31 DIAGNOSIS — Z87.19 HISTORY OF GASTRITIS: ICD-10-CM

## 2023-10-31 DIAGNOSIS — R10.10 UPPER ABDOMINAL PAIN: ICD-10-CM

## 2023-10-31 DIAGNOSIS — K44.9 HIATAL HERNIA: ICD-10-CM

## 2023-10-31 DIAGNOSIS — Z79.01 ANTICOAGULANT LONG-TERM USE: ICD-10-CM

## 2023-10-31 DIAGNOSIS — R10.13 DYSPEPSIA: Primary | ICD-10-CM

## 2023-10-31 DIAGNOSIS — R13.14 PHARYNGOESOPHAGEAL DYSPHAGIA: ICD-10-CM

## 2023-10-31 PROCEDURE — 3288F PR FALLS RISK ASSESSMENT DOCUMENTED: ICD-10-PCS | Mod: CPTII,S$GLB,, | Performed by: NURSE PRACTITIONER

## 2023-10-31 PROCEDURE — 3008F PR BODY MASS INDEX (BMI) DOCUMENTED: ICD-10-PCS | Mod: CPTII,S$GLB,, | Performed by: NURSE PRACTITIONER

## 2023-10-31 PROCEDURE — 1159F MED LIST DOCD IN RCRD: CPT | Mod: CPTII,S$GLB,, | Performed by: NURSE PRACTITIONER

## 2023-10-31 PROCEDURE — 3008F BODY MASS INDEX DOCD: CPT | Mod: CPTII,S$GLB,, | Performed by: NURSE PRACTITIONER

## 2023-10-31 PROCEDURE — 3288F FALL RISK ASSESSMENT DOCD: CPT | Mod: CPTII,S$GLB,, | Performed by: NURSE PRACTITIONER

## 2023-10-31 PROCEDURE — 1160F RVW MEDS BY RX/DR IN RCRD: CPT | Mod: CPTII,S$GLB,, | Performed by: NURSE PRACTITIONER

## 2023-10-31 PROCEDURE — 1160F PR REVIEW ALL MEDS BY PRESCRIBER/CLIN PHARMACIST DOCUMENTED: ICD-10-PCS | Mod: CPTII,S$GLB,, | Performed by: NURSE PRACTITIONER

## 2023-10-31 PROCEDURE — 3066F NEPHROPATHY DOC TX: CPT | Mod: CPTII,S$GLB,, | Performed by: NURSE PRACTITIONER

## 2023-10-31 PROCEDURE — 3066F PR DOCUMENTATION OF TREATMENT FOR NEPHROPATHY: ICD-10-PCS | Mod: CPTII,S$GLB,, | Performed by: NURSE PRACTITIONER

## 2023-10-31 PROCEDURE — 1159F PR MEDICATION LIST DOCUMENTED IN MEDICAL RECORD: ICD-10-PCS | Mod: CPTII,S$GLB,, | Performed by: NURSE PRACTITIONER

## 2023-10-31 PROCEDURE — 3044F HG A1C LEVEL LT 7.0%: CPT | Mod: CPTII,S$GLB,, | Performed by: NURSE PRACTITIONER

## 2023-10-31 PROCEDURE — 1101F PR PT FALLS ASSESS DOC 0-1 FALLS W/OUT INJ PAST YR: ICD-10-PCS | Mod: CPTII,S$GLB,, | Performed by: NURSE PRACTITIONER

## 2023-10-31 PROCEDURE — 1126F AMNT PAIN NOTED NONE PRSNT: CPT | Mod: CPTII,S$GLB,, | Performed by: NURSE PRACTITIONER

## 2023-10-31 PROCEDURE — 99214 PR OFFICE/OUTPT VISIT, EST, LEVL IV, 30-39 MIN: ICD-10-PCS | Mod: S$GLB,,, | Performed by: NURSE PRACTITIONER

## 2023-10-31 PROCEDURE — 99214 OFFICE O/P EST MOD 30 MIN: CPT | Mod: S$GLB,,, | Performed by: NURSE PRACTITIONER

## 2023-10-31 PROCEDURE — 3044F PR MOST RECENT HEMOGLOBIN A1C LEVEL <7.0%: ICD-10-PCS | Mod: CPTII,S$GLB,, | Performed by: NURSE PRACTITIONER

## 2023-10-31 PROCEDURE — 4010F PR ACE/ARB THEARPY RXD/TAKEN: ICD-10-PCS | Mod: CPTII,S$GLB,, | Performed by: NURSE PRACTITIONER

## 2023-10-31 PROCEDURE — 4010F ACE/ARB THERAPY RXD/TAKEN: CPT | Mod: CPTII,S$GLB,, | Performed by: NURSE PRACTITIONER

## 2023-10-31 PROCEDURE — 99999 PR PBB SHADOW E&M-EST. PATIENT-LVL IV: CPT | Mod: PBBFAC,,, | Performed by: NURSE PRACTITIONER

## 2023-10-31 PROCEDURE — 99999 PR PBB SHADOW E&M-EST. PATIENT-LVL IV: ICD-10-PCS | Mod: PBBFAC,,, | Performed by: NURSE PRACTITIONER

## 2023-10-31 PROCEDURE — 1101F PT FALLS ASSESS-DOCD LE1/YR: CPT | Mod: CPTII,S$GLB,, | Performed by: NURSE PRACTITIONER

## 2023-10-31 PROCEDURE — 1126F PR PAIN SEVERITY QUANTIFIED, NO PAIN PRESENT: ICD-10-PCS | Mod: CPTII,S$GLB,, | Performed by: NURSE PRACTITIONER

## 2023-10-31 RX ORDER — OMEPRAZOLE 40 MG/1
40 CAPSULE, DELAYED RELEASE ORAL
Qty: 30 CAPSULE | Refills: 1 | Status: SHIPPED | OUTPATIENT
Start: 2023-10-31

## 2023-10-31 NOTE — PATIENT INSTRUCTIONS
"GERD (Adult)    The esophagus is a tube that carries food from the mouth to the stomach. A valve at the lower end of the esophagus prevents stomach acid from flowing upward. When this valve doesn't work properly, stomach contents may repeatedly flow back up (reflux) into the esophagus. This is called gastroesophageal reflux disease (GERD). GERD can irritate the esophagus. It can cause problems with swallowing or breathing. In severe cases, GERD can cause recurrent pneumonia or other serious problems.  Symptoms of reflux include burning, pressure or sharp pain in the upper abdomen or mid to lower chest. The pain can spread to the neck, back, or shoulder. There may be belching, an acid taste in the back of the throat, chronic cough, or sore throat or hoarseness. GERD symptoms often occur during the day after a big meal. They can also occur at night when lying down.   Home care  Lifestyle changes can help reduce symptoms. If needed, medicines may be prescribed. Symptoms often improve with treatment, but if treatment is stopped, the symptoms often return after a few months. So most persons with GERD will need to continue treatment.  Lifestyle changes  Limit or avoid fatty, fried, and spicy foods, as well as coffee, chocolate, mint, and foods with high acid content such as tomatoes and citrus fruit and juices (orange, grapefruit, lemon).  Dont eat large meals, especially at night. Frequent, smaller meals are best. Do not lie down right after eating. And dont eat anything 3 hours before going to bed.  Avoid drinking alcohol and smoking. As much as possible, stay away from second hand smoke.  If you are overweight, losing weight will reduce symptoms.   Avoid wearing tight clothing around your stomach area.  If your symptoms occur during sleep, use a foam wedge to elevate your upper body (not just your head.) Or, place 4" blocks under the head of your bed.  Medicines  If needed, medicines can help relieve the symptoms of " GERD and prevent damage to the esophagus. Discuss a medicine plan with your healthcare provider. This may include one or more of the following medicines:  Antacids to help neutralize the normal acids in your stomach.  Acid blockers (H2 blockers) to decrease acid production.  Acid inhibitors (PPIs) to decrease acid production in a different way than the blockers. They may work better, but can take a little longer to take effect.  Take an antacid 30-60 minutes after eating and at bedtime, but not at the same time as an acid blocker.  Try not to take medicines such as ibuprofen and aspirin. If you are taking aspirin for your heart or other medical reasons, talk to your healthcare provider about stopping it.  Follow-up care  Follow up with your healthcare provider or as advised by our staff.  When to seek medical advice  Call your healthcare provider if any of the following occur:  Stomach pain gets worse or moves to the lower right abdomen (appendix area)  Chest pain appears or gets worse, or spreads to the back, neck, shoulder, or arm  Frequent vomiting (cant keep down liquids)  Blood in the stool or vomit (red or black in color)  Feeling weak or dizzy  Fever of 100.4ºF (38ºC) or higher, or as directed by your healthcare provider  Date Last Reviewed: 6/23/2015  © 6329-1507 [employer]. [address] [place] [address], [place] [address]. All rights reserved. This information is not intended as a substitute for professional medical care. Always follow your healthcare professional's instructions.         What Is a Hiatal Hernia?    Hiatal hernia is when the area where the stomach and esophagus meet bulges up through the diaphragm into the chest cavity. In some cases, part of the stomach may bulge above the diaphragm. Stomach acid may move up into the esophagus and cause symptoms. The symptoms are often blamed on gastroesophageal reflux disease (GERD). You may only know about the hernia when it shows up on an X-ray  taken for other reasons.   What you may feel  The hiatus is a normal hole in the diaphragm. The esophagus passes through this hole and leads to the stomach. In some cases, part of the stomach may bulge above the diaphragm. This bulge is called a hernia. Stomach acid may move up into the esophagus and cause symptoms.  When you eat, the muscle at the hiatus relaxes to allow food to pass into the stomach. It tightens again to keep food and digestive acids in the stomach.  Many people with hiatal hernias have mild symptoms. You may notice the following GERD symptoms:  Heartburn or other chest discomfort  A feeling of chest fullness after a meal  Frequent burping  Acid taste in the mouth  Trouble swallowing  Treating symptoms  If you have been diagnosed with hiatal hernia, these suggestions may help improve symptoms:  Lose excess weight. Extra weight puts pressure on the stomach and esophagus.  Dont lie down after eating. Sit up for at least an hour after eating. Lying down after eating can increase symptoms.  Avoid certain foods and drinks. These include fatty foods, chocolate, coffee, mint, and other foods that cause symptoms for you.  Dont smoke or drink alcohol. These can worsen symptoms.  Look at your medicines. Discuss your medicines with your healthcare provider. Many medicines can cause symptoms.  Consider an antacid medicine. Ask your healthcare provider about over-the-counter and prescription medicines that may help.  Ask about surgery, if needed. Surgery is a treatment choice for some people. Your healthcare provider can determine if surgery is an option for you.    Date Last Reviewed: 10/1/2016  © 8460-6316 [employer]. [address] [place] [address], [place] [address]. All rights reserved. This information is not intended as a substitute for professional medical care. Always follow your healthcare professional's instructions.

## 2023-10-31 NOTE — PROGRESS NOTES
Subjective:       Patient ID: Erinn Silva is a 67 y.o. female Body mass index is 34.35 kg/m².    Chief Complaint: slow digestion  and Gastroesophageal Reflux    This patient is new to me.  Referring Provider: Dr. Reed for abdominal discomfort.  Established patient of Dr. Bell.    GI Problem  The primary symptoms include abdominal pain and nausea (occasional, mild with reflux). Primary symptoms do not include fever, weight loss, fatigue, vomiting, diarrhea, melena, hematemesis, hematochezia or dysuria.   The abdominal pain began more than 2 days ago (intermittent for several years, occurs when reflux flares up). The abdominal pain is located in the RUQ and epigastric region (described as mild discomfort). The abdominal pain radiates to the back. The severity of the abdominal pain is 0/10 (currently).   The illness is also significant for dysphagia (occasional with food & large pills feels like it takes longer to go down; denies problems with liquids). The illness does not include chills, odynophagia or constipation. Significant associated medical issues include GERD (recurred ~3 weeks ago despite taking prilosec 20 mg once daily for the past 3 weeks; prior was taking it PRN).     Review of Systems   Constitutional:  Negative for appetite change, chills, fatigue, fever and weight loss.   HENT:  Positive for trouble swallowing. Negative for sore throat.    Respiratory:  Negative for cough, choking and shortness of breath.    Cardiovascular:  Negative for chest pain.   Gastrointestinal:  Positive for abdominal pain, dysphagia (occasional with food & large pills feels like it takes longer to go down; denies problems with liquids) and nausea (occasional, mild with reflux). Negative for anal bleeding, blood in stool, constipation, diarrhea, hematemesis, hematochezia, melena, rectal pain and vomiting.   Genitourinary:  Negative for difficulty urinating, dysuria and flank pain.   Neurological:  Negative for weakness.        No LMP recorded. Patient is postmenopausal.  Past Medical History:   Diagnosis Date    Anxiety     AR (allergic rhinitis)     Arthritis     Asthma, intermittent     Bilateral carotid artery stenosis 11/09/2021    Coronary artery disease     Depression     Diverticulosis     Endometrial polyp     Fatty liver     GERD (gastroesophageal reflux disease)     Hiatal hernia     Hyperlipidemia LDL goal <100     Hypertension     Mild mitral regurgitation     Multiple thyroid nodules 03/27/2014    Obesity (BMI 30-39.9) 03/27/2014    Renal cell carcinoma 2002    removed - no recurrence    S/P primary angioplasty with coronary stent 07/26/2017    Sleep apnea     Using CPAP    Solitary kidney, acquired     right    Tubular adenoma of colon 03/22/2022     Past Surgical History:   Procedure Laterality Date    APPENDECTOMY      CARDIAC CATHETERIZATION  06/27/2014    has blockages, but per patient no stents    CHOLECYSTECTOMY      COLONOSCOPY  2002    negative    COLONOSCOPY  03/25/2014         COLONOSCOPY N/A 03/22/2022    Procedure: COLONOSCOPY;  Surgeon: Hunter Garibay MD;  Location: [facility];  Service: Endoscopy;  Laterality: N/A; Repeat colonoscopy in 5 years for surveillance    CORONARY ANGIOPLASTY  2008?    balloon angioplasty    CORONARY ANGIOPLASTY WITH STENT PLACEMENT Right 07/26/2017    ESOPHAGOGASTRODUODENOSCOPY N/A 07/14/2021    Procedure: ESOPHAGOGASTRODUODENOSCOPY (EGD);  Surgeon: Sheng Bell III, MD;  Location: [facility];  Service: Endoscopy;  Laterality: N/A;    NEPHRECTOMY Left 2002    [facility] - cancer    OVARIAN CYST REMOVAL      PERCUTANEOUS ENDOVENOUS LASER ABLATION OF PERIPHERAL VEIN  06/05/2019    POLYPECTOMY      endometrial polyp removal    ROTATOR CUFF REPAIR      L    THYROIDECTOMY, PARTIAL Left     TUBAL LIGATION      UPPER GASTROINTESTINAL ENDOSCOPY  04/2014     Family History   Problem Relation Age of Onset    Heart attack Father 67    Arthritis Father      Hypertension Father     Asthma Brother     Diabetes Paternal Aunt     Allergies Paternal Aunt     Breast cancer Paternal Aunt     Diabetes Paternal Uncle     Allergies Maternal Grandmother     Breast cancer Daughter 40    Colon cancer Neg Hx     Stomach cancer Neg Hx     Angioedema Neg Hx     Atopy Neg Hx     Immunodeficiency Neg Hx     Urticaria Neg Hx     Rhinitis Neg Hx     Eczema Neg Hx     Asthma Neg Hx     Crohn's disease Neg Hx     Esophageal cancer Neg Hx     Ulcerative colitis Neg Hx      Social History     Tobacco Use    Smoking status: Never    Smokeless tobacco: Never   Substance Use Topics    Alcohol use: No    Drug use: No     Wt Readings from Last 10 Encounters:   10/31/23 85.2 kg (187 lb 13.3 oz)   09/20/23 87.5 kg (193 lb)   06/02/23 89.1 kg (196 lb 6.4 oz)   05/12/23 89 kg (196 lb 1.6 oz)   05/03/23 88 kg (194 lb)   03/03/23 88.5 kg (195 lb)   01/10/23 88.9 kg (195 lb 15.8 oz)   09/02/22 85.8 kg (189 lb 2.5 oz)   07/07/22 85.6 kg (188 lb 11.2 oz)   03/22/22 83.9 kg (185 lb)     Lab Results   Component Value Date    WBC 6.15 03/25/2022    HGB 13.5 03/25/2022    HCT 41.9 03/25/2022    MCV 90 03/25/2022     03/25/2022     CMP  Sodium   Date Value Ref Range Status   05/08/2023 140 136 - 145 mmol/L Final     Potassium   Date Value Ref Range Status   05/08/2023 5.1 3.5 - 5.1 mmol/L Final     Chloride   Date Value Ref Range Status   05/08/2023 102 95 - 110 mmol/L Final     CO2   Date Value Ref Range Status   05/08/2023 28 23 - 29 mmol/L Final     Glucose   Date Value Ref Range Status   05/08/2023 90 70 - 110 mg/dL Final     BUN   Date Value Ref Range Status   05/08/2023 16 8 - 23 mg/dL Final     Creatinine   Date Value Ref Range Status   05/08/2023 0.8 0.5 - 1.4 mg/dL Final     Calcium   Date Value Ref Range Status   05/08/2023 9.3 8.7 - 10.5 mg/dL Final     Total Protein   Date Value Ref Range Status   03/25/2022 7.3 6.0 - 8.4 g/dL Final     Albumin   Date Value Ref Range Status   05/08/2023 3.8  3.5 - 5.2 g/dL Final     Total Bilirubin   Date Value Ref Range Status   03/25/2022 0.6 0.1 - 1.0 mg/dL Final     Comment:     For infants and newborns, interpretation of results should be based  on gestational age, weight and in agreement with clinical  observations.    Premature Infant recommended reference ranges:  Up to 24 hours.............<8.0 mg/dL  Up to 48 hours............<12.0 mg/dL  3-5 days..................<15.0 mg/dL  6-29 days.................<15.0 mg/dL       Alkaline Phosphatase   Date Value Ref Range Status   03/25/2022 76 55 - 135 U/L Final     AST   Date Value Ref Range Status   03/25/2022 20 10 - 40 U/L Final     ALT   Date Value Ref Range Status   06/09/2023 20 10 - 44 U/L Final     Anion Gap   Date Value Ref Range Status   05/08/2023 10 8 - 16 mmol/L Final     eGFR if    Date Value Ref Range Status   03/25/2022 >60.0 >60 mL/min/1.73 m^2 Final     eGFR if non    Date Value Ref Range Status   03/25/2022 >60.0 >60 mL/min/1.73 m^2 Final     Comment:     Calculation used to obtain the estimated glomerular filtration  rate (eGFR) is the CKD-EPI equation.        Lab Results   Component Value Date    AMYLASE 65 03/18/2022     Lab Results   Component Value Date    LIPASE 39 03/18/2022     Lab Results   Component Value Date    TSH 1.820 06/09/2023     Reviewed prior medical records including radiology report of 12/27/2021 abdominal ultrasound; 7/15/2021 pelvic ultrasound; 1/31/2020 abdominal x-ray; 10/17/2017 CT abdomen pelvis; 4/17/2014 esophageal manometry; & endoscopy history (see surgical history/procedures).    Objective:      Physical Exam  Vitals and nursing note reviewed.   Constitutional:       General: She is not in acute distress.     Appearance: Normal appearance. She is well-developed. She is not diaphoretic.   HENT:      Mouth/Throat:      Comments: Patient is wearing a face mask, which covers patient's mouth and nose.  Eyes:      General: No scleral  icterus.     Conjunctiva/sclera: Conjunctivae normal.   Pulmonary:      Effort: Pulmonary effort is normal. No respiratory distress.      Breath sounds: Normal breath sounds. No wheezing.   Abdominal:      General: Bowel sounds are normal. There is no distension or abdominal bruit.      Palpations: Abdomen is soft. Abdomen is not rigid. There is no mass.      Tenderness: There is no abdominal tenderness. There is no guarding or rebound. Negative signs include Perez's sign and McBurney's sign.   Skin:     General: Skin is warm and dry.      Coloration: Skin is not jaundiced or pale.      Findings: No erythema or rash.   Neurological:      Mental Status: She is alert and oriented to person, place, and time.   Psychiatric:         Behavior: Behavior normal.         Thought Content: Thought content normal.         Judgment: Judgment normal.         Assessment:       1. Dyspepsia    2. Upper abdominal pain    3. History of gastroesophageal reflux (GERD)    4. History of gastritis    5. Hiatal hernia    6. Pharyngoesophageal dysphagia    7. Anticoagulant long-term use        Plan:       Dyspepsia  -   INCREASE TO  omeprazole (PRILOSEC) 40 MG capsule; Take 1 capsule (40 mg total) by mouth before breakfast.  Dispense: 30 capsule; Refill: 1  - schedule EGD, discussed procedure with patient, including risks and benefits, patient verbalized understanding    Upper abdominal pain  -   INCREASE TO  omeprazole (PRILOSEC) 40 MG capsule; Take 1 capsule (40 mg total) by mouth before breakfast.  Dispense: 30 capsule; Refill: 1  - schedule EGD, discussed procedure with patient, including risks and benefits, patient verbalized understanding  - avoid/minimize use of NSAIDs- since they can cause GI upset, bleeding and/or ulcers. If NSAID must be taken, recommend take with food.  - Possible abdominal imaging pending results of testing and if symptoms persist    History of gastroesophageal reflux (GERD) & History of gastritis  -  INCREASE  TO   omeprazole (PRILOSEC) 40 MG capsule; Take 1 capsule (40 mg total) by mouth before breakfast.  Dispense: 30 capsule; Refill: 1  - schedule EGD, discussed procedure with patient, including risks and benefits, patient verbalized understanding    Hiatal hernia  -  INCREASE TO   omeprazole (PRILOSEC) 40 MG capsule; Take 1 capsule (40 mg total) by mouth before breakfast.  Dispense: 30 capsule; Refill: 1  - schedule EGD, discussed procedure with patient, including risks and benefits, patient verbalized understanding    Pharyngoesophageal dysphagia  - schedule EGD, discussed procedure with patient and possible esophageal dilation may be performed during procedure if indicated, patient verbalized understanding  - educated patient to eat smaller more frequent meals and to eat slowly and advised to eat a soft diet.  - possible UGI/esophagram/esophageal manometry if symptoms persist    Anticoagulant long-term use  - informed patient that the anticoagulant(s) will likely need to be held for endoscopy, nurse will confirm with endoscopist, cardiologist, and/or PCP.    Follow up in about 1 month (around 11/30/2023), or if symptoms worsen or fail to improve.      If no improvement in symptoms or symptoms worsen, call/follow-up at clinic or go to ER.        39 minutes of total time spent on the encounter, which includes face to face time and non-face to face time preparing to see the patient (e.g., review of tests), Obtaining and/or reviewing separately obtained history, Documenting clinical information in the electronic or other health record, Independently interpreting results (not separately reported) and communicating results to the patient/family/caregiver, or Care coordination (not separately reported).

## 2023-10-31 NOTE — LETTER
October 31, 2023        Layo Reed MD  [id number] [facility]  [place] [address]             [facility]  [address] [place] [address]  Phone: [phone]   Patient: Erinn Silva   MR Number: [id number]   YOB: 1955   Date of Visit: 10/31/2023       Dear Dr. Reed:    Thank you for referring Erinn Silva to me for evaluation. Below are the relevant portions of my assessment and plan of care.    1. Hiatal hernia    2. History of gastritis      Hiatal hernia    History of gastritis      No follow-ups on file.      If you have questions, please do not hesitate to call me. I look forward to following Erinn along with you.    Sincerely,      Carol Martino, FNP           CC  No Recipients

## 2023-11-06 NOTE — TELEPHONE ENCOUNTER
Care Due:                  Date            Visit Type   Department     Provider  --------------------------------------------------------------------------------                                MYCHART                              FOLLOWUP/OF  [facility] FAMILY  Last Visit: 09-      FICE VISIT   MEDICINE       Layo Reed  Next Visit: None Scheduled  None         None Found                                                            Last  Test          Frequency    Reason                     Performed    Due Date  --------------------------------------------------------------------------------    CBC.........  12 months..  diclofenac...............  03- 03-    CMP.........  12 months..  pravastatin..............  03- 03-    [facility] Embedded Care Due Messages. Reference number: [id number].   11/06/2023 3:08:50 PM CST

## 2023-11-07 ENCOUNTER — PATIENT MESSAGE (OUTPATIENT)
Dept: FAMILY MEDICINE | Facility: CLINIC | Age: 68
End: 2023-11-07
Payer: COMMERCIAL

## 2023-11-07 RX ORDER — HYDROCHLOROTHIAZIDE 12.5 MG/1
12.5 TABLET ORAL DAILY
Qty: 90 TABLET | Refills: 0 | Status: SHIPPED | OUTPATIENT
Start: 2023-11-07 | End: 2024-02-14

## 2023-11-07 NOTE — TELEPHONE ENCOUNTER
Refill Routing Note   Medication(s) are not appropriate for processing by [facility] for the following reason(s):      Required vitals abnormal    [facility] action(s):  Defer Care Due:  Labs due     Medication Therapy Plan: labs: cbc ntbl to future labs      Appointments  past 12m or future 3m with PCP    Date Provider   Last Visit   9/20/2023 Layo Reed MD   Next Visit   Visit date not found Layo Reed MD   ED visits in past 90 days: 0        Note composed:7:59 AM 11/07/2023

## 2023-11-14 ENCOUNTER — ANESTHESIA (OUTPATIENT)
Dept: ENDOSCOPY | Facility: HOSPITAL | Age: 68
End: 2023-11-14
Payer: COMMERCIAL

## 2023-11-14 ENCOUNTER — HOSPITAL ENCOUNTER (OUTPATIENT)
Facility: HOSPITAL | Age: 68
Discharge: HOME | End: 2023-11-14
Attending: INTERNAL MEDICINE | Admitting: FAMILY MEDICINE
Payer: COMMERCIAL

## 2023-11-14 ENCOUNTER — ANESTHESIA EVENT (OUTPATIENT)
Dept: ENDOSCOPY | Facility: HOSPITAL | Age: 68
End: 2023-11-14
Payer: COMMERCIAL

## 2023-11-14 DIAGNOSIS — R13.10 DYSPHAGIA: ICD-10-CM

## 2023-11-14 PROCEDURE — 43248 EGD GUIDE WIRE INSERTION: CPT | Mod: ,,, | Performed by: INTERNAL MEDICINE

## 2023-11-14 PROCEDURE — 63600175 PHARM REV CODE 636 W HCPCS: Mod: PO | Performed by: NURSE ANESTHETIST, CERTIFIED REGISTERED

## 2023-11-14 PROCEDURE — D9220A PRA ANESTHESIA: Mod: CRNA,,, | Performed by: NURSE ANESTHETIST, CERTIFIED REGISTERED

## 2023-11-14 PROCEDURE — 43239 PR EGD, FLEX, W/BIOPSY, SGL/MULTI: ICD-10-PCS | Mod: 59,,, | Performed by: INTERNAL MEDICINE

## 2023-11-14 PROCEDURE — D9220A PRA ANESTHESIA: Mod: ANES,,, | Performed by: ANESTHESIOLOGY

## 2023-11-14 PROCEDURE — 43248 PR EGD, FLEX, W/DILATION OVER GUIDEWIRE: ICD-10-PCS | Mod: ,,, | Performed by: INTERNAL MEDICINE

## 2023-11-14 PROCEDURE — 63600175 PHARM REV CODE 636 W HCPCS: Mod: PO | Performed by: INTERNAL MEDICINE

## 2023-11-14 PROCEDURE — 88305 TISSUE EXAM BY PATHOLOGIST: CPT | Mod: PO | Performed by: PATHOLOGY

## 2023-11-14 PROCEDURE — 37000009 HC ANESTHESIA EA ADD 15 MINS: Mod: PO | Performed by: INTERNAL MEDICINE

## 2023-11-14 PROCEDURE — 37000008 HC ANESTHESIA 1ST 15 MINUTES: Mod: PO | Performed by: INTERNAL MEDICINE

## 2023-11-14 PROCEDURE — 88305 TISSUE EXAM BY PATHOLOGIST: CPT | Mod: 26,,, | Performed by: PATHOLOGY

## 2023-11-14 PROCEDURE — 27201012 HC FORCEPS, HOT/COLD, DISP: Mod: PO | Performed by: INTERNAL MEDICINE

## 2023-11-14 PROCEDURE — C1769 GUIDE WIRE: HCPCS | Mod: PO | Performed by: INTERNAL MEDICINE

## 2023-11-14 PROCEDURE — 88305 TISSUE EXAM BY PATHOLOGIST: ICD-10-PCS | Mod: 26,,, | Performed by: PATHOLOGY

## 2023-11-14 PROCEDURE — 43239 EGD BIOPSY SINGLE/MULTIPLE: CPT | Mod: 59,PO | Performed by: INTERNAL MEDICINE

## 2023-11-14 PROCEDURE — D9220A PRA ANESTHESIA: ICD-10-PCS | Mod: CRNA,,, | Performed by: NURSE ANESTHETIST, CERTIFIED REGISTERED

## 2023-11-14 PROCEDURE — 43239 EGD BIOPSY SINGLE/MULTIPLE: CPT | Mod: 59,,, | Performed by: INTERNAL MEDICINE

## 2023-11-14 PROCEDURE — 25000003 PHARM REV CODE 250: Mod: PO | Performed by: NURSE ANESTHETIST, CERTIFIED REGISTERED

## 2023-11-14 PROCEDURE — D9220A PRA ANESTHESIA: ICD-10-PCS | Mod: ANES,,, | Performed by: ANESTHESIOLOGY

## 2023-11-14 PROCEDURE — 43248 EGD GUIDE WIRE INSERTION: CPT | Mod: PO | Performed by: INTERNAL MEDICINE

## 2023-11-14 RX ORDER — ESMOLOL HYDROCHLORIDE 10 MG/ML
INJECTION INTRAVENOUS
Status: DISCONTINUED | OUTPATIENT
Start: 2023-11-14 | End: 2023-11-14

## 2023-11-14 RX ORDER — PROPOFOL 10 MG/ML
VIAL (ML) INTRAVENOUS
Status: DISCONTINUED | OUTPATIENT
Start: 2023-11-14 | End: 2023-11-14

## 2023-11-14 RX ORDER — LIDOCAINE HYDROCHLORIDE 20 MG/ML
INJECTION INTRAVENOUS
Status: DISCONTINUED | OUTPATIENT
Start: 2023-11-14 | End: 2023-11-14

## 2023-11-14 RX ORDER — SODIUM CHLORIDE, SODIUM LACTATE, POTASSIUM CHLORIDE, CALCIUM CHLORIDE 600; 310; 30; 20 MG/100ML; MG/100ML; MG/100ML; MG/100ML
INJECTION, SOLUTION INTRAVENOUS CONTINUOUS
Status: DISCONTINUED | OUTPATIENT
Start: 2023-11-14 | End: 2023-11-14 | Stop reason: HOSPADM

## 2023-11-14 RX ORDER — SODIUM CHLORIDE 0.9 % (FLUSH) 0.9 %
10 SYRINGE (ML) INJECTION
Status: DISCONTINUED | OUTPATIENT
Start: 2023-11-14 | End: 2023-11-14 | Stop reason: HOSPADM

## 2023-11-14 RX ADMIN — LIDOCAINE HYDROCHLORIDE 75 MG: 20 INJECTION INTRAVENOUS at 11:11

## 2023-11-14 RX ADMIN — Medication 120 MG: at 10:11

## 2023-11-14 RX ADMIN — Medication 30 MG: at 11:11

## 2023-11-14 RX ADMIN — SODIUM CHLORIDE, POTASSIUM CHLORIDE, SODIUM LACTATE AND CALCIUM CHLORIDE: 600; 310; 30; 20 INJECTION, SOLUTION INTRAVENOUS at 09:11

## 2023-11-14 RX ADMIN — LIDOCAINE HYDROCHLORIDE 75 MG: 20 INJECTION INTRAVENOUS at 10:11

## 2023-11-14 RX ADMIN — Medication 40 MG: at 11:11

## 2023-11-14 RX ADMIN — ESMOLOL HYDROCHLORIDE 20 MG: 10 INJECTION, SOLUTION INTRAVENOUS at 11:11

## 2023-11-14 NOTE — ANESTHESIA PREPROCEDURE EVALUATION
11/14/2023  Erinn Silva is a 67 y.o., female.      Pre-op Assessment    I have reviewed the Patient Summary Reports.     I have reviewed the Nursing Notes. I have reviewed the NPO Status.   I have reviewed the Medications.     Review of Systems  Anesthesia Hx:  No problems with previous Anesthesia                Social:  Non-Smoker       Hematology/Oncology:                        --  Cancer in past history (renal cell CA):                     Cardiovascular:     Hypertension, well controlled Valvular problems/Murmurs, MR  CAD  asymptomatic         hyperlipidemia       Coronary Artery Disease:                            Hypertension         Pulmonary:    Asthma asymptomatic   Sleep Apnea   Asthma:    Obstructive Sleep Apnea (KV[facility]).           Renal/:  Chronic Renal Disease (solitary kidney)        Kidney Function/Disease             Hepatic/GI:    Hiatal Hernia, GERD Liver Disease,     Gerd    Hernia, Hiatal Hernia   Liver Disease        Musculoskeletal:  Arthritis               Neurological:    Neuromuscular Disease,                                 Neuromuscular Disease   Endocrine:  Endocrine Normal          Obesity / BMI > 30, Morbid Obesity / BMI > 40  Psych:  Psychiatric History anxiety depression              Physical Exam  General: Well nourished, Cooperative, Alert and Oriented    Airway:  Mallampati: II   Mouth Opening: Normal  TM Distance: Normal  Neck ROM: Normal ROM    Anesthesia Plan  Type of Anesthesia, risks & benefits discussed:    Anesthesia Type: Gen ETT, Gen Supraglottic Airway, Gen Natural Airway, MAC  Intra-op Monitoring Plan: Standard ASA Monitors  Post Op Pain Control Plan: multimodal analgesia  Induction:  IV  Airway Plan: Direct, Video and Fiberoptic, Post-Induction  Informed Consent: Informed consent signed with the Patient and all parties understand the risks and agree with  anesthesia plan.  All questions answered.   ASA Score: 3    Ready For Surgery From Anesthesia Perspective.   .

## 2023-11-14 NOTE — DISCHARGE INSTRUCTIONS
Recovery After Procedural Sedation (Adult)   You have been given medicine by vein to make you sleep during your surgery. This may have included both a pain medicine and sleeping medicine. Most of the effects have worn off. But you may still have some drowsiness for the next 6 to 8 hours.  Home care  Follow these guidelines when you get home:  For the next 8 hours, you should be watched by a responsible adult. This person should make sure your condition is not getting worse.  Don't drink any alcohol for the next 24 hours.  Don't drive, operate dangerous machinery, or make important business or personal decisions during the next 24 hours.  To prevent injury or falls, use caution when standing and walking for at least 24 hours after your procedure.  Note: Your healthcare provider may tell you not to take any medicine by mouth for pain or sleep in the next 4 hours. These medicines may react with the medicines you were given in the hospital. This could cause a much stronger response than usual.  Follow-up care  Follow up with your healthcare provider if you are not alert and back to your usual level of activity within 12 hours.  When to seek medical advice  Call your healthcare provider right away if any of these occur:  Drowsiness gets worse  Weakness or dizziness gets worse  Repeated vomiting  You can't be awakened  Fever  New rash  [employer] last reviewed this educational content on 9/1/2019  © 0764-9079 [employer]. [address] [place] [address], [place] [address]. All rights reserved. This information is not intended as a substitute for professional medical care. Always follow your healthcare professional's instructions         Tips to Control Acid Reflux    To control acid reflux, youll need to make some basic diet and lifestyle changes. The simple steps outlined below may be all youll need to ease discomfort.  Watch what you eat  Avoid fatty foods and spicy foods.  Eat fewer acidic foods, such as citrus  and tomato-based foods. These can increase symptoms.  Limit drinking alcohol, caffeine, and fizzy beverages. All increase acid reflux.  Try limiting chocolate, peppermint, and spearmint. These can worsen acid reflux in some people.  Watch when you eat  Avoid lying down for 3 hours after eating.  Do not snack before going to bed.  Raise your head  Raising your head and upper body by 4 to 6 inches helps limit reflux when youre lying down. Put blocks under the head of your bed frame to raise it.  Other changes  Lose weight, if you need to  Dont exercise near bedtime  Avoid tight-fitting clothes  Limit aspirin and ibuprofen  Stop smoking   Date Last Reviewed: 7/1/2016  © 7052-4639 [employer]. [address] [place] [address], [place] [address]. All rights reserved. This information is not intended as a substitute for professional medical care. Always follow your healthcare professional's instructions.         High-Fiber Diet  Fiber is in fruits, vegetables, cereals, and grains. Fiber passes through your body undigested. A high-fiber diet helps food move through your intestinal tract. The added bulk is helpful in preventing constipation. In people with diverticulosis, fiber helps clean out the pouches along the colon wall. It also prevents new pouches from forming. A high-fiber diet reduces the risk of colon cancer. It also lowers blood cholesterol and prevents high blood sugar in people with diabetes.    The fiber-rich foods listed below should be part of your diet. If you are not used to high-fiber foods, start with 1 or 2 foods from this list. Every 3 to 4 days add a new one to your diet. Do this until you are eating 4 high-fiber foods per day. This should give you 20 to 35 grams of fiber a day. It is also important to drink a lot of water when you are on this diet. You should have 6 to 8 glasses of water a day. Water makes the fiber swell and increases the benefit.  Foods high in dietary fiber  The following  foods are high in dietary fiber:  Breads. Breads made with 100% whole-wheat flour; rocío, wheat, or rye crackers; whole-grain tortillas, bran muffins.  Cereals. Whole-grain and bran cereals with bran (shredded wheat, wheat flakes, raisin bran, corn bran); oatmeal, rolled oats, granola, and brown rice.  Fruits. Fresh fruits and their edible skins (pears, prunes, raisins, berries, apples, and apricots); bananas, citrus fruit, mangoes, pineapple; and prune juice.  Nuts. Any nuts and seeds.  Vegetables. Best served raw or lightly cooked. All types, especially: green peas, celery, eggplant, potatoes, spinach, broccoli, [place] sprouts, winter squash, carrots, cauliflower, soybeans, lentils, and fresh and dried beans of all kinds.  Other. Popcorn, any spices.  Date Last Reviewed: 8/1/2016  © 5488-7523 [employer]. [address] [place] [address]. All rights reserved. This information is not intended as a substitute for professional medical care. Always follow your healthcare professional's instructions.

## 2023-11-14 NOTE — H&P
History & Physical - Short Stay  Gastroenterology      SUBJECTIVE:     Procedure: Gastroscopy    Chief Complaint/Indication for Procedure:  GERD.  Dysphagia.  Dyspepsia.    History of Present Illness:    See recent GI OV note:  Office Visit   10/31/2023  [place] - Gastroenterology       Carol Martino FNP  Gastroenterology Dyspepsia +6 more  Dx slow digestion   Gastroesophageal Reflux; Referred by Self, Aaareferral  Reason for Visit     Progress Notes    Carol Martino FNP at 10/31/2023  1:30 PM    Status: Signed   Expand All Collapse All  Subjective:         Subjective   Patient ID: Erinn Silva is a 67 y.o. female Body mass index is 34.35 kg/m².     Chief Complaint: slow digestion  and Gastroesophageal Reflux     This patient is new to me.  Referring Provider: Dr. Reed for abdominal discomfort.  Established patient of Dr. Bell.     GI Problem  The primary symptoms include abdominal pain and nausea (occasional, mild with reflux). Primary symptoms do not include fever, weight loss, fatigue, vomiting, diarrhea, melena, hematemesis, hematochezia or dysuria.   The abdominal pain began more than 2 days ago (intermittent for several years, occurs when reflux flares up). The abdominal pain is located in the RUQ and epigastric region (described as mild discomfort). The abdominal pain radiates to the back. The severity of the abdominal pain is 0/10 (currently).   The illness is also significant for dysphagia (occasional with food & large pills feels like it takes longer to go down; denies problems with liquids). The illness does not include chills, odynophagia or constipation. Significant associated medical issues include GERD (recurred ~3 weeks ago despite taking prilosec 20 mg once daily for the past 3 weeks; prior was taking it PRN).      Review of Systems   Constitutional:  Negative for appetite change, chills, fatigue, fever and weight loss.   HENT:  Positive for trouble swallowing. Negative for  sore throat.    Respiratory:  Negative for cough, choking and shortness of breath.    Cardiovascular:  Negative for chest pain.   Gastrointestinal:  Positive for abdominal pain, dysphagia (occasional with food & large pills feels like it takes longer to go down; denies problems with liquids) and nausea (occasional, mild with reflux). Negative for anal bleeding, blood in stool, constipation, diarrhea, hematemesis, hematochezia, melena, rectal pain and vomiting.     Assessment:      Assessment   1. Dyspepsia    2. Upper abdominal pain    3. History of gastroesophageal reflux (GERD)    4. History of gastritis    5. Hiatal hernia    6. Pharyngoesophageal dysphagia    7. Anticoagulant long-term use          Plan:      Plan   Dyspepsia  -   INCREASE TO  omeprazole (PRILOSEC) 40 MG capsule; Take 1 capsule (40 mg total) by mouth before breakfast.  Dispense: 30 capsule; Refill: 1  - schedule EGD, discussed procedure with patient, including risks and benefits, patient verbalized understanding     Upper abdominal pain  -   INCREASE TO  omeprazole (PRILOSEC) 40 MG capsule; Take 1 capsule (40 mg total) by mouth before breakfast.  Dispense: 30 capsule; Refill: 1  - schedule EGD, discussed procedure with patient, including risks and benefits, patient verbalized understanding  - avoid/minimize use of NSAIDs- since they can cause GI upset, bleeding and/or ulcers. If NSAID must be taken, recommend take with food.  - Possible abdominal imaging pending results of testing and if symptoms persist     History of gastroesophageal reflux (GERD) & History of gastritis  -  INCREASE TO   omeprazole (PRILOSEC) 40 MG capsule; Take 1 capsule (40 mg total) by mouth before breakfast.  Dispense: 30 capsule; Refill: 1  - schedule EGD, discussed procedure with patient, including risks and benefits, patient verbalized understanding     Hiatal hernia  -  INCREASE TO   omeprazole (PRILOSEC) 40 MG capsule; Take 1 capsule (40 mg total) by mouth before  breakfast.  Dispense: 30 capsule; Refill: 1  - schedule EGD, discussed procedure with patient, including risks and benefits, patient verbalized understanding     Pharyngoesophageal dysphagia  - schedule EGD, discussed procedure with patient and possible esophageal dilation may be performed during procedure if indicated, patient verbalized understanding  - educated patient to eat smaller more frequent meals and to eat slowly and advised to eat a soft diet.  - possible UGI/esophagram/esophageal manometry if symptoms persist     Anticoagulant long-term use  - informed patient that the anticoagulant(s) will likely need to be held for endoscopy, nurse will confirm with endoscopist, cardiologist, and/or PCP.               See last Upper GI endoscopy 7/14/2021   Indications:           Epigastric abdominal pain, Suspected esophageal reflux   Providers:             Sheng Bell III, MD   Impression:    - Z-line irregular, 38 cm from the incisors.                          - No gross lesions in esophagus.                          - Erythematous mucosa in the antrum and prepyloric                          region of the stomach. Biopsied.                          - Normal duodenal bulb, second portion of the                          duodenum and third portion of the duodenum.                          - Normal hypopharynx.                          - Six biopsies were obtained in the second portion                          of the duodenum and in the third portion of the                          duodenum.   Recommendation:        - Discharge patient to home (via wheelchair).                          - Resume previous diet.                          - Continue present medications.                          - Await pathology results.                          - Return to referring physician as previously scheduled.   Sheng Bell III, MD   7/14/2021     1. Stomach, antrum, biopsy:   - Gastric antral mucosa with mild chronic inactive  gastritis   - No Helicobacter pylori organisms identified on immunohistochemical stain   - Negative for intestinal metaplasia and dysplasia   2. Duodenum, biopsy:   - Duodenal mucosa with no diagnostic histopathologic alterations   - No morphologic evidence of gluten-sensitive enteropathy       PTA Medications   Medication Sig    acetaminophen (TYLENOL) 650 MG TbSR Take 2 tablets (1,300 mg total) by mouth every 8 (eight) hours.    albuterol (PROVENTIL/VENTOLIN HFA) 90 mcg/actuation inhaler Inhale 2 puffs into the lungs every 6 (six) hours as needed for Wheezing.    aspirin (ECOTRIN) 81 MG EC tablet Take 81 mg by mouth once daily.    diclofenac sodium (VOLTAREN) 1 % Gel Apply 2 g topically 3 (three) times daily.    EScitalopram oxalate (LEXAPRO) 10 MG tablet Take 1 tablet (10 mg total) by mouth once daily.    hydroCHLOROthiazide (HYDRODIURIL) 12.5 MG Tab Take 1 tablet (12.5 mg total) by mouth once daily.    multivitamin capsule Take 1 capsule by mouth once daily.    omeprazole (PRILOSEC) 40 MG capsule Take 1 capsule (40 mg total) by mouth before breakfast.    pravastatin (PRAVACHOL) 40 MG tablet Take 1 tablet (40 mg total) by mouth every evening.    valsartan (DIOVAN) 320 MG tablet Take 1 tablet (320 mg total) by mouth once daily.       Review of patient's allergies indicates:   Allergen Reactions    Oxycodone Shortness Of Breath, Nausea And Vomiting and Other (See Comments)     Anxiety.    Plavix [clopidogrel] Diarrhea     Stomach cramps    Azithromycin Itching    Codeine Palpitations    Corticosteroids (glucocorticoids) Palpitations    Doxycycline Itching and Nausea Only    Hydralazine analogues Palpitations    Mobic [meloxicam] Itching    Neuromuscular blockers, steroidal Palpitations    Nickel sutures [surgical stainless steel] Rash     Jewelery    Pcn [penicillins] Nausea And Vomiting    Shellfish containing products Itching     topical iodine OK    Sulfa (sulfonamide antibiotics) Nausea And Vomiting        Past  Medical History:   Diagnosis Date    Anxiety     AR (allergic rhinitis)     Arthritis     Asthma, intermittent     Bilateral carotid artery stenosis 11/09/2021    Coronary artery disease     Depression     Diverticulosis     Endometrial polyp     Fatty liver     GERD (gastroesophageal reflux disease)     Hiatal hernia     Hyperlipidemia LDL goal <100     Hypertension     Mild mitral regurgitation     Multiple thyroid nodules 03/27/2014    Obesity (BMI 30-39.9) 03/27/2014    Renal cell carcinoma 2002    removed - no recurrence    S/P primary angioplasty with coronary stent 07/26/2017    Sleep apnea     Using CPAP    Solitary kidney, acquired     right    Tubular adenoma of colon 03/22/2022     Past Surgical History:   Procedure Laterality Date    APPENDECTOMY      CARDIAC CATHETERIZATION  06/27/2014    has blockages, but per patient no stents    CHOLECYSTECTOMY      COLONOSCOPY  2002    negative    COLONOSCOPY  03/25/2014         COLONOSCOPY N/A 03/22/2022    Procedure: COLONOSCOPY;  Surgeon: Hunter Garibay MD;  Location: [facility] ENDO;  Service: Endoscopy;  Laterality: N/A; Repeat colonoscopy in 5 years for surveillance    CORONARY ANGIOPLASTY  2008?    balloon angioplasty    CORONARY ANGIOPLASTY WITH STENT PLACEMENT Right 07/26/2017    ESOPHAGOGASTRODUODENOSCOPY N/A 07/14/2021    Procedure: ESOPHAGOGASTRODUODENOSCOPY (EGD);  Surgeon: Sheng Bell III, MD;  Location: [facility];  Service: Endoscopy;  Laterality: N/A;    NEPHRECTOMY Left 2002    [facility] - cancer    OVARIAN CYST REMOVAL      PERCUTANEOUS ENDOVENOUS LASER ABLATION OF PERIPHERAL VEIN  06/05/2019    POLYPECTOMY      endometrial polyp removal    ROTATOR CUFF REPAIR      L    THYROIDECTOMY, PARTIAL Left     TUBAL LIGATION      UPPER GASTROINTESTINAL ENDOSCOPY  04/2014     Family History   Problem Relation Age of Onset    Heart attack Father 67    Arthritis Father     Hypertension Father     Asthma Brother     Diabetes Paternal  "Aunt     Allergies Paternal Aunt     Breast cancer Paternal Aunt     Diabetes Paternal Uncle     Allergies Maternal Grandmother     Breast cancer Daughter 40    Colon cancer Neg Hx     Stomach cancer Neg Hx     Angioedema Neg Hx     Atopy Neg Hx     Immunodeficiency Neg Hx     Urticaria Neg Hx     Rhinitis Neg Hx     Eczema Neg Hx     Asthma Neg Hx     Crohn's disease Neg Hx     Esophageal cancer Neg Hx     Ulcerative colitis Neg Hx      Social History     Tobacco Use    Smoking status: Never    Smokeless tobacco: Never   Substance Use Topics    Alcohol use: No    Drug use: No         OBJECTIVE:     Vital Signs (Most Recent)  Temp: 97.3 °F (36.3 °C) (11/14/23 0940)  Pulse: 60 (11/14/23 0908)  Resp: 16 (11/14/23 0908)  BP: (!) 154/66 (11/14/23 0940)  SpO2: 100 % (11/14/23 0908)    Physical Exam:  : Ht: 5' 5" (165.1 cm)   Wt: 87.5 kg (193 lb)   BMI: 32.12 kg/m² .                                                       GENERAL:  Comfortable, in no acute distress.                                 HEENT EXAM:  Nonicteric.  No adenopathy.  Oropharynx is clear.               NECK:  Supple.                                                               LUNGS:  Clear.                                                               CARDIAC:  Regular rate and rhythm.  S1, S2.  No murmur.                      ABDOMEN:  Obese.  Soft, positive bowel sounds, nontender.  No hepatosplenomegaly or masses.  No rebound or guarding.                                             EXTREMITIES:  No edema.     MENTAL STATUS:  Alert and oriented.    ASSESSMENT/PLAN:     Assessment: GERD.  Dysphagia.  Dyspepsia.    Plan: Gastroscopy    Anesthesia Plan:   MAC / General Anaesthesia    ASA Grade: ASA 2 - Patient with mild systemic disease with no functional limitations    MALLAMPATI SCORE: II (hard and soft palate, upper portion of tonsils anduvula visible)    "

## 2023-11-14 NOTE — BRIEF OP NOTE
Discharge Note  Short Stay      SUMMARY     Admit Date: 11/14/2023    Attending Physician: Guille Trujillo Jr., MD     Discharge Physician: Guille Trujillo Jr., MD    Discharge Date: 11/14/2023 11:39 AM    Final Diagnosis: Heartburn [R12]  Hiatal hernia [K44.9]  Epigastric pain [R10.13]  Dysphagia, unspecified type [R13.10]      Impression:            - Normal oropharynx.                          - Normal cricopharyngeus.                          - Normal upper third of esophagus and middle third                          of esophagus.                          - Reflux esophagitis. Biopsied.                          - Z-line regular, 37 cm from the incisors.                          - No endoscopic esophageal abnormality to explain                          patient's dysphagia. Esophagus dilated, 51 Fr.                          - Normal cardia.                          - Normal gastric fundus and gastric body.                          - Slight antritis. Biopsied.                          - Normal pylorus.                          - Normal examined duodenum.   Recommendation:        - Discharge patient to home.                          - Follow an antireflux regimen.                          - Exercise and weight loss.                          - Continue present medications.                          - Use Prilosec (omeprazole) 40 mg PO daily.                          - Await pathology results.                          - Return to GI clinic in 4-6 weeks.   Guille Trujillo MD   11/14/2023       Disposition: HOME OR SELF CARE    Patient Instructions:   Current Discharge Medication List        CONTINUE these medications which have NOT CHANGED    Details   acetaminophen (TYLENOL) 650 MG TbSR Take 2 tablets (1,300 mg total) by mouth every 8 (eight) hours.  Refills: 0      albuterol (PROVENTIL/VENTOLIN HFA) 90 mcg/actuation inhaler Inhale 2 puffs into the lungs every 6 (six) hours as needed for Wheezing.  Qty: 18 g,  Refills: 3    Associated Diagnoses: Medication refill      aspirin (ECOTRIN) 81 MG EC tablet Take 81 mg by mouth once daily.      diclofenac sodium (VOLTAREN) 1 % Gel Apply 2 g topically 3 (three) times daily.  Qty: 100 g, Refills: 5      EScitalopram oxalate (LEXAPRO) 10 MG tablet Take 1 tablet (10 mg total) by mouth once daily.  Qty: 90 tablet, Refills: 3      hydroCHLOROthiazide (HYDRODIURIL) 12.5 MG Tab Take 1 tablet (12.5 mg total) by mouth once daily.  Qty: 90 tablet, Refills: 0    Comments: .      multivitamin capsule Take 1 capsule by mouth once daily.      omeprazole (PRILOSEC) 40 MG capsule Take 1 capsule (40 mg total) by mouth before breakfast.  Qty: 30 capsule, Refills: 1    Associated Diagnoses: Hiatal hernia; History of gastritis; Dyspepsia; History of gastroesophageal reflux (GERD)      pravastatin (PRAVACHOL) 40 MG tablet Take 1 tablet (40 mg total) by mouth every evening.  Qty: 90 tablet, Refills: 3    Associated Diagnoses: Hyperlipidemia LDL goal <100      valsartan (DIOVAN) 320 MG tablet Take 1 tablet (320 mg total) by mouth once daily.  Qty: 90 tablet, Refills: 0    Comments: .             Discharge Procedure Orders (must include Diet, Follow-up, Activity)    Follow Up:  Follow up with PCP as per your routine.  Please follow an anti reflux diet and a high fiber diet.  Activity as tolerated.    No driving day of procedure.

## 2023-11-14 NOTE — [employer] PATIENT INSTRUCTIONS
Discharge Summary/Instructions after an Endoscopic Procedure  Patient Name: Erinn Silva  Patient MRN: [id number]  Patient YOB: 1955 Tuesday, November 14, 2023  Guille Trujillo MD  Dear patient,  As a result of recent federal legislation (The Federal Cures Act), you may   receive lab or pathology results from your procedure in your [facility]   account before your physician is able to contact you. Your physician or   their representative will relay the results to you with their   recommendations at their soonest availability.  Thank you,  RESTRICTIONS:  During your procedure today, you received medications for sedation.  These   medications may affect your judgment, balance and coordination.  Therefore,   for 24 hours, you have the following restrictions:   - DO NOT drive a car, operate machinery, make legal/financial decisions,   sign important papers or drink alcohol.    ACTIVITY:  Today: no heavy lifting, straining or running due to procedural   sedation/anesthesia.  The following day: return to full activity including work.  DIET:  Eat and drink normally unless instructed otherwise.     TREATMENT FOR COMMON SIDE EFFECTS:  - Mild abdominal pain, nausea, belching, bloating or excessive gas:  rest,   eat lightly and use a heating pad.  - Sore Throat: treat with throat lozenges and/or gargle with warm salt   water.  - Because air was used during the procedure, expelling large amounts of air   from your rectum or belching is normal.  - If a bowel prep was taken, you may not have a bowel movement for 1-3 days.    This is normal.  SYMPTOMS TO WATCH FOR AND REPORT TO YOUR PHYSICIAN:  1. Abdominal pain or bloating, other than gas cramps.  2. Chest pain.  3. Back pain.  4. Signs of infection such as: chills or fever occurring within 24 hours   after the procedure.  5. Rectal bleeding, which would show as bright red, maroon, or black stools.   (A tablespoon of blood from the rectum is not serious,  especially if   hemorrhoids are present.)  6. Vomiting.  7. Weakness or dizziness.  GO DIRECTLY TO THE NEAREST EMERGENCY ROOM IF YOU HAVE ANY OF THE FOLLOWING:      Difficulty breathing              Chills and/or fever over 101 F   Persistent vomiting and/or vomiting blood   Severe abdominal pain   Severe chest pain   Black, tarry stools   Bleeding- more than one tablespoon   Any other symptom or condition that you feel may need urgent attention  Your doctor recommends these additional instructions:  If any biopsies were taken, your doctors clinic will contact you in 1 to 2   weeks with any results.  You are being discharged to home.   Follow an antireflux regimen.  This includes:       - Do not lie down for at least 3 to 4 hours after meals.        - Raise the head of the bed 4 to 6 inches.        - Decrease excess weight.        - Avoid citrus juices and other acidic foods, alcohol, chocolate, mints,   coffee and other caffeinated beverages, carbonated beverages, fatty and   fried foods.        - Avoid tight-fitting clothing.        - Avoid cigarettes and other tobacco products.   Especially:  Exercise and weight loss.     Continue your present medications.   Take Prilosec (omeprazole) 40 mg by mouth once a day.   Return to GI clinic in 4-6 weeks.  For questions, problems or results please call your physician - Guille Trujillo MD at Work:  [phone].  EMERGENCY PHONE NUMBER: [phone], LAB RESULTS: [phone]  IF A COMPLICATION OR EMERGENCY SITUATION ARISES AND YOU ARE UNABLE TO REACH   YOUR PHYSICIAN - GO DIRECTLY TO THE EMERGENCY ROOM.  ___________________________________________  Nurse Signature  ___________________________________________  Patient/Designated Responsible Party Signature  Guille Trujillo MD  11/14/2023 11:37:52 AM  This report has been verified and signed electronically.  Dear patient,  As a result of recent federal legislation (The Federal Cures Act), you may   receive  lab or pathology results from your procedure in your [facility]   account before your physician is able to contact you. Your physician or   their representative will relay the results to you with their   recommendations at their soonest availability.  Thank you.  [employer]

## 2023-11-14 NOTE — TRANSFER OF CARE
"Anesthesia Transfer of Care Note    Patient: Erinn Silva    Procedure(s) Performed: Procedure(s) (LRB):  EGD (ESOPHAGOGASTRODUODENOSCOPY) (N/A)    Patient location: PACU    Anesthesia Type: general    Transport from OR: Transported from OR on room air with adequate spontaneous ventilation    Post pain: adequate analgesia    Post assessment: no apparent anesthetic complications    Post vital signs: stable    Level of consciousness: awake and sedated    Nausea/Vomiting: no nausea/vomiting    Complications: none    Transfer of care protocol was followed    Last vitals: Visit Vitals  BP (!) 99/54 (BP Location: Left arm, Patient Position: Lying)   Pulse 70   Temp 36.4 °C (97.5 °F) (Skin)   Resp 16   Ht 5' 5" (1.651 m)   Wt 87.5 kg (193 lb)   SpO2 98%   Breastfeeding No   BMI 32.12 kg/m²     "

## 2023-11-14 NOTE — ANESTHESIA POSTPROCEDURE EVALUATION
Anesthesia Post Evaluation    Patient: Erinn Silva    Procedure(s) Performed: Procedure(s) (LRB):  EGD (ESOPHAGOGASTRODUODENOSCOPY) (N/A)    Final Anesthesia Type: general      Patient location during evaluation: PACU  Patient participation: Yes- Able to Participate  Level of consciousness: awake and alert and oriented  Post-procedure vital signs: reviewed and stable  Pain management: adequate  Airway patency: patent    PONV status at discharge: No PONV  Anesthetic complications: no      Cardiovascular status: blood pressure returned to baseline and stable  Respiratory status: unassisted and spontaneous ventilation  Hydration status: euvolemic  Follow-up not needed.          Vitals Value Taken Time   /75 11/14/23 1159   Temp 36.4 °C (97.5 °F) 11/14/23 1122   Pulse 65 11/14/23 1159   Resp 16 11/14/23 1159   SpO2 100 % 11/14/23 1159         Event Time   Out of Recovery 12:00:55         Pain/Saskia Score: Saskia Score: 10 (11/14/2023 11:59 AM)

## 2023-11-15 VITALS
TEMPERATURE: 98 F | RESPIRATION RATE: 16 BRPM | OXYGEN SATURATION: 100 % | HEIGHT: 65 IN | HEART RATE: 65 BPM | SYSTOLIC BLOOD PRESSURE: 162 MMHG | WEIGHT: 193 LBS | DIASTOLIC BLOOD PRESSURE: 75 MMHG | BODY MASS INDEX: 32.15 KG/M2

## 2023-11-16 LAB
FINAL PATHOLOGIC DIAGNOSIS: NORMAL
GROSS: NORMAL
Lab: NORMAL

## 2023-11-22 ENCOUNTER — TELEPHONE (OUTPATIENT)
Dept: GASTROENTEROLOGY | Facility: CLINIC | Age: 68
End: 2023-11-22
Payer: COMMERCIAL

## 2023-11-22 NOTE — TELEPHONE ENCOUNTER
Called and spoke with the patient, patient scheduled for a f/u up per recommendations of Dr. Trujillo, patient verbalized date and time of appointment.

## 2023-11-24 ENCOUNTER — OFFICE VISIT (OUTPATIENT)
Dept: FAMILY MEDICINE | Facility: CLINIC | Age: 68
End: 2023-11-24
Payer: COMMERCIAL

## 2023-11-24 VITALS
HEART RATE: 76 BPM | TEMPERATURE: 100 F | HEIGHT: 65 IN | SYSTOLIC BLOOD PRESSURE: 160 MMHG | BODY MASS INDEX: 32.06 KG/M2 | DIASTOLIC BLOOD PRESSURE: 84 MMHG | OXYGEN SATURATION: 95 % | WEIGHT: 192.44 LBS

## 2023-11-24 DIAGNOSIS — I10 ESSENTIAL HYPERTENSION: ICD-10-CM

## 2023-11-24 DIAGNOSIS — I25.10 CORONARY ARTERY DISEASE INVOLVING NATIVE CORONARY ARTERY OF NATIVE HEART WITHOUT ANGINA PECTORIS: ICD-10-CM

## 2023-11-24 DIAGNOSIS — J06.9 UPPER RESPIRATORY TRACT INFECTION, UNSPECIFIED TYPE: Primary | ICD-10-CM

## 2023-11-24 DIAGNOSIS — J06.9 UPPER RESPIRATORY TRACT INFECTION, UNSPECIFIED TYPE: ICD-10-CM

## 2023-11-24 DIAGNOSIS — E78.5 HYPERLIPIDEMIA LDL GOAL <100: ICD-10-CM

## 2023-11-24 DIAGNOSIS — K21.9 GASTROESOPHAGEAL REFLUX DISEASE WITHOUT ESOPHAGITIS: ICD-10-CM

## 2023-11-24 LAB
CTP QC/QA: YES
FLUAV AG NPH QL: NEGATIVE
FLUBV AG NPH QL: NEGATIVE
S PYO RRNA THROAT QL PROBE: NEGATIVE
SARS-COV-2 RDRP RESP QL NAA+PROBE: NEGATIVE

## 2023-11-24 PROCEDURE — 3077F PR MOST RECENT SYSTOLIC BLOOD PRESSURE >= 140 MM HG: ICD-10-PCS | Mod: CPTII,S$GLB,, | Performed by: FAMILY MEDICINE

## 2023-11-24 PROCEDURE — 87804 POCT INFLUENZA A/B: ICD-10-PCS | Mod: QW,S$GLB,, | Performed by: FAMILY MEDICINE

## 2023-11-24 PROCEDURE — 1159F MED LIST DOCD IN RCRD: CPT | Mod: CPTII,S$GLB,, | Performed by: FAMILY MEDICINE

## 2023-11-24 PROCEDURE — 87635: ICD-10-PCS | Mod: QW,S$GLB,, | Performed by: FAMILY MEDICINE

## 2023-11-24 PROCEDURE — 3008F BODY MASS INDEX DOCD: CPT | Mod: CPTII,S$GLB,, | Performed by: FAMILY MEDICINE

## 2023-11-24 PROCEDURE — 1159F PR MEDICATION LIST DOCUMENTED IN MEDICAL RECORD: ICD-10-PCS | Mod: CPTII,S$GLB,, | Performed by: FAMILY MEDICINE

## 2023-11-24 PROCEDURE — 1101F PR PT FALLS ASSESS DOC 0-1 FALLS W/OUT INJ PAST YR: ICD-10-PCS | Mod: CPTII,S$GLB,, | Performed by: FAMILY MEDICINE

## 2023-11-24 PROCEDURE — 3077F SYST BP >= 140 MM HG: CPT | Mod: CPTII,S$GLB,, | Performed by: FAMILY MEDICINE

## 2023-11-24 PROCEDURE — 3044F HG A1C LEVEL LT 7.0%: CPT | Mod: CPTII,S$GLB,, | Performed by: FAMILY MEDICINE

## 2023-11-24 PROCEDURE — 99214 OFFICE O/P EST MOD 30 MIN: CPT | Mod: S$GLB,,, | Performed by: FAMILY MEDICINE

## 2023-11-24 PROCEDURE — 99214 PR OFFICE/OUTPT VISIT, EST, LEVL IV, 30-39 MIN: ICD-10-PCS | Mod: S$GLB,,, | Performed by: FAMILY MEDICINE

## 2023-11-24 PROCEDURE — 87880 POCT RAPID STREP A: ICD-10-PCS | Mod: QW,S$GLB,, | Performed by: FAMILY MEDICINE

## 2023-11-24 PROCEDURE — 3066F NEPHROPATHY DOC TX: CPT | Mod: CPTII,S$GLB,, | Performed by: FAMILY MEDICINE

## 2023-11-24 PROCEDURE — 1101F PT FALLS ASSESS-DOCD LE1/YR: CPT | Mod: CPTII,S$GLB,, | Performed by: FAMILY MEDICINE

## 2023-11-24 PROCEDURE — 99999 PR PBB SHADOW E&M-EST. PATIENT-LVL IV: CPT | Mod: PBBFAC,,, | Performed by: FAMILY MEDICINE

## 2023-11-24 PROCEDURE — 3008F PR BODY MASS INDEX (BMI) DOCUMENTED: ICD-10-PCS | Mod: CPTII,S$GLB,, | Performed by: FAMILY MEDICINE

## 2023-11-24 PROCEDURE — 1126F PR PAIN SEVERITY QUANTIFIED, NO PAIN PRESENT: ICD-10-PCS | Mod: CPTII,S$GLB,, | Performed by: FAMILY MEDICINE

## 2023-11-24 PROCEDURE — 87635 SARS-COV-2 COVID-19 AMP PRB: CPT | Mod: QW,S$GLB,, | Performed by: FAMILY MEDICINE

## 2023-11-24 PROCEDURE — 3288F PR FALLS RISK ASSESSMENT DOCUMENTED: ICD-10-PCS | Mod: CPTII,S$GLB,, | Performed by: FAMILY MEDICINE

## 2023-11-24 PROCEDURE — 3288F FALL RISK ASSESSMENT DOCD: CPT | Mod: CPTII,S$GLB,, | Performed by: FAMILY MEDICINE

## 2023-11-24 PROCEDURE — 3044F PR MOST RECENT HEMOGLOBIN A1C LEVEL <7.0%: ICD-10-PCS | Mod: CPTII,S$GLB,, | Performed by: FAMILY MEDICINE

## 2023-11-24 PROCEDURE — 99999 PR PBB SHADOW E&M-EST. PATIENT-LVL IV: ICD-10-PCS | Mod: PBBFAC,,, | Performed by: FAMILY MEDICINE

## 2023-11-24 PROCEDURE — 87880 STREP A ASSAY W/OPTIC: CPT | Mod: QW,S$GLB,, | Performed by: FAMILY MEDICINE

## 2023-11-24 PROCEDURE — 3079F PR MOST RECENT DIASTOLIC BLOOD PRESSURE 80-89 MM HG: ICD-10-PCS | Mod: CPTII,S$GLB,, | Performed by: FAMILY MEDICINE

## 2023-11-24 PROCEDURE — 1126F AMNT PAIN NOTED NONE PRSNT: CPT | Mod: CPTII,S$GLB,, | Performed by: FAMILY MEDICINE

## 2023-11-24 PROCEDURE — 3079F DIAST BP 80-89 MM HG: CPT | Mod: CPTII,S$GLB,, | Performed by: FAMILY MEDICINE

## 2023-11-24 PROCEDURE — 3066F PR DOCUMENTATION OF TREATMENT FOR NEPHROPATHY: ICD-10-PCS | Mod: CPTII,S$GLB,, | Performed by: FAMILY MEDICINE

## 2023-11-24 PROCEDURE — 4010F ACE/ARB THERAPY RXD/TAKEN: CPT | Mod: CPTII,S$GLB,, | Performed by: FAMILY MEDICINE

## 2023-11-24 PROCEDURE — 87804 INFLUENZA ASSAY W/OPTIC: CPT | Mod: QW,S$GLB,, | Performed by: FAMILY MEDICINE

## 2023-11-24 PROCEDURE — 4010F PR ACE/ARB THEARPY RXD/TAKEN: ICD-10-PCS | Mod: CPTII,S$GLB,, | Performed by: FAMILY MEDICINE

## 2023-11-24 RX ORDER — PROMETHAZINE HYDROCHLORIDE AND DEXTROMETHORPHAN HYDROBROMIDE 6.25; 15 MG/5ML; MG/5ML
5 SYRUP ORAL 3 TIMES DAILY PRN
Qty: 118 ML | Refills: 0 | Status: SHIPPED | OUTPATIENT
Start: 2023-11-24 | End: 2023-12-04

## 2023-11-24 NOTE — PROGRESS NOTES
Subjective:       Patient ID: Erinn Silva is a 67 y.o. female.    Chief Complaint: Nasal Congestion and Sore Throat      HPI Comments:       Current Outpatient Medications:     acetaminophen (TYLENOL) 650 MG TbSR, Take 2 tablets (1,300 mg total) by mouth every 8 (eight) hours., Disp: , Rfl: 0    albuterol (PROVENTIL/VENTOLIN HFA) 90 mcg/actuation inhaler, Inhale 2 puffs into the lungs every 6 (six) hours as needed for Wheezing., Disp: 18 g, Rfl: 3    aspirin (ECOTRIN) 81 MG EC tablet, Take 81 mg by mouth once daily., Disp: , Rfl:     diclofenac sodium (VOLTAREN) 1 % Gel, Apply 2 g topically 3 (three) times daily., Disp: 100 g, Rfl: 5    EScitalopram oxalate (LEXAPRO) 10 MG tablet, Take 1 tablet (10 mg total) by mouth once daily., Disp: 90 tablet, Rfl: 3    hydroCHLOROthiazide (HYDRODIURIL) 12.5 MG Tab, Take 1 tablet (12.5 mg total) by mouth once daily., Disp: 90 tablet, Rfl: 0    multivitamin capsule, Take 1 capsule by mouth once daily., Disp: , Rfl:     omeprazole (PRILOSEC) 40 MG capsule, Take 1 capsule (40 mg total) by mouth before breakfast., Disp: 30 capsule, Rfl: 1    pravastatin (PRAVACHOL) 40 MG tablet, Take 1 tablet (40 mg total) by mouth every evening., Disp: 90 tablet, Rfl: 3    valsartan (DIOVAN) 320 MG tablet, Take 1 tablet (320 mg total) by mouth once daily., Disp: 90 tablet, Rfl: 0    promethazine-dextromethorphan (PROMETHAZINE-DM) 6.25-15 mg/5 mL Syrp, Take 5 mLs by mouth 3 (three) times daily as needed., Disp: 118 mL, Rfl: 0      This my 1st time seeing this patient.  She is a nonsmoker with a history of coronary artery disease and hypertension.    48 hour history of cough, sore throat, ear discomfort.  All sputum was white.  Denies any fever chills.  Has headaches but no body aches.  No GI symptoms.    So far taking Coricidin.  No home testing.  Has had some recent sick contacts      Review of Systems   Constitutional:  Negative for activity change, appetite change, chills and fever.   HENT:   "Positive for congestion, postnasal drip, rhinorrhea and sore throat.    Respiratory:  Positive for cough. Negative for shortness of breath.    Cardiovascular:  Negative for chest pain.   Gastrointestinal:  Negative for abdominal pain, diarrhea and nausea.   Genitourinary:  Negative for difficulty urinating.   Musculoskeletal:  Negative for arthralgias and myalgias.   Neurological:  Negative for dizziness and headaches.       Objective:      Vitals:    11/24/23 0858   BP: (!) 160/84   Pulse: 76   Temp: 99.7 °F (37.6 °C)   SpO2: 95%   Weight: 87.3 kg (192 lb 7.4 oz)   Height: 5' 5" (1.651 m)   PainSc: 0-No pain     Physical Exam  Vitals and nursing note reviewed.   Constitutional:       General: She is not in acute distress.     Appearance: She is well-developed. She is ill-appearing. She is not diaphoretic.   HENT:      Head: Normocephalic.      Nose: Mucosal edema and rhinorrhea present.      Mouth/Throat:      Pharynx: Pharyngeal swelling present. No oropharyngeal exudate or posterior oropharyngeal erythema.   Neck:      Thyroid: No thyromegaly.   Cardiovascular:      Rate and Rhythm: Normal rate and regular rhythm.      Heart sounds: Normal heart sounds. No murmur heard.  Pulmonary:      Effort: Pulmonary effort is normal.      Breath sounds: Normal breath sounds. No wheezing or rales.   Abdominal:      General: There is no distension.      Palpations: Abdomen is soft.   Musculoskeletal:      Cervical back: Neck supple.   Lymphadenopathy:      Cervical: No cervical adenopathy.   Skin:     General: Skin is warm and dry.   Neurological:      Mental Status: She is alert and oriented to person, place, and time.   Psychiatric:         Behavior: Behavior normal.         Thought Content: Thought content normal.         Judgment: Judgment normal.         Assessment:       1. Upper respiratory tract infection, unspecified type    2. Essential hypertension    3. Hyperlipidemia LDL goal <100    4. Coronary artery disease " involving native coronary artery of native heart without angina pectoris    5. Gastroesophageal reflux disease without esophagitis    6. Upper respiratory tract infection, unspecified type        Plan:   Upper respiratory tract infection, unspecified type  Comments:  Supportive care.  Continue Coricidin.  Orders:  -     POCT Influenza A/B  -     POCT COVID-19 Rapid Screening  -     POCT rapid strep A    Essential hypertension  Comments:  Uncontrolled.  Follow-up with PCP    Hyperlipidemia LDL goal <100  Comments:  On statin    Coronary artery disease involving native coronary artery of native heart without angina pectoris  Comments:  On aspirin and statin    Gastroesophageal reflux disease without esophagitis  Comments:  On PPI    Upper respiratory tract infection, unspecified type  Comments:  No wheezing currently  Orders:  -     POCT Influenza A/B  -     POCT COVID-19 Rapid Screening  -     POCT rapid strep A    Other orders  -     promethazine-dextromethorphan (PROMETHAZINE-DM) 6.25-15 mg/5 mL Syrp; Take 5 mLs by mouth 3 (three) times daily as needed.  Dispense: 118 mL; Refill: 0

## 2023-11-29 ENCOUNTER — OFFICE VISIT (OUTPATIENT)
Dept: FAMILY MEDICINE | Facility: CLINIC | Age: 68
End: 2023-11-29
Payer: COMMERCIAL

## 2023-11-29 VITALS
DIASTOLIC BLOOD PRESSURE: 82 MMHG | HEART RATE: 78 BPM | OXYGEN SATURATION: 97 % | BODY MASS INDEX: 32.1 KG/M2 | SYSTOLIC BLOOD PRESSURE: 140 MMHG | TEMPERATURE: 97 F | WEIGHT: 192.88 LBS

## 2023-11-29 DIAGNOSIS — J32.9 SINUSITIS, UNSPECIFIED CHRONICITY, UNSPECIFIED LOCATION: Primary | ICD-10-CM

## 2023-11-29 DIAGNOSIS — R05.9 COUGH, UNSPECIFIED TYPE: ICD-10-CM

## 2023-11-29 PROCEDURE — 3288F FALL RISK ASSESSMENT DOCD: CPT | Mod: CPTII,S$GLB,, | Performed by: NURSE PRACTITIONER

## 2023-11-29 PROCEDURE — 99213 PR OFFICE/OUTPT VISIT, EST, LEVL III, 20-29 MIN: ICD-10-PCS | Mod: S$GLB,,, | Performed by: NURSE PRACTITIONER

## 2023-11-29 PROCEDURE — 3079F PR MOST RECENT DIASTOLIC BLOOD PRESSURE 80-89 MM HG: ICD-10-PCS | Mod: CPTII,S$GLB,, | Performed by: NURSE PRACTITIONER

## 2023-11-29 PROCEDURE — 1159F PR MEDICATION LIST DOCUMENTED IN MEDICAL RECORD: ICD-10-PCS | Mod: CPTII,S$GLB,, | Performed by: NURSE PRACTITIONER

## 2023-11-29 PROCEDURE — 3066F NEPHROPATHY DOC TX: CPT | Mod: CPTII,S$GLB,, | Performed by: NURSE PRACTITIONER

## 2023-11-29 PROCEDURE — 1126F AMNT PAIN NOTED NONE PRSNT: CPT | Mod: CPTII,S$GLB,, | Performed by: NURSE PRACTITIONER

## 2023-11-29 PROCEDURE — 99999 PR PBB SHADOW E&M-EST. PATIENT-LVL V: CPT | Mod: PBBFAC,,, | Performed by: NURSE PRACTITIONER

## 2023-11-29 PROCEDURE — 3288F PR FALLS RISK ASSESSMENT DOCUMENTED: ICD-10-PCS | Mod: CPTII,S$GLB,, | Performed by: NURSE PRACTITIONER

## 2023-11-29 PROCEDURE — 3044F HG A1C LEVEL LT 7.0%: CPT | Mod: CPTII,S$GLB,, | Performed by: NURSE PRACTITIONER

## 2023-11-29 PROCEDURE — 3044F PR MOST RECENT HEMOGLOBIN A1C LEVEL <7.0%: ICD-10-PCS | Mod: CPTII,S$GLB,, | Performed by: NURSE PRACTITIONER

## 2023-11-29 PROCEDURE — 3077F SYST BP >= 140 MM HG: CPT | Mod: CPTII,S$GLB,, | Performed by: NURSE PRACTITIONER

## 2023-11-29 PROCEDURE — 1101F PR PT FALLS ASSESS DOC 0-1 FALLS W/OUT INJ PAST YR: ICD-10-PCS | Mod: CPTII,S$GLB,, | Performed by: NURSE PRACTITIONER

## 2023-11-29 PROCEDURE — 1160F PR REVIEW ALL MEDS BY PRESCRIBER/CLIN PHARMACIST DOCUMENTED: ICD-10-PCS | Mod: CPTII,S$GLB,, | Performed by: NURSE PRACTITIONER

## 2023-11-29 PROCEDURE — 99999 PR PBB SHADOW E&M-EST. PATIENT-LVL V: ICD-10-PCS | Mod: PBBFAC,,, | Performed by: NURSE PRACTITIONER

## 2023-11-29 PROCEDURE — 3008F BODY MASS INDEX DOCD: CPT | Mod: CPTII,S$GLB,, | Performed by: NURSE PRACTITIONER

## 2023-11-29 PROCEDURE — 4010F ACE/ARB THERAPY RXD/TAKEN: CPT | Mod: CPTII,S$GLB,, | Performed by: NURSE PRACTITIONER

## 2023-11-29 PROCEDURE — 1101F PT FALLS ASSESS-DOCD LE1/YR: CPT | Mod: CPTII,S$GLB,, | Performed by: NURSE PRACTITIONER

## 2023-11-29 PROCEDURE — 3077F PR MOST RECENT SYSTOLIC BLOOD PRESSURE >= 140 MM HG: ICD-10-PCS | Mod: CPTII,S$GLB,, | Performed by: NURSE PRACTITIONER

## 2023-11-29 PROCEDURE — 1126F PR PAIN SEVERITY QUANTIFIED, NO PAIN PRESENT: ICD-10-PCS | Mod: CPTII,S$GLB,, | Performed by: NURSE PRACTITIONER

## 2023-11-29 PROCEDURE — 1159F MED LIST DOCD IN RCRD: CPT | Mod: CPTII,S$GLB,, | Performed by: NURSE PRACTITIONER

## 2023-11-29 PROCEDURE — 3008F PR BODY MASS INDEX (BMI) DOCUMENTED: ICD-10-PCS | Mod: CPTII,S$GLB,, | Performed by: NURSE PRACTITIONER

## 2023-11-29 PROCEDURE — 3066F PR DOCUMENTATION OF TREATMENT FOR NEPHROPATHY: ICD-10-PCS | Mod: CPTII,S$GLB,, | Performed by: NURSE PRACTITIONER

## 2023-11-29 PROCEDURE — 99213 OFFICE O/P EST LOW 20 MIN: CPT | Mod: S$GLB,,, | Performed by: NURSE PRACTITIONER

## 2023-11-29 PROCEDURE — 1160F RVW MEDS BY RX/DR IN RCRD: CPT | Mod: CPTII,S$GLB,, | Performed by: NURSE PRACTITIONER

## 2023-11-29 PROCEDURE — 4010F PR ACE/ARB THEARPY RXD/TAKEN: ICD-10-PCS | Mod: CPTII,S$GLB,, | Performed by: NURSE PRACTITIONER

## 2023-11-29 PROCEDURE — 3079F DIAST BP 80-89 MM HG: CPT | Mod: CPTII,S$GLB,, | Performed by: NURSE PRACTITIONER

## 2023-11-29 RX ORDER — AMOXICILLIN 875 MG/1
875 TABLET, FILM COATED ORAL EVERY 12 HOURS
Qty: 20 TABLET | Refills: 0 | Status: SHIPPED | OUTPATIENT
Start: 2023-11-29 | End: 2023-12-09

## 2023-11-29 RX ORDER — BENZONATATE 200 MG/1
200 CAPSULE ORAL 3 TIMES DAILY PRN
Qty: 30 CAPSULE | Refills: 0 | Status: CANCELLED | OUTPATIENT
Start: 2023-11-29 | End: 2023-12-09

## 2023-11-29 RX ORDER — AZELASTINE 1 MG/ML
1 SPRAY, METERED NASAL 2 TIMES DAILY
Qty: 30 ML | Refills: 0 | Status: CANCELLED | OUTPATIENT
Start: 2023-11-29 | End: 2023-12-06

## 2023-11-29 NOTE — PROGRESS NOTES
Subjective     Patient ID: Erinn Silva is a 67 y.o. female.    Chief Complaint: Sinusitis    Sinusitis  This is a new (Pt went to urgent care on 11/24/23; tested negative for COVID-19, strep A, influenza) problem. The current episode started 1 to 4 weeks ago (x 1 w). The problem is unchanged. There has been no fever. The pain is mild. Associated symptoms include congestion, coughing, sinus pressure and a sore throat. Pertinent negatives include no chills, diaphoresis, ear pain, headaches, hoarse voice, neck pain, shortness of breath, sneezing or swollen glands. Treatments tried: Prescribed phenergan DM at urgent care. The treatment provided mild relief.     Past Medical History:   Diagnosis Date    Anxiety     AR (allergic rhinitis)     Arthritis     Asthma, intermittent     Bilateral carotid artery stenosis 11/09/2021    Coronary artery disease     Depression     Diverticulosis     Endometrial polyp     Fatty liver     GERD (gastroesophageal reflux disease)     Hiatal hernia     Hyperlipidemia LDL goal <100     Hypertension     Mild mitral regurgitation     Multiple thyroid nodules 03/27/2014    Obesity (BMI 30-39.9) 03/27/2014    Renal cell carcinoma 2002    removed - no recurrence    S/P primary angioplasty with coronary stent 07/26/2017    Sleep apnea     Using CPAP    Solitary kidney, acquired     right    Tubular adenoma of colon 03/22/2022     Social History     Socioeconomic History    Marital status:    Tobacco Use    Smoking status: Never    Smokeless tobacco: Never   Substance and Sexual Activity    Alcohol use: No    Drug use: No    Sexual activity: Yes     Partners: Male     Birth control/protection: None     Past Surgical History:   Procedure Laterality Date    APPENDECTOMY      CARDIAC CATHETERIZATION  06/27/2014    has blockages, but per patient no stents    CHOLECYSTECTOMY      COLONOSCOPY  2002    negative    COLONOSCOPY  03/25/2014         COLONOSCOPY  N/A 03/22/2022    Procedure: COLONOSCOPY;  Surgeon: Hunter Garibay MD;  Location[facility];  Service: Endoscopy;  Laterality: N/A; Repeat colonoscopy in 5 years for surveillance    CORONARY ANGIOPLASTY  2008?    balloon angioplasty    CORONARY ANGIOPLASTY WITH STENT PLACEMENT Right 07/26/2017    ESOPHAGOGASTRODUODENOSCOPY N/A 07/14/2021    Procedure: ESOPHAGOGASTRODUODENOSCOPY (EGD);  Surgeon: Sheng Bell III, MD;  Location: [facility] ENDO;  Service: Endoscopy;  Laterality: N/A;    ESOPHAGOGASTRODUODENOSCOPY N/A 11/14/2023    Procedure: EGD (ESOPHAGOGASTRODUODENOSCOPY);  Surgeon: Guille Trujillo Jr., MD;  Location: [facility];  Service: Endoscopy;  Laterality: N/A;    NEPHRECTOMY Left 2002    [facility] - cancer    OVARIAN CYST REMOVAL      PERCUTANEOUS ENDOVENOUS LASER ABLATION OF PERIPHERAL VEIN  06/05/2019    POLYPECTOMY      endometrial polyp removal    ROTATOR CUFF REPAIR      L    THYROIDECTOMY, PARTIAL Left     TUBAL LIGATION      UPPER GASTROINTESTINAL ENDOSCOPY  04/2014       Review of Systems   Constitutional: Negative.  Negative for chills and diaphoresis.   HENT:  Positive for nasal congestion, sinus pressure/congestion and sore throat. Negative for ear pain, hoarse voice and sneezing.    Eyes: Negative.    Respiratory:  Positive for cough. Negative for shortness of breath.    Cardiovascular: Negative.    Gastrointestinal: Negative.    Endocrine: Negative.    Genitourinary: Negative.    Musculoskeletal: Negative.  Negative for neck pain.   Integumentary:  Negative.   Allergic/Immunologic: Negative.    Neurological: Negative.  Negative for headaches.   Psychiatric/Behavioral: Negative.            Objective     Physical Exam  Vitals and nursing note reviewed.   Constitutional:       Appearance: Normal appearance.   HENT:      Head: Normocephalic.      Right Ear: Hearing, tympanic membrane, ear canal and external ear normal.      Left Ear: Tympanic membrane, ear canal  and external ear normal.      Nose: Congestion and rhinorrhea present.      Right Sinus: Maxillary sinus tenderness present. No frontal sinus tenderness.      Left Sinus: Maxillary sinus tenderness present. No frontal sinus tenderness.      Mouth/Throat:      Mouth: Mucous membranes are moist.      Pharynx: Uvula midline. Posterior oropharyngeal erythema (mild) present. No pharyngeal swelling, oropharyngeal exudate or uvula swelling.   Eyes:      Conjunctiva/sclera: Conjunctivae normal.      Pupils: Pupils are equal, round, and reactive to light.   Cardiovascular:      Rate and Rhythm: Normal rate and regular rhythm.      Pulses: Normal pulses.      Heart sounds: Normal heart sounds.   Pulmonary:      Effort: Pulmonary effort is normal.      Breath sounds: Normal breath sounds.   Abdominal:      General: Bowel sounds are normal.      Palpations: Abdomen is soft.   Musculoskeletal:         General: Normal range of motion.      Cervical back: Normal range of motion and neck supple.   Skin:     General: Skin is warm and dry.      Capillary Refill: Capillary refill takes 2 to 3 seconds.   Neurological:      Mental Status: She is alert and oriented to person, place, and time.   Psychiatric:         Mood and Affect: Mood normal.         Behavior: Behavior normal.         Thought Content: Thought content normal.         Judgment: Judgment normal.          Assessment and Plan     1. Sinusitis, unspecified chronicity, unspecified location  2. Cough, unspecified type  -     amoxicillin (AMOXIL) 875 MG tablet; Take 1 tablet (875 mg total) by mouth every 12 (twelve) hours. for 10 days  Dispense: 20 tablet; Refill: 0  Hydrate well  Rest   Warm salt water gargles PRN  Continue current medication  Report to ER immediately if symptoms worsen or persist                No follow-ups on file.

## 2023-11-29 NOTE — PATIENT INSTRUCTIONS
Hydrate well  Rest   Warm salt water gargles PRN  Continue current medication  Report to ER immediately if symptoms worsen or persist     John Plasencia,     If you are due for any health screening(s) below please notify me so we can arrange them to be ordered and scheduled. Most healthy patients at your age complete them, but you are free to accept or refuse.     If you can't do it, I'll definitely understand. If you can, I'd certainly appreciate it!    Tests to Keep You Healthy    Mammogram: Met on 3/3/2023  Colon Cancer Screening: Met on 3/14/2023  Cervical Cancer Screening: Met on 7/13/2021  Last Blood Pressure <= 139/89 (11/29/2023): NO      Lets manage your high blood pressure     Your blood pressure was above 140/90 today during your visit. We recommend that you schedule a nurse visit in two weeks to check your blood pressure and discuss ways to support your health goals.     You can also manage your health and record your blood pressure from the comfort of home by keeping a daily blood pressure log. These results are shared with and reviewed by your provider. Please print this form (Daily Blood Pressure Log) to assist you in keeping track of your blood pressure at home.     Schedule your nurse visit in two weeks to learn more about how to track and manage high blood pressure.    Daily Blood Pressure Log    Name:__________________________________                  Date of Birth:_________    Average Blood Pressure:  __________      Date: Time  (a.m.) Blood  Pressure: Pulse  Rate: Time  (p.m.) Blood  Pressure : Pulse  Rate:   Sample 8:37 127/83 84

## 2023-11-30 DIAGNOSIS — Z76.0 MEDICATION REFILL: ICD-10-CM

## 2023-11-30 RX ORDER — ALBUTEROL SULFATE 90 UG/1
2 AEROSOL, METERED RESPIRATORY (INHALATION) EVERY 6 HOURS PRN
Qty: 18 G | Refills: 3 | Status: SHIPPED | OUTPATIENT
Start: 2023-11-30

## 2023-11-30 NOTE — TELEPHONE ENCOUNTER
No care due was identified.  Health [facility] Embedded Care Due Messages. Reference number: [id number].   11/30/2023 1:52:38 PM CST

## 2023-12-07 RX ORDER — VALSARTAN 320 MG/1
320 TABLET ORAL
Qty: 90 TABLET | Refills: 0 | Status: SHIPPED | OUTPATIENT
Start: 2023-12-07 | End: 2024-01-16 | Stop reason: SDUPTHER

## 2023-12-07 NOTE — TELEPHONE ENCOUNTER
No care due was identified.  Health [facility] Embedded Care Due Messages. Reference number: [id number].   12/07/2023 2:05:12 PM CST

## 2023-12-07 NOTE — TELEPHONE ENCOUNTER
Refill Routing Note   Medication(s) are not appropriate for processing by [facility] for the following reason(s):        Required vitals abnormal  New or recently adjusted medication  (!) 140/82     OR action(s):  Defer               Appointments  past 12m or future 3m with PCP    Date Provider   Last Visit   9/20/2023 Layo Reed MD   Next Visit   Visit date not found Layo Reed MD   ED visits in past 90 days: 0        Note composed:5:20 PM 12/07/2023

## 2023-12-08 ENCOUNTER — PATIENT MESSAGE (OUTPATIENT)
Dept: FAMILY MEDICINE | Facility: CLINIC | Age: 68
End: 2023-12-08
Payer: COMMERCIAL

## 2023-12-18 ENCOUNTER — TELEPHONE (OUTPATIENT)
Dept: PHARMACY | Facility: CLINIC | Age: 68
End: 2023-12-18
Payer: COMMERCIAL

## 2023-12-24 NOTE — PROGRESS NOTES
Please let patient know.  The stomach tissue came back fine.   minimal inflammation, and no trace of the bacteria.     Rec remains the same.  Anti reflux diet.  Take meds as directed.

## 2024-01-16 RX ORDER — VALSARTAN 320 MG/1
320 TABLET ORAL DAILY
Qty: 90 TABLET | Refills: 0 | Status: SHIPPED | OUTPATIENT
Start: 2024-01-16 | End: 2024-04-26

## 2024-01-16 NOTE — TELEPHONE ENCOUNTER
Care Due:                  Date            Visit Type   Department     Provider  --------------------------------------------------------------------------------                                MYCHART                              FOLLOWUP/OF  [facility]  Last Visit: 09-      FICE VISIT   MEDICINE       Layo Reed  Next Visit: None Scheduled  None         None Found                                                            Last  Test          Frequency    Reason                     Performed    Due Date  --------------------------------------------------------------------------------    CBC.........  12 months..  diclofenac...............  03- 03-    CMP.........  12 months..  pravastatin..............  03- 03-    Health [facility] Embedded Care Due Messages. Reference number: [id number].   1/16/2024 8:21:32 AM CST

## 2024-02-05 RX ORDER — ESCITALOPRAM OXALATE 10 MG/1
10 TABLET ORAL DAILY
Qty: 90 TABLET | Refills: 2 | Status: SHIPPED | OUTPATIENT
Start: 2024-02-05

## 2024-02-05 NOTE — TELEPHONE ENCOUNTER
No care due was identified.  [facility] Embedded Care Due Messages. Reference number: [id number].   2/05/2024 3:17:35 PM CST

## 2024-02-08 ENCOUNTER — TELEPHONE (OUTPATIENT)
Dept: FAMILY MEDICINE | Facility: CLINIC | Age: 69
End: 2024-02-08
Payer: COMMERCIAL

## 2024-02-08 NOTE — TELEPHONE ENCOUNTER
----- Message from Reyna Mcintyre sent at 2/8/2024  2:50 PM CST -----  Contact: Erinn Barillas is calling in regards to her coming earlier on 002/09.please call back at  [phone]            Thanks  [place]

## 2024-02-09 ENCOUNTER — CLINICAL SUPPORT (OUTPATIENT)
Dept: FAMILY MEDICINE | Facility: CLINIC | Age: 69
End: 2024-02-09
Payer: COMMERCIAL

## 2024-02-09 VITALS — SYSTOLIC BLOOD PRESSURE: 142 MMHG | DIASTOLIC BLOOD PRESSURE: 64 MMHG | HEART RATE: 63 BPM

## 2024-02-09 DIAGNOSIS — Z01.30 BP CHECK: Primary | ICD-10-CM

## 2024-02-09 PROCEDURE — 99999 PR PBB SHADOW E&M-EST. PATIENT-LVL III: CPT | Mod: PBBFAC,,,

## 2024-02-09 NOTE — PROGRESS NOTES
Pt in clinic for BP check. /66 HR 63. Pt asymptomatic. Pt sat in clinic 5 minutes prior to rechecking BP. Manual /64. PCP notified by Epic message.

## 2024-02-14 ENCOUNTER — TELEPHONE (OUTPATIENT)
Dept: FAMILY MEDICINE | Facility: CLINIC | Age: 69
End: 2024-02-14
Payer: COMMERCIAL

## 2024-02-14 RX ORDER — HYDROCHLOROTHIAZIDE 25 MG/1
25 TABLET ORAL DAILY
Qty: 90 TABLET | Refills: 0 | Status: SHIPPED | OUTPATIENT
Start: 2024-02-14

## 2024-02-14 NOTE — PROGRESS NOTES
Blood pressure still above goal.  Recommend increase hydrochlorothiazide 25 mg daily.  Prescription sent to pharmacy.  Recheck blood pressure with nurse in 1 month.

## 2024-02-14 NOTE — TELEPHONE ENCOUNTER
----- Message from Layo Reed MD sent at 2/14/2024 10:57 AM CST -----    ----- Message -----  From: Carla Guerrero LPN  Sent: 2/9/2024   2:15 PM CST  To: Layo Reed MD    /66 HR 63. Pt asymptomatic. Pt sat in clinic 5 minutes prior to rechecking BP. Manual /64

## 2024-03-06 ENCOUNTER — PATIENT MESSAGE (OUTPATIENT)
Dept: FAMILY MEDICINE | Facility: CLINIC | Age: 69
End: 2024-03-06
Payer: COMMERCIAL

## 2024-03-06 DIAGNOSIS — Z12.31 SCREENING MAMMOGRAM FOR HIGH-RISK PATIENT: Primary | ICD-10-CM

## 2024-04-17 ENCOUNTER — HOSPITAL ENCOUNTER (OUTPATIENT)
Dept: RADIOLOGY | Facility: HOSPITAL | Age: 69
Discharge: HOME | End: 2024-04-17
Attending: FAMILY MEDICINE
Payer: COMMERCIAL

## 2024-04-17 DIAGNOSIS — Z12.31 SCREENING MAMMOGRAM FOR HIGH-RISK PATIENT: ICD-10-CM

## 2024-04-17 PROCEDURE — 77063 BREAST TOMOSYNTHESIS BI: CPT | Mod: 26,,, | Performed by: RADIOLOGY

## 2024-04-17 PROCEDURE — 77067 SCR MAMMO BI INCL CAD: CPT | Mod: 26,,, | Performed by: RADIOLOGY

## 2024-04-17 PROCEDURE — 77063 BREAST TOMOSYNTHESIS BI: CPT | Mod: TC,PO

## 2024-04-25 NOTE — TELEPHONE ENCOUNTER
Care Due:                  Date            Visit Type   Department     Provider  --------------------------------------------------------------------------------                                MYCHART                              FOLLOWUP/OF  [facility]  Last Visit: 09-      FICE VISIT   MEDICINE       Layo Reed  Next Visit: None Scheduled  None         None Found                                                            Last  Test          Frequency    Reason                     Performed    Due Date  --------------------------------------------------------------------------------    CBC.........  12 months..  diclofenac...............  Not Found    Overdue    [employer] Embedded Care Due Messages. Reference number: [id number].   4/25/2024 10:36:33 AM CDT

## 2024-04-26 RX ORDER — VALSARTAN 320 MG/1
320 TABLET ORAL
Qty: 90 TABLET | Refills: 0 | Status: SHIPPED | OUTPATIENT
Start: 2024-04-26

## 2024-04-26 NOTE — TELEPHONE ENCOUNTER
Refill Routing Note   Medication(s) are not appropriate for processing by [facility] for the following reason(s):        Required vitals abnormal  02/09/24 (!) 142/64   11/29/23 (!) 140/82   11/24/23 (!) 160/84       OR action(s):  Defer     Requires labs : Yes      Medication Therapy Plan: 02/09/24 (!) 142/64 11/29/23 (!) 140/82 11/24/23 (!) 160/84      Appointments  past 12m or future 3m with PCP    Date Provider   Last Visit   9/20/2023 Layo Reed MD   Next Visit   Visit date not found Layo Reed MD   ED visits in past 90 days: 0        Note composed:8:22 PM 04/25/2024

## 2024-04-29 ENCOUNTER — OFFICE VISIT (OUTPATIENT)
Dept: GASTROENTEROLOGY | Facility: CLINIC | Age: 69
End: 2024-04-29
Payer: COMMERCIAL

## 2024-04-29 VITALS — BODY MASS INDEX: 32.54 KG/M2 | WEIGHT: 195.31 LBS | HEIGHT: 65 IN

## 2024-04-29 DIAGNOSIS — R10.10 UPPER ABDOMINAL PAIN: ICD-10-CM

## 2024-04-29 DIAGNOSIS — K21.00 GASTROESOPHAGEAL REFLUX DISEASE WITH ESOPHAGITIS WITHOUT HEMORRHAGE: ICD-10-CM

## 2024-04-29 DIAGNOSIS — Z98.890 HISTORY OF ESOPHAGOGASTRODUODENOSCOPY (EGD): Primary | ICD-10-CM

## 2024-04-29 DIAGNOSIS — R10.11 RUQ ABDOMINAL PAIN: ICD-10-CM

## 2024-04-29 DIAGNOSIS — K29.50 CHRONIC GASTRITIS WITHOUT BLEEDING, UNSPECIFIED GASTRITIS TYPE: ICD-10-CM

## 2024-04-29 DIAGNOSIS — K44.9 HIATAL HERNIA: ICD-10-CM

## 2024-04-29 PROCEDURE — 99999 PR PBB SHADOW E&M-EST. PATIENT-LVL III: CPT | Mod: PBBFAC,,, | Performed by: NURSE PRACTITIONER

## 2024-04-29 PROCEDURE — 1159F MED LIST DOCD IN RCRD: CPT | Mod: CPTII,S$GLB,, | Performed by: NURSE PRACTITIONER

## 2024-04-29 PROCEDURE — 99214 OFFICE O/P EST MOD 30 MIN: CPT | Mod: S$GLB,,, | Performed by: NURSE PRACTITIONER

## 2024-04-29 PROCEDURE — 1126F AMNT PAIN NOTED NONE PRSNT: CPT | Mod: CPTII,S$GLB,, | Performed by: NURSE PRACTITIONER

## 2024-04-29 PROCEDURE — 1101F PT FALLS ASSESS-DOCD LE1/YR: CPT | Mod: CPTII,S$GLB,, | Performed by: NURSE PRACTITIONER

## 2024-04-29 PROCEDURE — 4010F ACE/ARB THERAPY RXD/TAKEN: CPT | Mod: CPTII,S$GLB,, | Performed by: NURSE PRACTITIONER

## 2024-04-29 PROCEDURE — 3008F BODY MASS INDEX DOCD: CPT | Mod: CPTII,S$GLB,, | Performed by: NURSE PRACTITIONER

## 2024-04-29 PROCEDURE — 3288F FALL RISK ASSESSMENT DOCD: CPT | Mod: CPTII,S$GLB,, | Performed by: NURSE PRACTITIONER

## 2024-04-29 PROCEDURE — 1160F RVW MEDS BY RX/DR IN RCRD: CPT | Mod: CPTII,S$GLB,, | Performed by: NURSE PRACTITIONER

## 2024-04-29 RX ORDER — OMEPRAZOLE 20 MG/1
20 CAPSULE, DELAYED RELEASE ORAL
Qty: 90 CAPSULE | Refills: 0 | Status: SHIPPED | OUTPATIENT
Start: 2024-04-29 | End: 2024-07-28

## 2024-04-29 NOTE — PROGRESS NOTES
Subjective:       Patient ID: Erinn Silva is a 68 y.o. female Body mass index is 32.5 kg/m².    Chief Complaint: Follow-up and Abdominal Pain    Established patient of Dr. Bell, Dr. Trujillo, & myself.    Patient reports she was taking atorvastatin which she reports was causing abdominal pain as a side effect. It has been discontinued and she was placed on another cholesterol medication and patient reports the abdominal pain has improved. Patient reports current intermittent abdominal pain is similar to what she has been having for years that occurs with reflux flare-ups. Patient reports she prefers low dose or to wean off acid reducing medication if possible.    GI Problem  The primary symptoms include abdominal pain. Primary symptoms do not include fever, weight loss, fatigue, nausea, vomiting, diarrhea, melena, hematemesis, jaundice, hematochezia or dysuria.   The abdominal pain began more than 2 days ago (intermittent for several years, occurs when reflux flares up). The abdominal pain has been rapidly improving since its onset. The abdominal pain is located in the RUQ and epigastric region (occurs with reflux flare-ups; described as mild discomfort). The abdominal pain radiates to the back. The severity of the abdominal pain is 0/10 (currently).   The illness does not include chills, dysphagia (resolved with EGD with dilation), odynophagia or constipation. Significant associated medical issues include GERD (reflux occurs if she eats trigger food, occurs once a week to once every 2 weeks; taking prilosec 40 mg once daily PRN; triggered by fried foods, eating too fast).     Review of Systems   Constitutional:  Negative for appetite change, chills, fatigue, fever and weight loss.   HENT:  Negative for sore throat and trouble swallowing.    Respiratory:  Negative for cough, choking and shortness of breath.    Cardiovascular:  Negative for chest pain.   Gastrointestinal:  Positive for abdominal pain. Negative for  anal bleeding, blood in stool, constipation, diarrhea, dysphagia (resolved with EGD with dilation), hematemesis, hematochezia, jaundice, melena, nausea, rectal pain and vomiting.   Genitourinary:  Negative for difficulty urinating, dysuria and flank pain.   Neurological:  Negative for weakness.       No LMP recorded. Patient is postmenopausal.  Past Medical History:   Diagnosis Date    Anxiety     AR (allergic rhinitis)     Arthritis     Asthma, intermittent     Bilateral carotid artery stenosis 11/09/2021    Coronary artery disease     Depression     Diverticulosis     Endometrial polyp     Fatty liver     GERD (gastroesophageal reflux disease)     Hiatal hernia     Hyperlipidemia LDL goal <100     Hypertension     Mild mitral regurgitation     Multiple thyroid nodules 03/27/2014    Obesity (BMI 30-39.9) 03/27/2014    Renal cell carcinoma 2002    removed - no recurrence    S/P primary angioplasty with coronary stent 07/26/2017    Sleep apnea     Using CPAP    Solitary kidney, acquired     right    Tubular adenoma of colon 03/22/2022     Past Surgical History:   Procedure Laterality Date    APPENDECTOMY      CARDIAC CATHETERIZATION  06/27/2014    has blockages, but per patient no stents    CHOLECYSTECTOMY      COLONOSCOPY  2002    negative    COLONOSCOPY  03/25/2014         COLONOSCOPY N/A 03/22/2022    Procedure: COLONOSCOPY;  Surgeon: Hunter Garibay MD;  Location: [facility];  Service: Endoscopy;  Laterality: N/A; Repeat colonoscopy in 5 years for surveillance    CORONARY ANGIOPLASTY  2008?    balloon angioplasty    CORONARY ANGIOPLASTY WITH STENT PLACEMENT Right 07/26/2017    ESOPHAGOGASTRODUODENOSCOPY N/A 07/14/2021    Procedure: ESOPHAGOGASTRODUODENOSCOPY (EGD);  Surgeon: Sheng Bell III, MD;  Location: [facility];  Service: Endoscopy;  Laterality: N/A;    ESOPHAGOGASTRODUODENOSCOPY N/A 11/14/2023    Procedure: EGD (ESOPHAGOGASTRODUODENOSCOPY);  Surgeon: Guille Trujillo Jr., MD;  Location: [facility];   Service: Endoscopy;  Laterality: N/A;    NEPHRECTOMY Left 2002    [facility] - [facility] - cancer    OVARIAN CYST REMOVAL      PERCUTANEOUS ENDOVENOUS LASER ABLATION OF PERIPHERAL VEIN  06/05/2019    POLYPECTOMY      endometrial polyp removal    ROTATOR CUFF REPAIR      L    THYROIDECTOMY, PARTIAL Left     TUBAL LIGATION      UPPER GASTROINTESTINAL ENDOSCOPY  04/2014     Family History   Problem Relation Name Age of Onset    Heart attack Father Andreas 67    Arthritis Father Andreas     Hypertension Father Andreas     Asthma Brother Marcos     Diabetes Paternal Aunt      Allergies Paternal Aunt      Breast cancer Paternal Aunt      Diabetes Paternal Uncle      Allergies Maternal Grandmother      Breast cancer Daughter  40    Colon cancer Neg Hx      Stomach cancer Neg Hx      Angioedema Neg Hx      Atopy Neg Hx      Immunodeficiency Neg Hx      Urticaria Neg Hx      Rhinitis Neg Hx      Eczema Neg Hx      Asthma Neg Hx      Crohn's disease Neg Hx      Esophageal cancer Neg Hx      Ulcerative colitis Neg Hx       Social History     Tobacco Use    Smoking status: Never    Smokeless tobacco: Never   Substance Use Topics    Alcohol use: No    Drug use: No     Wt Readings from Last 10 Encounters:   04/29/24 88.6 kg (195 lb 5.2 oz)   11/29/23 87.5 kg (192 lb 14.4 oz)   11/24/23 87.3 kg (192 lb 7.4 oz)   11/09/23 87.5 kg (193 lb)   10/31/23 85.2 kg (187 lb 13.3 oz)   09/20/23 87.5 kg (193 lb)   06/02/23 89.1 kg (196 lb 6.4 oz)   05/12/23 89 kg (196 lb 1.6 oz)   05/03/23 88 kg (194 lb)   03/03/23 88.5 kg (195 lb)     Lab Results   Component Value Date    WBC 6.15 03/25/2022    HGB 13.5 03/25/2022    HCT 41.9 03/25/2022    MCV 90 03/25/2022     03/25/2022     CMP  Sodium   Date Value Ref Range Status   04/17/2024 137 136 - 145 mmol/L Final     Potassium   Date Value Ref Range Status   04/17/2024 3.8 3.5 - 5.1 mmol/L Final     Chloride   Date Value Ref Range Status   04/17/2024 102 95 - 110 mmol/L Final     CO2    Date Value Ref Range Status   04/17/2024 24 23 - 29 mmol/L Final     Glucose   Date Value Ref Range Status   04/17/2024 84 70 - 110 mg/dL Final     BUN   Date Value Ref Range Status   04/17/2024 17 8 - 23 mg/dL Final     Creatinine   Date Value Ref Range Status   04/17/2024 0.8 0.5 - 1.4 mg/dL Final     Calcium   Date Value Ref Range Status   04/17/2024 9.5 8.7 - 10.5 mg/dL Final     Total Protein   Date Value Ref Range Status   04/17/2024 7.6 6.0 - 8.4 g/dL Final     Albumin   Date Value Ref Range Status   04/17/2024 3.8 3.5 - 5.2 g/dL Final     Total Bilirubin   Date Value Ref Range Status   04/17/2024 0.8 0.1 - 1.0 mg/dL Final     Comment:     For infants and newborns, interpretation of results should be based  on gestational age, weight and in agreement with clinical  observations.    Premature Infant recommended reference ranges:  Up to 24 hours.............<8.0 mg/dL  Up to 48 hours............<12.0 mg/dL  3-5 days..................<15.0 mg/dL  6-29 days.................<15.0 mg/dL       Alkaline Phosphatase   Date Value Ref Range Status   04/17/2024 71 55 - 135 U/L Final     AST   Date Value Ref Range Status   04/17/2024 21 10 - 40 U/L Final     ALT   Date Value Ref Range Status   04/17/2024 21 10 - 44 U/L Final     Anion Gap   Date Value Ref Range Status   04/17/2024 11 8 - 16 mmol/L Final     eGFR if    Date Value Ref Range Status   03/25/2022 >60.0 >60 mL/min/1.73 m^2 Final     eGFR if non    Date Value Ref Range Status   03/25/2022 >60.0 >60 mL/min/1.73 m^2 Final     Comment:     Calculation used to obtain the estimated glomerular filtration  rate (eGFR) is the CKD-EPI equation.        Lab Results   Component Value Date    AMYLASE 65 03/18/2022     Lab Results   Component Value Date    LIPASE 39 03/18/2022     Lab Results   Component Value Date    TSH 1.820 06/09/2023     Reviewed prior medical records including radiology report of 12/27/2021 abdominal ultrasound;  7/15/2021 pelvic ultrasound; 1/31/2020 abdominal x-ray; 10/17/2017 CT abdomen pelvis; 4/17/2014 esophageal manometry; & endoscopy history (see surgical history/procedures).    Objective:      Physical Exam  Vitals and nursing note reviewed.   Constitutional:       General: She is not in acute distress.     Appearance: Normal appearance. She is well-developed. She is not diaphoretic.   HENT:      Mouth/Throat:      Comments: Patient is wearing a face mask, which covers patient's mouth and nose.  Eyes:      General: No scleral icterus.     Conjunctiva/sclera: Conjunctivae normal.   Pulmonary:      Effort: Pulmonary effort is normal. No respiratory distress.      Breath sounds: Normal breath sounds. No wheezing.   Abdominal:      General: Bowel sounds are normal. There is no distension or abdominal bruit.      Palpations: Abdomen is soft. Abdomen is not rigid. There is no mass.      Tenderness: There is abdominal tenderness (mild) in the epigastric area. There is no guarding or rebound. Negative signs include Perez's sign and McBurney's sign.   Skin:     General: Skin is warm and dry.      Coloration: Skin is not jaundiced or pale.      Findings: No erythema or rash.   Neurological:      Mental Status: She is alert and oriented to person, place, and time.   Psychiatric:         Behavior: Behavior normal.         Thought Content: Thought content normal.         Judgment: Judgment normal.         Assessment:       1. History of esophagogastroduodenoscopy (EGD)    2. Gastroesophageal reflux disease with esophagitis without hemorrhage    3. Chronic gastritis without bleeding, unspecified gastritis type    4. RUQ abdominal pain    5. Upper abdominal pain    6. Hiatal hernia          Plan:       History of esophagogastroduodenoscopy (EGD), Gastroesophageal reflux disease with esophagitis without hemorrhage, & Chronic gastritis without bleeding, unspecified gastritis type  -  DECREASE TO   omeprazole (PRILOSEC) 20 MG  capsule; Take 1 capsule (20 mg total) by mouth before breakfast.  Dispense: 90 capsule; Refill: 0    RUQ abdominal pain  -     US Abdomen Limited (LIVER); Future; Expected date: 04/29/2024  - avoid/minimize use of NSAIDs- since they can cause GI upset, bleeding and/or ulcers. If NSAID must be taken, recommend take with food.  - Possible additional abdominal imaging pending results of testing and if symptoms persist    Hiatal hernia  -discussed diagnosis with patient & that it is usually managed by controlling reflux symptoms, surgery is an option, but usually performed if reflux is uncontrolled by medication management and lifestyle/dietary modifications; if symptoms persist despite medication management and lifestyle/dietary modifications, we can refer to general surgery to consult about surgical options, patient verbalized understanding    Follow up in about 1 month (around 5/29/2024), or if symptoms worsen or fail to improve.      If no improvement in symptoms or symptoms worsen, call/follow-up at clinic or go to ER.        27 minutes of total time spent on the encounter, which includes face to face time and non-face to face time preparing to see the patient (e.g., review of tests), Obtaining and/or reviewing separately obtained history, Documenting clinical information in the electronic or other health record, Independently interpreting results (not separately reported) and communicating results to the patient/family/caregiver, or Care coordination (not separately reported).

## 2024-04-29 NOTE — PATIENT INSTRUCTIONS
Hiatal Hernia   The Basics   Written by the doctors and editors at [facility]   What is a hiatal hernia? -- A hiatal hernia is what doctors call it when a part of the stomach moves up into the chest area. Normally, the stomach sits below the diaphragm, the layer of muscle that separates the organs in the chest from the organs in the belly. The esophagus, the tube that carries food from the mouth to the stomach, passes through a hole in the diaphragm. In people with a hiatal hernia, the stomach pushes up through that hole, too.  There are 2 types of hiatal hernia (figure 1):  Sliding hernia - A sliding hernia happens when the top of the stomach and the lower part of the esophagus squeeze up into the space above the diaphragm. This is the most common type of hiatal hernia.  Paraesophageal hernia - A paraesophageal hernia happens when the top of the stomach squeezes up into the space above the diaphragm. This is not very common, but it can be serious if the stomach folds up on itself. It can also cause bleeding from the stomach or trouble breathing.  What are the symptoms of a hiatal hernia? -- Hiatal hernias do not usually cause symptoms. In some cases, though, hiatal hernias cause stomach acid to leak into the esophagus. This is called acid reflux or gastroesophageal reflux, and it can cause symptoms, including:  Burning in the chest, known as heartburn  Burning in the throat or an acid taste in the throat  Stomach or chest pain  Trouble swallowing  A raspy voice or a sore throat  Unexplained cough  Is there a test for hiatal hernia? -- Yes, but doctors do not usually test for hiatal hernia. Instead, most people learn they have a hiatal hernia when they are having tests to find the cause of symptoms, or for other reasons. For instance, some people find out they have a hiatal hernia when they have an X-ray. Others find out when their doctor puts a tube with a tiny camera down their throat (called an endoscopy) (figure  2).  How are hiatal hernias treated? -- People who have symptoms caused by a hiatal hernia can get treated for their symptoms.  Treatment for symptoms involves taking the medicines that are used for acid reflux (table 1). People with a paraesophageal hernia, and some people with a sliding hernia, need surgery. For this surgery, the surgeon pulls the stomach back down and repairs the hole in the diaphragm so the stomach does not slide up again.  All topics are updated as new evidence becomes available and our peer review process is complete.  This topic retrieved from [employer] on: Sep 21, 2021.  Topic 33540 Version 8.0  Release: 29.4.2 - C29.263  © 2021 UpToDate, Inc. and/or its affiliates. All rights reserved.  figure 1: Hiatal hernia     A sliding hernia happens when the top of the stomach squeezes up into the space above the diaphragm. This is the most common type of hiatal hernia.  A paraesophageal hernia happens when the top of the stomach folds up against the esophagus, creating a pouch. This is not very common, but it can be serious.  Graphic 23334 Version 4.0    figure 2: Upper endoscopy     During an upper endoscopy, you lie down and the doctor puts a thin tube with a camera and light on the end (called an endoscope) into your mouth and down into your esophagus, stomach, and duodenum (the first part of your small intestine). The camera sends pictures from inside your body to a television screen. That way, your doctor can see the inside of your esophagus, stomach, and duodenum.  Graphic 48495 Version 4.0    table 1: Medicines used to reduce stomach acid  Medicine type  Medicine name examples    Antacids* Calcium carbonate (sample brand names: Maalox, Tums)    Aluminum hydroxide, magnesium hydroxide, and simethicone (sample brand name: Mylanta)   Surface agents Sucralfate (brand name: Carafate)   Histamine blockers¶  Famotidine (brand name: Pepcid)    Cimetidine (brand name: Tagamet)   Proton pump inhibitors  Omeprazole (brand name: Prilosec)    Esomeprazole (brand name: Nexium)    Pantoprazole (brand name: Protonix)    Lansoprazole (brand name: Prevacid)    Dexlansoprazole (brand name: Dexilant)    Rabeprazole (brand name: AcipHex)   Graphic 54278 Version 14.0  Consumer Information Use and Disclaimer   This information is not specific medical advice and does not replace information you receive from your health care provider. This is only a brief summary of general information. It does NOT include all information about conditions, illnesses, injuries, tests, procedures, treatments, therapies, discharge instructions or life-style choices that may apply to you. You must talk with your health care provider for complete information about your health and treatment options. This information should not be used to decide whether or not to accept your health care provider's advice, instructions or recommendations. Only your health care provider has the knowledge and training to provide advice that is right for you. The use of this information is governed by the [employer] End User License Agreement, available at https://www.[employer]/en/solutions/FookyZ/about/glenna.The use of [employer] content is governed by the [employer] Terms of Use. ©2021 UpToDate, Inc. All rights reserved.  Copyright   © 2021 UpToDate, Inc. and/or its affiliates. All rights reserved. Acid Reflux and GERD in Adults Discharge Instructions   About this topic   GERD stands for gastroesophageal reflux disease. It is sometimes called reflux or acid reflux. Acid reflux happens when your stomach acid backs up into your esophagus, the tube that carries your food from your mouth to your stomach. This can be uncomfortable. You may have stomach or chest pain (heartburn), trouble swallowing, or an upset stomach. Some people have a cough or sore throat.  Most of the time, you can use over-the-counter medicines to help with this problem.       What care is needed at  home?   Ask your doctor what you need to do when you go home. Make sure you ask questions if you do not understand what the doctor says.  Raise the head of your bed by 6 to 8 inches (15 to 20 cm). Use wood or rubber blocks under 2 legs or try a foam wedge under your mattress. Just sleeping with your head raised on pillows is not enough.  Avoid beer, wine, and mixed drinks and avoid caffeine.  Keep a healthy weight. If you are too heavy, lose weight.  If you smoke, try to quit. Your doctor or nurse can help.  Keep a diary of your signs. Write down what you had to eat before you had reflux. This will help you learn which foods cause you problems. For some people, they need to avoid coffee, chocolate, alcohol, spicy or fatty foods, or peppermint.  Avoid eating for 2 to 3 hours before bedtime. Lying down after you eat can make reflux worse.  Avoid belts and clothes that are too tight.  What follow-up care is needed?   Your doctor may ask you to make visits to the office to check on your progress. Be sure to keep these visits.  What drugs may be needed?   The doctor may order drugs to:  Relieve heartburn  Prevent reflux  Lessen acid production  Heal the esophageal lining  Will physical activity be limited?   Your physical activities will not be limited.  What problems could happen?   Asthma  Precancerous changes in the food pipe  Long-term cough  Dental problems  Higher risk of cancer of the food pipe. This is esophageal cancer.  Narrowing of the food pipe. This is a stricture.  Open sore in the food pipe. This is an ulcer.  When do I need to call the doctor?   You have signs of a heart attack, which may include:  Severe chest pain, pressure, or discomfort with:  Breathing trouble, sweating, upset stomach, or cold, clammy skin.  Pain in your arms, back, or jaw.  Worse pain with activity like walking up stairs.  Fast or irregular heartbeat.  Feeling dizzy, faint, or weak.  You have sudden, severe belly pain or the belly  pain is constant.  You have blood in the undigested food and acid that comes up, or stool that looks red, black, or like tar.  You feel like your food gets stuck or you have pain when you swallow.  You lose weight when you are not trying to.  You choke when you are eating.  Your reflux is very bad, very frequent, or not helped by over-the-counter medicines.  You keep throwing up.  Teach Back: Helping You Understand   The Teach Back Method helps you understand the information we are giving you. After you talk with the staff, tell them in your own words what you learned. This helps to make sure the staff has described each thing clearly. It also helps to explain things that may have been confusing. Before going home, make sure you can do these:  I can tell you about my condition.  I can tell you what changes I need to make with my eating habits to ease the reflux.  I can tell you what I will do if I am throwing up fluid that looks like blood or coffee grounds.  Where can I learn more?   American Academy of [employer]icians  https://familydoctor.org/condition/refluxacid-reflux/   [facility] Choices  https://www.nhs.uk/conditions/heartburn-and-acid-reflux/   Last Reviewed Date   2021-06-09  Consumer Information Use and Disclaimer   This information is not specific medical advice and does not replace information you receive from your health care provider. This is only a brief summary of general information. It does NOT include all information about conditions, illnesses, injuries, tests, procedures, treatments, therapies, discharge instructions or life-style choices that may apply to you. You must talk with your health care provider for complete information about your health and treatment options. This information should not be used to decide whether or not to accept your health care providers advice, instructions or recommendations. Only your health care provider has the knowledge and training to provide advice that is right  "for you.  Copyright   Copyright © 2021 UpToDate, Inc. and its affiliates and/or licensors. All rights reserved. Gastritis   The Basics   Written by the doctors and editors at [employer]   What is gastritis? -- "Gastritis" means inflammation of the stomach lining (figure 1).  Some people have gastritis that comes on suddenly and lasts only for a short time. Doctors call this "acute" gastritis. Other people have gastritis that lasts for months or years. Doctors call this "chronic" gastritis.  What causes gastritis? -- Different things can cause gastritis, including:  An infection in the stomach from bacteria called "H. pylori"  Medicines called "nonsteroidal antiinflammatory drugs" (NSAIDs) - These include aspirin, ibuprofen (brand names: Advil, Motrin), and naproxen (brand names: Aleve, Naprosyn).  Drinking alcohol  Conditions in which the body's infection-fighting system attacks the stomach lining  Having a serious or life-threatening illness  What are the symptoms of gastritis? -- People with gastritis have no symptoms. When people do have symptoms, they are due to other conditions that can happen with gastritis, like ulcers. Symptoms from ulcers include:  Pain in the upper belly  Feeling bloated, or feeling full after eating a small amount of food  Decreased appetite  Nausea or vomiting  Vomiting blood, or having black-colored bowel movements  Feeling more tired than usual - This happens if people with gastritis get a condition called "anemia."  Should I call my doctor or nurse? -- Call your doctor or nurse if:  You have belly pain that gets worse or doesn't go away  You vomit blood or have black bowel movements  You are losing weight (without trying to)  Will I need tests? -- Probably. Your doctor or nurse will ask about your symptoms and do an exam. They might also do:  An upper endoscopy - During this procedure, the doctor puts a thin tube with a camera on the end into your mouth and down into your stomach " (figure 2). they will look at the inside of your stomach. During the procedure, they might also do a test called a biopsy. For a biopsy, the doctor takes a small sample of the stomach lining. Then another doctor looks at the sample under a microscope.  Tests to check for H. pylori infection. These can include:  Blood tests  Breath tests - These tests measure substances in your breath after you drink a special liquid.  Tests on a small sample of your bowel movement  Blood tests to check for anemia  How is gastritis treated? -- Treatment depends on what's causing your gastritis.  For example, if NSAIDs are causing your gastritis, your doctor will recommend that you not take those medicines. If alcohol is causing your gastritis, they will recommend that you stop drinking alcohol.  Doctors can use medicines to treat gastritis caused by an H. pylori infection. Most people take 3 or more medicines for 2 weeks. The treatment includes antibiotics plus medicine that helps the stomach make less acid.  Doctors can use medicines that reduce or block stomach acid to treat other causes of gastritis (table 1). The main types of medicines that reduce or block stomach acid are:  Antacids  Surface agents  Histamine blockers  Proton pump inhibitors  If your doctor recommends acid-reducing treatment, they will tell you which medicine to use.  What happens after treatment? -- Sometimes, people who are treated for an H. pylori infection need follow-up tests to make sure the infection is gone. Follow-up tests include breath tests, lab tests on a sample of bowel movement, or endoscopy.  All topics are updated as new evidence becomes available and our peer review process is complete.  This topic retrieved from [employer] on: Sep 21, 2021.  Topic 26301 Version 8.0  Release: 29.4.2 - C29.263  © 2021 UpToDate, Inc. and/or its affiliates. All rights reserved.  figure 1: Upper digestive tract     The upper digestive tract includes the esophagus  (the tube that connects the mouth to the stomach), the stomach, and the duodenum (the first part of the small intestine).  Graphic 76005 Version 6.0    figure 2: Upper endoscopy     During an upper endoscopy, you lie down and the doctor puts a thin tube with a camera and light on the end (called an endoscope) into your mouth and down into your esophagus, stomach, and duodenum (the first part of your small intestine). The camera sends pictures from inside your body to a television screen. That way, your doctor can see the inside of your esophagus, stomach, and duodenum.  Graphic 23214 Version 4.0    table 1: Medicines used to reduce stomach acid  Medicine type  Medicine name examples    Antacids* Calcium carbonate (sample brand names: Maalox, Tums)    Aluminum hydroxide, magnesium hydroxide, and simethicone (sample brand name: Mylanta)   Surface agents Sucralfate (brand name: Carafate)   Histamine blockers¶  Famotidine (brand name: Pepcid)    Cimetidine (brand name: Tagamet)   Proton pump inhibitors Omeprazole (brand name: Prilosec)    Esomeprazole (brand name: Nexium)    Pantoprazole (brand name: Protonix)    Lansoprazole (brand name: Prevacid)    Dexlansoprazole (brand name: Dexilant)    Rabeprazole (brand name: AcipHex)   Graphic 53454 Version 14.0  Consumer Information Use and Disclaimer   This information is not specific medical advice and does not replace information you receive from your health care provider. This is only a brief summary of general information. It does NOT include all information about conditions, illnesses, injuries, tests, procedures, treatments, therapies, discharge instructions or life-style choices that may apply to you. You must talk with your health care provider for complete information about your health and treatment options. This information should not be used to decide whether or not to accept your health care provider's advice, instructions or recommendations. Only your health care  provider has the knowledge and training to provide advice that is right for you. The use of this information is governed by the [employer] End User License Agreement, available at [email or web address]://[email or web address]/solutions/Jini/about/glenna.The use of [employer] content is governed by the [employer] Terms of Use. ©2021 UpToDate, Inc. All rights reserved.  Copyright   © 2021 UpToDate, Inc. and/or its affiliates. All rights reserved.      Gastritis Discharge Instructions   About this topic   Gastritis is inflammation of the lining of the stomach. Sometimes gastritis is caused by bacteria. Other times, it can be caused by drugs. Some types of drugs can cause gastritis. The most common are nonsteroidal anti-inflammatory drugs (NSAIDs) like ibuprofen or naproxen. Gastritis can also be caused by other things like drinking alcohol or having a serious illness. It can also happen if you have a health problem in which the bodys own infection fighting system attacks the stomach lining. Based on the cause, you may need to take an antibiotic or other medicine to treat your gastritis. If so, be sure you finish all of the medicine that is ordered.     What care is needed at home?   Ask your doctor what you need to do when you go home. Make sure you ask questions if you do not understand what the doctor says.  Eat small meals more often to help with belly pain.  Keep a diary about your pain and the foods you eat. Then you can avoid those that bother your stomach.  Avoid or limit spicy foods.  Avoid or limit beer, wine, and mixed drinks.  If you smoke, try to quit. Your doctor or nurse can help.  Try to learn ways to manage stress. Stress may cause the acid levels in your stomach to rise.  If possible, avoid long-term use of aspirin and other anti-inflammatory drugs.  What follow-up care is needed?   Your doctor may ask you to make visits to the office to check on your progress. Be sure to keep these visits.  What drugs may be needed?    The doctor may order drugs to:  Fight an infection  Control how much acid your stomach makes  Help healing  Will physical activity be limited?   You may want to limit your activity if you have belly pain. Having an upset stomach or throwing up may also limit what you do. You may need more rest if you feel weak or tired.  What problems could happen?   Stomach ulcer or bleeding  Stomach cancer  When do I need to call the doctor?   You start throwing up blood or pass a lot of blood in your stool.  Your belly pain becomes much worse all of a sudden or over a few hours.  Your belly becomes hard or tender.  You have chest pain or trouble breathing  Your stools are bright red, black, or tar-colored.  You are throwing up often.  Your belly pain does not get better even after taking medicine, changing your diet, and following treatment instructions.  You lose a lot of weight without trying.  Teach Back: Helping You Understand   The Teach Back Method helps you understand the information we are giving you. After you talk with the staff, tell them in your own words what you learned. This helps to make sure the staff has described each thing clearly. It also helps to explain things that may have been confusing. Before going home, make sure you can do these:  I can tell you about my condition.  I can tell you if I need to make changes with my diet or drugs.  I can tell you what I will do if I throw up blood or have bloody or black tarry stools.  Where can I learn more?   [facility] [place]  https://www.nhs.uk/conditions/gastritis/   Last Reviewed Date   2021-06-08  Consumer Information Use and Disclaimer   This information is not specific medical advice and does not replace information you receive from your health care provider. This is only a brief summary of general information. It does NOT include all information about conditions, illnesses, injuries, tests, procedures, treatments, therapies, discharge  instructions or life-style choices that may apply to you. You must talk with your health care provider for complete information about your health and treatment options. This information should not be used to decide whether or not to accept your health care providers advice, instructions or recommendations. Only your health care provider has the knowledge and training to provide advice that is right for you.  Copyright   Copyright © 2021 [employer] and its affiliates and/or licensors. All rights reserved.

## 2024-05-07 ENCOUNTER — HOSPITAL ENCOUNTER (OUTPATIENT)
Dept: RADIOLOGY | Facility: HOSPITAL | Age: 69
Discharge: HOME | End: 2024-05-07
Attending: NURSE PRACTITIONER
Payer: COMMERCIAL

## 2024-05-07 DIAGNOSIS — R10.11 RUQ ABDOMINAL PAIN: ICD-10-CM

## 2024-05-07 PROCEDURE — 76705 ECHO EXAM OF ABDOMEN: CPT | Mod: TC,PO

## 2024-05-07 PROCEDURE — 76705 ECHO EXAM OF ABDOMEN: CPT | Mod: 26,,, | Performed by: RADIOLOGY

## 2024-06-20 RX ORDER — HYDROCHLOROTHIAZIDE 25 MG/1
25 TABLET ORAL DAILY
Qty: 90 TABLET | Refills: 0 | Status: SHIPPED | OUTPATIENT
Start: 2024-06-20

## 2024-06-20 NOTE — TELEPHONE ENCOUNTER
Care Due:                  Date            Visit Type   Department     Provider  --------------------------------------------------------------------------------                                MYCHART                              FOLLOWUP/OF  [facility]  Last Visit: 09-      [facility]ICE VISIT   MEDICINE       Layo Reed  Next Visit: None Scheduled  None         None Found                                                            Last  Test          Frequency    Reason                     Performed    Due Date  --------------------------------------------------------------------------------    Office Visit  12 months..  diclofenac...............  09- 09-    [facility] Embedded Care Due Messages. Reference number: [id number].   6/20/2024 8:45:55 AM CDT   none

## 2024-10-09 RX ORDER — HYDROCHLOROTHIAZIDE 25 MG/1
TABLET ORAL
Qty: 90 TABLET | Refills: 0 | Status: SHIPPED | OUTPATIENT
Start: 2024-10-09

## 2024-10-09 NOTE — TELEPHONE ENCOUNTER
Care Due:                  Date            Visit Type   Department     Provider  --------------------------------------------------------------------------------                                MYCHART                              FOLLOWUP/OF  [facility] FAMILY  Last Visit: 09-      FICE VISIT   MEDICINE       Layo Reed  Next Visit: None Scheduled  None         None Found                                                            Last  Test          Frequency    Reason                     Performed    Due Date  --------------------------------------------------------------------------------    Office Visit  15 months..  EScitalopram, albuterol,   09- 12-                             hydroCHLOROthiazide,                             pravastatin..............    [employer] Embedded Care Due Messages. Reference number: [id number].   10/09/2024 9:38:15 AM CDT

## 2024-10-09 NOTE — TELEPHONE ENCOUNTER
Refill Routing Note   Medication(s) are not appropriate for processing by [facility] for the following reason(s):        Required vitals abnormal    [facility] action(s):  Defer   Requires appointment : Yes             Appointments  past 12m or future 3m with PCP    Date Provider   Last Visit   9/20/2023 Layo Reed MD   Next Visit   Visit date not found Layo Reed MD   ED visits in past 90 days: 0        Note composed:10:30 AM 10/09/2024

## 2024-10-15 ENCOUNTER — OFFICE VISIT (OUTPATIENT)
Dept: FAMILY MEDICINE | Facility: CLINIC | Age: 69
End: 2024-10-15
Payer: COMMERCIAL

## 2024-10-15 VITALS
SYSTOLIC BLOOD PRESSURE: 170 MMHG | OXYGEN SATURATION: 98 % | WEIGHT: 200.38 LBS | HEART RATE: 77 BPM | HEIGHT: 65 IN | TEMPERATURE: 97 F | BODY MASS INDEX: 33.38 KG/M2 | DIASTOLIC BLOOD PRESSURE: 74 MMHG

## 2024-10-15 DIAGNOSIS — T30.0 BURN: Primary | ICD-10-CM

## 2024-10-15 PROCEDURE — 3078F DIAST BP <80 MM HG: CPT | Mod: CPTII,S$GLB,, | Performed by: NURSE PRACTITIONER

## 2024-10-15 PROCEDURE — 99213 OFFICE O/P EST LOW 20 MIN: CPT | Mod: S$GLB,,, | Performed by: NURSE PRACTITIONER

## 2024-10-15 PROCEDURE — 4010F ACE/ARB THERAPY RXD/TAKEN: CPT | Mod: CPTII,S$GLB,, | Performed by: NURSE PRACTITIONER

## 2024-10-15 PROCEDURE — 3008F BODY MASS INDEX DOCD: CPT | Mod: CPTII,S$GLB,, | Performed by: NURSE PRACTITIONER

## 2024-10-15 PROCEDURE — 99999 PR PBB SHADOW E&M-EST. PATIENT-LVL V: CPT | Mod: PBBFAC,,, | Performed by: NURSE PRACTITIONER

## 2024-10-15 PROCEDURE — 3077F SYST BP >= 140 MM HG: CPT | Mod: CPTII,S$GLB,, | Performed by: NURSE PRACTITIONER

## 2024-10-15 PROCEDURE — 1159F MED LIST DOCD IN RCRD: CPT | Mod: CPTII,S$GLB,, | Performed by: NURSE PRACTITIONER

## 2024-10-15 PROCEDURE — 1126F AMNT PAIN NOTED NONE PRSNT: CPT | Mod: CPTII,S$GLB,, | Performed by: NURSE PRACTITIONER

## 2024-10-15 PROCEDURE — 3288F FALL RISK ASSESSMENT DOCD: CPT | Mod: CPTII,S$GLB,, | Performed by: NURSE PRACTITIONER

## 2024-10-15 PROCEDURE — 87070 CULTURE OTHR SPECIMN AEROBIC: CPT | Performed by: NURSE PRACTITIONER

## 2024-10-15 PROCEDURE — 1101F PT FALLS ASSESS-DOCD LE1/YR: CPT | Mod: CPTII,S$GLB,, | Performed by: NURSE PRACTITIONER

## 2024-10-15 RX ORDER — CEPHALEXIN 500 MG/1
500 CAPSULE ORAL EVERY 12 HOURS
Qty: 14 CAPSULE | Refills: 0 | Status: SHIPPED | OUTPATIENT
Start: 2024-10-15 | End: 2024-10-22

## 2024-10-15 RX ORDER — MUPIROCIN 20 MG/G
OINTMENT TOPICAL 3 TIMES DAILY
Qty: 22 G | Refills: 0 | Status: SHIPPED | OUTPATIENT
Start: 2024-10-15 | End: 2024-10-25
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
## 2024-10-15 NOTE — PATIENT INSTRUCTIONS
Keep area clean and dry  Clean with mild antibacterial soap and water  Hydrate well  Rest  Report to ER immediately if symptoms worsen or persist    John Plasencia,     If you are due for any health screening(s) below please notify me so we can arrange them to be ordered and scheduled. Most healthy patients at your age complete them, but you are free to accept or refuse.     If you can't do it, I'll definitely understand. If you can, I'd certainly appreciate it!    Tests to Keep You Healthy    Mammogram: Met on 4/17/2024  Colon Cancer Screening: Met on 3/22/2022  Last Blood Pressure <= 139/89 (2/9/2024): NO

## 2024-10-18 LAB — BACTERIA SPEC AEROBE CULT: NO GROWTH

## 2024-10-30 DIAGNOSIS — E78.5 HYPERLIPIDEMIA LDL GOAL <100: ICD-10-CM

## 2024-10-30 RX ORDER — PRAVASTATIN SODIUM 40 MG/1
40 TABLET ORAL NIGHTLY
Qty: 90 TABLET | Refills: 0 | Status: SHIPPED | OUTPATIENT
Start: 2024-10-30

## 2024-11-19 ENCOUNTER — PATIENT OUTREACH (OUTPATIENT)
Dept: ADMINISTRATIVE | Facility: HOSPITAL | Age: 69
End: 2024-11-19
Payer: COMMERCIAL

## 2024-11-19 NOTE — PROGRESS NOTES
VBC Program activation,  default modifiers removed, Pt currently has PCP visit tomorrow in which Uncontrolled BP will be checked.

## 2024-11-20 ENCOUNTER — OFFICE VISIT (OUTPATIENT)
Dept: FAMILY MEDICINE | Facility: CLINIC | Age: 69
End: 2024-11-20
Payer: COMMERCIAL

## 2024-11-20 VITALS
SYSTOLIC BLOOD PRESSURE: 154 MMHG | WEIGHT: 199.63 LBS | DIASTOLIC BLOOD PRESSURE: 74 MMHG | HEIGHT: 62 IN | BODY MASS INDEX: 36.74 KG/M2 | HEART RATE: 71 BPM | TEMPERATURE: 97 F

## 2024-11-20 DIAGNOSIS — F33.40 RECURRENT MAJOR DEPRESSIVE DISORDER, IN REMISSION: ICD-10-CM

## 2024-11-20 DIAGNOSIS — Z23 NEED FOR VACCINATION: ICD-10-CM

## 2024-11-20 DIAGNOSIS — E66.01 SEVERE OBESITY (BMI 35.0-39.9) WITH COMORBIDITY: ICD-10-CM

## 2024-11-20 DIAGNOSIS — E78.5 HYPERLIPIDEMIA, UNSPECIFIED HYPERLIPIDEMIA TYPE: ICD-10-CM

## 2024-11-20 DIAGNOSIS — F41.9 ANXIETY: ICD-10-CM

## 2024-11-20 DIAGNOSIS — Z90.5 SOLITARY KIDNEY, ACQUIRED: ICD-10-CM

## 2024-11-20 DIAGNOSIS — I10 ESSENTIAL HYPERTENSION: ICD-10-CM

## 2024-11-20 DIAGNOSIS — K21.9 GASTROESOPHAGEAL REFLUX DISEASE WITHOUT ESOPHAGITIS: ICD-10-CM

## 2024-11-20 DIAGNOSIS — I25.10 CORONARY ARTERY DISEASE INVOLVING NATIVE CORONARY ARTERY OF NATIVE HEART WITHOUT ANGINA PECTORIS: ICD-10-CM

## 2024-11-20 DIAGNOSIS — Z85.528 PERSONAL HISTORY OF RENAL CANCER: ICD-10-CM

## 2024-11-20 DIAGNOSIS — Z00.00 ROUTINE HISTORY AND PHYSICAL EXAMINATION OF ADULT: Primary | ICD-10-CM

## 2024-11-20 PROCEDURE — 3078F DIAST BP <80 MM HG: CPT | Mod: CPTII,S$GLB,, | Performed by: FAMILY MEDICINE

## 2024-11-20 PROCEDURE — 90653 IIV ADJUVANT VACCINE IM: CPT | Mod: S$GLB,,, | Performed by: FAMILY MEDICINE

## 2024-11-20 PROCEDURE — 90471 IMMUNIZATION ADMIN: CPT | Mod: S$GLB,,, | Performed by: FAMILY MEDICINE

## 2024-11-20 PROCEDURE — 1126F AMNT PAIN NOTED NONE PRSNT: CPT | Mod: CPTII,S$GLB,, | Performed by: FAMILY MEDICINE

## 2024-11-20 PROCEDURE — 1101F PT FALLS ASSESS-DOCD LE1/YR: CPT | Mod: CPTII,S$GLB,, | Performed by: FAMILY MEDICINE

## 2024-11-20 PROCEDURE — 99999 PR PBB SHADOW E&M-EST. PATIENT-LVL V: CPT | Mod: PBBFAC,,, | Performed by: FAMILY MEDICINE

## 2024-11-20 PROCEDURE — 3008F BODY MASS INDEX DOCD: CPT | Mod: CPTII,S$GLB,, | Performed by: FAMILY MEDICINE

## 2024-11-20 PROCEDURE — 3077F SYST BP >= 140 MM HG: CPT | Mod: CPTII,S$GLB,, | Performed by: FAMILY MEDICINE

## 2024-11-20 PROCEDURE — 1159F MED LIST DOCD IN RCRD: CPT | Mod: CPTII,S$GLB,, | Performed by: FAMILY MEDICINE

## 2024-11-20 PROCEDURE — 3288F FALL RISK ASSESSMENT DOCD: CPT | Mod: CPTII,S$GLB,, | Performed by: FAMILY MEDICINE

## 2024-11-20 PROCEDURE — 4010F ACE/ARB THERAPY RXD/TAKEN: CPT | Mod: CPTII,S$GLB,, | Performed by: FAMILY MEDICINE

## 2024-11-20 PROCEDURE — 99397 PER PM REEVAL EST PAT 65+ YR: CPT | Mod: 25,S$GLB,, | Performed by: FAMILY MEDICINE

## 2024-11-20 RX ORDER — FELODIPINE 2.5 MG/1
2.5 TABLET, EXTENDED RELEASE ORAL DAILY
Qty: 30 TABLET | Refills: 1 | Status: SHIPPED | OUTPATIENT
Start: 2024-11-20 | End: 2025-11-20

## 2024-11-20 RX ORDER — VALSARTAN 320 MG/1
320 TABLET ORAL DAILY
Qty: 90 TABLET | Refills: 0 | Status: SHIPPED | OUTPATIENT
Start: 2024-11-20

## 2024-11-20 NOTE — PROGRESS NOTES
History of Present Illness    Ms. Silva presents for physical exam visit after more than a year since last appointment. She had a cardiology consultation with Dr. Colby.. April cholesterol levels showed total cholesterol of 197 mg/dL and LDL of 108 mg/dL. Ms. Silva has been taking pravastatin for cholesterol management but reports previous muscle issues with other cholesterol medications.  She does have coronary disease with recent cardiac catheterization without significant new findings.  Anxiety stable.  Depression stable.  Obesity reviewed stable.    Regarding blood pressure, the patient reports home monitoring with fluctuations.   Ms. Silva has a history of remote renal cancer .    Ms. Silva denies chest pain and current symptoms related to past renal cancer.      Erinn was seen today for annual exam.    Diagnoses and all orders for this visit:    Routine history and physical examination of adult    Coronary artery disease involving native coronary artery of native heart without angina pectoris    Hyperlipidemia, unspecified hyperlipidemia type    Essential hypertension  -     MYC E-VISIT    Personal history of renal cancer  -     Ambulatory referral/consult to Urology; Future    Gastroesophageal reflux disease without esophagitis    Recurrent major depressive disorder, in remission    Anxiety    Solitary kidney, acquired  -     Ambulatory referral/consult to Urology; Future    Need for vaccination  -     influenza (adjuvanted) (Fluad) 45 mcg/0.5 mL IM vaccine (> or = 66 yo) 0.5 mL    Severe obesity (BMI 35.0-39.9) with comorbidity    Other orders  -     felodipine (PLENDIL) 2.5 MG Tb24; Take 1 tablet (2.5 mg total) by mouth once daily.  -     valsartan (DIOVAN) 320 MG tablet; Take 1 tablet (320 mg total) by mouth once daily.          Assessment & Plan    Assessed cholesterol levels: total cholesterol 197, . Ideally, LDL should be lower.   Deferred changes to pravastatin dosage or addition of  Zetia until next labs in spring.  Evaluated blood pressure control; noted recent elevated readings in office and at home.  Determined current antihypertensive regimen (valsartan 320mg, hydrochlorothiazide 25mg) is maxed out.  Will add low-dose amlodipine (2.5mg) to address persistently elevated blood pressure.  Assessed renal cancer follow-up needs; determined routine surveillance likely unnecessary given length of time since diagnosis.    ESSENTIAL HYPERTENSION:  - Started amlodipine 2.5mg daily for blood pressure control.  - Continued pravastatin (current dose), valsartan 320mg daily, and hydrochlorothiazide 25mg daily.  - Educated on proper blood pressure measurement technique: sit for 10 minutes before checking.  - MsMolly Walker to check blood pressure after sitting for 10 minutes.      HISTORY OF KIDNEY CANCER:  - Referred to urologist for renal cancer follow-up evaluation.      VACCINATIONS AND PREVENTIVE CARE:  - Discussed annual flu and COVID vaccinations, plus 1-time RSV vaccine recommendation.   COVID vaccine, and RSV vaccine at local pharmacy.  - Flu vaccine administered in office.    FOLLOW-UP:  - Follow up in 1 month E visit check blood pressure using MyChart.  - Follow up in spring (around April) for labs, including cholesterol panel.  - Contact the office to schedule follow-up visit to review blood pressure control if preferred over MyChart reporting.               Past Medical History:  Past Medical History:   Diagnosis Date    Anxiety     AR (allergic rhinitis)     Arthritis     Asthma, intermittent     Bilateral carotid artery stenosis 11/09/2021    Coronary artery disease     Depression     Diverticulosis     Endometrial polyp     Fatty liver     GERD (gastroesophageal reflux disease)     Hiatal hernia     Hyperlipidemia LDL goal <100     Hypertension     Mild mitral regurgitation     Multiple thyroid nodules 03/27/2014    Obesity (BMI 30-39.9) 03/27/2014    Renal cell carcinoma 2002    removed - no  recurrence    S/P primary angioplasty with coronary stent 07/26/2017    Sleep apnea     Using CPAP    Solitary kidney, acquired     right    Tubular adenoma of colon 03/22/2022     Past Surgical History:   Procedure Laterality Date    APPENDECTOMY      CARDIAC CATHETERIZATION  06/27/2014    has blockages, but per patient no stents    CHOLECYSTECTOMY      COLONOSCOPY  2002    negative    COLONOSCOPY  03/25/2014         COLONOSCOPY N/A 03/22/2022    Procedure: COLONOSCOPY;  Surgeon: Hunter Garibay MD;  Location: [facility];  Service: Endoscopy;  Laterality: N/A; Repeat colonoscopy in 5 years for surveillance    CORONARY ANGIOGRAPHY  06/12/2024    CORONARY ANGIOPLASTY  2008?    balloon angioplasty    CORONARY ANGIOPLASTY WITH STENT PLACEMENT Right 07/26/2017    ESOPHAGOGASTRODUODENOSCOPY N/A 07/14/2021    Procedure: ESOPHAGOGASTRODUODENOSCOPY (EGD);  Surgeon: Sheng Bell III, MD;  Location: [facility];  Service: Endoscopy;  Laterality: N/A;    ESOPHAGOGASTRODUODENOSCOPY N/A 11/14/2023    Procedure: EGD (ESOPHAGOGASTRODUODENOSCOPY);  Surgeon: Guille Trujillo Jr., MD;  Location: [facility];  Service: Endoscopy;  Laterality: N/A;    NEPHRECTOMY Left 2002    [facility] - cancer    OVARIAN CYST REMOVAL      PERCUTANEOUS ENDOVENOUS LASER ABLATION OF PERIPHERAL VEIN  06/05/2019    POLYPECTOMY      endometrial polyp removal    ROTATOR CUFF REPAIR      L    THYROIDECTOMY, PARTIAL Left     TUBAL LIGATION      UPPER GASTROINTESTINAL ENDOSCOPY  04/2014     Review of patient's allergies indicates:   Allergen Reactions    Oxycodone Shortness Of Breath, Nausea And Vomiting and Other (See Comments)     Anxiety.    Plavix [clopidogrel] Diarrhea     Stomach cramps    Azithromycin Itching    Codeine Palpitations    Corticosteroids (glucocorticoids) Palpitations    Doxycycline Itching and Nausea Only    Hydralazine analogues Palpitations    Mobic [meloxicam] Itching    Neuromuscular blockers, steroidal  Palpitations    Nickel sutures [surgical stainless steel] Rash     Jewelery    Pcn [penicillins] Nausea And Vomiting    Shellfish containing products Itching     topical iodine OK    Sulfa (sulfonamide antibiotics) Nausea And Vomiting     Current Outpatient Medications on File Prior to Visit   Medication Sig Dispense Refill    acetaminophen (TYLENOL) 650 MG TbSR Take 2 tablets (1,300 mg total) by mouth every 8 (eight) hours.  0    albuterol (PROVENTIL/VENTOLIN HFA) 90 mcg/actuation inhaler Inhale 2 puffs into the lungs every 6 (six) hours as needed for Wheezing. 18 g 3    aspirin (ECOTRIN) 81 MG EC tablet Take 81 mg by mouth once daily.      diclofenac sodium (VOLTAREN) 1 % Gel Apply 2 g topically 3 (three) times daily. 100 g 5    EScitalopram oxalate (LEXAPRO) 10 MG tablet Take 1 tablet (10 mg total) by mouth once daily. 90 tablet 2    hydroCHLOROthiazide (HYDRODIURIL) 25 MG tablet TAKE 1 TABLET(25 MG) BY MOUTH DAILY 90 tablet 0    multivitamin capsule Take 1 capsule by mouth once daily.      pravastatin (PRAVACHOL) 40 MG tablet TAKE 1 TABLET(40 MG) BY MOUTH EVERY EVENING 90 tablet 0    [DISCONTINUED] valsartan (DIOVAN) 320 MG tablet TAKE 1 TABLET BY MOUTH EVERY DAY 90 tablet 0    omeprazole (PRILOSEC) 20 MG capsule Take 1 capsule (20 mg total) by mouth before breakfast. 90 capsule 0     No current facility-administered medications on file prior to visit.     Social History     Socioeconomic History    Marital status:    Tobacco Use    Smoking status: Never    Smokeless tobacco: Never   Substance and Sexual Activity    Alcohol use: No    Drug use: No    Sexual activity: Yes     Partners: Male     Birth control/protection: None     Family History   Problem Relation Name Age of Onset    Heart attack Father Andreas 67    Arthritis Father Andreas     Hypertension Father Andreas     Asthma Brother Marcos     Diabetes Paternal Aunt      Allergies Paternal Aunt      Breast cancer Paternal Aunt      Diabetes Paternal  "Uncle      Allergies Maternal Grandmother      Breast cancer Daughter  40    Colon cancer Neg Hx      Stomach cancer Neg Hx      Angioedema Neg Hx      Atopy Neg Hx      Immunodeficiency Neg Hx      Urticaria Neg Hx      Rhinitis Neg Hx      Eczema Neg Hx      Asthma Neg Hx      Crohn's disease Neg Hx      Esophageal cancer Neg Hx      Ulcerative colitis Neg Hx             ROS:  GENERAL: No fever, chills,  or significant weight changes.   CARDIOVASCULAR: Denies chest pain, PND, orthopnea or reduced exercise tolerance.  ABDOMEN: Appetite fine. Denies diarrhea, abdominal pain, hematemesis or blood in stool.  URINARY: No flank pain, dysuria or hematuria.    Vitals:    11/20/24 1055 11/20/24 1141   BP: (!) 149/67 (!) 154/74   Pulse: 71    Temp: 97.3 °F (36.3 °C)    TempSrc: Temporal    Weight: 90.5 kg (199 lb 9.6 oz)    Height: 5' 2" (1.575 m)      Wt Readings from Last 3 Encounters:   11/20/24 90.5 kg (199 lb 9.6 oz)   10/15/24 90.9 kg (200 lb 6.4 oz)   04/29/24 88.6 kg (195 lb 5.2 oz)       OBJECTIVE:   APPEARANCE: Well nourished, well developed, in no acute distress.    HEAD: Normocephalic.  Atraumatic.  No sinus tenderness.  EYES:   Right eye: Pupil reactive.  Conjunctiva clear.    Left eye: Pupil reactive.  Conjunctiva clear.  EOMI.    EARS: TM's intact. Light reflex normal. No retraction or perforation.    NOSE:  clear.  MOUTH & THROAT:  No pharyngeal erythema or exudate. No lesions.  NECK: Supple. No bruits.  No JVD.  No cervical lymphadenopathy.  No thyromegaly.    CHEST: Breath sounds clear bilaterally.  Normal respiratory effort  CARDIOVASCULAR: Normal rate.  Regular rhythm.  No murmurs.  No rub.  No gallops.  ABDOMEN: Bowel sounds normal.  Soft.  No tenderness.  No organomegaly.  PERIPHERAL VASCULAR: No cyanosis.  No clubbing.  No edema.  NEUROLOGIC: No focal findings.  MENTAL STATUS: Alert.  Oriented x 3.          "

## 2024-12-12 ENCOUNTER — TELEPHONE (OUTPATIENT)
Dept: PHARMACY | Facility: CLINIC | Age: 69
End: 2024-12-12
Payer: COMMERCIAL

## 2024-12-12 NOTE — TELEPHONE ENCOUNTER
[facility]/Population Health Chart Review & Patient Outreach Details For Medication Adherence Project    Reason for Outreach Encounter: 3rd Party payor non-compliance report ([employer], [employer], [employer], etc)  2.  Patient Outreach Method: Reviewed Patient Chart  3.   Medication in question: atorvastatin    LAST FILLED: n/a  Hyperlipidemia Medications               pravastatin (PRAVACHOL) 40 MG tablet TAKE 1 TABLET(40 MG) BY MOUTH EVERY EVENING               4.  Reviewed and or Updates Made To: Patient Chart  5. Outreach Outcomes and/or actions taken: Medication discontinued    Additional Notes:

## 2024-12-27 ENCOUNTER — OFFICE VISIT (OUTPATIENT)
Dept: UROLOGY | Facility: CLINIC | Age: 69
End: 2024-12-27
Payer: COMMERCIAL

## 2024-12-27 VITALS — BODY MASS INDEX: 36.87 KG/M2 | WEIGHT: 200.38 LBS | HEIGHT: 62 IN

## 2024-12-27 DIAGNOSIS — N32.81 OVERACTIVE BLADDER: Primary | ICD-10-CM

## 2024-12-27 DIAGNOSIS — Z85.528 PERSONAL HISTORY OF RENAL CANCER: ICD-10-CM

## 2024-12-27 DIAGNOSIS — Z90.5 SOLITARY KIDNEY, ACQUIRED: ICD-10-CM

## 2024-12-27 LAB
BILIRUBIN, UA POC OHS: NEGATIVE
BLOOD, UA POC OHS: NEGATIVE
CLARITY, UA POC OHS: CLEAR
COLOR, UA POC OHS: YELLOW
GLUCOSE, UA POC OHS: NEGATIVE
KETONES, UA POC OHS: NEGATIVE
LEUKOCYTES, UA POC OHS: NEGATIVE
NITRITE, UA POC OHS: NEGATIVE
PH, UA POC OHS: 5.5
PROTEIN, UA POC OHS: NEGATIVE
SPECIFIC GRAVITY, UA POC OHS: 1.01
UROBILINOGEN, UA POC OHS: 0.2

## 2024-12-27 PROCEDURE — 99999 PR PBB SHADOW E&M-EST. PATIENT-LVL III: CPT | Mod: PBBFAC,,, | Performed by: UROLOGY

## 2024-12-27 RX ORDER — MIRABEGRON 50 MG/1
50 TABLET, FILM COATED, EXTENDED RELEASE ORAL DAILY
Qty: 30 TABLET | Refills: 11 | Status: SHIPPED | OUTPATIENT
Start: 2024-12-27 | End: 2025-12-27

## 2024-12-27 NOTE — PROGRESS NOTES
Subjective:       Patient ID: Erinn Silva is a 69 y.o. female.    Chief Complaint: renal cell cancer    HPI    69-year-old with a distant history of kidney cancer.  She underwent left radical nephrectomy in 2002 by Dr. Barney.  Final path is unknown.  Renal ultrasound last year demonstrated a normal-appearing right kidney.  Her renal function is normal with a GFR of greater than 60.  She does complain more recently of urinary frequency and urgency.  She has not tried any previous overactive bladder medications.  She denies hematuria and dysuria.  Her urinalysis is clear today.  Urine dipstick shows negative for all components.    Past Medical History:   Diagnosis Date    Anxiety     AR (allergic rhinitis)     Arthritis     Asthma, intermittent     Bilateral carotid artery stenosis 11/09/2021    Coronary artery disease     Depression     Diverticulosis     Endometrial polyp     Fatty liver     GERD (gastroesophageal reflux disease)     Hiatal hernia     Hyperlipidemia LDL goal <100     Hypertension     Mild mitral regurgitation     Multiple thyroid nodules 03/27/2014    Obesity (BMI 30-39.9) 03/27/2014    Renal cell carcinoma 2002    removed - no recurrence    S/P primary angioplasty with coronary stent 07/26/2017    Sleep apnea     Using CPAP    Solitary kidney, acquired     right    Tubular adenoma of colon 03/22/2022     Past Surgical History:   Procedure Laterality Date    APPENDECTOMY      CARDIAC CATHETERIZATION  06/27/2014    has blockages, but per patient no stents    CHOLECYSTECTOMY      COLONOSCOPY  2002    negative    COLONOSCOPY  03/25/2014         COLONOSCOPY N/A 03/22/2022    Procedure: COLONOSCOPY;  Surgeon: Hunter Garibay MD;  Location: [facility];  Service: Endoscopy;  Laterality: N/A; Repeat colonoscopy in 5 years for surveillance    CORONARY ANGIOGRAPHY  06/12/2024    CORONARY ANGIOPLASTY  2008?    balloon angioplasty    CORONARY ANGIOPLASTY WITH STENT PLACEMENT Right 07/26/2017     ESOPHAGOGASTRODUODENOSCOPY N/A 07/14/2021    Procedure: ESOPHAGOGASTRODUODENOSCOPY (EGD);  Surgeon: Sheng Bell III, MD;  Location: [facility];  Service: Endoscopy;  Laterality: N/A;    ESOPHAGOGASTRODUODENOSCOPY N/A 11/14/2023    Procedure: EGD (ESOPHAGOGASTRODUODENOSCOPY);  Surgeon: Guille Trujillo Jr., MD;  Location: [facility];  Service: Endoscopy;  Laterality: N/A;    NEPHRECTOMY Left 2002    [facility] - cancer    OVARIAN CYST REMOVAL      PERCUTANEOUS ENDOVENOUS LASER ABLATION OF PERIPHERAL VEIN  06/05/2019    POLYPECTOMY      endometrial polyp removal    ROTATOR CUFF REPAIR      L    THYROIDECTOMY, PARTIAL Left     TUBAL LIGATION      UPPER GASTROINTESTINAL ENDOSCOPY  04/2014       Current Outpatient Medications:     acetaminophen (TYLENOL) 650 MG TbSR, Take 2 tablets (1,300 mg total) by mouth every 8 (eight) hours., Disp: , Rfl: 0    albuterol (PROVENTIL/VENTOLIN HFA) 90 mcg/actuation inhaler, Inhale 2 puffs into the lungs every 6 (six) hours as needed for Wheezing., Disp: 18 g, Rfl: 3    aspirin (ECOTRIN) 81 MG EC tablet, Take 81 mg by mouth once daily., Disp: , Rfl:     EScitalopram oxalate (LEXAPRO) 10 MG tablet, Take 1 tablet (10 mg total) by mouth once daily., Disp: 90 tablet, Rfl: 2    felodipine (PLENDIL) 2.5 MG Tb24, Take 1 tablet (2.5 mg total) by mouth once daily., Disp: 30 tablet, Rfl: 1    hydroCHLOROthiazide (HYDRODIURIL) 25 MG tablet, TAKE 1 TABLET(25 MG) BY MOUTH DAILY, Disp: 90 tablet, Rfl: 0    multivitamin capsule, Take 1 capsule by mouth once daily., Disp: , Rfl:     omeprazole (PRILOSEC) 20 MG capsule, Take 1 capsule (20 mg total) by mouth before breakfast., Disp: 90 capsule, Rfl: 0    pravastatin (PRAVACHOL) 40 MG tablet, TAKE 1 TABLET(40 MG) BY MOUTH EVERY EVENING, Disp: 90 tablet, Rfl: 0    valsartan (DIOVAN) 320 MG tablet, Take 1 tablet (320 mg total) by mouth once daily., Disp: 90 tablet, Rfl: 0    diclofenac sodium (VOLTAREN) 1 % Gel, Apply 2 g topically  3 (three) times daily. (Patient not taking: Reported on 12/27/2024), Disp: 100 g, Rfl: 5    mirabegron (MYRBETRIQ) 50 mg Tb24, Take 1 tablet (50 mg total) by mouth once daily., Disp: 30 tablet, Rfl: 11        Review of Systems   Constitutional:  Negative for fever.   Genitourinary:  Positive for frequency and urgency. Negative for dysuria, flank pain and hematuria.       Objective:      Physical Exam  Vitals reviewed.   Constitutional:       Appearance: She is well-developed.   Pulmonary:      Effort: Pulmonary effort is normal. No respiratory distress.   Skin:     General: Skin is warm.   Neurological:      Mental Status: She is alert and oriented to person, place, and time.   Psychiatric:         Behavior: Behavior normal.         Assessment:       1. Overactive bladder    2. Personal history of renal cancer    3. Solitary kidney, acquired        Plan:       Overactive bladder    Personal history of renal cancer  -     Ambulatory referral/consult to Urology    Solitary kidney, acquired  -     Ambulatory referral/consult to Urology    Other orders  -     mirabegron (MYRBETRIQ) 50 mg Tb24; Take 1 tablet (50 mg total) by mouth once daily.  Dispense: 30 tablet; Refill: 11       I personally reviewed the US images and made an independent interpretation of the test completed by another healthcare professional.    Recommend a trial of Myrbetriq for overactive bladder.  She is 22 years postop radical nephrectomy and no further surveillance is necessary.  I recommend she keep her regular follow-up with Nephrology.

## 2025-01-10 ENCOUNTER — TELEPHONE (OUTPATIENT)
Dept: PHARMACY | Facility: CLINIC | Age: 70
End: 2025-01-10
Payer: COMMERCIAL

## 2025-01-22 NOTE — TELEPHONE ENCOUNTER
Care Due:                  Date            Visit Type   Department     Provider  --------------------------------------------------------------------------------                                MYCHART                              FOLLOWUP/OF  [facility] FAMILY  Last Visit: 11-      FICE VISIT   MEDICINE       Layo Reed  Next Visit: None Scheduled  None         None Found                                                            Last  Test          Frequency    Reason                     Performed    Due Date  --------------------------------------------------------------------------------    CMP.........  12 months..  hydroCHLOROthiazide,       04- 04-                             pravastatin, valsartan...    Lipid Panel.  12 months..  pravastatin..............  04- 04-    Health [facility] Embedded Care Due Messages. Reference number: [id number].   1/21/2025 11:53:41 PM CST

## 2025-01-25 RX ORDER — HYDROCHLOROTHIAZIDE 25 MG/1
TABLET ORAL
Qty: 90 TABLET | Refills: 0 | Status: SHIPPED | OUTPATIENT
Start: 2025-01-25

## 2025-01-25 RX ORDER — HYDROCHLOROTHIAZIDE 25 MG/1
TABLET ORAL
Qty: 90 TABLET | Refills: 0 | OUTPATIENT
Start: 2025-01-25

## 2025-01-25 NOTE — TELEPHONE ENCOUNTER
Refill Routing Note   Medication(s) are not appropriate for processing by [facility] for the following reason(s):        Required vitals abnormal    [facility] action(s):  Defer   Requires labs : Yes             Appointments  past 12m or future 3m with PCP    Date Provider   Last Visit   11/20/2024 Layo Reed MD   Next Visit   1/25/2025 Layo Reed MD   ED visits in past 90 days: 0        Note composed:11:33 AM 01/25/2025

## 2025-01-25 NOTE — TELEPHONE ENCOUNTER
No care due was identified.  Health [facility] Embedded Care Due Messages. Reference number: [id number].   1/25/2025 10:13:43 AM CST

## 2025-01-30 ENCOUNTER — TELEPHONE (OUTPATIENT)
Dept: FAMILY MEDICINE | Facility: CLINIC | Age: 70
End: 2025-01-30
Payer: COMMERCIAL

## 2025-01-30 DIAGNOSIS — I10 ESSENTIAL HYPERTENSION: Primary | ICD-10-CM

## 2025-01-30 NOTE — TELEPHONE ENCOUNTER
----- Message from Suzi sent at 1/30/2025  3:18 PM CST -----  Contact: Patient [phone]  Patient is returning a phone call.    Who left a message for the patient: Trinity    Does patient know what this is regarding:      Would you like a call back, or a response through your [facility] portal?:   call     Comments:

## 2025-02-06 RX ORDER — FELODIPINE 2.5 MG/1
2.5 TABLET, EXTENDED RELEASE ORAL DAILY
Qty: 30 TABLET | Refills: 0 | Status: SHIPPED | OUTPATIENT
Start: 2025-02-06 | End: 2025-02-07 | Stop reason: SINTOL

## 2025-02-06 NOTE — TELEPHONE ENCOUNTER
No care due was identified.  [facility] Embedded Care Due Messages. Reference number: [id number].   2/06/2025 8:22:55 AM CST

## 2025-02-07 ENCOUNTER — OFFICE VISIT (OUTPATIENT)
Dept: FAMILY MEDICINE | Facility: CLINIC | Age: 70
End: 2025-02-07
Payer: COMMERCIAL

## 2025-02-07 VITALS
BODY MASS INDEX: 37.73 KG/M2 | SYSTOLIC BLOOD PRESSURE: 138 MMHG | OXYGEN SATURATION: 99 % | TEMPERATURE: 98 F | DIASTOLIC BLOOD PRESSURE: 68 MMHG | RESPIRATION RATE: 14 BRPM | HEART RATE: 72 BPM | WEIGHT: 205 LBS | HEIGHT: 62 IN

## 2025-02-07 DIAGNOSIS — I73.9 CLAUDICATION: ICD-10-CM

## 2025-02-07 DIAGNOSIS — E66.01 SEVERE OBESITY (BMI 35.0-39.9) WITH COMORBIDITY: ICD-10-CM

## 2025-02-07 DIAGNOSIS — M54.42 CHRONIC BILATERAL LOW BACK PAIN WITH BILATERAL SCIATICA: ICD-10-CM

## 2025-02-07 DIAGNOSIS — G89.29 CHRONIC BILATERAL LOW BACK PAIN WITH BILATERAL SCIATICA: ICD-10-CM

## 2025-02-07 DIAGNOSIS — M54.41 CHRONIC BILATERAL LOW BACK PAIN WITH BILATERAL SCIATICA: ICD-10-CM

## 2025-02-07 DIAGNOSIS — I10 ESSENTIAL HYPERTENSION: Primary | ICD-10-CM

## 2025-02-07 PROCEDURE — 3288F FALL RISK ASSESSMENT DOCD: CPT | Mod: CPTII,S$GLB,, | Performed by: FAMILY MEDICINE

## 2025-02-07 PROCEDURE — 1101F PT FALLS ASSESS-DOCD LE1/YR: CPT | Mod: CPTII,S$GLB,, | Performed by: FAMILY MEDICINE

## 2025-02-07 PROCEDURE — G2211 COMPLEX E/M VISIT ADD ON: HCPCS | Mod: S$GLB,,, | Performed by: FAMILY MEDICINE

## 2025-02-07 PROCEDURE — 3008F BODY MASS INDEX DOCD: CPT | Mod: CPTII,S$GLB,, | Performed by: FAMILY MEDICINE

## 2025-02-07 PROCEDURE — 99214 OFFICE O/P EST MOD 30 MIN: CPT | Mod: S$GLB,,, | Performed by: FAMILY MEDICINE

## 2025-02-07 PROCEDURE — 1126F AMNT PAIN NOTED NONE PRSNT: CPT | Mod: CPTII,S$GLB,, | Performed by: FAMILY MEDICINE

## 2025-02-07 PROCEDURE — 99999 PR PBB SHADOW E&M-EST. PATIENT-LVL V: CPT | Mod: PBBFAC,,, | Performed by: FAMILY MEDICINE

## 2025-02-07 PROCEDURE — 1159F MED LIST DOCD IN RCRD: CPT | Mod: CPTII,S$GLB,, | Performed by: FAMILY MEDICINE

## 2025-02-07 PROCEDURE — 3078F DIAST BP <80 MM HG: CPT | Mod: CPTII,S$GLB,, | Performed by: FAMILY MEDICINE

## 2025-02-07 PROCEDURE — 3075F SYST BP GE 130 - 139MM HG: CPT | Mod: CPTII,S$GLB,, | Performed by: FAMILY MEDICINE

## 2025-02-07 RX ORDER — MAGNESIUM HYDROXIDE 400 MG/5ML
1 SUSPENSION, ORAL (FINAL DOSE FORM) ORAL ONCE
COMMUNITY
End: 2025-02-07 | Stop reason: ALTCHOICE

## 2025-02-07 RX ORDER — CHLORTHALIDONE 25 MG/1
25 TABLET ORAL DAILY
Qty: 30 TABLET | Refills: 1 | Status: SHIPPED | OUTPATIENT
Start: 2025-02-07 | End: 2026-02-07

## 2025-02-07 RX ORDER — POTASSIUM CHLORIDE 750 MG/1
10 TABLET, EXTENDED RELEASE ORAL DAILY
Qty: 30 TABLET | Refills: 1 | Status: SHIPPED | OUTPATIENT
Start: 2025-02-07

## 2025-02-07 NOTE — PROGRESS NOTES
Follow-up hypertension.  Better controlled with felodipine but she complains of some lower extremity edema with this.  She occasionally gets some lightheadedness when she stands up.  She had similar symptoms and saw Cardiology in the fall prior to beginning the felodipine.  She was not noted to be orthostatic on checks at that time.  She does complain of some chronic bilateral lower back pain.  She gets radiation and discomfort into the legs particularly when walking which improves with rest.  This has been for least a couple of months.  Prior similar symptoms.  She does have severe obesity noted.      Erinn was seen today for follow-up.    Diagnoses and all orders for this visit:    Essential hypertension  -     Basic Metabolic Panel; Future    Chronic bilateral low back pain with bilateral sciatica  -     MRI Lumbar Spine Without Contrast; Future    Claudication  -     US Ankle Brachial Indices Ext LTD WO Str; Future  -     MRI Lumbar Spine Without Contrast; Future    Severe obesity (BMI 35.0-39.9) with comorbidity    Other orders  -     chlorthalidone (HYGROTEN) 25 MG Tab; Take 1 tablet (25 mg total) by mouth once daily.  -     potassium chloride (KLOR-CON) 10 MEQ TbSR; Take 1 tablet (10 mEq total) by mouth once daily.      Substitute chlorthalidone in place of hydrochlorothiazide.  Discontinue amlodipine.  Follow-up 4 weeks with appointment and lab.      This note was generated with the assistance of ambient listening technology. Verbal consent was obtained by the patient and accompanying visitor(s) for the recording of patient appointment to facilitate this note. I attest to having reviewed and edited the generated note for accuracy, though some syntax or spelling errors may persist. Please contact the author of this note for any clarification.        Past Medical History:  Past Medical History:   Diagnosis Date    Anxiety     AR (allergic rhinitis)     Arthritis     Asthma, intermittent     Bilateral  carotid artery stenosis 11/09/2021    Coronary artery disease     Depression     Diverticulosis     Endometrial polyp     Fatty liver     GERD (gastroesophageal reflux disease)     Hiatal hernia     Hyperlipidemia LDL goal <100     Hypertension     Mild mitral regurgitation     Multiple thyroid nodules 03/27/2014    Obesity (BMI 30-39.9) 03/27/2014    Renal cell carcinoma 2002    removed - no recurrence    S/P primary angioplasty with coronary stent 07/26/2017    Sleep apnea     Using CPAP    Solitary kidney, acquired     right    Tubular adenoma of colon 03/22/2022     Past Surgical History:   Procedure Laterality Date    APPENDECTOMY      CARDIAC CATHETERIZATION  06/27/2014    has blockages, but per patient no stents    CHOLECYSTECTOMY      COLONOSCOPY  2002    negative    COLONOSCOPY  03/25/2014         COLONOSCOPY N/A 03/22/2022    Procedure: COLONOSCOPY;  Surgeon: Hunter Garibay MD;  Location: [facility];  Service: Endoscopy;  Laterality: N/A; Repeat colonoscopy in 5 years for surveillance    CORONARY ANGIOGRAPHY  06/12/2024    CORONARY ANGIOPLASTY  2008?    balloon angioplasty    CORONARY ANGIOPLASTY WITH STENT PLACEMENT Right 07/26/2017    ESOPHAGOGASTRODUODENOSCOPY N/A 07/14/2021    Procedure: ESOPHAGOGASTRODUODENOSCOPY (EGD);  Surgeon: Sheng Bell III, MD;  Location: [facility];  Service: Endoscopy;  Laterality: N/A;    ESOPHAGOGASTRODUODENOSCOPY N/A 11/14/2023    Procedure: EGD (ESOPHAGOGASTRODUODENOSCOPY);  Surgeon: Guille Trujillo Jr., MD;  Location: [facility];  Service: Endoscopy;  Laterality: N/A;    NEPHRECTOMY Left 2002    [facility] - cancer    OVARIAN CYST REMOVAL      PERCUTANEOUS ENDOVENOUS LASER ABLATION OF PERIPHERAL VEIN  06/05/2019    POLYPECTOMY      endometrial polyp removal    ROTATOR CUFF REPAIR      L    THYROIDECTOMY, PARTIAL Left     TUBAL LIGATION      UPPER GASTROINTESTINAL ENDOSCOPY  04/2014     Review of patient's allergies indicates:   Allergen  Reactions    Oxycodone Shortness Of Breath, Nausea And Vomiting and Other (See Comments)     Anxiety.    Plavix [clopidogrel] Diarrhea     Stomach cramps    Azithromycin Itching    Codeine Palpitations    Corticosteroids (glucocorticoids) Palpitations    Doxycycline Itching and Nausea Only    Hydralazine analogues Palpitations    Mobic [meloxicam] Itching    Neuromuscular blockers, steroidal Palpitations    Nickel sutures [surgical stainless steel] Rash     Jewelery    Pcn [penicillins] Nausea And Vomiting    Shellfish containing products Itching     topical iodine OK    Sulfa (sulfonamide antibiotics) Nausea And Vomiting     Current Outpatient Medications on File Prior to Visit   Medication Sig Dispense Refill    acetaminophen (TYLENOL) 650 MG TbSR Take 2 tablets (1,300 mg total) by mouth every 8 (eight) hours.  0    albuterol (PROVENTIL/VENTOLIN HFA) 90 mcg/actuation inhaler Inhale 2 puffs into the lungs every 6 (six) hours as needed for Wheezing. 18 g 3    aspirin (ECOTRIN) 81 MG EC tablet Take 81 mg by mouth once daily.      diclofenac sodium (VOLTAREN) 1 % Gel Apply 2 g topically 3 (three) times daily. 100 g 5    EScitalopram oxalate (LEXAPRO) 10 MG tablet Take 1 tablet (10 mg total) by mouth once daily. 90 tablet 2    multivitamin capsule Take 1 capsule by mouth once daily.      omeprazole (PRILOSEC) 20 MG capsule Take 1 capsule (20 mg total) by mouth before breakfast. 90 capsule 0    pravastatin (PRAVACHOL) 40 MG tablet TAKE 1 TABLET(40 MG) BY MOUTH EVERY EVENING 90 tablet 0    valsartan (DIOVAN) 320 MG tablet Take 1 tablet (320 mg total) by mouth once daily. 90 tablet 0    [DISCONTINUED] felodipine (PLENDIL) 2.5 MG Tb24 Take 1 tablet (2.5 mg total) by mouth once daily. 30 tablet 0    [DISCONTINUED] hydroCHLOROthiazide (HYDRODIURIL) 25 MG tablet TAKE 1 TABLET(25 MG) BY MOUTH DAILY 90 tablet 0    [DISCONTINUED] mirabegron (MYRBETRIQ) 50 mg Tb24 Take 1 tablet (50 mg total) by mouth once daily. 30 tablet 11     "[DISCONTINUED] potassium gluconate 595 mg (99 mg) Tab Take 1 tablet by mouth once.       No current facility-administered medications on file prior to visit.     Social History     Socioeconomic History    Marital status:    Tobacco Use    Smoking status: Never    Smokeless tobacco: Never   Substance and Sexual Activity    Alcohol use: No    Drug use: No    Sexual activity: Yes     Partners: Male     Birth control/protection: None     Family History   Problem Relation Name Age of Onset    Heart attack Father Andreas 67    Arthritis Father Andreas     Hypertension Father Andreas     Asthma Brother Marcos     Diabetes Paternal Aunt      Allergies Paternal Aunt      Breast cancer Paternal Aunt      Diabetes Paternal Uncle      Allergies Maternal Grandmother      Breast cancer Daughter  40    Colon cancer Neg Hx      Stomach cancer Neg Hx      Angioedema Neg Hx      Atopy Neg Hx      Immunodeficiency Neg Hx      Urticaria Neg Hx      Rhinitis Neg Hx      Eczema Neg Hx      Asthma Neg Hx      Crohn's disease Neg Hx      Esophageal cancer Neg Hx      Ulcerative colitis Neg Hx             ROS:  GENERAL: No fever, chills,  or significant weight changes.   CARDIOVASCULAR: Denies chest pain, PND, orthopnea or reduced exercise tolerance.  ABDOMEN: Appetite fine. Denies diarrhea, abdominal pain, hematemesis or blood in stool.  URINARY: No flank pain, dysuria or hematuria.    Vitals:    02/07/25 1000 02/07/25 1044   BP: (!) 142/80 138/68   Pulse: 72    Resp: 14    Temp: 97.6 °F (36.4 °C)    SpO2: 99%    Weight: 93 kg (205 lb)    Height: 5' 2" (1.575 m)      Wt Readings from Last 3 Encounters:   02/07/25 93 kg (205 lb)   12/27/24 90.9 kg (200 lb 6.4 oz)   11/20/24 90.5 kg (199 lb 9.6 oz)       OBJECTIVE:   APPEARANCE: Well nourished, well developed, in no acute distress.    HEAD: Normocephalic.  Atraumatic.  No sinus tenderness.  EYES:   Right eye: Pupil reactive.  Conjunctiva clear.    Left eye: Pupil reactive.  Conjunctiva " clear.  EOMI.    EARS: TM's intact. Light reflex normal. No retraction or perforation.    NOSE:  clear.  MOUTH & THROAT:  No pharyngeal erythema or exudate. No lesions.  NECK: Supple. No bruits.  No JVD.  No cervical lymphadenopathy.  No thyromegaly.    CHEST: Breath sounds clear bilaterally.  Normal respiratory effort  CARDIOVASCULAR: Normal rate.  Regular rhythm.  No murmurs.  No rub.  No gallops.  ABDOMEN: Bowel sounds normal.  Soft.  No tenderness.  No organomegaly.  PERIPHERAL VASCULAR: No cyanosis.  No clubbing.  Trace bilateral extremity edema.  Pulses diminished but palpable.  NEUROLOGIC: No focal findings.  Back has mild decreased range of motion.  Not significantly tender to palpation.  Mildly positive right straight leg raise.  Gait okay.  Able stand on heels and toes.  MENTAL STATUS: Alert.  Oriented x 3.

## 2025-02-12 ENCOUNTER — HOSPITAL ENCOUNTER (OUTPATIENT)
Dept: RADIOLOGY | Facility: HOSPITAL | Age: 70
Discharge: HOME | End: 2025-02-12
Attending: FAMILY MEDICINE
Payer: COMMERCIAL

## 2025-02-12 DIAGNOSIS — I73.9 CLAUDICATION: ICD-10-CM

## 2025-02-12 PROCEDURE — 93922 UPR/L XTREMITY ART 2 LEVELS: CPT | Mod: TC,PO

## 2025-02-12 PROCEDURE — 93922 UPR/L XTREMITY ART 2 LEVELS: CPT | Mod: 26,,, | Performed by: STUDENT IN AN ORGANIZED HEALTH CARE EDUCATION/TRAINING PROGRAM

## 2025-02-19 ENCOUNTER — TELEPHONE (OUTPATIENT)
Dept: FAMILY MEDICINE | Facility: CLINIC | Age: 70
End: 2025-02-19
Payer: COMMERCIAL

## 2025-02-19 ENCOUNTER — PATIENT MESSAGE (OUTPATIENT)
Dept: RADIOLOGY | Facility: HOSPITAL | Age: 70
End: 2025-02-19
Payer: COMMERCIAL

## 2025-02-19 DIAGNOSIS — F41.9 ANXIETY: Primary | ICD-10-CM

## 2025-02-19 RX ORDER — LORAZEPAM 1 MG/1
1 TABLET ORAL
Qty: 1 TABLET | Refills: 0 | Status: SHIPPED | OUTPATIENT
Start: 2025-02-19 | End: 2025-03-21

## 2025-02-19 NOTE — TELEPHONE ENCOUNTER
Surgical clamps are not typically an issue with MRI.  She can certainly let technician know that she has surgical clips from previous kidney surgery when she goes for the procedure.  You could ask Sarah to check with MRI as well prior to this to make sure no issue.     I would recommend that she take the Lexapro as normal.

## 2025-02-19 NOTE — TELEPHONE ENCOUNTER
----- Message from Marianne sent at 2/19/2025  3:15 PM CST -----  Type: Needs Medical AdviceWho Called:  ptSymptoms (please be specific):  anxiety with mriHow long has patient had these symptoms:  Pharmacy name and phone #:  [id number] - [place] - [id number]  [place] [id number]: [phone] Fax: [phone] Call Back Number: [phone] Information:  Please call back to advise. Thanks!

## 2025-02-19 NOTE — TELEPHONE ENCOUNTER
Pt was advised of Dr Reed response.  She said that she thinks that she has a metal clamp in her from a kidney surgery .  Is it ok to have a Mri ?  Also should she take the Lexapro the morning of or just the Lorazepam ?   Please advise, thanks

## 2025-02-19 NOTE — TELEPHONE ENCOUNTER
I sent a prescription for lorazepam 1 mg to take about 45 minutes prior to the MRI.  This should help alleviate anxiety with this.  She will need to have someone else drive her.

## 2025-02-20 DIAGNOSIS — E78.5 HYPERLIPIDEMIA LDL GOAL <100: ICD-10-CM

## 2025-02-21 RX ORDER — PRAVASTATIN SODIUM 40 MG/1
40 TABLET ORAL NIGHTLY
Qty: 90 TABLET | Refills: 0 | Status: SHIPPED | OUTPATIENT
Start: 2025-02-21

## 2025-02-21 RX ORDER — VALSARTAN 320 MG/1
320 TABLET ORAL DAILY
Qty: 90 TABLET | Refills: 0 | Status: SHIPPED | OUTPATIENT
Start: 2025-02-21

## 2025-02-21 NOTE — TELEPHONE ENCOUNTER
No care due was identified.  [facility] Embedded Care Due Messages. Reference number: [id number].   2/20/2025 9:27:36 PM CST

## 2025-02-27 ENCOUNTER — TELEPHONE (OUTPATIENT)
Dept: FAMILY MEDICINE | Facility: CLINIC | Age: 70
End: 2025-02-27

## 2025-02-27 ENCOUNTER — OFFICE VISIT (OUTPATIENT)
Dept: FAMILY MEDICINE | Facility: CLINIC | Age: 70
End: 2025-02-27
Payer: COMMERCIAL

## 2025-02-27 VITALS
OXYGEN SATURATION: 99 % | HEART RATE: 72 BPM | SYSTOLIC BLOOD PRESSURE: 139 MMHG | BODY MASS INDEX: 36.77 KG/M2 | TEMPERATURE: 97 F | HEIGHT: 62 IN | DIASTOLIC BLOOD PRESSURE: 79 MMHG | WEIGHT: 199.81 LBS

## 2025-02-27 DIAGNOSIS — J32.9 SINUSITIS, UNSPECIFIED CHRONICITY, UNSPECIFIED LOCATION: Primary | ICD-10-CM

## 2025-02-27 DIAGNOSIS — Z76.0 MEDICATION REFILL: Primary | ICD-10-CM

## 2025-02-27 PROCEDURE — 1126F AMNT PAIN NOTED NONE PRSNT: CPT | Mod: CPTII,S$GLB,, | Performed by: NURSE PRACTITIONER

## 2025-02-27 PROCEDURE — 99213 OFFICE O/P EST LOW 20 MIN: CPT | Mod: S$GLB,,, | Performed by: NURSE PRACTITIONER

## 2025-02-27 PROCEDURE — 99999 PR PBB SHADOW E&M-EST. PATIENT-LVL V: CPT | Mod: PBBFAC,,, | Performed by: NURSE PRACTITIONER

## 2025-02-27 PROCEDURE — 1159F MED LIST DOCD IN RCRD: CPT | Mod: CPTII,S$GLB,, | Performed by: NURSE PRACTITIONER

## 2025-02-27 PROCEDURE — 1101F PT FALLS ASSESS-DOCD LE1/YR: CPT | Mod: CPTII,S$GLB,, | Performed by: NURSE PRACTITIONER

## 2025-02-27 PROCEDURE — 3008F BODY MASS INDEX DOCD: CPT | Mod: CPTII,S$GLB,, | Performed by: NURSE PRACTITIONER

## 2025-02-27 PROCEDURE — 3075F SYST BP GE 130 - 139MM HG: CPT | Mod: CPTII,S$GLB,, | Performed by: NURSE PRACTITIONER

## 2025-02-27 PROCEDURE — 3288F FALL RISK ASSESSMENT DOCD: CPT | Mod: CPTII,S$GLB,, | Performed by: NURSE PRACTITIONER

## 2025-02-27 PROCEDURE — 3078F DIAST BP <80 MM HG: CPT | Mod: CPTII,S$GLB,, | Performed by: NURSE PRACTITIONER

## 2025-02-27 PROCEDURE — 4010F ACE/ARB THERAPY RXD/TAKEN: CPT | Mod: CPTII,S$GLB,, | Performed by: NURSE PRACTITIONER

## 2025-02-27 RX ORDER — AMOXICILLIN 875 MG/1
875 TABLET, FILM COATED ORAL EVERY 12 HOURS
Qty: 20 TABLET | Refills: 0 | Status: SHIPPED | OUTPATIENT
Start: 2025-02-27 | End: 2025-03-09

## 2025-02-27 RX ORDER — PROMETHAZINE HYDROCHLORIDE AND DEXTROMETHORPHAN HYDROBROMIDE 6.25; 15 MG/5ML; MG/5ML
5 SYRUP ORAL 2 TIMES DAILY PRN
Qty: 118 ML | Refills: 0 | Status: SHIPPED | OUTPATIENT
Start: 2025-02-27 | End: 2025-03-09

## 2025-02-27 RX ORDER — DICYCLOMINE HYDROCHLORIDE 10 MG/1
10 CAPSULE ORAL 2 TIMES DAILY PRN
Qty: 14 CAPSULE | Refills: 0 | Status: SHIPPED | OUTPATIENT
Start: 2025-02-27 | End: 2025-03-06

## 2025-02-27 NOTE — PROGRESS NOTES
Subjective     Patient ID: Erinn Silva is a 69 y.o. female.    Chief Complaint: Cough, Generalized Body Aches, and Headache    Sinus Problem  This is a new problem. The current episode started in the past 7 days. The problem is unchanged. There has been no fever. The pain is mild. Associated symptoms include congestion, coughing, headaches and sinus pressure. Pertinent negatives include no chills, diaphoresis, ear pain, hoarse voice, neck pain, shortness of breath, sneezing, sore throat or swollen glands. Past treatments include nothing. The treatment provided no relief.     Past Medical History:   Diagnosis Date    Anxiety     AR (allergic rhinitis)     Arthritis     Asthma, intermittent     Bilateral carotid artery stenosis 11/09/2021    Coronary artery disease     Depression     Diverticulosis     Endometrial polyp     Fatty liver     GERD (gastroesophageal reflux disease)     Hiatal hernia     Hyperlipidemia LDL goal <100     Hypertension     Mild mitral regurgitation     Multiple thyroid nodules 03/27/2014    Obesity (BMI 30-39.9) 03/27/2014    Renal cell carcinoma 2002    removed - no recurrence    S/P primary angioplasty with coronary stent 07/26/2017    Sleep apnea     Using CPAP    Solitary kidney, acquired     right    Tubular adenoma of colon 03/22/2022     Social History[1]  Past Surgical History:   Procedure Laterality Date    APPENDECTOMY      CARDIAC CATHETERIZATION  06/27/2014    has blockages, but per patient no stents    CHOLECYSTECTOMY      COLONOSCOPY  2002    negative    COLONOSCOPY  03/25/2014         COLONOSCOPY N/A 03/22/2022    Procedure: COLONOSCOPY;  Surgeon: Hunter Garibay MD;  Location: [facility];  Service: Endoscopy;  Laterality: N/A; Repeat colonoscopy in 5 years for surveillance    CORONARY ANGIOGRAPHY  06/12/2024    CORONARY ANGIOPLASTY  2008?    balloon angioplasty    CORONARY ANGIOPLASTY WITH STENT PLACEMENT Right 07/26/2017    ESOPHAGOGASTRODUODENOSCOPY N/A 07/14/2021     Procedure: ESOPHAGOGASTRODUODENOSCOPY (EGD);  Surgeon: Sheng Bell III, MD;  Location: [facility];  Service: Endoscopy;  Laterality: N/A;    ESOPHAGOGASTRODUODENOSCOPY N/A 11/14/2023    Procedure: EGD (ESOPHAGOGASTRODUODENOSCOPY);  Surgeon: Guille Trujillo Jr., MD;  Location: [facility];  Service: Endoscopy;  Laterality: N/A;    NEPHRECTOMY Left 2002    [facility] - cancer    OVARIAN CYST REMOVAL      PERCUTANEOUS ENDOVENOUS LASER ABLATION OF PERIPHERAL VEIN  06/05/2019    POLYPECTOMY      endometrial polyp removal    ROTATOR CUFF REPAIR      L    THYROIDECTOMY, PARTIAL Left     TUBAL LIGATION      UPPER GASTROINTESTINAL ENDOSCOPY  04/2014       Review of Systems   Constitutional: Negative.  Negative for chills and diaphoresis.   HENT:  Positive for nasal congestion, rhinorrhea and sinus pressure/congestion. Negative for ear pain, hoarse voice, sneezing and sore throat.    Eyes: Negative.    Respiratory:  Positive for cough. Negative for shortness of breath.    Cardiovascular: Negative.    Gastrointestinal: Negative.    Endocrine: Negative.    Genitourinary: Negative.    Musculoskeletal: Negative.  Negative for neck pain.   Integumentary:  Negative.   Allergic/Immunologic: Negative.    Neurological:  Positive for headaches.   Psychiatric/Behavioral: Negative.            Objective     Physical Exam  Vitals and nursing note reviewed.   Constitutional:       Appearance: Normal appearance.   HENT:      Head: Normocephalic.      Right Ear: Hearing, tympanic membrane, ear canal and external ear normal.      Left Ear: Hearing, tympanic membrane, ear canal and external ear normal.      Nose: Congestion and rhinorrhea present.      Right Sinus: Maxillary sinus tenderness present. No frontal sinus tenderness.      Left Sinus: Maxillary sinus tenderness present. No frontal sinus tenderness.      Mouth/Throat:      Mouth: Mucous membranes are moist.      Pharynx: Oropharynx is clear.   Eyes:       Conjunctiva/sclera: Conjunctivae normal.      Pupils: Pupils are equal, round, and reactive to light.   Cardiovascular:      Rate and Rhythm: Normal rate and regular rhythm.      Pulses: Normal pulses.      Heart sounds: Normal heart sounds.   Pulmonary:      Effort: Pulmonary effort is normal.      Breath sounds: Normal breath sounds.   Abdominal:      General: Bowel sounds are normal.      Palpations: Abdomen is soft.   Musculoskeletal:         General: Normal range of motion.      Cervical back: Normal range of motion and neck supple.   Skin:     General: Skin is warm and dry.      Capillary Refill: Capillary refill takes 2 to 3 seconds.   Neurological:      Mental Status: She is alert and oriented to person, place, and time.   Psychiatric:         Mood and Affect: Mood normal.         Behavior: Behavior normal.         Thought Content: Thought content normal.         Judgment: Judgment normal.            Assessment and Plan     1. Sinusitis, unspecified chronicity, unspecified location  -     POCT COVID-19 Rapid Screening  -     POCT Influenza A/B Molecular  Flonase OTC as directed  Hydrate well  Rest  Report to ER immediately if symptoms worsen or persist               No follow-ups on file.         [1]   Social History  Socioeconomic History    Marital status:    Tobacco Use    Smoking status: Never    Smokeless tobacco: Never   Substance and Sexual Activity    Alcohol use: No    Drug use: No    Sexual activity: Yes     Partners: Male     Birth control/protection: None

## 2025-02-27 NOTE — PATIENT INSTRUCTIONS
Flonase OTC as directed  Hydrate well  Rest  Cough medication causes drowsiness  Report to ER immediately if symptoms worsen or persist    John Plasencia,     If you are due for any health screening(s) below please notify me so we can arrange them to be ordered and scheduled. Most healthy patients at your age complete them, but you are free to accept or refuse.     If you can't do it, I'll definitely understand. If you can, I'd certainly appreciate it!    All of your core healthy metrics are met.

## 2025-03-11 ENCOUNTER — LAB VISIT (OUTPATIENT)
Dept: LAB | Facility: HOSPITAL | Age: 70
End: 2025-03-11
Attending: FAMILY MEDICINE
Payer: COMMERCIAL

## 2025-03-11 DIAGNOSIS — I10 ESSENTIAL HYPERTENSION: ICD-10-CM

## 2025-03-11 LAB
ANION GAP SERPL CALC-SCNC: 10 MMOL/L (ref 8–16)
BUN SERPL-MCNC: 18 MG/DL (ref 8–23)
CALCIUM SERPL-MCNC: 9.3 MG/DL (ref 8.7–10.5)
CHLORIDE SERPL-SCNC: 98 MMOL/L (ref 95–110)
CO2 SERPL-SCNC: 29 MMOL/L (ref 23–29)
CREAT SERPL-MCNC: 0.8 MG/DL (ref 0.5–1.4)
EST. GFR  (NO RACE VARIABLE): >60 ML/MIN/1.73 M^2
GLUCOSE SERPL-MCNC: 88 MG/DL (ref 70–110)
POTASSIUM SERPL-SCNC: 3.8 MMOL/L (ref 3.5–5.1)
SODIUM SERPL-SCNC: 137 MMOL/L (ref 136–145)

## 2025-03-11 PROCEDURE — 36415 COLL VENOUS BLD VENIPUNCTURE: CPT | Mod: PO | Performed by: FAMILY MEDICINE

## 2025-03-11 PROCEDURE — 80048 BASIC METABOLIC PNL TOTAL CA: CPT | Performed by: FAMILY MEDICINE

## 2025-03-12 ENCOUNTER — RESULTS FOLLOW-UP (OUTPATIENT)
Dept: FAMILY MEDICINE | Facility: CLINIC | Age: 70
End: 2025-03-12

## 2025-03-12 RX ORDER — ESCITALOPRAM OXALATE 10 MG/1
10 TABLET ORAL DAILY
Qty: 90 TABLET | Refills: 3 | Status: SHIPPED | OUTPATIENT
Start: 2025-03-12

## 2025-03-12 NOTE — TELEPHONE ENCOUNTER
No care due was identified.  [facility] Embedded Care Due Messages. Reference number: [id number].   3/12/2025 3:36:45 PM CDT

## 2025-03-13 ENCOUNTER — TELEPHONE (OUTPATIENT)
Dept: NEPHROLOGY | Facility: CLINIC | Age: 70
End: 2025-03-13

## 2025-03-13 NOTE — TELEPHONE ENCOUNTER
----- Message from Oma sent at 3/12/2025  3:50 PM CDT -----  Regarding: Needs return call  Type: Needs Medical AdviceWho Called:  Makenna Call Back Number: [phone] Information: Pt wants to know what the next available appt is

## 2025-03-17 ENCOUNTER — TELEPHONE (OUTPATIENT)
Dept: NEPHROLOGY | Facility: CLINIC | Age: 70
End: 2025-03-17
Payer: COMMERCIAL

## 2025-03-17 DIAGNOSIS — Z90.5 SOLITARY KIDNEY, ACQUIRED: Primary | ICD-10-CM

## 2025-03-17 NOTE — TELEPHONE ENCOUNTER
Patient called to ask if Chlorthalidone is safe to take for her kidney function. Dr Reed placed her on this last month.   Also, she is scheduled for a follow up with Kandace Herring in 2 weeks. Please order necessary labs.

## 2025-03-17 NOTE — TELEPHONE ENCOUNTER
----- Message from Tylor sent at 3/17/2025  4:15 PM CDT -----  Contact: self  Type:  Sooner Appointment RequestCaller is requesting a sooner appointment.  Caller declined first available appointment listed below.  Caller will not accept being placed on the waitlist and is requesting a message be sent to doctor.Name of Caller:  PTWhen is the first available appointment?  JulySymptoms:  F/u  1 kidneyWould the patient rather a call back or a response via [facility]? [facility] Call Back Number:  [phone] Additional Information:

## 2025-03-18 ENCOUNTER — PATIENT MESSAGE (OUTPATIENT)
Dept: NEPHROLOGY | Facility: CLINIC | Age: 70
End: 2025-03-18
Payer: COMMERCIAL

## 2025-03-28 ENCOUNTER — PATIENT MESSAGE (OUTPATIENT)
Dept: FAMILY MEDICINE | Facility: CLINIC | Age: 70
End: 2025-03-28

## 2025-03-28 ENCOUNTER — TELEPHONE (OUTPATIENT)
Dept: FAMILY MEDICINE | Facility: CLINIC | Age: 70
End: 2025-03-28

## 2025-03-28 ENCOUNTER — OFFICE VISIT (OUTPATIENT)
Dept: FAMILY MEDICINE | Facility: CLINIC | Age: 70
End: 2025-03-28
Payer: COMMERCIAL

## 2025-03-28 VITALS
HEIGHT: 62 IN | TEMPERATURE: 98 F | WEIGHT: 200.38 LBS | SYSTOLIC BLOOD PRESSURE: 132 MMHG | DIASTOLIC BLOOD PRESSURE: 68 MMHG | HEART RATE: 77 BPM | BODY MASS INDEX: 36.87 KG/M2

## 2025-03-28 DIAGNOSIS — G89.29 CHRONIC BILATERAL LOW BACK PAIN WITH BILATERAL SCIATICA: ICD-10-CM

## 2025-03-28 DIAGNOSIS — I73.9 CLAUDICATION: ICD-10-CM

## 2025-03-28 DIAGNOSIS — M54.9 DORSALGIA, UNSPECIFIED: ICD-10-CM

## 2025-03-28 DIAGNOSIS — M54.42 CHRONIC BILATERAL LOW BACK PAIN WITH BILATERAL SCIATICA: ICD-10-CM

## 2025-03-28 DIAGNOSIS — M54.41 CHRONIC BILATERAL LOW BACK PAIN WITH BILATERAL SCIATICA: ICD-10-CM

## 2025-03-28 DIAGNOSIS — I10 ESSENTIAL HYPERTENSION: Primary | ICD-10-CM

## 2025-03-28 PROCEDURE — 99999 PR PBB SHADOW E&M-EST. PATIENT-LVL IV: CPT | Mod: PBBFAC,,, | Performed by: FAMILY MEDICINE

## 2025-03-28 NOTE — TELEPHONE ENCOUNTER
----- Message from Clau sent at 3/28/2025  2:37 PM CDT -----  Type:  Needs Medical AdviceWho Called: Cindy [facility] Medical Claims Dept -YANIRA ORTIZ [[id number]]Symptoms (please be specific):  How long has patient had these symptoms:  Pharmacy name and phone #:  Would the patient rather a call back or a response via [facility]? [facility] Call Back Number: [phone] Information: Cindy patient called in regards to denial for Ultra of the heart. Contact Heber in regards to this matter

## 2025-03-28 NOTE — PROGRESS NOTES
Follow-up hypertension.  She states good control blood pressure at home in the 120-130s over 80s though was elevated in the clinic today.  Tolerating recent medication changes well.      She does complain of some chronic bilateral lower back pain.  She gets radiation and discomfort into the legs particularly when walking which improves with rest.  This has been for least a greater than 3 months.  Prior similar symptoms.  We did do arterial ultrasound/BELEM last visit which did not show evidence of significant arterial vascular disease legs.  We did order MRI lumbar spine last visit however she has not had this done yet.      Erinn was seen today for follow-up.    Diagnoses and all orders for this visit:    Essential hypertension    Chronic bilateral low back pain with bilateral sciatica  -     MRI Lumbar Spine Without Contrast; Future    Claudication  -     MRI Lumbar Spine Without Contrast; Future    Dorsalgia, unspecified      Continue current medications.  Continue to monitor blood pressure at home.  We will reorder the MRI as her previous authorization has           Past Medical History:  Past Medical History:   Diagnosis Date    Anxiety     AR (allergic rhinitis)     Arthritis     Asthma, intermittent     Bilateral carotid artery stenosis 2021    Coronary artery disease     Depression     Diverticulosis     Endometrial polyp     Fatty liver     GERD (gastroesophageal reflux disease)     Hiatal hernia     Hyperlipidemia LDL goal <100     Hypertension     Mild mitral regurgitation     Multiple thyroid nodules 2014    Obesity (BMI 30-39.9) 2014    Renal cell carcinoma     removed - no recurrence    S/P primary angioplasty with coronary stent 2017    Sleep apnea     Using CPAP    Solitary kidney, acquired     right    Tubular adenoma of colon 2022     Past Surgical History:   Procedure Laterality Date    APPENDECTOMY      CARDIAC CATHETERIZATION  2014    has  blockages, but per patient no stents    CHOLECYSTECTOMY      COLONOSCOPY  2002    negative    COLONOSCOPY  03/25/2014         COLONOSCOPY N/A 03/22/2022    Procedure: COLONOSCOPY;  Surgeon: Hunter Garibay MD;  Location: [facility];  Service: Endoscopy;  Laterality: N/A; Repeat colonoscopy in 5 years for surveillance    CORONARY ANGIOGRAPHY  06/12/2024    CORONARY ANGIOPLASTY  2008?    balloon angioplasty    CORONARY ANGIOPLASTY WITH STENT PLACEMENT Right 07/26/2017    ESOPHAGOGASTRODUODENOSCOPY N/A 07/14/2021    Procedure: ESOPHAGOGASTRODUODENOSCOPY (EGD);  Surgeon: Sheng Bell III, MD;  Location: [facility] ENDO;  Service: Endoscopy;  Laterality: N/A;    ESOPHAGOGASTRODUODENOSCOPY N/A 11/14/2023    Procedure: EGD (ESOPHAGOGASTRODUODENOSCOPY);  Surgeon: Guille Trujillo Jr., MD;  Location: [facility];  Service: Endoscopy;  Laterality: N/A;    NEPHRECTOMY Left 2002    [facility]    OVARIAN CYST REMOVAL      PERCUTANEOUS ENDOVENOUS LASER ABLATION OF PERIPHERAL VEIN  06/05/2019    POLYPECTOMY      endometrial polyp removal    ROTATOR CUFF REPAIR      L    THYROIDECTOMY, PARTIAL Left     TUBAL LIGATION      UPPER GASTROINTESTINAL ENDOSCOPY  04/2014     Review of patient's allergies indicates:   Allergen Reactions    Oxycodone Shortness Of Breath, Nausea And Vomiting and Other (See Comments)     Anxiety.    Enalapril maleate-felodipine     Plavix [clopidogrel] Diarrhea     Stomach cramps    Azithromycin Itching    Codeine Palpitations    Corticosteroids (glucocorticoids) Palpitations    Doxycycline Itching and Nausea Only    Hydralazine analogues Palpitations    Mobic [meloxicam] Itching    Neuromuscular blockers, steroidal Palpitations    Nickel sutures [surgical stainless steel] Rash     Jewelery    Pcn [penicillins] Nausea And Vomiting    Shellfish containing products Itching     topical iodine OK    Sulfa (sulfonamide antibiotics) Nausea And Vomiting     Current Outpatient Medications  on File Prior to Visit   Medication Sig Dispense Refill    acetaminophen (TYLENOL) 650 MG TbSR Take 2 tablets (1,300 mg total) by mouth every 8 (eight) hours.  0    albuterol (PROVENTIL/VENTOLIN HFA) 90 mcg/actuation inhaler Inhale 2 puffs into the lungs every 6 (six) hours as needed for Wheezing. 18 g 3    aspirin (ECOTRIN) 81 MG EC tablet Take 81 mg by mouth once daily.      chlorthalidone (HYGROTEN) 25 MG Tab Take 1 tablet (25 mg total) by mouth once daily. 30 tablet 1    diclofenac sodium (VOLTAREN) 1 % Gel Apply 2 g topically 3 (three) times daily. 100 g 5    EScitalopram oxalate (LEXAPRO) 10 MG tablet Take 1 tablet (10 mg total) by mouth once daily. 90 tablet 3    multivitamin capsule Take 1 capsule by mouth once daily.      potassium chloride (KLOR-CON) 10 MEQ TbSR Take 1 tablet (10 mEq total) by mouth once daily. 30 tablet 1    pravastatin (PRAVACHOL) 40 MG tablet Take 1 tablet (40 mg total) by mouth every evening. 90 tablet 0    valsartan (DIOVAN) 320 MG tablet Take 1 tablet (320 mg total) by mouth once daily. 90 tablet 0    LORazepam (ATIVAN) 1 MG tablet Take 1 tablet (1 mg total) by mouth On call Procedure for Anxiety. 45 minutes prior to procedure 1 tablet 0    omeprazole (PRILOSEC) 20 MG capsule Take 1 capsule (20 mg total) by mouth before breakfast. 90 capsule 0     No current facility-administered medications on file prior to visit.     Social History     Socioeconomic History    Marital status:    Tobacco Use    Smoking status: Never    Smokeless tobacco: Never   Substance and Sexual Activity    Alcohol use: No    Drug use: No    Sexual activity: Yes     Partners: Male     Birth control/protection: None     Social Drivers of Health     Financial Resource Strain: Low Risk  (3/24/2025)    Overall Financial Resource Strain (CARDIA)     Difficulty of Paying Living Expenses: Not very hard   Food Insecurity: Patient Declined (3/24/2025)    Hunger Vital Sign     Worried About Running Out of Food in  "the Last Year: Patient declined     Ran Out of Food in the Last Year: Patient declined   Transportation Needs: No Transportation Needs (3/24/2025)    PRAPARE - Transportation     Lack of Transportation (Medical): No     Lack of Transportation (Non-Medical): No   Physical Activity: Insufficiently Active (3/24/2025)    Exercise Vital Sign     Days of Exercise per Week: 3 days     Minutes of Exercise per Session: 10 min   Stress: Stress Concern Present (3/24/2025)    [employer] - Occupational Stress Questionnaire     Feeling of Stress : To some extent   Housing Stability: Low Risk  (3/24/2025)    Housing Stability Vital Sign     Unable to Pay for Housing in the Last Year: No     Number of Times Moved in the Last Year: 0     Homeless in the Last Year: No     Family History   Problem Relation Name Age of Onset    Heart attack Father Andreas 67    Arthritis Father Andreas     Hypertension Father Andreas     Asthma Brother Marcos     Diabetes Paternal Aunt      Allergies Paternal Aunt      Breast cancer Paternal Aunt      Diabetes Paternal Uncle      Allergies Maternal Grandmother      Breast cancer Daughter  40    Colon cancer Neg Hx      Stomach cancer Neg Hx      Angioedema Neg Hx      Atopy Neg Hx      Immunodeficiency Neg Hx      Urticaria Neg Hx      Rhinitis Neg Hx      Eczema Neg Hx      Asthma Neg Hx      Crohn's disease Neg Hx      Esophageal cancer Neg Hx      Ulcerative colitis Neg Hx           Vitals:    03/28/25 1118 03/28/25 1140   BP: (!) 146/68 132/68   Pulse: 77    Temp: 97.7 °F (36.5 °C)    TempSrc: Temporal    Weight: 90.9 kg (200 lb 6.4 oz)    Height: 5' 2" (1.575 m)      Wt Readings from Last 3 Encounters:   03/28/25 90.9 kg (200 lb 6.4 oz)   02/27/25 90.6 kg (199 lb 12.8 oz)   02/07/25 93 kg (205 lb)       OBJECTIVE:   APPEARANCE: Well nourished, well developed, in no acute distress.    HEAD: Normocephalic.  Atraumatic.  EYES:   Right eye: Pupil reactive.  Conjunctiva clear. "    Left eye: Pupil reactive.  Conjunctiva clear.  EOMI.    NECK: Supple.     CHEST: Breath sounds clear bilaterally.  Normal respiratory effort  CARDIOVASCULAR: Normal rate.  Regular rhythm.  No murmurs.  No rub.  No gallops.  PERIPHERAL VASCULAR: No cyanosis.  No clubbing.  no edema.  NEUROLOGIC: No focal findings.  Back has mild decreased range of motion.  Not significantly tender to palpation.  Mildly positive right straight leg raise.  Gait okay.  Able stand on heels and toes.  MENTAL STATUS: Alert.  Oriented x 3.

## 2025-03-28 NOTE — TELEPHONE ENCOUNTER
Per [facility] Whiet has approval for echo of heart, she suggested contacting [facility], per Dr Jain, pass on to billing, will send to staff

## 2025-03-31 ENCOUNTER — PATIENT MESSAGE (OUTPATIENT)
Dept: FAMILY MEDICINE | Facility: CLINIC | Age: 70
End: 2025-03-31
Payer: COMMERCIAL

## 2025-04-14 ENCOUNTER — TELEPHONE (OUTPATIENT)
Dept: PHARMACY | Facility: CLINIC | Age: 70
End: 2025-04-14
Payer: COMMERCIAL

## 2025-04-14 ENCOUNTER — PATIENT OUTREACH (OUTPATIENT)
Dept: ADMINISTRATIVE | Facility: HOSPITAL | Age: 70
End: 2025-04-14
Payer: COMMERCIAL

## 2025-04-14 DIAGNOSIS — Z12.31 ENCOUNTER FOR SCREENING MAMMOGRAM FOR MALIGNANT NEOPLASM OF BREAST: Primary | ICD-10-CM

## 2025-04-14 NOTE — TELEPHONE ENCOUNTER
[facility]/Population Health Chart Review & Patient Outreach Details For Medication Adherence Project    Reason for Outreach Encounter: 3rd Party payor non-compliance report ([employer], [employer], C, etc)  2.  Patient Outreach Method: Reviewed Patient Chart  3.   Medication in question: pravastatin   LAST FILLED: 2/22/25 for 90 day supply  Hyperlipidemia Medications              pravastatin (PRAVACHOL) 40 MG tablet Take 1 tablet (40 mg total) by mouth every evening.               4.  Reviewed and or Updates Made To: Patient Chart  5. Outreach Outcomes and/or actions taken: Patient filled medication and is on track to be adherent

## 2025-04-14 NOTE — PROGRESS NOTES
VBC Program closed due to Does Not Meet Criteria for Program status. Patient is not due for any topics at this time. Mammo ordered, Portal msg sent.

## 2025-04-25 RX ORDER — CHLORTHALIDONE 25 MG/1
25 TABLET ORAL DAILY
Qty: 90 TABLET | Refills: 3 | Status: SHIPPED | OUTPATIENT
Start: 2025-04-25 | End: 2026-04-20

## 2025-04-25 NOTE — TELEPHONE ENCOUNTER
Care Due:                  Date            Visit Type   Department     Provider  --------------------------------------------------------------------------------                                EP -                              PRIMARY      [facility] FAMILY  Last Visit: 03-      CARE ([facility])   MEDICINE       Layo Reed  Next Visit: None Scheduled  None         None Found                                                            Last  Test          Frequency    Reason                     Performed    Due Date  --------------------------------------------------------------------------------    CMP.........  12 months..  pravastatin..............  04- 04-    Lipid Panel.  12 months..  pravastatin..............  04- 04-    [employer] Embedded Care Due Messages. Reference number: [id number].   4/25/2025 2:17:08 PM CDT

## 2025-04-27 ENCOUNTER — PATIENT MESSAGE (OUTPATIENT)
Dept: ADMINISTRATIVE | Facility: HOSPITAL | Age: 70
End: 2025-04-27
Payer: COMMERCIAL

## 2025-04-27 DIAGNOSIS — F41.9 ANXIETY: ICD-10-CM

## 2025-04-27 RX ORDER — CHLORTHALIDONE 25 MG/1
25 TABLET ORAL DAILY
Qty: 90 TABLET | Refills: 3 | Status: CANCELLED | OUTPATIENT
Start: 2025-04-27 | End: 2026-04-22

## 2025-04-28 ENCOUNTER — PATIENT OUTREACH (OUTPATIENT)
Dept: ADMINISTRATIVE | Facility: HOSPITAL | Age: 70
End: 2025-04-28
Payer: COMMERCIAL

## 2025-04-28 DIAGNOSIS — M89.9 DISORDER OF SKELETAL SYSTEM: Primary | ICD-10-CM

## 2025-04-28 RX ORDER — POTASSIUM CHLORIDE 750 MG/1
10 TABLET, EXTENDED RELEASE ORAL DAILY
Qty: 90 TABLET | Refills: 3 | Status: SHIPPED | OUTPATIENT
Start: 2025-04-28

## 2025-04-28 RX ORDER — LORAZEPAM 2 MG/1
2 TABLET ORAL
Qty: 1 TABLET | Refills: 0 | Status: SHIPPED | OUTPATIENT
Start: 2025-04-28 | End: 2025-05-28

## 2025-04-28 NOTE — TELEPHONE ENCOUNTER
I spoke w/ pt , she has an MRI sched on Thursday . It is a closed MRI and she is claustrophobic . She is asking if you would send something in for her to take the edge off .   She said that you sent in [place] last time . She will have someone driving her . She is asking if you could send in something a little stronger than previous .  Please advise, thanks

## 2025-04-28 NOTE — TELEPHONE ENCOUNTER
----- Message from [place] sent at 4/28/2025  1:11 PM CDT -----  Type: Patient callWho called: Patient Does the patient know what this is regarding? Requesting a call back from Trinity or Petty in regards to upcoming MRI ; please advise Would the patient rather a call back or response via [facility]? [facility]: [phone] information:

## 2025-04-28 NOTE — TELEPHONE ENCOUNTER
Refill Routing Note   Medication(s) are not appropriate for processing by [facility] for the following reason(s):        New or recently adjusted medication    [facility] action(s):  Defer               Appointments  past 12m or future 3m with PCP    Date Provider   Last Visit   3/28/2025 Layo Reed MD   Next Visit   Visit date not found Layo Reed MD   ED visits in past 90 days: 0        Note composed:9:04 AM 04/28/2025

## 2025-04-28 NOTE — PROGRESS NOTES
Replying to Campaign Questionnaire for Overdue HM: OSTEO SCREENING, MAMMOGRAM     05.15.2025 @ [facility]

## 2025-04-28 NOTE — TELEPHONE ENCOUNTER
No care due was identified.  [facility] Embedded Care Due Messages. Reference number: [id number].   4/27/2025 10:33:57 PM CDT

## 2025-05-08 ENCOUNTER — PATIENT MESSAGE (OUTPATIENT)
Dept: FAMILY MEDICINE | Facility: CLINIC | Age: 70
End: 2025-05-08
Payer: COMMERCIAL

## 2025-05-08 ENCOUNTER — PATIENT MESSAGE (OUTPATIENT)
Dept: NEPHROLOGY | Facility: CLINIC | Age: 70
End: 2025-05-08
Payer: COMMERCIAL

## 2025-05-08 ENCOUNTER — TELEPHONE (OUTPATIENT)
Dept: NEPHROLOGY | Facility: CLINIC | Age: 70
End: 2025-05-08
Payer: COMMERCIAL

## 2025-05-08 DIAGNOSIS — I25.10 CORONARY ARTERY DISEASE INVOLVING NATIVE CORONARY ARTERY OF NATIVE HEART WITHOUT ANGINA PECTORIS: Primary | ICD-10-CM

## 2025-05-08 DIAGNOSIS — E78.5 HYPERLIPIDEMIA, UNSPECIFIED HYPERLIPIDEMIA TYPE: ICD-10-CM

## 2025-05-08 NOTE — TELEPHONE ENCOUNTER
She is due for lipid panel, ALT.  She could just have this done when she has lab work for Dr. Hooks in June if she would like

## 2025-05-08 NOTE — TELEPHONE ENCOUNTER
----- Message from Arelis sent at 5/8/2025  9:35 AM CDT -----  Contact: [phone]  Type:  Patient Returning CallWho Called:YANIRA ORTIZ [[id number]]Who Left Message for Patient: MIRIAM Onesimo the patient know what this is regarding?:missed a call from your officeWould the patient rather a call back or a response via [facility]? Call [facility] Call Back Number: [phone] Information: [id number]

## 2025-05-16 ENCOUNTER — HOSPITAL ENCOUNTER (OUTPATIENT)
Dept: RADIOLOGY | Facility: HOSPITAL | Age: 70
Discharge: HOME | End: 2025-05-16
Attending: FAMILY MEDICINE
Payer: COMMERCIAL

## 2025-05-16 DIAGNOSIS — I73.9 CLAUDICATION: ICD-10-CM

## 2025-05-16 DIAGNOSIS — G89.29 CHRONIC BILATERAL LOW BACK PAIN WITH BILATERAL SCIATICA: ICD-10-CM

## 2025-05-16 DIAGNOSIS — M54.41 CHRONIC BILATERAL LOW BACK PAIN WITH BILATERAL SCIATICA: ICD-10-CM

## 2025-05-16 DIAGNOSIS — M54.42 CHRONIC BILATERAL LOW BACK PAIN WITH BILATERAL SCIATICA: ICD-10-CM

## 2025-05-16 PROCEDURE — 72148 MRI LUMBAR SPINE W/O DYE: CPT | Mod: TC,PO

## 2025-05-16 PROCEDURE — 72148 MRI LUMBAR SPINE W/O DYE: CPT | Mod: 26,,, | Performed by: RADIOLOGY

## 2025-05-22 ENCOUNTER — RESULTS FOLLOW-UP (OUTPATIENT)
Dept: FAMILY MEDICINE | Facility: CLINIC | Age: 70
End: 2025-05-22

## 2025-05-22 DIAGNOSIS — M54.41 CHRONIC BILATERAL LOW BACK PAIN WITH BILATERAL SCIATICA: ICD-10-CM

## 2025-05-22 DIAGNOSIS — M54.42 CHRONIC BILATERAL LOW BACK PAIN WITH BILATERAL SCIATICA: ICD-10-CM

## 2025-05-22 DIAGNOSIS — M48.062 SPINAL STENOSIS OF LUMBAR REGION WITH NEUROGENIC CLAUDICATION: ICD-10-CM

## 2025-05-22 DIAGNOSIS — G89.29 CHRONIC BILATERAL LOW BACK PAIN WITH BILATERAL SCIATICA: ICD-10-CM

## 2025-05-22 DIAGNOSIS — M51.360 DEGENERATION OF INTERVERTEBRAL DISC OF LUMBAR REGION WITH DISCOGENIC BACK PAIN: Primary | ICD-10-CM

## 2025-05-22 PROBLEM — M48.061 LUMBAR STENOSIS: Status: ACTIVE | Noted: 2025-05-22

## 2025-05-26 ENCOUNTER — HOSPITAL ENCOUNTER (OUTPATIENT)
Dept: RADIOLOGY | Facility: HOSPITAL | Age: 70
Discharge: HOME | End: 2025-05-26
Attending: FAMILY MEDICINE
Payer: COMMERCIAL

## 2025-05-26 DIAGNOSIS — Z12.31 ENCOUNTER FOR SCREENING MAMMOGRAM FOR MALIGNANT NEOPLASM OF BREAST: ICD-10-CM

## 2025-05-26 PROCEDURE — 77063 BREAST TOMOSYNTHESIS BI: CPT | Mod: 26,,, | Performed by: RADIOLOGY

## 2025-05-26 PROCEDURE — 77067 SCR MAMMO BI INCL CAD: CPT | Mod: 26,,, | Performed by: RADIOLOGY

## 2025-05-26 PROCEDURE — 77063 BREAST TOMOSYNTHESIS BI: CPT | Mod: TC,PO

## 2025-05-30 DIAGNOSIS — E78.5 HYPERLIPIDEMIA LDL GOAL <100: ICD-10-CM

## 2025-05-30 RX ORDER — VALSARTAN 320 MG/1
320 TABLET ORAL
Qty: 90 TABLET | Refills: 3 | Status: SHIPPED | OUTPATIENT
Start: 2025-05-30

## 2025-06-02 RX ORDER — PRAVASTATIN SODIUM 40 MG/1
40 TABLET ORAL NIGHTLY
Qty: 90 TABLET | Refills: 0 | Status: SHIPPED | OUTPATIENT
Start: 2025-06-02

## 2025-06-05 ENCOUNTER — PATIENT MESSAGE (OUTPATIENT)
Dept: FAMILY MEDICINE | Facility: CLINIC | Age: 70
End: 2025-06-05
Payer: COMMERCIAL

## 2025-06-06 ENCOUNTER — TELEPHONE (OUTPATIENT)
Dept: PHARMACY | Facility: CLINIC | Age: 70
End: 2025-06-06
Payer: COMMERCIAL

## 2025-06-30 ENCOUNTER — PATIENT MESSAGE (OUTPATIENT)
Dept: FAMILY MEDICINE | Facility: CLINIC | Age: 70
End: 2025-06-30
Payer: COMMERCIAL

## 2025-06-30 ENCOUNTER — APPOINTMENT (OUTPATIENT)
Dept: LAB | Facility: HOSPITAL | Age: 70
End: 2025-06-30
Attending: INTERNAL MEDICINE
Payer: COMMERCIAL

## 2025-07-07 ENCOUNTER — OFFICE VISIT (OUTPATIENT)
Dept: NEPHROLOGY | Facility: CLINIC | Age: 70
End: 2025-07-07
Payer: COMMERCIAL

## 2025-07-07 VITALS
SYSTOLIC BLOOD PRESSURE: 130 MMHG | DIASTOLIC BLOOD PRESSURE: 78 MMHG | OXYGEN SATURATION: 96 % | WEIGHT: 203.13 LBS | BODY MASS INDEX: 37.38 KG/M2 | HEIGHT: 62 IN | HEART RATE: 86 BPM

## 2025-07-07 DIAGNOSIS — E55.9 VITAMIN D DEFICIENCY: ICD-10-CM

## 2025-07-07 DIAGNOSIS — N25.81 SECONDARY HYPERPARATHYROIDISM OF RENAL ORIGIN: ICD-10-CM

## 2025-07-07 DIAGNOSIS — I10 ESSENTIAL HYPERTENSION: ICD-10-CM

## 2025-07-07 DIAGNOSIS — Z85.528 PERSONAL HISTORY OF RENAL CANCER: ICD-10-CM

## 2025-07-07 DIAGNOSIS — Z90.5 SOLITARY KIDNEY, ACQUIRED: Primary | ICD-10-CM

## 2025-07-07 PROCEDURE — 4010F ACE/ARB THERAPY RXD/TAKEN: CPT | Mod: CPTII,S$GLB,, | Performed by: INTERNAL MEDICINE

## 2025-07-07 PROCEDURE — 3066F NEPHROPATHY DOC TX: CPT | Mod: CPTII,S$GLB,, | Performed by: INTERNAL MEDICINE

## 2025-07-07 PROCEDURE — 3288F FALL RISK ASSESSMENT DOCD: CPT | Mod: CPTII,S$GLB,, | Performed by: INTERNAL MEDICINE

## 2025-07-07 PROCEDURE — 3008F BODY MASS INDEX DOCD: CPT | Mod: CPTII,S$GLB,, | Performed by: INTERNAL MEDICINE

## 2025-07-07 PROCEDURE — 1101F PT FALLS ASSESS-DOCD LE1/YR: CPT | Mod: CPTII,S$GLB,, | Performed by: INTERNAL MEDICINE

## 2025-07-07 PROCEDURE — 99999 PR PBB SHADOW E&M-EST. PATIENT-LVL III: CPT | Mod: PBBFAC,,, | Performed by: INTERNAL MEDICINE

## 2025-07-07 PROCEDURE — 99214 OFFICE O/P EST MOD 30 MIN: CPT | Mod: S$GLB,,, | Performed by: INTERNAL MEDICINE

## 2025-07-07 PROCEDURE — 1160F RVW MEDS BY RX/DR IN RCRD: CPT | Mod: CPTII,S$GLB,, | Performed by: INTERNAL MEDICINE

## 2025-07-07 PROCEDURE — 3075F SYST BP GE 130 - 139MM HG: CPT | Mod: CPTII,S$GLB,, | Performed by: INTERNAL MEDICINE

## 2025-07-07 PROCEDURE — 1159F MED LIST DOCD IN RCRD: CPT | Mod: CPTII,S$GLB,, | Performed by: INTERNAL MEDICINE

## 2025-07-07 PROCEDURE — 1126F AMNT PAIN NOTED NONE PRSNT: CPT | Mod: CPTII,S$GLB,, | Performed by: INTERNAL MEDICINE

## 2025-07-07 PROCEDURE — 3078F DIAST BP <80 MM HG: CPT | Mod: CPTII,S$GLB,, | Performed by: INTERNAL MEDICINE

## 2025-07-07 NOTE — PROGRESS NOTES
Subjective:       Patient ID: Erinn Silva is a 69 y.o. Black or  female who presents for follow up evaluation of solitary kidney aquired    HPI    2017: Initial Consult for solitary kidney     May 2023:   She is referred by her PCP for evaluation of CKD< in setting of solitary kidney s/p nephrectomy 2002 (renal cell ca)  No known kidney disease in family  Brother has DM 2  Water and juice and rare soda   LE edema at end of day   Low sodium diet but uses canned foods  PCP and Cards manages BP     July 2025:Doing well. She reports urinating 10-12 times daily, with episodes occurring approximately every hour, particularly after taking oral medications. These episodes sometimes correlate with low potassium levels, resulting in muscle cramps.     Review of Systems   Constitutional:  Negative for activity change, appetite change and unexpected weight change.   HENT:  Negative for facial swelling.    Respiratory:  Negative for shortness of breath.    Cardiovascular:  Positive for leg swelling (at end of day).   Gastrointestinal:  Negative for abdominal pain.   Genitourinary:  Negative for difficulty urinating.   Musculoskeletal:  Positive for arthralgias and back pain.   Allergic/Immunologic: Positive for immunocompromised state (age CKD).   Psychiatric/Behavioral:  Negative for confusion and decreased concentration.        Objective:      Physical Exam  Vitals and nursing note reviewed.   Constitutional:       Appearance: Normal appearance. She is obese.   HENT:      Head: Atraumatic.   Musculoskeletal:      Right lower leg: No edema.      Left lower leg: No edema.   Neurological:      Mental Status: She is alert and oriented to person, place, and time.         Assessment:       1. Solitary kidney, acquired    2. Essential hypertension    3. Personal history of renal cancer    4. Secondary hyperparathyroidism of renal origin            Plan:           Solitary kidney from R nephrectomy for renal cell  ca in 2002  - stable kidney function at Stage 2   - Educated on kidney's role in regulating blood acid levels. - Patient to limit salt and sugar intake to support kidney health. - Patient to increase consumption of fruits and vegetables. - Patient to maintain hydration, especially when taking chlorthalidone. - Patient to continue to avoid or limit use of NSAIDs (e.g., Aleve, Advil, ibuprofen). - Continued Valsartan for kidney protection and BP management. - Urine protein test to ensure kidney is not hyperfiltering. - Ordered vitamin D level. - Ordered PTH level.   - Continued Valsartan for kidney protection and BP management.     Secondary Hyperparathyroidism  - PTH level of 99, elevated but not concerning at current level. - Calcium and phosphorus levels WNL. - Explained role of PTH as bone hormone- Discussed importance of vitamin D, including kidney's role in converting it to active form called calcitriol - Ordered vitamin D level. - Ordered PTH level.     RTC 12 mo w labs  30 minutes of total time spent on the encounter, which includes face to face time and non-face to face time preparing to see the patient (eg, review of tests), Obtaining and/or reviewing separately obtained history, Documenting clinical information in the electronic or other health record, Independently interpreting results (not separately reported) and communicating results to the patient/family/caregiver, or Care coordination (not separately reported).    Portions of this note were generated by [employer].

## 2025-07-19 ENCOUNTER — PATIENT MESSAGE (OUTPATIENT)
Dept: FAMILY MEDICINE | Facility: CLINIC | Age: 70
End: 2025-07-19
Payer: COMMERCIAL

## 2025-08-03 ENCOUNTER — TELEPHONE (OUTPATIENT)
Dept: PHARMACY | Facility: CLINIC | Age: 70
End: 2025-08-03
Payer: COMMERCIAL

## 2025-08-03 NOTE — TELEPHONE ENCOUNTER
[facility]/Population Health Chart Review & Patient Outreach Details For Medication Adherence Project    Reason for Outreach Encounter: 3rd Party payor non-compliance report ([employer], BCBS, [employer], etc)  2.  Patient Outreach Method: Reviewed patient chart   3.   Medication in question:    Hyperlipidemia Medications              pravastatin (PRAVACHOL) 40 MG tablet Take 1 tablet (40 mg total) by mouth every evening.                  LF 90 ds 6/3/25    4.  Reviewed and or Updates Made To: Patient Chart  5. Outreach Outcomes and/or actions taken: Patient filled medication and is on track to be adherent  Additional Notes:

## 2025-08-08 ENCOUNTER — OFFICE VISIT (OUTPATIENT)
Dept: PAIN MEDICINE | Facility: CLINIC | Age: 70
End: 2025-08-08
Payer: COMMERCIAL

## 2025-08-08 VITALS
HEIGHT: 62 IN | BODY MASS INDEX: 36.87 KG/M2 | RESPIRATION RATE: 17 BRPM | HEART RATE: 69 BPM | SYSTOLIC BLOOD PRESSURE: 138 MMHG | WEIGHT: 200.38 LBS | DIASTOLIC BLOOD PRESSURE: 75 MMHG

## 2025-08-08 DIAGNOSIS — M54.42 CHRONIC BILATERAL LOW BACK PAIN WITH BILATERAL SCIATICA: ICD-10-CM

## 2025-08-08 DIAGNOSIS — G89.29 CHRONIC BILATERAL LOW BACK PAIN WITH BILATERAL SCIATICA: ICD-10-CM

## 2025-08-08 DIAGNOSIS — M54.41 CHRONIC BILATERAL LOW BACK PAIN WITH BILATERAL SCIATICA: ICD-10-CM

## 2025-08-08 DIAGNOSIS — M48.062 SPINAL STENOSIS OF LUMBAR REGION WITH NEUROGENIC CLAUDICATION: ICD-10-CM

## 2025-08-08 DIAGNOSIS — M51.360 DEGENERATION OF INTERVERTEBRAL DISC OF LUMBAR REGION WITH DISCOGENIC BACK PAIN: ICD-10-CM

## 2025-08-08 PROCEDURE — 99999 PR PBB SHADOW E&M-EST. PATIENT-LVL V: CPT | Mod: PBBFAC,,, | Performed by: PHYSICAL MEDICINE & REHABILITATION

## 2025-08-08 NOTE — PROGRESS NOTES
New Patient Chronic Pain Note (Initial Visit)    Referring Physician: Layo Reed MD    PCP: Layo Reed MD    Chief Complaint:   Chief Complaint   Patient presents with    Back Pain    Low-back Pain     Right     Leg Pain     Right         SUBJECTIVE:    Erinn Silva is a 69 y.o. female who presents to the clinic for the evaluation of lower back pain.  She was referred by her primary care team for further evaluation and management of this pain.  She has past medical history of lumbar spinal stenosis, depression, anxiety, asthma, hypertension, hyperlipidemia, renal cancer history, severe obesity, GERD, sleep apnea, and multiple other medical comorbidities as listed in her chart.  The pain started a few months ago without any injury or trauma and symptoms have been off and on.The pain is located in the lumbosacral area and radiates to the thoracolumbar area and also down the right leg occasionally extending posteriorly to the knee.  The pain is described as sharp, stabbing, aching and is rated as 0/10. The pain is rated with a score of  0/10 on the BEST day and a score of 10/10 on the WORST day.  Symptoms interfere with daily activity. The pain is exacerbated by walking for prolonged times and sudden movements.  The pain is mitigated by stretching and sitting. Employment status:  Retired    Patient denies night fever/night sweats, urinary incontinence, bowel incontinence, significant weight loss, significant motor weakness, and loss of sensations.    Pain Disability Index Review:         8/8/2025     9:45 AM   Last 3 PDI Scores   Pain Disability Index (PDI) 12       Non-Pharmacologic Treatments:  Physical Therapy/Home Exercise: no  Ice/Heat:yes  TENS: no  Acupuncture: no  Massage: yes  Chiropractic: no    Other: no      Pain Medications:  - Opioids:  None recently  - Adjuvant Medications:  Lorazepam, Tylenol, Voltaren gel, Lexapro  - Anti-Coagulants: Aspirin     report:  Reviewed and consistent  with medication use as prescribed.    Pain Procedures:   Denies      Imaging:   MRI lumbar spine 05/16/2025:  Morphology: Heterogeneous fatty marrow changes throughout the visualized osseous structures as well as an inflammatory Schmorl's node along the inferior endplate of L5 associated with prominent ventral osteophytes.  No acute fractures..     Alignment: Grade 1 retrolisthesis of L1 on L2.     Cord: Normal in contour, caliber, and signal intensity.  Conus positioning is within normal limits.     Disc levels:     T12-L1: Mild degenerative disc height loss and bilateral facet arthrosis contributing to mild left foraminal narrowing.  The spinal canal and right foramen are patent.     L1-L2: Moderate to severe degenerative disc height loss.  Broad-based disc bulging and shallow central disc extrusion with slight cranial migration as well as mild to moderate facet arthrosis producing overall moderate narrowing of the spinal canal and mild bilateral foraminal narrowing.     L2-L3: Moderate to severe degenerative disc height loss with shallow disc bulging and moderate bilateral facet arthrosis producing mild narrowing of the spinal canal and moderate left/mild right foraminal narrowing.     L3-L4: Moderate degenerative disc height loss with shallow disc bulging and moderate bilateral facet arthrosis with ligamentum flavum thickening producing mild narrowing of the spinal canal and mild to moderate left/mild right foraminal narrowing.     L4-L5: Moderate degenerative disc height loss and desiccation with shallow disc bulging and moderate bilateral facet arthrosis producing mild to moderate narrowing of the spinal canal and asymmetric encroachment of the left subarticular recess as well as mild to moderate left and mild right foraminal narrowing.     L5-S1: Severe degenerative disc height loss with shallow disc bulging and mild-to-moderate bilateral facet arthrosis producing mild narrowing of both subarticular recesses  and moderate left/mild to moderate right foraminal narrowing.     Other: Left nephrectomy changes.  Visualized paraspinal soft tissues are otherwise within normal limits.    X-ray lumbar spine 07/07/2022:  Alignment of the lumbar spine is normal.  No fracture or pars defect.  Mild multilevel lumbar spine degenerative disc disease.  There is also prominent inferior lumbar spine facet arthropathy.  Mild sacroiliac joint degenerative changes.  No osseous erosion or suspicious osseous lesion.  Multiple surgical clips are scattered within the left abdomen.       Past Medical History:   Diagnosis Date    Anxiety     AR (allergic rhinitis)     Arthritis     Asthma, intermittent     Bilateral carotid artery stenosis 11/09/2021    Coronary artery disease     Depression     Diverticulosis     Endometrial polyp     Fatty liver     GERD (gastroesophageal reflux disease)     Hiatal hernia     Hyperlipidemia LDL goal <100     Hypertension     Lumbar stenosis 05/22/2025    Mild mitral regurgitation     Multiple thyroid nodules 03/27/2014    Obesity (BMI 30-39.9) 03/27/2014    Renal cell carcinoma 2002    removed - no recurrence    S/P primary angioplasty with coronary stent 07/26/2017    Sleep apnea     Using CPAP    Solitary kidney, acquired     right    Tubular adenoma of colon 03/22/2022     Past Surgical History:   Procedure Laterality Date    APPENDECTOMY      CARDIAC CATHETERIZATION  06/27/2014    has blockages, but per patient no stents    CHOLECYSTECTOMY      COLONOSCOPY  2002    negative    COLONOSCOPY  03/25/2014         COLONOSCOPY N/A 03/22/2022    Procedure: COLONOSCOPY;  Surgeon: Hunter Garibay MD;  Location: [facility];  Service: Endoscopy;  Laterality: N/A; Repeat colonoscopy in 5 years for surveillance    CORONARY ANGIOGRAPHY  06/12/2024    CORONARY ANGIOPLASTY  2008?    balloon angioplasty    CORONARY ANGIOPLASTY WITH STENT PLACEMENT Right 07/26/2017    ESOPHAGOGASTRODUODENOSCOPY N/A 07/14/2021    Procedure:  ESOPHAGOGASTRODUODENOSCOPY (EGD);  Surgeon: Sheng Bell III, MD;  Location: [facility] ENDO;  Service: Endoscopy;  Laterality: N/A;    ESOPHAGOGASTRODUODENOSCOPY N/A 11/14/2023    Procedure: EGD (ESOPHAGOGASTRODUODENOSCOPY);  Surgeon: Guille Trujillo Jr., MD;  Location: [facility] ENDO;  Service: Endoscopy;  Laterality: N/A;    NEPHRECTOMY Left 2002    [facility] - cancer    OVARIAN CYST REMOVAL      PERCUTANEOUS ENDOVENOUS LASER ABLATION OF PERIPHERAL VEIN  06/05/2019    POLYPECTOMY      endometrial polyp removal    ROTATOR CUFF REPAIR      L    THYROIDECTOMY, PARTIAL Left     TUBAL LIGATION      UPPER GASTROINTESTINAL ENDOSCOPY  04/2014     Social History[1]  Family History   Problem Relation Name Age of Onset    Heart attack Father Andreas 67    Arthritis Father Andreas     Hypertension Father Andreas     Asthma Brother Marcos     Diabetes Paternal Aunt      Allergies Paternal Aunt      Breast cancer Paternal Aunt      Diabetes Paternal Uncle      Allergies Maternal Grandmother      Breast cancer Daughter  40    Colon cancer Neg Hx      Stomach cancer Neg Hx      Angioedema Neg Hx      Atopy Neg Hx      Immunodeficiency Neg Hx      Urticaria Neg Hx      Rhinitis Neg Hx      Eczema Neg Hx      Asthma Neg Hx      Crohn's disease Neg Hx      Esophageal cancer Neg Hx      Ulcerative colitis Neg Hx         Review of patient's allergies indicates:   Allergen Reactions    Oxycodone Shortness Of Breath, Nausea And Vomiting and Other (See Comments)     Anxiety.    Enalapril maleate-felodipine     Plavix [clopidogrel] Diarrhea     Stomach cramps    Azithromycin Itching    Codeine Palpitations    Corticosteroids (glucocorticoids) Palpitations    Doxycycline Itching and Nausea Only    Hydralazine analogues Palpitations    Mobic [meloxicam] Itching    Neuromuscular blockers, steroidal Palpitations    Nickel sutures [surgical stainless steel] Rash     Jewelery    Pcn [penicillins] Nausea And Vomiting    Shellfish  containing products Itching     topical iodine OK    Sulfa (sulfonamide antibiotics) Nausea And Vomiting       Current Outpatient Medications   Medication Sig    acetaminophen (TYLENOL) 650 MG TbSR Take 2 tablets (1,300 mg total) by mouth every 8 (eight) hours.    albuterol (PROVENTIL/VENTOLIN HFA) 90 mcg/actuation inhaler Inhale 2 puffs into the lungs every 6 (six) hours as needed for Wheezing.    aspirin (ECOTRIN) 81 MG EC tablet Take 81 mg by mouth once daily.    chlorthalidone (HYGROTEN) 25 MG Tab Take 1 tablet (25 mg total) by mouth once daily.    EScitalopram oxalate (LEXAPRO) 10 MG tablet Take 1 tablet (10 mg total) by mouth once daily.    multivitamin capsule Take 1 capsule by mouth once daily.    potassium chloride (KLOR-CON) 10 MEQ TbSR Take 1 tablet (10 mEq total) by mouth once daily.    pravastatin (PRAVACHOL) 40 MG tablet Take 1 tablet (40 mg total) by mouth every evening.    valsartan (DIOVAN) 320 MG tablet TAKE ONE TABLET BY MOUTH EVERY DAY     No current facility-administered medications for this visit.       Review of Systems     GENERAL:  No weight loss, malaise or fevers.  HEENT:   No recent changes in vision or hearing  NECK:  Negative for lumps, no difficulty with swallowing.  RESPIRATORY:  Negative for cough, wheezing or shortness of breath, patient denies any recent URI.  CARDIOVASCULAR:  Negative for chest pain, leg swelling or palpitations.  GI:  Negative for abdominal discomfort, blood in stools or black stools or change in bowel habits.  MUSCULOSKELETAL:  See HPI.  SKIN:  Negative for lesions, rash, and itching.  PSYCH:  No mood disorder or recent psychosocial stressors.  Patients sleep is not disturbed secondary to pain.  HEMATOLOGY/LYMPHOLOGY:  Negative for prolonged bleeding, bruising easily or swollen nodes.  Patient is currently taking anti-coagulants  NEURO:   No history of headaches, syncope, paralysis, seizures or tremors.  All other reviewed and negative other than  "HPI.    OBJECTIVE:    /75   Pulse 69   Resp 17   Ht 5' 2" (1.575 m)   Wt 90.9 kg (200 lb 6.4 oz)   BMI 36.65 kg/m²         Physical Exam    GENERAL: Well appearing, in no acute distress, alert and oriented x3.  PSYCH:  Mood and affect appropriate.  SKIN: Skin color, texture, turgor normal, no rashes or lesions.  HEAD/FACE:  Normocephalic, atraumatic. Cranial nerves grossly intact  CV: RRR with palpation of the radial artery.  PULM: No evidence of respiratory difficulty, symmetric chest rise.  GI:  Soft and non-tender.  BACK: Straight leg raising in the sitting and supine positions is negative to radicular pain.  Mild-to-moderate pain to palpation over the facet joints of the lumbar spine and lumbar paraspinals.  Fair range of motion without significant pain reproduction.   EXTREMITIES:  No deformities, edema, or skin discoloration. Good capillary refill.  MUSCULOSKELETAL:  Hip and knee provocative maneuvers are unremarkable.  There is no pain with palpation over the sacroiliac joints bilaterally.  FABERs test is equivocal.  FADIRs test is negative.   Bilateral upper and lower extremity strength is normal and symmetric.  No atrophy or tone abnormalities are noted.  NEURO: Bilateral upper and lower extremity coordination and muscle stretch reflexes are physiologic and symmetric.  Plantar response are downgoing. No clonus.  No loss of sensation is noted.  GAIT: normal.      LABS:  Lab Results   Component Value Date    WBC 6.15 03/25/2022    HGB 13.5 03/25/2022    HCT 41.9 03/25/2022    MCV 90 03/25/2022     03/25/2022       CMP  Sodium   Date Value Ref Range Status   06/30/2025 138 136 - 145 mmol/L Final   03/11/2025 137 136 - 145 mmol/L Final     Potassium   Date Value Ref Range Status   06/30/2025 4.1 3.5 - 5.1 mmol/L Final   03/11/2025 3.8 3.5 - 5.1 mmol/L Final     Chloride   Date Value Ref Range Status   06/30/2025 100 95 - 110 mmol/L Final   03/11/2025 98 95 - 110 mmol/L Final     CO2   Date " Value Ref Range Status   06/30/2025 27 23 - 29 mmol/L Final   03/11/2025 29 23 - 29 mmol/L Final     Glucose   Date Value Ref Range Status   06/30/2025 88 70 - 110 mg/dL Final   03/11/2025 88 70 - 110 mg/dL Final     BUN   Date Value Ref Range Status   06/30/2025 18 8 - 23 mg/dL Final     Creatinine   Date Value Ref Range Status   06/30/2025 0.8 0.5 - 1.4 mg/dL Final     Calcium   Date Value Ref Range Status   06/30/2025 9.1 8.7 - 10.5 mg/dL Final   03/11/2025 9.3 8.7 - 10.5 mg/dL Final     Total Protein   Date Value Ref Range Status   04/17/2024 7.6 6.0 - 8.4 g/dL Final     Albumin   Date Value Ref Range Status   06/30/2025 3.7 3.5 - 5.2 g/dL Final   04/17/2024 3.8 3.5 - 5.2 g/dL Final     Total Bilirubin   Date Value Ref Range Status   04/17/2024 0.8 0.1 - 1.0 mg/dL Final     Comment:     For infants and newborns, interpretation of results should be based  on gestational age, weight and in agreement with clinical  observations.    Premature Infant recommended reference ranges:  Up to 24 hours.............<8.0 mg/dL  Up to 48 hours............<12.0 mg/dL  3-5 days..................<15.0 mg/dL  6-29 days.................<15.0 mg/dL       Alkaline Phosphatase   Date Value Ref Range Status   04/17/2024 71 55 - 135 U/L Final     AST   Date Value Ref Range Status   04/17/2024 21 10 - 40 U/L Final     ALT   Date Value Ref Range Status   06/30/2025 22 10 - 44 unit/L Final   04/17/2024 21 10 - 44 U/L Final     Anion Gap   Date Value Ref Range Status   06/30/2025 11 8 - 16 mmol/L Final     eGFR if    Date Value Ref Range Status   03/25/2022 >60.0 >60 mL/min/1.73 m^2 Final     eGFR if non    Date Value Ref Range Status   03/25/2022 >60.0 >60 mL/min/1.73 m^2 Final     Comment:     Calculation used to obtain the estimated glomerular filtration  rate (eGFR) is the CKD-EPI equation.          Lab Results   Component Value Date    HGBA1C 5.6 05/08/2023             ASSESSMENT: 69 y.o. year old female  with lower back and hip pain, consistent with     1. Degeneration of intervertebral disc of lumbar region with discogenic back pain  Ambulatory referral/consult to Pain Clinic      2. Chronic bilateral low back pain with bilateral sciatica  Ambulatory referral/consult to Pain Clinic    Ambulatory Referral/Consult to Physical Therapy      3. Spinal stenosis of lumbar region with neurogenic claudication  Ambulatory referral/consult to Pain Clinic            PLAN:   - Interventions none at this time    - Anticoagulation use: Patient is taking ASA     - Medications: I have stressed the importance of physical activity and a home exercise plan to help with pain and improve health. and Patient can continue with medications for now since they are providing benefits, using them appropriately, and without side effects.     Discussed turmeric has not over-the-counter supplement to take for inflammatory based pain          - Therapy: Referral to Physical therapy for Lumbar stabilization, core strengthening, and a home exercise program.    - Labs:  Reviewed    - Imaging: Reviewed available imaging with patient and answered any questions they had regarding study.    - Consults/Referrals:  Physical therapy at [facility]    - Records:  Reviewed/Obtain old records from outside physicians and imaging    - Follow up visit: return to clinic in 10-12 weeks or as needed    - Counseled patient regarding the importance of activity modification and physical therapy    - This condition does not require this patient to take time off of work, and the primary goal of our Pain Management services is to improve the patient's functional capacity.    - Patient Questions: Answered all of the patient's questions regarding diagnosis, therapy, and treatment        The above plan and management options were discussed at length with patient. Patient is in agreement with the above and verbalized understanding.    I discussed the goals of  interventional chronic pain management with the patient on today's visit.  I explained the utility of injections for diagnostic and therapeutic purposes.  We discussed a multimodal approach to pain including treating the patient's given worst pain at any given time.  We will use a systematic approach to addressing pain.  We will also adopt a multimodal approach that includes injections, adjuvant medications, physical therapy, at times psychiatry.  There may be a limited role for opioid use intermittently in the treatment of pain, more particularly for acute pain although no one approach can be used as a sole treatment modality.    I emphasized the importance of regular exercise, core strengthening and stretching, diet and weight loss as a cornerstone of long-term pain management.      Nik Pinedo MD  Interventional Pain Management  [facility]    Disclaimer:  This note was prepared using voice recognition system and is likely to have sound alike errors that may have been overlooked even after proof reading.  Please call me with any questions         [1]   Social History  Socioeconomic History    Marital status:    Tobacco Use    Smoking status: Never    Smokeless tobacco: Never   Substance and Sexual Activity    Alcohol use: No    Drug use: No    Sexual activity: Yes     Partners: Male     Birth control/protection: None     Social Drivers of Health     Financial Resource Strain: Low Risk  (3/24/2025)    Overall Financial Resource Strain (CARDIA)     Difficulty of Paying Living Expenses: Not very hard   Food Insecurity: Patient Declined (3/24/2025)    Hunger Vital Sign     Worried About Running Out of Food in the Last Year: Patient declined     Ran Out of Food in the Last Year: Patient declined   Transportation Needs: No Transportation Needs (3/24/2025)    PRAPARE - Transportation     Lack of Transportation (Medical): No     Lack of Transportation (Non-Medical): No   Physical Activity:  Insufficiently Active (3/24/2025)    Exercise Vital Sign     Days of Exercise per Week: 3 days     Minutes of Exercise per Session: 10 min   Stress: Stress Concern Present (3/24/2025)    [employer] [place] [employer] - Occupational Stress Questionnaire     Feeling of Stress : To some extent   Housing Stability: Low Risk  (3/24/2025)    Housing Stability Vital Sign     Unable to Pay for Housing in the Last Year: No     Number of Times Moved in the Last Year: 0     Homeless in the Last Year: No

## 2025-09-03 DIAGNOSIS — E78.5 HYPERLIPIDEMIA LDL GOAL <100: ICD-10-CM

## 2025-09-04 RX ORDER — PRAVASTATIN SODIUM 40 MG/1
40 TABLET ORAL NIGHTLY
Qty: 90 TABLET | Refills: 0 | Status: SHIPPED | OUTPATIENT
Start: 2025-09-04